# Patient Record
Sex: MALE | Race: WHITE | NOT HISPANIC OR LATINO | Employment: OTHER | URBAN - NONMETROPOLITAN AREA
[De-identification: names, ages, dates, MRNs, and addresses within clinical notes are randomized per-mention and may not be internally consistent; named-entity substitution may affect disease eponyms.]

---

## 2017-03-08 RX ORDER — LISINOPRIL 10 MG/1
10 TABLET ORAL DAILY
Qty: 90 TABLET | Refills: 3 | Status: SHIPPED | OUTPATIENT
Start: 2017-03-08 | End: 2017-03-31 | Stop reason: SDUPTHER

## 2017-03-31 ENCOUNTER — OFFICE VISIT (OUTPATIENT)
Dept: INTERNAL MEDICINE | Facility: CLINIC | Age: 67
End: 2017-03-31

## 2017-03-31 VITALS
WEIGHT: 208.25 LBS | OXYGEN SATURATION: 98 % | HEIGHT: 73 IN | HEART RATE: 89 BPM | SYSTOLIC BLOOD PRESSURE: 120 MMHG | BODY MASS INDEX: 27.6 KG/M2 | TEMPERATURE: 98 F | DIASTOLIC BLOOD PRESSURE: 60 MMHG

## 2017-03-31 DIAGNOSIS — R18.8 CIRRHOSIS OF LIVER WITH ASCITES, UNSPECIFIED HEPATIC CIRRHOSIS TYPE (HCC): ICD-10-CM

## 2017-03-31 DIAGNOSIS — I10 ESSENTIAL HYPERTENSION: Primary | ICD-10-CM

## 2017-03-31 DIAGNOSIS — K74.60 CIRRHOSIS OF LIVER WITH ASCITES, UNSPECIFIED HEPATIC CIRRHOSIS TYPE (HCC): ICD-10-CM

## 2017-03-31 DIAGNOSIS — E10.42 TYPE 1 DIABETES MELLITUS WITH DIABETIC POLYNEUROPATHY (HCC): ICD-10-CM

## 2017-03-31 PROCEDURE — 99214 OFFICE O/P EST MOD 30 MIN: CPT | Performed by: INTERNAL MEDICINE

## 2017-03-31 RX ORDER — LISINOPRIL 10 MG/1
10 TABLET ORAL DAILY
Qty: 90 TABLET | Refills: 3 | Status: SHIPPED | OUTPATIENT
Start: 2017-03-31 | End: 2017-09-25 | Stop reason: SDUPTHER

## 2017-03-31 NOTE — PROGRESS NOTES
Subjective  Garrett Richard is a 66 y.o. male    HPI coming in for follow-up poorly controlled diabetes has not been taking Bydureon. Continues to use tobacco severe peripheral neuropathy denies chest pain or shortness of breath history of steatohepatitis with cirrhosis.    The following portions of the patient's history were reviewed and updated as appropriate: allergies, current medications, past family history, past medical history, past social history, past surgical history, and problem list.     Review of Systems   Constitutional: Negative.  Negative for activity change, appetite change, fatigue and fever.   HENT: Negative.  Negative for congestion, ear discharge, ear pain and trouble swallowing.    Eyes: Negative.  Negative for photophobia and visual disturbance.   Respiratory: Negative.  Negative for cough and shortness of breath.    Cardiovascular: Negative.  Negative for chest pain and palpitations.   Gastrointestinal: Negative.  Negative for abdominal distention, abdominal pain, constipation, diarrhea, nausea and vomiting.   Endocrine: Negative.    Genitourinary: Negative.  Negative for dysuria, hematuria and urgency.   Musculoskeletal: Positive for arthralgias. Negative for back pain, joint swelling and myalgias.   Skin: Negative.  Negative for color change and rash.   Allergic/Immunologic: Negative.    Neurological: Positive for dizziness and numbness. Negative for weakness, light-headedness and headaches.   Hematological: Negative.  Negative for adenopathy. Does not bruise/bleed easily.   Psychiatric/Behavioral: Negative.  Negative for agitation, confusion and dysphoric mood. The patient is not nervous/anxious.        Vitals:    03/31/17 1425   BP: 120/60   Pulse: 89   Temp: 98 °F (36.7 °C)   SpO2: 98%       Objective  Physical Exam   Constitutional: He is oriented to person, place, and time. He appears well-developed and well-nourished.   HENT:   Head: Normocephalic and atraumatic.   Eyes: EOM are  normal. Pupils are equal, round, and reactive to light.   Neck: Normal range of motion. Neck supple. No thyromegaly present.   Cardiovascular: Normal rate, regular rhythm and normal heart sounds.    Pulmonary/Chest: Effort normal and breath sounds normal. No respiratory distress.   Abdominal: Soft. Bowel sounds are normal. There is no tenderness.   Musculoskeletal: Normal range of motion. He exhibits edema.   Neurological: He is alert and oriented to person, place, and time.   Skin: Skin is warm and dry.   Psychiatric: He has a normal mood and affect. His behavior is normal. Judgment and thought content normal.   Nursing note and vitals reviewed.      Diagnoses and all orders for this visit:    Essential hypertension stable with current meds and low-salt diet    Type 1 diabetes mellitus with diabetic polyneuropathy patient currently on Lantus I have added on Humalog insulin 6-10 units with meals 3 times a day follow Accu-Chek readings at home    Cirrhosis of liver with ascites, unspecified hepatic cirrhosis type stable continue with salt restriction recent CMP okay no history of alcohol use    Other orders  -     lisinopril (PRINIVIL,ZESTRIL) 10 MG tablet; Take 1 tablet by mouth Daily.

## 2017-05-31 ENCOUNTER — TELEPHONE (OUTPATIENT)
Dept: INTERNAL MEDICINE | Facility: CLINIC | Age: 67
End: 2017-05-31

## 2017-06-19 ENCOUNTER — OFFICE VISIT (OUTPATIENT)
Dept: INTERNAL MEDICINE | Facility: CLINIC | Age: 67
End: 2017-06-19

## 2017-06-19 VITALS
HEART RATE: 85 BPM | SYSTOLIC BLOOD PRESSURE: 120 MMHG | HEIGHT: 73 IN | OXYGEN SATURATION: 96 % | TEMPERATURE: 98.6 F | DIASTOLIC BLOOD PRESSURE: 65 MMHG | BODY MASS INDEX: 26.94 KG/M2 | WEIGHT: 203.25 LBS

## 2017-06-19 DIAGNOSIS — E10.42 TYPE 1 DIABETES MELLITUS WITH DIABETIC POLYNEUROPATHY (HCC): ICD-10-CM

## 2017-06-19 DIAGNOSIS — I10 ESSENTIAL HYPERTENSION: ICD-10-CM

## 2017-06-19 DIAGNOSIS — R18.8 CIRRHOSIS OF LIVER WITH ASCITES, UNSPECIFIED HEPATIC CIRRHOSIS TYPE (HCC): Primary | ICD-10-CM

## 2017-06-19 DIAGNOSIS — K74.60 CIRRHOSIS OF LIVER WITH ASCITES, UNSPECIFIED HEPATIC CIRRHOSIS TYPE (HCC): Primary | ICD-10-CM

## 2017-06-19 PROCEDURE — 99214 OFFICE O/P EST MOD 30 MIN: CPT | Performed by: INTERNAL MEDICINE

## 2017-06-19 NOTE — PROGRESS NOTES
Subjective  Garrett Richard is a 66 y.o. male    HPI coming in for follow-up history of type I diabetes on insulin basal along with rapid acting insulin with meals with reasonable good blood sugar control denies hypoglycemic episodes history of cirrhosis without significant fluid overload. Has peripheral neuropathy with occasional lightheadedness dizziness thought to be secondary to orthostasis    The following portions of the patient's history were reviewed and updated as appropriate: allergies, current medications, past family history, past medical history, past social history, past surgical history, and problem list.     Review of Systems   Constitutional: Negative.  Negative for activity change, appetite change, fatigue and fever.   HENT: Negative.  Negative for congestion, ear discharge, ear pain and trouble swallowing.    Eyes: Negative.  Negative for photophobia and visual disturbance.   Respiratory: Negative.  Negative for cough and shortness of breath.    Cardiovascular: Negative.  Negative for chest pain and palpitations.   Gastrointestinal: Negative.  Negative for abdominal distention, abdominal pain, constipation, diarrhea, nausea and vomiting.   Endocrine: Negative.    Genitourinary: Negative.  Negative for dysuria, hematuria and urgency.   Musculoskeletal: Positive for arthralgias. Negative for back pain, joint swelling and myalgias.   Skin: Negative.  Negative for color change and rash.   Allergic/Immunologic: Negative.    Neurological: Positive for dizziness and numbness. Negative for weakness, light-headedness and headaches.   Hematological: Negative.  Negative for adenopathy. Does not bruise/bleed easily.   Psychiatric/Behavioral: Negative.  Negative for agitation, confusion and dysphoric mood. The patient is not nervous/anxious.        Vitals:    06/19/17 1455   BP: 120/65   Pulse: 85   Temp: 98.6 °F (37 °C)   SpO2: 96%       Objective  Physical Exam   Constitutional: He is oriented to person,  place, and time. He appears well-developed and well-nourished. No distress.   HENT:   Nose: Nose normal.   Mouth/Throat: Oropharynx is clear and moist.   Eyes: Conjunctivae and EOM are normal. No scleral icterus.   Neck: No tracheal deviation present. No thyromegaly present.   Cardiovascular: Normal rate and regular rhythm.  Exam reveals no friction rub.    No murmur heard.  Pulmonary/Chest: No respiratory distress. He has no wheezes. He has no rales.   Abdominal: Soft. He exhibits no distension and no mass. There is no tenderness. There is no guarding.   Musculoskeletal: Normal range of motion. He exhibits no deformity.   Lymphadenopathy:     He has no cervical adenopathy.   Neurological: He is alert and oriented to person, place, and time. He has normal reflexes. No cranial nerve deficit. Coordination normal.   Skin: Skin is warm and dry. No rash noted. No erythema.   vitilligo   Psychiatric: He has a normal mood and affect. His behavior is normal. Judgment and thought content normal.       Diagnoses and all orders for this visit:    Cirrhosis of liver with ascites, unspecified hepatic cirrhosis type continue with salt and fluid restriction follow liver ultrasound    Essential hypertension stable with current meds and low-salt diet    Type 1 diabetes mellitus with diabetic polyneuropathy continue with current insulin regimen follow A1c

## 2017-06-20 RX ORDER — IBUPROFEN 200 MG
1 TABLET ORAL 3 TIMES DAILY
Qty: 200 EACH | Refills: 3 | Status: SHIPPED | OUTPATIENT
Start: 2017-06-20 | End: 2019-04-25 | Stop reason: SDUPTHER

## 2017-07-11 ENCOUNTER — HOSPITAL ENCOUNTER (OUTPATIENT)
Dept: ULTRASOUND IMAGING | Facility: HOSPITAL | Age: 67
Discharge: HOME | End: 2017-07-11
Attending: INTERNAL MEDICINE | Admitting: INTERNAL MEDICINE

## 2017-07-11 DIAGNOSIS — E10.42 TYPE 1 DIABETES MELLITUS WITH DIABETIC POLYNEUROPATHY (HCC): ICD-10-CM

## 2017-07-11 DIAGNOSIS — R18.8 CIRRHOSIS OF LIVER WITH ASCITES, UNSPECIFIED HEPATIC CIRRHOSIS TYPE (HCC): ICD-10-CM

## 2017-07-11 DIAGNOSIS — K74.60 CIRRHOSIS OF LIVER WITH ASCITES, UNSPECIFIED HEPATIC CIRRHOSIS TYPE (HCC): ICD-10-CM

## 2017-07-11 PROCEDURE — 76705 ECHO EXAM OF ABDOMEN: CPT

## 2017-07-18 ENCOUNTER — TELEPHONE (OUTPATIENT)
Dept: INTERNAL MEDICINE | Facility: CLINIC | Age: 67
End: 2017-07-18

## 2017-07-18 NOTE — TELEPHONE ENCOUNTER
----- Message from Pierce Lubin MD sent at 7/12/2017 10:28 AM EDT -----  Please inform patient ultrasound are okay.

## 2017-07-27 ENCOUNTER — TELEPHONE (OUTPATIENT)
Dept: INTERNAL MEDICINE | Facility: CLINIC | Age: 67
End: 2017-07-27

## 2017-07-27 NOTE — TELEPHONE ENCOUNTER
PATIENT NEEDS REFILL, BUT HIS INSURANCE WILL NOT PAY FOR HIM TO GET THIS DONE UNTIL Saturday, AND HE'LL BE OUT. PLEASE RETURN CALL TO DISCUSS WHAT HE NEEDS TO DO.

## 2017-07-27 NOTE — TELEPHONE ENCOUNTER
I have placed some samples in the lab fridge for the patient, He has been informed to pick these up.

## 2017-09-25 ENCOUNTER — OFFICE VISIT (OUTPATIENT)
Dept: INTERNAL MEDICINE | Facility: CLINIC | Age: 67
End: 2017-09-25

## 2017-09-25 VITALS
BODY MASS INDEX: 26.11 KG/M2 | WEIGHT: 197 LBS | TEMPERATURE: 98.5 F | SYSTOLIC BLOOD PRESSURE: 122 MMHG | OXYGEN SATURATION: 95 % | HEIGHT: 73 IN | RESPIRATION RATE: 12 BRPM | DIASTOLIC BLOOD PRESSURE: 60 MMHG | HEART RATE: 82 BPM

## 2017-09-25 DIAGNOSIS — K74.60 CIRRHOSIS OF LIVER WITH ASCITES, UNSPECIFIED HEPATIC CIRRHOSIS TYPE (HCC): ICD-10-CM

## 2017-09-25 DIAGNOSIS — I10 ESSENTIAL HYPERTENSION: ICD-10-CM

## 2017-09-25 DIAGNOSIS — R18.8 CIRRHOSIS OF LIVER WITH ASCITES, UNSPECIFIED HEPATIC CIRRHOSIS TYPE (HCC): ICD-10-CM

## 2017-09-25 DIAGNOSIS — E10.42 TYPE 1 DIABETES MELLITUS WITH DIABETIC POLYNEUROPATHY (HCC): Primary | ICD-10-CM

## 2017-09-25 LAB
POC CREATININE URINE: 50
POC MICROALBUMIN URINE: 10

## 2017-09-25 PROCEDURE — 82044 UR ALBUMIN SEMIQUANTITATIVE: CPT | Performed by: INTERNAL MEDICINE

## 2017-09-25 PROCEDURE — 99214 OFFICE O/P EST MOD 30 MIN: CPT | Performed by: INTERNAL MEDICINE

## 2017-09-25 RX ORDER — GABAPENTIN 300 MG/1
300 CAPSULE ORAL 2 TIMES DAILY
Qty: 180 CAPSULE | Refills: 1 | Status: SHIPPED | OUTPATIENT
Start: 2017-09-25 | End: 2018-06-22

## 2017-09-25 RX ORDER — LISINOPRIL 10 MG/1
10 TABLET ORAL DAILY
Qty: 90 TABLET | Refills: 3 | Status: SHIPPED | OUTPATIENT
Start: 2017-09-25 | End: 2018-03-21 | Stop reason: SDUPTHER

## 2017-09-25 NOTE — PROGRESS NOTES
"Subjective   Garrett Richard is a 67 y.o. male.     History of Present Illness   Here today for 6 month follow up for his chronic insulin dependent DM, HTN, HLD, cirrhosis and peripheral neuropathy. He reports his blood sugar to be \"all over the place\". He reports checking it at home, but does not have a record with him. He reports a diet consisting mainly of beef sticks, protein, cottage cheese and fruit. He has had a 6 lb weight loss since his last visit. He has an inadequate exercise regimen due to \"problems with my balance\". Pt reports falling several times over the past year due to losing his balance. He states \"if I can't see my feet, I will lose my balance and fall\". No associated syncope or episodes. Denies hypoglycemic episodes.     The following portions of the patient's history were reviewed and updated as appropriate: allergies, current medications, past family history, past medical history, past social history, past surgical history and problem list.    Review of Systems   Constitutional: Negative.  Negative for activity change, appetite change, fatigue and fever.   HENT: Negative.  Negative for congestion, ear discharge, ear pain and trouble swallowing.    Eyes: Negative.  Negative for photophobia and visual disturbance.   Respiratory: Negative.  Negative for cough and shortness of breath.    Cardiovascular: Negative.  Negative for chest pain and palpitations.   Gastrointestinal: Negative.  Negative for abdominal distention, abdominal pain, constipation, diarrhea, nausea and vomiting.   Endocrine: Negative.    Genitourinary: Negative.  Negative for dysuria, hematuria and urgency.   Musculoskeletal: Positive for arthralgias. Negative for back pain, joint swelling and myalgias.   Skin: Negative.  Negative for color change and rash.   Allergic/Immunologic: Negative.    Neurological: Positive for dizziness and numbness (neuropathy bilateral feet). Negative for weakness, light-headedness and headaches. "   Hematological: Negative.  Negative for adenopathy. Does not bruise/bleed easily.   Psychiatric/Behavioral: Negative.  Negative for agitation, confusion and dysphoric mood. The patient is not nervous/anxious.        Objective   Physical Exam   Constitutional: He is oriented to person, place, and time. He appears well-developed and well-nourished. No distress.   HENT:   Nose: Nose normal.   Mouth/Throat: Oropharynx is clear and moist.   Eyes: Conjunctivae and EOM are normal. No scleral icterus.   Neck: No tracheal deviation present. No thyromegaly present.   Cardiovascular: Normal rate and regular rhythm.  Exam reveals no friction rub.    No murmur heard.  Pulmonary/Chest: No respiratory distress. He has no wheezes. He has no rales.   Abdominal: Soft. He exhibits no distension and no mass. There is no tenderness. There is no guarding.   Musculoskeletal: Normal range of motion. He exhibits no deformity.    Garrett had a diabetic foot exam performed today.    Vascular Status -  His exam exhibits right foot vasculature normal. His exam exhibits no right foot edema. His exam exhibits left foot vasculature normal. His exam exhibits no left foot edema.   Skin Integrity  -  His right foot skin is intact.     Garrett 's left foot skin is intact. .  Lymphadenopathy:     He has no cervical adenopathy.   Neurological: He is alert and oriented to person, place, and time. He has normal strength and normal reflexes. No cranial nerve deficit. Coordination normal.   Skin: Skin is warm and dry. No rash noted. No erythema.   vitilligo   Psychiatric: He has a normal mood and affect. His behavior is normal. Judgment and thought content normal.       Assessment/Plan   Garrett was seen today for hyperlipidemia, hypertension and diabetes.    Diagnoses and all orders for this visit:    Type 1 diabetes mellitus with diabetic polyneuropathy - Check A1C and urine for microablumin today. Continue with insulin. ADA diet. Adequate exercise,  moderate intensity most days of the week (goal of 150 minutes per week). Monitor and record glucose at home. Annual eye exam (scheduled for 11/6/17). Good diabetic foot care with annual foot exam. Continue with Neurontin for neuropathy. Fall precautions.    Cirrhosis of liver with ascites, unspecified hepatic cirrhosis type - stable. Check CBC, CMP today. Liver US 7/11/17 was unchanged from previous exam (9/21/16).    Essential hypertension - stable. Continue with antihypertensive medications. Low sodium diet. Increase physical activity as noted above.    Other orders  -     lisinopril (PRINIVIL,ZESTRIL) 10 MG tablet; Take 1 tablet by mouth Daily.  -     gabapentin (NEURONTIN) 300 MG capsule; Take 1 capsule by mouth 2 (Two) Times a Day.    This encounter was scribed by Greg Hilton, as dictated by my teaching physician, Dr. Lubin, in his presence.      1.  This note accurately reflects work and decisions made by me.

## 2017-09-26 LAB
ALBUMIN SERPL-MCNC: 3.6 G/DL (ref 3.5–5)
ALBUMIN/GLOB SERPL: 0.9 G/DL (ref 1–2)
ALP SERPL-CCNC: 114 U/L (ref 38–126)
ALT SERPL-CCNC: 50 U/L (ref 13–69)
AST SERPL-CCNC: 53 U/L (ref 15–46)
BILIRUB SERPL-MCNC: 1.1 MG/DL (ref 0.2–1.3)
BUN SERPL-MCNC: 18 MG/DL (ref 7–20)
BUN/CREAT SERPL: 20 (ref 6.3–21.9)
CALCIUM SERPL-MCNC: 9.7 MG/DL (ref 8.4–10.2)
CHLORIDE SERPL-SCNC: 103 MMOL/L (ref 98–107)
CO2 SERPL-SCNC: 23 MMOL/L (ref 26–30)
CREAT SERPL-MCNC: 0.9 MG/DL (ref 0.6–1.3)
ERYTHROCYTE [DISTWIDTH] IN BLOOD BY AUTOMATED COUNT: 15.9 % (ref 11.5–14.5)
GLOBULIN SER CALC-MCNC: 4 GM/DL
GLUCOSE SERPL-MCNC: 440 MG/DL (ref 74–98)
HBA1C MFR BLD: >14.6 %
HCT VFR BLD AUTO: 33.3 % (ref 42–52)
HGB BLD-MCNC: 10.2 G/DL (ref 14–18)
LDLC SERPL DIRECT ASSAY-MCNC: 103 MG/DL (ref 0–99)
MCH RBC QN AUTO: 25.4 PG (ref 27–31)
MCHC RBC AUTO-ENTMCNC: 30.6 G/DL (ref 30–37)
MCV RBC AUTO: 83 FL (ref 80–94)
PLATELET # BLD AUTO: 69 10*3/MM3 (ref 130–400)
POTASSIUM SERPL-SCNC: 4.8 MMOL/L (ref 3.5–5.1)
PROT SERPL-MCNC: 7.6 G/DL (ref 6.3–8.2)
RBC # BLD AUTO: 4.01 10*6/MM3 (ref 4.7–6.1)
SODIUM SERPL-SCNC: 137 MMOL/L (ref 137–145)
WBC # BLD AUTO: 4.59 10*3/MM3 (ref 4.8–10.8)

## 2017-11-22 ENCOUNTER — TELEPHONE (OUTPATIENT)
Dept: INTERNAL MEDICINE | Facility: CLINIC | Age: 67
End: 2017-11-22

## 2017-11-22 RX ORDER — BLOOD-GLUCOSE METER
1 EACH MISCELLANEOUS 4 TIMES DAILY
Qty: 1 KIT | Refills: 0 | Status: SHIPPED | OUTPATIENT
Start: 2017-11-22 | End: 2018-01-29

## 2017-11-22 NOTE — TELEPHONE ENCOUNTER
Patient stopped by the office and stated he needs a new glucometer and supplies. Please sent in a new order for him to [facility] in [place].

## 2017-11-30 ENCOUNTER — TELEPHONE (OUTPATIENT)
Dept: INTERNAL MEDICINE | Facility: CLINIC | Age: 67
End: 2017-11-30

## 2017-11-30 NOTE — TELEPHONE ENCOUNTER
RETURNED PATIENTS CALL FROM A VM HE LEFT. NO ANSWER. LEFT VM REQUESTING A CALL BACK WITH SOME INFO ON WHAT HE MAY NEED.

## 2017-12-11 RX ORDER — PRENATAL VIT 91/IRON/FOLIC/DHA 28-975-200
COMBINATION PACKAGE (EA) ORAL 2 TIMES DAILY
Qty: 24 G | Refills: 2 | Status: SHIPPED | OUTPATIENT
Start: 2017-12-11 | End: 2018-01-29

## 2017-12-11 NOTE — TELEPHONE ENCOUNTER
Patient called requesting a refill on his insulin pen needles. This has been sent to [facility]

## 2018-01-29 ENCOUNTER — OFFICE VISIT (OUTPATIENT)
Dept: INTERNAL MEDICINE | Facility: CLINIC | Age: 68
End: 2018-01-29

## 2018-01-29 ENCOUNTER — APPOINTMENT (OUTPATIENT)
Dept: GENERAL RADIOLOGY | Facility: HOSPITAL | Age: 68
End: 2018-01-29

## 2018-01-29 ENCOUNTER — HOSPITAL ENCOUNTER (EMERGENCY)
Facility: HOSPITAL | Age: 68
Discharge: HOME | End: 2018-01-29
Attending: EMERGENCY MEDICINE | Admitting: EMERGENCY MEDICINE

## 2018-01-29 VITALS
DIASTOLIC BLOOD PRESSURE: 70 MMHG | HEIGHT: 72 IN | RESPIRATION RATE: 18 BRPM | TEMPERATURE: 98.8 F | HEART RATE: 99 BPM | BODY MASS INDEX: 25.47 KG/M2 | WEIGHT: 188 LBS | OXYGEN SATURATION: 94 % | SYSTOLIC BLOOD PRESSURE: 133 MMHG

## 2018-01-29 VITALS
TEMPERATURE: 98.4 F | WEIGHT: 188 LBS | DIASTOLIC BLOOD PRESSURE: 60 MMHG | HEIGHT: 73 IN | SYSTOLIC BLOOD PRESSURE: 110 MMHG | OXYGEN SATURATION: 96 % | BODY MASS INDEX: 24.92 KG/M2 | HEART RATE: 95 BPM

## 2018-01-29 DIAGNOSIS — M62.81 GENERALIZED MUSCLE WEAKNESS: ICD-10-CM

## 2018-01-29 DIAGNOSIS — N30.00 ACUTE CYSTITIS WITHOUT HEMATURIA: ICD-10-CM

## 2018-01-29 DIAGNOSIS — L03.116 CELLULITIS OF LEFT ANKLE: Primary | ICD-10-CM

## 2018-01-29 DIAGNOSIS — M25.572 ACUTE LEFT ANKLE PAIN: Primary | ICD-10-CM

## 2018-01-29 LAB
ALBUMIN SERPL-MCNC: 3.2 G/DL (ref 3.5–5)
ALBUMIN/GLOB SERPL: 0.8 G/DL (ref 1–2)
ALP SERPL-CCNC: 101 U/L (ref 38–126)
ALT SERPL W P-5'-P-CCNC: 39 U/L (ref 13–69)
ANION GAP SERPL CALCULATED.3IONS-SCNC: 14.2 MMOL/L
AST SERPL-CCNC: 53 U/L (ref 15–46)
BACTERIA UR QL AUTO: ABNORMAL /HPF
BASOPHILS # BLD AUTO: 0.02 10*3/MM3 (ref 0–0.2)
BASOPHILS NFR BLD AUTO: 0.3 % (ref 0–2.5)
BILIRUB SERPL-MCNC: 1.3 MG/DL (ref 0.2–1.3)
BILIRUB UR QL STRIP: NEGATIVE
BUN BLD-MCNC: 19 MG/DL (ref 7–20)
BUN/CREAT SERPL: 21.1 (ref 6.3–21.9)
CALCIUM SPEC-SCNC: 9 MG/DL (ref 8.4–10.2)
CHLORIDE SERPL-SCNC: 101 MMOL/L (ref 98–107)
CLARITY UR: ABNORMAL
CO2 SERPL-SCNC: 23 MMOL/L (ref 26–30)
COLOR UR: YELLOW
CREAT BLD-MCNC: 0.9 MG/DL (ref 0.6–1.3)
CRP SERPL-MCNC: 7.1 MG/DL (ref 0–1)
D-LACTATE SERPL-SCNC: 1.2 MMOL/L (ref 0.5–2)
DEPRECATED RDW RBC AUTO: 46.6 FL (ref 37–54)
EOSINOPHIL # BLD AUTO: 0.06 10*3/MM3 (ref 0–0.7)
EOSINOPHIL NFR BLD AUTO: 1 % (ref 0–7)
ERYTHROCYTE [DISTWIDTH] IN BLOOD BY AUTOMATED COUNT: 15.3 % (ref 11.5–14.5)
FLUAV AG NPH QL: NEGATIVE
FLUBV AG NPH QL IA: NEGATIVE
GFR SERPL CREATININE-BSD FRML MDRD: 85 ML/MIN/1.73
GLOBULIN UR ELPH-MCNC: 3.9 GM/DL
GLUCOSE BLD-MCNC: 318 MG/DL (ref 74–98)
GLUCOSE UR STRIP-MCNC: ABNORMAL MG/DL
HCT VFR BLD AUTO: 29.7 % (ref 42–52)
HGB BLD-MCNC: 9.8 G/DL (ref 14–18)
HGB UR QL STRIP.AUTO: ABNORMAL
HOLD SPECIMEN: NORMAL
HOLD SPECIMEN: NORMAL
HYALINE CASTS UR QL AUTO: ABNORMAL /LPF
IMM GRANULOCYTES # BLD: 0.01 10*3/MM3 (ref 0–0.06)
IMM GRANULOCYTES NFR BLD: 0.2 % (ref 0–0.6)
KETONES UR QL STRIP: ABNORMAL
LEUKOCYTE ESTERASE UR QL STRIP.AUTO: ABNORMAL
LYMPHOCYTES # BLD AUTO: 0.66 10*3/MM3 (ref 0.6–3.4)
LYMPHOCYTES NFR BLD AUTO: 11.3 % (ref 10–50)
MCH RBC QN AUTO: 27.5 PG (ref 27–31)
MCHC RBC AUTO-ENTMCNC: 33 G/DL (ref 30–37)
MCV RBC AUTO: 83.4 FL (ref 80–94)
MONOCYTES # BLD AUTO: 0.6 10*3/MM3 (ref 0–0.9)
MONOCYTES NFR BLD AUTO: 10.3 % (ref 0–12)
NEUTROPHILS # BLD AUTO: 4.5 10*3/MM3 (ref 2–6.9)
NEUTROPHILS NFR BLD AUTO: 76.9 % (ref 37–80)
NITRITE UR QL STRIP: POSITIVE
NRBC BLD MANUAL-RTO: 0 /100 WBC (ref 0–0)
PH UR STRIP.AUTO: 6 [PH] (ref 5–8)
PLATELET # BLD AUTO: 59 10*3/MM3 (ref 130–400)
PMV BLD AUTO: 11.1 FL (ref 6–12)
POTASSIUM BLD-SCNC: 4.2 MMOL/L (ref 3.5–5.1)
PROT SERPL-MCNC: 7.1 G/DL (ref 6.3–8.2)
PROT UR QL STRIP: ABNORMAL
RBC # BLD AUTO: 3.56 10*6/MM3 (ref 4.7–6.1)
RBC # UR: ABNORMAL /HPF
REF LAB TEST METHOD: ABNORMAL
RENAL EPI CELLS #/AREA URNS HPF: ABNORMAL /HPF
SODIUM BLD-SCNC: 134 MMOL/L (ref 137–145)
SP GR UR STRIP: 1.02 (ref 1–1.03)
SQUAMOUS #/AREA URNS HPF: ABNORMAL /HPF
TROPONIN I SERPL-MCNC: <0.012 NG/ML (ref 0–0.03)
UROBILINOGEN UR QL STRIP: ABNORMAL
WBC NRBC COR # BLD: 5.85 10*3/MM3 (ref 4.8–10.8)
WBC UR QL AUTO: ABNORMAL /HPF
WHOLE BLOOD HOLD SPECIMEN: NORMAL
WHOLE BLOOD HOLD SPECIMEN: NORMAL

## 2018-01-29 PROCEDURE — 86140 C-REACTIVE PROTEIN: CPT | Performed by: EMERGENCY MEDICINE

## 2018-01-29 PROCEDURE — 87147 CULTURE TYPE IMMUNOLOGIC: CPT | Performed by: EMERGENCY MEDICINE

## 2018-01-29 PROCEDURE — 93005 ELECTROCARDIOGRAM TRACING: CPT | Performed by: EMERGENCY MEDICINE

## 2018-01-29 PROCEDURE — 87804 INFLUENZA ASSAY W/OPTIC: CPT | Performed by: EMERGENCY MEDICINE

## 2018-01-29 PROCEDURE — 87086 URINE CULTURE/COLONY COUNT: CPT | Performed by: EMERGENCY MEDICINE

## 2018-01-29 PROCEDURE — 96361 HYDRATE IV INFUSION ADD-ON: CPT

## 2018-01-29 PROCEDURE — 96365 THER/PROPH/DIAG IV INF INIT: CPT

## 2018-01-29 PROCEDURE — 87040 BLOOD CULTURE FOR BACTERIA: CPT | Performed by: EMERGENCY MEDICINE

## 2018-01-29 PROCEDURE — 96375 TX/PRO/DX INJ NEW DRUG ADDON: CPT

## 2018-01-29 PROCEDURE — 81001 URINALYSIS AUTO W/SCOPE: CPT | Performed by: EMERGENCY MEDICINE

## 2018-01-29 PROCEDURE — 99213 OFFICE O/P EST LOW 20 MIN: CPT | Performed by: INTERNAL MEDICINE

## 2018-01-29 PROCEDURE — 87186 SC STD MICRODIL/AGAR DIL: CPT | Performed by: EMERGENCY MEDICINE

## 2018-01-29 PROCEDURE — 84484 ASSAY OF TROPONIN QUANT: CPT | Performed by: EMERGENCY MEDICINE

## 2018-01-29 PROCEDURE — 83605 ASSAY OF LACTIC ACID: CPT | Performed by: EMERGENCY MEDICINE

## 2018-01-29 PROCEDURE — 85025 COMPLETE CBC W/AUTO DIFF WBC: CPT | Performed by: EMERGENCY MEDICINE

## 2018-01-29 PROCEDURE — 71045 X-RAY EXAM CHEST 1 VIEW: CPT

## 2018-01-29 PROCEDURE — 99284 EMERGENCY DEPT VISIT MOD MDM: CPT

## 2018-01-29 PROCEDURE — 87077 CULTURE AEROBIC IDENTIFY: CPT | Performed by: EMERGENCY MEDICINE

## 2018-01-29 PROCEDURE — 25010000002 KETOROLAC TROMETHAMINE PER 15 MG: Performed by: EMERGENCY MEDICINE

## 2018-01-29 PROCEDURE — 80053 COMPREHEN METABOLIC PANEL: CPT | Performed by: EMERGENCY MEDICINE

## 2018-01-29 RX ORDER — CLINDAMYCIN PHOSPHATE 600 MG/50ML
600 INJECTION, SOLUTION INTRAVENOUS ONCE
Status: COMPLETED | OUTPATIENT
Start: 2018-01-29 | End: 2018-01-29

## 2018-01-29 RX ORDER — KETOROLAC TROMETHAMINE 30 MG/ML
30 INJECTION, SOLUTION INTRAMUSCULAR; INTRAVENOUS ONCE
Status: COMPLETED | OUTPATIENT
Start: 2018-01-29 | End: 2018-01-29

## 2018-01-29 RX ORDER — HYDROCODONE BITARTRATE AND ACETAMINOPHEN 5; 325 MG/1; MG/1
1 TABLET ORAL EVERY 6 HOURS PRN
Qty: 12 TABLET | Refills: 0 | Status: SHIPPED | OUTPATIENT
Start: 2018-01-29 | End: 2018-03-21

## 2018-01-29 RX ORDER — SODIUM CHLORIDE 0.9 % (FLUSH) 0.9 %
10 SYRINGE (ML) INJECTION AS NEEDED
Status: DISCONTINUED | OUTPATIENT
Start: 2018-01-29 | End: 2018-01-30 | Stop reason: HOSPADM

## 2018-01-29 RX ORDER — CLINDAMYCIN HYDROCHLORIDE 300 MG/1
300 CAPSULE ORAL 4 TIMES DAILY
Qty: 40 CAPSULE | Refills: 0 | Status: SHIPPED | OUTPATIENT
Start: 2018-01-29 | End: 2018-03-21

## 2018-01-29 RX ORDER — ACETAMINOPHEN 325 MG/1
975 TABLET ORAL ONCE
Status: COMPLETED | OUTPATIENT
Start: 2018-01-29 | End: 2018-01-29

## 2018-01-29 RX ADMIN — SODIUM CHLORIDE 1000 ML: 9 INJECTION, SOLUTION INTRAVENOUS at 21:32

## 2018-01-29 RX ADMIN — ACETAMINOPHEN 975 MG: 325 TABLET, FILM COATED ORAL at 21:32

## 2018-01-29 RX ADMIN — KETOROLAC TROMETHAMINE 30 MG: 30 INJECTION, SOLUTION INTRAMUSCULAR at 22:57

## 2018-01-29 RX ADMIN — CLINDAMYCIN PHOSPHATE 600 MG: 600 INJECTION, SOLUTION INTRAVENOUS at 22:58

## 2018-01-29 NOTE — PROGRESS NOTES
"Subjective  Garrett Richard is a 67 y.o. male    HPI coming in accompanied by his wife patient had an accidental fall 3 days ago while stepping out of his car and developed some pain in his left ankle laterally. He did not seek any medical attention at that time since then his pain has increased there is now some associated swelling and redness in the ankle region he is not able to bear weight fully on his left foot. He is using NSAIDs on a when necessary basis only. History of diabetes and cirrhosis    The following portions of the patient's history were reviewed and updated as appropriate: allergies, current medications, past family history, past medical history, past social history, past surgical history, and problem list.     Review of Systems   Constitutional: Negative.  Negative for activity change, appetite change, fatigue and fever.   HENT: Negative for congestion, ear discharge, ear pain and trouble swallowing.    Eyes: Negative for photophobia and visual disturbance.   Respiratory: Negative for cough and shortness of breath.    Cardiovascular: Negative for chest pain and palpitations.   Gastrointestinal: Negative for abdominal distention, abdominal pain, constipation, diarrhea, nausea and vomiting.   Endocrine: Negative.    Genitourinary: Negative for dysuria, hematuria and urgency.   Musculoskeletal: Positive for joint swelling. Negative for arthralgias, back pain and myalgias.   Skin: Negative for color change and rash.   Allergic/Immunologic: Negative.    Neurological: Negative for dizziness, weakness, light-headedness and headaches.   Hematological: Negative for adenopathy. Does not bruise/bleed easily.   Psychiatric/Behavioral: Negative for agitation, confusion and dysphoric mood. The patient is not nervous/anxious.        Visit Vitals   • /60   • Pulse 95   • Temp 98.4 °F (36.9 °C)   • Ht 185.4 cm (73\")   • Wt 85.3 kg (188 lb)   • SpO2 96%   • BMI 24.8 kg/m2       Objective  Physical Exam "   Constitutional: He is oriented to person, place, and time. He appears well-developed and well-nourished. No distress.   HENT:   Nose: Nose normal.   Mouth/Throat: Oropharynx is clear and moist.   Eyes: Conjunctivae and EOM are normal. No scleral icterus.   Neck: No tracheal deviation present. No thyromegaly present.   Cardiovascular: Normal rate and regular rhythm.  Exam reveals no friction rub.    No murmur heard.  Pulmonary/Chest: No respiratory distress. He has no wheezes. He has no rales.   Abdominal: Soft. He exhibits no distension and no mass. There is no tenderness. There is no guarding.   Musculoskeletal: Normal range of motion. He exhibits tenderness and deformity.   Lymphadenopathy:     He has no cervical adenopathy.   Neurological: He is alert and oriented to person, place, and time. He has normal reflexes. No cranial nerve deficit. Coordination normal.   Skin: Skin is warm and dry. No rash noted. No erythema.   Psychiatric: He has a normal mood and affect. His behavior is normal. Judgment and thought content normal.       Diagnoses and all orders for this visit:    Acute left ankle pain. With significant tenderness noted laterally suspicious for fibular fracture. We will need to refer to  orthopedics. Wife prefers Dr. Gaston referral made patient to be seen today at 1:30. Will hold off on imaging studies here.

## 2018-02-01 ENCOUNTER — TELEPHONE (OUTPATIENT)
Dept: INTERNAL MEDICINE | Facility: CLINIC | Age: 68
End: 2018-02-01

## 2018-02-01 LAB
BACTERIA SPEC AEROBE CULT: ABNORMAL
BACTERIA SPEC AEROBE CULT: ABNORMAL

## 2018-02-02 ENCOUNTER — OFFICE VISIT (OUTPATIENT)
Dept: INTERNAL MEDICINE | Facility: CLINIC | Age: 68
End: 2018-02-02

## 2018-02-02 VITALS
HEIGHT: 72 IN | OXYGEN SATURATION: 96 % | HEART RATE: 89 BPM | TEMPERATURE: 100 F | DIASTOLIC BLOOD PRESSURE: 70 MMHG | SYSTOLIC BLOOD PRESSURE: 130 MMHG | BODY MASS INDEX: 25.47 KG/M2 | WEIGHT: 188 LBS

## 2018-02-02 DIAGNOSIS — R50.81 FEVER IN OTHER DISEASES: Primary | ICD-10-CM

## 2018-02-02 PROCEDURE — 99213 OFFICE O/P EST LOW 20 MIN: CPT | Performed by: INTERNAL MEDICINE

## 2018-02-02 RX ORDER — LEVOFLOXACIN 500 MG/1
500 TABLET, FILM COATED ORAL DAILY
Qty: 7 TABLET | Refills: 0 | Status: SHIPPED | OUTPATIENT
Start: 2018-02-02 | End: 2018-03-21

## 2018-02-02 NOTE — PROGRESS NOTES
"Subjective  Garrett Richard is a 67 y.o. male    HPI coming in for follow-up on his left ankle injury with pain and swelling. Was evaluated by orthopedic surgeon x-ray did not show evidence of fracture appears to have had a bad sprain. He is wearing a boot now has had some low-grade fever was evaluated in the emergency room where a chest x-ray was unremarkable CBC without white count elevation. Urine analysis with evidence of infection. Culture grew staph epidermidis sensitive to Augmentin he is currently on Augmentin but continues to have low-grade fever    The following portions of the patient's history were reviewed and updated as appropriate: allergies, current medications, past family history, past medical history, past social history, past surgical history, and problem list.     Review of Systems   Constitutional: Negative.  Negative for activity change, appetite change, fatigue and fever.   HENT: Negative for congestion, ear discharge, ear pain and trouble swallowing.    Eyes: Negative for photophobia and visual disturbance.   Respiratory: Negative for cough and shortness of breath.    Cardiovascular: Negative for chest pain and palpitations.   Gastrointestinal: Negative for abdominal distention, abdominal pain, constipation, diarrhea, nausea and vomiting.   Endocrine: Negative.    Genitourinary: Negative for dysuria, hematuria and urgency.   Musculoskeletal: Positive for arthralgias. Negative for back pain, joint swelling and myalgias.        Lt ankle pain   Skin: Negative for color change and rash.   Allergic/Immunologic: Negative.    Neurological: Negative for dizziness, weakness, light-headedness and headaches.   Hematological: Negative for adenopathy. Does not bruise/bleed easily.   Psychiatric/Behavioral: Negative for agitation, confusion and dysphoric mood. The patient is not nervous/anxious.        Visit Vitals   • /70   • Pulse 89   • Temp 100 °F (37.8 °C)   • Ht 182.9 cm (72\")   • Wt 85.3 " kg (188 lb)   • SpO2 96%   • BMI 25.5 kg/m2       Objective  Physical Exam   Constitutional: He is oriented to person, place, and time. He appears well-developed and well-nourished. No distress.   HENT:   Nose: Nose normal.   Mouth/Throat: Oropharynx is clear and moist.   Eyes: Conjunctivae and EOM are normal. No scleral icterus.   Neck: No tracheal deviation present. No thyromegaly present.   Cardiovascular: Normal rate and regular rhythm.  Exam reveals no friction rub.    No murmur heard.  Pulmonary/Chest: No respiratory distress. He has no wheezes. He has no rales.   Abdominal: Soft. He exhibits no distension and no mass. There is no tenderness. There is no guarding.   Musculoskeletal: Normal range of motion. He exhibits no deformity.   Lymphadenopathy:     He has no cervical adenopathy.   Neurological: He is alert and oriented to person, place, and time. He has normal reflexes. No cranial nerve deficit. Coordination normal.   Skin: Skin is warm and dry. Rash noted. There is erythema.   Psychiatric: He has a normal mood and affect. His behavior is normal. Judgment and thought content normal.       Diagnoses and all orders for this visit:    Fever in other diseases labs reviewed discussed with patient. Suspect symptoms are secondary to cellulitis in his left ankle. Switch antibiotics changed to levofloxacin. Encouraged to push fluids needs aggressive blood sugar control. Consider IV antibiotics if needed he is going to be following up with orthopedics next week.    Other orders  -     Cancel: CBC No Differential  -     Cancel: Comprehensive metabolic panel  -     Cancel: Sedimentation rate, automated  -     Cancel: C-reactive protein  -     levoFLOXacin (LEVAQUIN) 500 MG tablet; Take 1 tablet by mouth Daily.

## 2018-02-03 LAB
BACTERIA SPEC AEROBE CULT: NORMAL
BACTERIA SPEC AEROBE CULT: NORMAL

## 2018-02-09 ENCOUNTER — TELEPHONE (OUTPATIENT)
Dept: INTERNAL MEDICINE | Facility: CLINIC | Age: 68
End: 2018-02-09

## 2018-02-12 ENCOUNTER — TELEPHONE (OUTPATIENT)
Dept: INTERNAL MEDICINE | Facility: CLINIC | Age: 68
End: 2018-02-12

## 2018-02-12 NOTE — TELEPHONE ENCOUNTER
PATIENT LEFT A  THIS MORNING SAYING HE STILL HAS THE INFECTION IN HIS ANKLE AND WANTS TO KNOW IF HE CAN HAVE ANOTHER ROUND OF ANTIBIOTICS     PLEASE ADVISE

## 2018-02-12 NOTE — TELEPHONE ENCOUNTER
PATIENT INFORMED TO TAKE ANTIINFLAMMATORY MED - HE SAYS THE LEVAQUIN WORKS THE BEST. HIS FOOT WAS LOOKING A LOT BETTER UNTIL THE ANTIBIOTIC RAN OUT.

## 2018-02-13 NOTE — TELEPHONE ENCOUNTER
Discussed with patient. Foot is better. Continue conservative measures. He is an appointment with orthopedics on 2 days. Continue to hold off on antibiotic

## 2018-03-21 ENCOUNTER — OFFICE VISIT (OUTPATIENT)
Dept: INTERNAL MEDICINE | Facility: CLINIC | Age: 68
End: 2018-03-21

## 2018-03-21 VITALS
WEIGHT: 187 LBS | HEART RATE: 109 BPM | SYSTOLIC BLOOD PRESSURE: 138 MMHG | BODY MASS INDEX: 25.33 KG/M2 | HEIGHT: 72 IN | TEMPERATURE: 98.5 F | OXYGEN SATURATION: 97 % | DIASTOLIC BLOOD PRESSURE: 72 MMHG

## 2018-03-21 DIAGNOSIS — E78.2 MIXED HYPERLIPIDEMIA: ICD-10-CM

## 2018-03-21 DIAGNOSIS — I10 ESSENTIAL HYPERTENSION: ICD-10-CM

## 2018-03-21 DIAGNOSIS — K74.60 CIRRHOSIS OF LIVER WITH ASCITES, UNSPECIFIED HEPATIC CIRRHOSIS TYPE (HCC): Primary | ICD-10-CM

## 2018-03-21 DIAGNOSIS — R18.8 CIRRHOSIS OF LIVER WITH ASCITES, UNSPECIFIED HEPATIC CIRRHOSIS TYPE (HCC): Primary | ICD-10-CM

## 2018-03-21 DIAGNOSIS — E10.42 TYPE 1 DIABETES MELLITUS WITH DIABETIC POLYNEUROPATHY (HCC): ICD-10-CM

## 2018-03-21 LAB
ALBUMIN SERPL-MCNC: 3.9 G/DL (ref 3.5–5)
ALBUMIN/GLOB SERPL: 0.8 G/DL (ref 1–2)
ALP SERPL-CCNC: 104 U/L (ref 38–126)
ALT SERPL-CCNC: 48 U/L (ref 13–69)
AST SERPL-CCNC: 59 U/L (ref 15–46)
BILIRUB SERPL-MCNC: 1.2 MG/DL (ref 0.2–1.3)
BUN SERPL-MCNC: 9 MG/DL (ref 7–20)
BUN/CREAT SERPL: 11.3 (ref 6.3–21.9)
CALCIUM SERPL-MCNC: 9.9 MG/DL (ref 8.4–10.2)
CHLORIDE SERPL-SCNC: 103 MMOL/L (ref 98–107)
CHOLEST SERPL-MCNC: 214 MG/DL (ref 0–199)
CO2 SERPL-SCNC: 26 MMOL/L (ref 26–30)
CREAT SERPL-MCNC: 0.8 MG/DL (ref 0.6–1.3)
ERYTHROCYTE [DISTWIDTH] IN BLOOD BY AUTOMATED COUNT: 15.5 % (ref 11.5–14.5)
GFR SERPLBLD CREATININE-BSD FMLA CKD-EPI: 118 ML/MIN/1.73
GFR SERPLBLD CREATININE-BSD FMLA CKD-EPI: 97 ML/MIN/1.73
GLOBULIN SER CALC-MCNC: 4.7 GM/DL
GLUCOSE SERPL-MCNC: 271 MG/DL (ref 74–98)
HBA1C MFR BLD: 10.1 %
HCT VFR BLD AUTO: 35.9 % (ref 42–52)
HDLC SERPL-MCNC: 50 MG/DL (ref 40–60)
HGB BLD-MCNC: 11.7 G/DL (ref 14–18)
LDLC SERPL CALC-MCNC: 142 MG/DL (ref 0–99)
MCH RBC QN AUTO: 28.2 PG (ref 27–31)
MCHC RBC AUTO-ENTMCNC: 32.6 G/DL (ref 30–37)
MCV RBC AUTO: 86.5 FL (ref 80–94)
PLATELET # BLD AUTO: 92 10*3/MM3 (ref 130–400)
POC CREATININE URINE: 100
POC MICROALBUMIN URINE: 30
POTASSIUM SERPL-SCNC: 4.3 MMOL/L (ref 3.5–5.1)
PROT SERPL-MCNC: 8.6 G/DL (ref 6.3–8.2)
RBC # BLD AUTO: 4.15 10*6/MM3 (ref 4.7–6.1)
SODIUM SERPL-SCNC: 144 MMOL/L (ref 137–145)
TRIGL SERPL-MCNC: 109 MG/DL
VLDLC SERPL CALC-MCNC: 21.8 MG/DL
WBC # BLD AUTO: 5.6 10*3/MM3 (ref 4.8–10.8)

## 2018-03-21 PROCEDURE — 82044 UR ALBUMIN SEMIQUANTITATIVE: CPT | Performed by: PHYSICIAN ASSISTANT

## 2018-03-21 PROCEDURE — 99214 OFFICE O/P EST MOD 30 MIN: CPT | Performed by: PHYSICIAN ASSISTANT

## 2018-03-21 RX ORDER — LISINOPRIL 10 MG/1
10 TABLET ORAL DAILY
Qty: 90 TABLET | Refills: 3 | Status: CANCELLED | OUTPATIENT
Start: 2018-03-21

## 2018-03-21 RX ORDER — LISINOPRIL 10 MG/1
10 TABLET ORAL DAILY
Qty: 90 TABLET | Refills: 3 | Status: SHIPPED | OUTPATIENT
Start: 2018-03-21 | End: 2019-04-25 | Stop reason: SDUPTHER

## 2018-03-21 NOTE — PROGRESS NOTES
Subjective     Chief Complaint: 6 month f/u    History of Present Illness     Garrett Richard is a 67 y.o. male with history of insulin dependent DM, HTN, HLD, peripheral neuropathy, and cirrhosis here for 6 month follow up. States his sugars have been running high at home, typically low 300s. Drinking a lot of water. Diet consists of cottage cheese, fruits, protein, beef sticks- has not changed. He's tried increasing his humalog 10 units 3x daily to 20 units 3x daily. Also using toujeo 20 units at bedtime. Sugar is typically highest upon awakening. No symptomatic lows, lowest reading 96. Neuropathy stable. Not due for eye exam until November.    Still recovering from an ankle injury resulting in cellulitis. Overall doing much better but a mild limp. Has not been exercising much because of this.    Weight is stable in the 180s. No increase in abdominal swelling, denies abdominal pain.    The following portions of the patient's history were reviewed and updated as appropriate: current medications, allergies, PMH.    Review of Systems   Constitutional: Negative for appetite change, chills, fatigue, fever and unexpected weight change.   HENT: Negative for congestion, ear pain, hearing loss, nosebleeds, sinus pressure, sore throat, tinnitus and trouble swallowing.    Eyes: Negative for photophobia, discharge and visual disturbance.   Respiratory: Negative for cough, chest tightness, shortness of breath and wheezing.    Cardiovascular: Negative for chest pain, palpitations and leg swelling.   Gastrointestinal: Negative for abdominal distention, abdominal pain, blood in stool, constipation, diarrhea, nausea and vomiting.   Endocrine: Positive for polydipsia and polyuria. Negative for cold intolerance, heat intolerance and polyphagia.   Genitourinary: Negative for difficulty urinating, dysuria, flank pain, frequency, hematuria and urgency.   Musculoskeletal: Negative for arthralgias, back pain, joint swelling,  "myalgias, neck pain and neck stiffness.        Left ankle pain.   Skin: Negative for color change, pallor, rash and wound.   Allergic/Immunologic: Negative for environmental allergies, food allergies and immunocompromised state.   Neurological: Negative for dizziness, weakness, numbness and headaches.   Hematological: Negative for adenopathy. Does not bruise/bleed easily.   Psychiatric/Behavioral: Negative for dysphoric mood, hallucinations, sleep disturbance and suicidal ideas. The patient is not nervous/anxious.      Objective     Vitals:    03/21/18 1001   BP: 138/72   Pulse: 109   Temp: 98.5 °F (36.9 °C)   SpO2: 97%   Weight: 84.8 kg (187 lb)   Height: 182.9 cm (72.01\")       Physical Exam   Constitutional: Appears well-developed and well-nourished.   Mouth/Throat: Oropharynx is clear and moist.   Eyes: EOM are normal. Pupils are equal, round, and reactive to light.   Neck: Normal range of motion. Neck supple.   Cardiovascular: Normal rate, regular rhythm and normal heart sounds.    Pulmonary/Chest: Effort normal and breath sounds normal.   Abdominal: Soft. Bowel sounds are normal.   Musculoskeletal: Left ankle edema. Normal range of motion.   Lymphadenopathy: No cervical adenopathy noted.   Neurological: Alert and oriented to person, place, and time.   Skin: Skin is warm and dry.   Psychiatric: Exhibits a normal mood and affect.     Assessment/Plan     Diagnoses and all orders for this visit:    Cirrhosis of liver with ascites, unspecified hepatic cirrhosis type  -     Comprehensive Metabolic Panel  -     US Liver; Future  -     CBC No Differential    Last US 07/11/2017, no change from previous (9/21/16). Labs today.    Type 1 diabetes mellitus with diabetic polyneuropathy  -     Comprehensive Metabolic Panel  -     Hemoglobin A1c  -     POC Microalbumin  -     HUMALOG 100 UNIT/ML injection; Inject 20 Units under the skin 3 (Three) Times a Day Before Meals.  -     Insulin Glargine 300 UNIT/ML solution " pen-injector; Inject 30 Units under the skin every night at bedtime.    We will try increasing Toujeo at night. Continue to monitor BS at home, watch diet, exercise encouraged. Labs today. Up to date on eye exam (November 2017). Continue good diabetic foot care.    Essential hypertension  -     lisinopril (PRINIVIL,ZESTRIL) 10 MG tablet; Take 1 tablet by mouth Daily.    Continue low salt diet. Increase exercise as tolerated.    Mixed hyperlipidemia  -     Lipid Panel    6 month f/u with Dr. Lubin, sooner if blood sugars worsen.    Adelita Maier PA-C  03/21/2018         Please note that portions of this note were completed with a voice recognition program. Efforts were made to edit dictation, but occasionally words are mistranscribed.

## 2018-04-27 ENCOUNTER — TELEPHONE (OUTPATIENT)
Dept: INTERNAL MEDICINE | Facility: CLINIC | Age: 68
End: 2018-04-27

## 2018-04-27 DIAGNOSIS — E10.42 TYPE 1 DIABETES MELLITUS WITH DIABETIC POLYNEUROPATHY (HCC): ICD-10-CM

## 2018-05-01 ENCOUNTER — HOSPITAL ENCOUNTER (OUTPATIENT)
Dept: ULTRASOUND IMAGING | Facility: HOSPITAL | Age: 68
Discharge: HOME | End: 2018-05-01
Admitting: PHYSICIAN ASSISTANT

## 2018-05-01 DIAGNOSIS — E10.42 TYPE 1 DIABETES MELLITUS WITH DIABETIC POLYNEUROPATHY (HCC): ICD-10-CM

## 2018-05-01 DIAGNOSIS — R18.8 CIRRHOSIS OF LIVER WITH ASCITES, UNSPECIFIED HEPATIC CIRRHOSIS TYPE (HCC): ICD-10-CM

## 2018-05-01 DIAGNOSIS — K74.60 CIRRHOSIS OF LIVER WITH ASCITES, UNSPECIFIED HEPATIC CIRRHOSIS TYPE (HCC): ICD-10-CM

## 2018-05-01 PROCEDURE — 76705 ECHO EXAM OF ABDOMEN: CPT

## 2018-05-01 NOTE — TELEPHONE ENCOUNTER
MEDICATION SENT TO PHARMACY     SPOKE WITH PHARMACIST - THEY HAVE THE SCRIPT READY FOR .     PATIENT INFORMED

## 2018-05-02 DIAGNOSIS — R16.0 LIVER MASSES: Primary | ICD-10-CM

## 2018-05-08 ENCOUNTER — HOSPITAL ENCOUNTER (OUTPATIENT)
Dept: CT IMAGING | Facility: HOSPITAL | Age: 68
Discharge: HOME | End: 2018-05-08
Attending: INTERNAL MEDICINE | Admitting: INTERNAL MEDICINE

## 2018-05-08 DIAGNOSIS — R16.0 LIVER MASSES: ICD-10-CM

## 2018-05-08 LAB — CREAT BLDA-MCNC: 0.8 MG/DL (ref 0.6–1.3)

## 2018-05-08 PROCEDURE — 0 IOPAMIDOL PER 1 ML: Performed by: INTERNAL MEDICINE

## 2018-05-08 PROCEDURE — 74178 CT ABD&PLV WO CNTR FLWD CNTR: CPT

## 2018-05-08 PROCEDURE — 82565 ASSAY OF CREATININE: CPT

## 2018-05-08 RX ADMIN — IOPAMIDOL 95 ML: 755 INJECTION, SOLUTION INTRAVENOUS at 09:30

## 2018-05-11 ENCOUNTER — TELEPHONE (OUTPATIENT)
Dept: INTERNAL MEDICINE | Facility: CLINIC | Age: 68
End: 2018-05-11

## 2018-05-11 DIAGNOSIS — R16.0 LIVER MASS: Primary | ICD-10-CM

## 2018-05-25 DIAGNOSIS — R16.0 LIVER MASS: Primary | ICD-10-CM

## 2018-06-22 ENCOUNTER — OFFICE VISIT (OUTPATIENT)
Dept: INTERNAL MEDICINE | Facility: CLINIC | Age: 68
End: 2018-06-22

## 2018-06-22 VITALS
BODY MASS INDEX: 25.47 KG/M2 | TEMPERATURE: 100.8 F | DIASTOLIC BLOOD PRESSURE: 70 MMHG | SYSTOLIC BLOOD PRESSURE: 120 MMHG | HEART RATE: 105 BPM | OXYGEN SATURATION: 96 % | HEIGHT: 72 IN | WEIGHT: 188 LBS

## 2018-06-22 DIAGNOSIS — I10 ESSENTIAL HYPERTENSION: ICD-10-CM

## 2018-06-22 DIAGNOSIS — E10.65 TYPE 1 DIABETES MELLITUS WITH HYPERGLYCEMIA (HCC): ICD-10-CM

## 2018-06-22 DIAGNOSIS — K74.60 CIRRHOSIS OF LIVER WITH ASCITES, UNSPECIFIED HEPATIC CIRRHOSIS TYPE (HCC): ICD-10-CM

## 2018-06-22 DIAGNOSIS — E10.42 TYPE 1 DIABETES MELLITUS WITH DIABETIC POLYNEUROPATHY (HCC): Primary | ICD-10-CM

## 2018-06-22 DIAGNOSIS — R18.8 CIRRHOSIS OF LIVER WITH ASCITES, UNSPECIFIED HEPATIC CIRRHOSIS TYPE (HCC): ICD-10-CM

## 2018-06-22 PROCEDURE — G0009 ADMIN PNEUMOCOCCAL VACCINE: HCPCS | Performed by: INTERNAL MEDICINE

## 2018-06-22 PROCEDURE — 99214 OFFICE O/P EST MOD 30 MIN: CPT | Performed by: INTERNAL MEDICINE

## 2018-06-22 PROCEDURE — 90670 PCV13 VACCINE IM: CPT | Performed by: INTERNAL MEDICINE

## 2018-06-22 NOTE — PROGRESS NOTES
"Subjective  Garrett Richard is a 67 y.o. 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 Recent finding off a sub 2 cm mass in his liver on routine ultrasound. He is now scheduled for possible liver transplant through [facility]. Coming in for follow-up recent A1c should suboptimal control    The following portions of the patient's history were reviewed and updated as appropriate: allergies, current medications, past family history, past medical history, past social history, past surgical history, and problem list.     Review of Systems   Constitutional: Positive for fatigue. Negative for activity change, appetite change and fever.   HENT: Negative for congestion, ear discharge, ear pain and trouble swallowing.    Eyes: Negative for photophobia and visual disturbance.   Respiratory: Negative for cough and shortness of breath.    Cardiovascular: Negative for chest pain and palpitations.   Gastrointestinal: Negative for abdominal distention, abdominal pain, constipation, diarrhea, nausea and vomiting.   Endocrine: Negative.    Genitourinary: Negative for dysuria, hematuria and urgency.   Musculoskeletal: Positive for arthralgias and gait problem. Negative for back pain, joint swelling and myalgias.   Skin: Negative for color change and rash.   Allergic/Immunologic: Negative.    Neurological: Negative for dizziness, weakness, light-headedness and headaches.   Hematological: Negative for adenopathy. Does not bruise/bleed easily.   Psychiatric/Behavioral: Positive for decreased concentration and sleep disturbance. Negative for agitation, confusion and dysphoric mood. The patient is not nervous/anxious.        Visit Vitals  /70   Pulse 105   Temp (!) 100.8 °F (38.2 °C)   Ht 182.9 cm (72.01\")   Wt 85.3 kg (188 lb)   SpO2 96%   BMI 25.49 kg/m²       Objective  Physical Exam "   Constitutional: He is oriented to person, place, and time. He appears well-developed and well-nourished. No distress.   HENT:   Nose: Nose normal.   Mouth/Throat: Oropharynx is clear and moist.   Eyes: Conjunctivae and EOM are normal. No scleral icterus.   Neck: No tracheal deviation present. No thyromegaly present.   Cardiovascular: Normal rate and regular rhythm.  Exam reveals no friction rub.    No murmur heard.  Pulmonary/Chest: No respiratory distress. He has no wheezes. He has no rales.   Abdominal: Soft. He exhibits no distension and no mass. There is no tenderness. There is no guarding.   Musculoskeletal: Normal range of motion. He exhibits deformity.   Lymphadenopathy:     He has no cervical adenopathy.   Neurological: He is alert and oriented to person, place, and time. He has normal reflexes. No cranial nerve deficit. Coordination normal.   Skin: Skin is warm and dry. No rash noted. No erythema.   Psychiatric: He has a normal mood and affect. His behavior is normal. Judgment and thought content normal.       Diagnoses and all orders for this visit:    Type 1 diabetes mellitus with diabetic polyneuropathy discussed titration of basal insulin discussed dietary restrictions    Type 1 diabetes with hyperglycemia, with long-term current use of insulin    Cirrhosis of liver with ascites, unspecified hepatic cirrhosis type without significant abdominal distention no change in bowel habits no melena or hematochezia. Continue with current meds    Essential hypertension stable with current meds and low-salt diet

## 2018-08-03 ENCOUNTER — TELEPHONE (OUTPATIENT)
Dept: INTERNAL MEDICINE | Facility: CLINIC | Age: 68
End: 2018-08-03

## 2018-08-03 NOTE — TELEPHONE ENCOUNTER
Patient left a voice mail about some scheduling conflicts.   I called him back and left a detailed voice mail requesting a call back on Monday and I will try to get in touch with him as well.

## 2018-08-13 ENCOUNTER — OFFICE VISIT (OUTPATIENT)
Dept: INTERNAL MEDICINE | Facility: CLINIC | Age: 68
End: 2018-08-13

## 2018-08-13 VITALS
BODY MASS INDEX: 26.55 KG/M2 | DIASTOLIC BLOOD PRESSURE: 70 MMHG | WEIGHT: 196 LBS | HEIGHT: 72 IN | OXYGEN SATURATION: 95 % | TEMPERATURE: 98.1 F | SYSTOLIC BLOOD PRESSURE: 120 MMHG | HEART RATE: 80 BPM

## 2018-08-13 DIAGNOSIS — E10.42 TYPE 1 DIABETES MELLITUS WITH DIABETIC POLYNEUROPATHY (HCC): Primary | ICD-10-CM

## 2018-08-13 PROCEDURE — 99213 OFFICE O/P EST LOW 20 MIN: CPT | Performed by: INTERNAL MEDICINE

## 2018-08-13 NOTE — PROGRESS NOTES
"Subjective  Garrett Richard is a 68 y.o. male    HPI patient with cirrhosis and portal hypertension with a renal lesion scheduled for surgical removal has poorly controlled diabetes has brought in his blood sugar readings which show very poor control this is in spite of his increasing his Humalog insulin with meals up to 24 units 3 times a day. He is reasonably compliant with diet    The following portions of the patient's history were reviewed and updated as appropriate: allergies, current medications, past family history, past medical history, past social history, past surgical history, and problem list.     Review of Systems   Constitutional: Positive for fatigue. Negative for activity change, appetite change and fever.   HENT: Negative for congestion, ear discharge, ear pain and trouble swallowing.    Eyes: Negative for photophobia and visual disturbance.   Respiratory: Negative for cough and shortness of breath.    Cardiovascular: Negative for chest pain and palpitations.   Gastrointestinal: Negative for abdominal distention, abdominal pain, constipation, diarrhea, nausea and vomiting.   Endocrine: Negative.    Genitourinary: Negative for dysuria, hematuria and urgency.   Musculoskeletal: Positive for gait problem. Negative for arthralgias, back pain, joint swelling and myalgias.   Skin: Negative for color change and rash.   Allergic/Immunologic: Negative.    Neurological: Negative for dizziness, weakness, light-headedness and headaches.   Hematological: Negative for adenopathy. Does not bruise/bleed easily.   Psychiatric/Behavioral: Negative for agitation, confusion, decreased concentration, dysphoric mood and sleep disturbance. The patient is not nervous/anxious.        Visit Vitals  /70   Pulse 80   Temp 98.1 °F (36.7 °C)   Ht 182.9 cm (72.01\")   Wt 88.9 kg (196 lb)   SpO2 95%   BMI 26.57 kg/m²       Objective  Physical Exam   Constitutional: He is oriented to person, place, and time. He appears " well-developed and well-nourished. No distress.   HENT:   Nose: Nose normal.   Mouth/Throat: Oropharynx is clear and moist.   Eyes: Conjunctivae and EOM are normal. No scleral icterus.   Neck: No tracheal deviation present. No thyromegaly present.   Cardiovascular: Normal rate and regular rhythm.  Exam reveals no friction rub.    No murmur heard.  Pulmonary/Chest: No respiratory distress. He has no wheezes. He has no rales.   Abdominal: Soft. He exhibits no distension and no mass. There is no tenderness. There is no guarding.   Musculoskeletal: Normal range of motion. He exhibits deformity.   Lymphadenopathy:     He has no cervical adenopathy.   Neurological: He is alert and oriented to person, place, and time. He has normal reflexes. No cranial nerve deficit. Coordination normal.   Skin: Skin is warm and dry. No rash noted. No erythema.   Psychiatric: He has a normal mood and affect. His behavior is normal. Judgment and thought content normal.       Diagnoses and all orders for this visit:    Type 1 diabetes mellitus with diabetic polyneuropathy (CMS/[facility]) will increase basal insulin to 30 units  daily at bedtime. Discussed walking regimen discussed titration of basal insulin 5 following a.m. Accu-Cheks.

## 2018-10-25 ENCOUNTER — OFFICE VISIT (OUTPATIENT)
Dept: INTERNAL MEDICINE | Facility: CLINIC | Age: 68
End: 2018-10-25

## 2018-10-25 VITALS
HEART RATE: 85 BPM | OXYGEN SATURATION: 97 % | SYSTOLIC BLOOD PRESSURE: 126 MMHG | WEIGHT: 203.8 LBS | BODY MASS INDEX: 27.6 KG/M2 | HEIGHT: 72 IN | TEMPERATURE: 99.5 F | DIASTOLIC BLOOD PRESSURE: 64 MMHG

## 2018-10-25 DIAGNOSIS — R18.8 CIRRHOSIS OF LIVER WITH ASCITES, UNSPECIFIED HEPATIC CIRRHOSIS TYPE (HCC): ICD-10-CM

## 2018-10-25 DIAGNOSIS — C22.0 HEPATOCELLULAR CARCINOMA (HCC): ICD-10-CM

## 2018-10-25 DIAGNOSIS — N28.89 RIGHT RENAL MASS: Primary | ICD-10-CM

## 2018-10-25 DIAGNOSIS — K74.60 CIRRHOSIS OF LIVER WITH ASCITES, UNSPECIFIED HEPATIC CIRRHOSIS TYPE (HCC): ICD-10-CM

## 2018-10-25 DIAGNOSIS — E10.42 TYPE 1 DIABETES MELLITUS WITH DIABETIC POLYNEUROPATHY (HCC): ICD-10-CM

## 2018-10-25 PROCEDURE — 90471 IMMUNIZATION ADMIN: CPT | Performed by: INTERNAL MEDICINE

## 2018-10-25 PROCEDURE — 99214 OFFICE O/P EST MOD 30 MIN: CPT | Performed by: INTERNAL MEDICINE

## 2018-10-25 PROCEDURE — 90662 IIV NO PRSV INCREASED AG IM: CPT | Performed by: INTERNAL MEDICINE

## 2018-10-25 NOTE — PROGRESS NOTES
Subjective  Garrett Richard is a 68 y.o. male    HPI coming in for follow-up patient with nonalcoholic steatohepatitis resulting in cirrhosis of the liver for which she was being followed by gastroenterology in [place]. A recent ultrasound of the liver showed evidence of a hepatocellular mass he has undergone transarterial chemotherapy embolization for the liver mass with good results. Also noted to have an incidental finding of a 2 cm right kidney mass suspicious for renal cell carcinoma. Because he is a candidate for future transplant this was treated with cryotherapy with good results. He has poorly controlled diabetes with neuropathy currently undergoing treatment from endocrine with escalating doses of rapid insulin with meals and basal insulin at nighttime. Now with an A1c with much better control. He denies episode suggestive of hypoglycemia    The following portions of the patient's history were reviewed and updated as appropriate: allergies, current medications, past family history, past medical history, past social history, past surgical history, and problem list.     Review of Systems   Constitutional: Positive for fatigue. Negative for activity change, appetite change and fever.   HENT: Negative for congestion, ear discharge, ear pain and trouble swallowing.    Eyes: Negative for photophobia and visual disturbance.   Respiratory: Negative for cough and shortness of breath.    Cardiovascular: Negative for chest pain and palpitations.   Gastrointestinal: Negative for abdominal distention, abdominal pain, constipation, diarrhea, nausea and vomiting.   Endocrine: Negative.    Genitourinary: Negative for dysuria, hematuria and urgency.   Musculoskeletal: Positive for arthralgias and gait problem. Negative for back pain, joint swelling and myalgias.   Skin: Negative for color change and rash.   Allergic/Immunologic: Negative.    Neurological: Positive for numbness. Negative for dizziness, weakness,  "light-headedness and headaches.   Hematological: Negative for adenopathy. Does not bruise/bleed easily.   Psychiatric/Behavioral: Positive for sleep disturbance. Negative for agitation, confusion, decreased concentration and dysphoric mood. The patient is not nervous/anxious.        Visit Vitals  /64   Pulse 85   Temp 99.5 °F (37.5 °C)   Ht 182.9 cm (72.01\")   Wt 92.4 kg (203 lb 12.8 oz)   SpO2 97%   BMI 27.63 kg/m²       Objective  Physical Exam   Constitutional: He is oriented to person, place, and time. He appears well-developed and well-nourished. No distress.   HENT:   Nose: Nose normal.   Mouth/Throat: Oropharynx is clear and moist.   Eyes: Conjunctivae and EOM are normal. No scleral icterus.   Neck: No tracheal deviation present. No thyromegaly present.   Cardiovascular: Normal rate and regular rhythm.  Exam reveals no friction rub.    No murmur heard.  Pulmonary/Chest: No respiratory distress. He has no wheezes. He has no rales.   Abdominal: Soft. He exhibits no distension and no mass. There is no tenderness. There is no guarding.   Musculoskeletal: Normal range of motion. He exhibits deformity.   Lymphadenopathy:     He has no cervical adenopathy.   Neurological: He is alert and oriented to person, place, and time. He has normal reflexes. No cranial nerve deficit. Coordination normal.   Skin: Skin is warm and dry. No rash noted. No erythema.   vitilligo   Psychiatric: He has a normal mood and affect. His behavior is normal. Judgment and thought content normal.       Diagnoses and all orders for this visit:    Right renal mass status post cryotherapy records from [facility] reviewed    Type 1 diabetes mellitus with diabetic polyneuropathy (CMS/HCC) appears stable following up with endocrinology    Cirrhosis of liver with ascites, unspecified hepatic cirrhosis type (CMS/HCC) stable recent weight gain noted without evidence of fluid overload    Hepatocellular carcinoma (CMS/HCC) stable  Stopped " tobacco use about 4 months ago.

## 2019-04-24 RX ORDER — FLURBIPROFEN SODIUM 0.3 MG/ML
SOLUTION/ DROPS OPHTHALMIC
Qty: 100 EACH | Refills: 1 | Status: SHIPPED | OUTPATIENT
Start: 2019-04-24 | End: 2019-04-25 | Stop reason: SDUPTHER

## 2019-04-25 ENCOUNTER — OFFICE VISIT (OUTPATIENT)
Dept: INTERNAL MEDICINE | Facility: CLINIC | Age: 69
End: 2019-04-25

## 2019-04-25 VITALS
TEMPERATURE: 98.6 F | OXYGEN SATURATION: 97 % | HEIGHT: 72 IN | DIASTOLIC BLOOD PRESSURE: 72 MMHG | SYSTOLIC BLOOD PRESSURE: 133 MMHG | WEIGHT: 209 LBS | HEART RATE: 79 BPM | BODY MASS INDEX: 28.31 KG/M2

## 2019-04-25 DIAGNOSIS — I10 ESSENTIAL HYPERTENSION: ICD-10-CM

## 2019-04-25 DIAGNOSIS — E10.42 TYPE 1 DIABETES MELLITUS WITH DIABETIC POLYNEUROPATHY (HCC): ICD-10-CM

## 2019-04-25 DIAGNOSIS — K74.60 CIRRHOSIS OF LIVER WITH ASCITES, UNSPECIFIED HEPATIC CIRRHOSIS TYPE (HCC): Primary | ICD-10-CM

## 2019-04-25 DIAGNOSIS — R18.8 CIRRHOSIS OF LIVER WITH ASCITES, UNSPECIFIED HEPATIC CIRRHOSIS TYPE (HCC): Primary | ICD-10-CM

## 2019-04-25 PROCEDURE — 99214 OFFICE O/P EST MOD 30 MIN: CPT | Performed by: INTERNAL MEDICINE

## 2019-04-25 RX ORDER — IBUPROFEN 200 MG
1 TABLET ORAL 3 TIMES DAILY
Qty: 200 EACH | Refills: 3 | Status: SHIPPED | OUTPATIENT
Start: 2019-04-25 | End: 2021-01-01

## 2019-04-25 RX ORDER — LISINOPRIL 10 MG/1
10 TABLET ORAL DAILY
Qty: 90 TABLET | Refills: 3 | Status: SHIPPED | OUTPATIENT
Start: 2019-04-25 | End: 2020-01-31 | Stop reason: SDUPTHER

## 2019-04-25 NOTE — PROGRESS NOTES
Subjective  Garrett Richard is a 68 y.o. male    HPI coming in for follow-up patient with cirrhosis of the liver thought to be secondary to  SMITH in a patient with poorly controlled diabetes in the past.  On routine follow-up noted to have hepatocellular carcinoma which was treated with transarterial chemoembolization with resolution of his lesion.  Also had an incidental finding of renal cell carcinoma which was treated with cryotherapy with resolution.  He is following up with endocrinology for his diabetes control most recent A1c of less than 7 he is on high doses of insulin.  He has stopped using tobacco over the last 1 month now.  No new complaints    The following portions of the patient's history were reviewed and updated as appropriate: allergies, current medications, past family history, past medical history, past social history, past surgical history, and problem list.     Review of Systems   Constitutional: Positive for fatigue. Negative for activity change, appetite change and fever.   HENT: Negative for congestion, ear discharge, ear pain and trouble swallowing.    Eyes: Negative for photophobia and visual disturbance.   Respiratory: Negative for cough and shortness of breath.    Cardiovascular: Negative for chest pain and palpitations.   Gastrointestinal: Negative for abdominal distention, abdominal pain, constipation, diarrhea, nausea and vomiting.   Endocrine: Negative.    Genitourinary: Negative for dysuria, hematuria and urgency.   Musculoskeletal: Positive for arthralgias. Negative for back pain, joint swelling and myalgias.   Skin: Negative for color change and rash.   Allergic/Immunologic: Negative.    Neurological: Negative for dizziness, weakness, light-headedness and headaches.   Hematological: Negative for adenopathy. Does not bruise/bleed easily.   Psychiatric/Behavioral: Positive for sleep disturbance. Negative for agitation, confusion and dysphoric mood. The patient is not  "nervous/anxious.        Visit Vitals  /72 Comment: Automatic   Pulse 79   Temp 98.6 °F (37 °C) (Temporal)   Ht 182.9 cm (72.01\")   Wt 94.8 kg (209 lb)   SpO2 97%   BMI 28.34 kg/m²       Objective  Physical Exam   Constitutional: He is oriented to person, place, and time. He appears well-developed and well-nourished. No distress.   HENT:   Nose: Nose normal.   Mouth/Throat: Oropharynx is clear and moist.   Eyes: Conjunctivae and EOM are normal. No scleral icterus.   Neck: No tracheal deviation present. No thyromegaly present.   Cardiovascular: Normal rate and regular rhythm. Exam reveals no friction rub.   No murmur heard.  Pulmonary/Chest: No respiratory distress. He has no wheezes. He has no rales.   Abdominal: Soft. He exhibits no distension and no mass. There is no tenderness. There is no guarding.   Musculoskeletal: Normal range of motion. He exhibits deformity.    Garrett had a diabetic foot exam performed today.  Vascular Status -  His right foot exhibits normal foot vasculature  and no edema. His left foot exhibits normal foot vasculature  and no edema.  Skin Integrity  -  His right foot skin is intact.His left foot skin is intact..  Lymphadenopathy:     He has no cervical adenopathy.   Neurological: He is alert and oriented to person, place, and time. He has normal reflexes. No cranial nerve deficit. Coordination normal.   Skin: Skin is warm and dry. No rash noted. No erythema.   Psychiatric: He has a normal mood and affect. His behavior is normal. Judgment and thought content normal.       Diagnoses and all orders for this visit:    Cirrhosis of liver with ascites, unspecified hepatic cirrhosis type (CMS/HCC) stable without evidence of fluid overload    Essential hypertension stable continue with current meds low-salt diet  -     lisinopril (PRINIVIL,ZESTRIL) 10 MG tablet; Take 1 tablet by mouth Daily.    Type 1 diabetes mellitus with diabetic polyneuropathy (CMS/HCC) continue with the dietary " "restrictions advised him to bring copy of his lab work from [facility] with his next visit  -     HUMALOG 100 UNIT/ML injection; Inject 30 Units under the skin into the appropriate area as directed 3 (Three) Times a Day Before Meals.    Other orders  -     Insulin Pen Needle (B-D UF III MINI PEN NEEDLES) 31G X 5 MM misc; 4 TIMES DAILY  -     Insulin Glargine 300 UNIT/ML solution pen-injector; Inject 40 Units under the skin into the appropriate area as directed every night at bedtime.  -     Insulin Syringe (SAFETY INSULIN SYRINGES) 29G X 1/2\" 1 ML misc; 1 each 3 (Three) Times a Day.        "

## 2019-05-02 DIAGNOSIS — E10.42 TYPE 1 DIABETES MELLITUS WITH DIABETIC POLYNEUROPATHY (HCC): ICD-10-CM

## 2019-05-02 RX ORDER — INSULIN GLARGINE 100 [IU]/ML
40 INJECTION, SOLUTION SUBCUTANEOUS NIGHTLY
Qty: 15 PEN | Refills: 3 | Status: SHIPPED | OUTPATIENT
Start: 2019-05-02 | End: 2019-09-19 | Stop reason: SDUPTHER

## 2019-05-02 RX ORDER — LANCETS 30 GAUGE
EACH MISCELLANEOUS
Qty: 200 EACH | Refills: 11 | Status: SHIPPED | OUTPATIENT
Start: 2019-05-02 | End: 2021-01-01 | Stop reason: CLARIF

## 2019-05-02 RX ORDER — BLOOD-GLUCOSE METER
KIT MISCELLANEOUS
Qty: 1 EACH | Refills: 0 | Status: SHIPPED | OUTPATIENT
Start: 2019-05-02 | End: 2021-01-01 | Stop reason: CLARIF

## 2019-05-02 NOTE — TELEPHONE ENCOUNTER
Insurance is saying that this patient needs a new meter The [employer] DUID, then he needs the test scripts, and needles for this machine.  He also needs a refill on the humalog and tojeo.  His prescription insurance is requiring a PA for the test strips, needles, tojeo, and humalog.  His prescription card is scanned in now.  He only has one day left of the humalog.

## 2019-05-03 RX ORDER — BLOOD SUGAR DIAGNOSTIC
1 STRIP MISCELLANEOUS 3 TIMES DAILY
Qty: 100 EACH | Refills: 3 | Status: SHIPPED | OUTPATIENT
Start: 2019-05-03 | End: 2021-01-01

## 2019-05-20 ENCOUNTER — TELEPHONE (OUTPATIENT)
Dept: INTERNAL MEDICINE | Facility: CLINIC | Age: 69
End: 2019-05-20

## 2019-05-20 NOTE — TELEPHONE ENCOUNTER
Patient would like to inform you that    has changed his insulin. It looks as though Dr. Lubin is aware of this but he just wanted double check.     He also has an MRI done at  on 05/07/2019 and he would like to know if Dr. Lubin as reviewed and/or received a copy.

## 2019-05-21 NOTE — TELEPHONE ENCOUNTER
Patient informed via detailed voice mail that I was able to get a copy of the MRI report.     I thanked him for letting us know

## 2019-08-01 RX ORDER — INSULIN ASPART 100 [IU]/ML
INJECTION, SOLUTION INTRAVENOUS; SUBCUTANEOUS
Qty: 3 PEN | Refills: 3 | Status: SHIPPED | OUTPATIENT
Start: 2019-08-01 | End: 2019-09-22 | Stop reason: SDUPTHER

## 2019-09-13 ENCOUNTER — OFFICE VISIT (OUTPATIENT)
Dept: INTERNAL MEDICINE | Facility: CLINIC | Age: 69
End: 2019-09-13

## 2019-09-13 VITALS
TEMPERATURE: 100 F | DIASTOLIC BLOOD PRESSURE: 66 MMHG | WEIGHT: 218 LBS | BODY MASS INDEX: 29.53 KG/M2 | HEART RATE: 79 BPM | HEIGHT: 72 IN | OXYGEN SATURATION: 99 % | SYSTOLIC BLOOD PRESSURE: 138 MMHG

## 2019-09-13 DIAGNOSIS — R18.8 CIRRHOSIS OF LIVER WITH ASCITES, UNSPECIFIED HEPATIC CIRRHOSIS TYPE (HCC): ICD-10-CM

## 2019-09-13 DIAGNOSIS — I10 ESSENTIAL HYPERTENSION: ICD-10-CM

## 2019-09-13 DIAGNOSIS — E10.42 TYPE 1 DIABETES MELLITUS WITH DIABETIC POLYNEUROPATHY (HCC): ICD-10-CM

## 2019-09-13 DIAGNOSIS — C22.0 HEPATOCELLULAR CARCINOMA (HCC): Primary | ICD-10-CM

## 2019-09-13 DIAGNOSIS — K74.60 CIRRHOSIS OF LIVER WITH ASCITES, UNSPECIFIED HEPATIC CIRRHOSIS TYPE (HCC): ICD-10-CM

## 2019-09-13 PROCEDURE — 99397 PER PM REEVAL EST PAT 65+ YR: CPT | Performed by: INTERNAL MEDICINE

## 2019-09-13 NOTE — PROGRESS NOTES
Chief Complaint   Patient presents with   • Annual Exam       Garrett Richard is a 69 y.o. male and is here for a comprehensive physical exam. The patient reports problems - hepato cellular ca, s/p TACE, diabetes, cirrhosis.     History:  Erectile dysfunction  yes  Nocturia  yes      Do you take any herbs or supplements that were not prescribed by a doctor? no    Are you taking aspirin daily? no      Health Habits:  Dental Exam. up to date  Eye Exam. up to date  Exercise: 5 times/week.  Current exercise activities include: walking    Health Maintenance   Topic Date Due   • HEMOGLOBIN A1C  09/21/2018   • LIPID PANEL  03/21/2019   • URINE MICROALBUMIN  03/21/2019   • ZOSTER VACCINE (3 of 3) 09/13/2019 (Originally 11/11/2016)   • PNEUMOCOCCAL VACCINES (65+ LOW/MEDIUM RISK) (2 of 2 - PPSV23) 09/13/2029 (Originally 6/22/2019)   • DIABETIC EYE EXAM  04/09/2020   • DIABETIC FOOT EXAM  04/25/2020   • ANNUAL PHYSICAL  09/14/2020   • TDAP/TD VACCINES (3 - Td) 09/01/2025   • COLONOSCOPY  06/21/2028   • HEPATITIS C SCREENING  Completed   • INFLUENZA VACCINE  Addressed       PMH, PSH, SocHx, FamHx, Allergies, and Medications: Reviewed and updated in the Visit Navigator.     No Known Allergies  Past Medical History:   Diagnosis Date   • Diabetes mellitus (CMS/HCC)     type II per patient   • Hepatocellular carcinoma (CMS/HCC) 10/25/2018    s/p TACE procedure with no mass noted on fu imaging   • Hypertension    • Liver disease    • Renal cell cancer (CMS/HCC)     s/p cryosurgery   • Umbilical hernia    • Upper respiratory infection    • Urinary symptom or sign    • Urinary tract infection      Past Surgical History:   Procedure Laterality Date   • TONSILLECTOMY       Social History     Socioeconomic History   • Marital status:      Spouse name: Not on file   • Number of children: Not on file   • Years of education: Not on file   • Highest education level: Not on file   Tobacco Use   • Smoking status: Never Smoker   •  "Smokeless tobacco: Never Used   Substance and Sexual Activity   • Alcohol use: No   • Drug use: No   • Sexual activity: Defer     No family history on file.    Review of Systems  Review of Systems   Constitutional: Negative.  Negative for activity change, appetite change, fatigue and fever.   HENT: Negative for congestion, ear discharge, ear pain and trouble swallowing.    Eyes: Negative for photophobia and visual disturbance.   Respiratory: Negative for cough and shortness of breath.    Cardiovascular: Negative for chest pain and palpitations.   Gastrointestinal: Negative for abdominal distention, abdominal pain, constipation, diarrhea, nausea and vomiting.   Endocrine: Negative.    Genitourinary: Negative for dysuria, hematuria and urgency.   Musculoskeletal: Positive for arthralgias. Negative for back pain, joint swelling and myalgias.   Skin: Negative for color change and rash.   Allergic/Immunologic: Negative.    Neurological: Negative for dizziness, weakness, light-headedness and headaches.   Hematological: Negative for adenopathy. Does not bruise/bleed easily.   Psychiatric/Behavioral: Positive for sleep disturbance. Negative for agitation, confusion and dysphoric mood. The patient is not nervous/anxious.        Vitals:    09/13/19 1534   BP: 138/66   Pulse: 79   Temp: 100 °F (37.8 °C)   SpO2: 99%       Objective   /66 Comment: Automatic  Pulse 79   Temp 100 °F (37.8 °C) (Temporal)   Ht 182.9 cm (72.01\")   Wt 98.9 kg (218 lb)   SpO2 99%   BMI 29.56 kg/m²     Physical Exam   Constitutional: He is oriented to person, place, and time. He appears well-developed and well-nourished. No distress.   HENT:   Nose: Nose normal.   Mouth/Throat: Oropharynx is clear and moist.   Eyes: Conjunctivae and EOM are normal. No scleral icterus.   Neck: No tracheal deviation present. No thyromegaly present.   Cardiovascular: Normal rate and regular rhythm. Exam reveals no friction rub.   No murmur " heard.  Pulmonary/Chest: No respiratory distress. He has no wheezes. He has no rales.   Abdominal: Soft. He exhibits no distension and no mass. There is no tenderness. There is no guarding.   Musculoskeletal: Normal range of motion. He exhibits deformity.   Lymphadenopathy:     He has no cervical adenopathy.   Neurological: He is alert and oriented to person, place, and time. He has normal reflexes. No cranial nerve deficit. Coordination normal.   Skin: Skin is warm and dry. No rash noted. No erythema.   Psychiatric: He has a normal mood and affect. His behavior is normal. Judgment and thought content normal.       The 10-year CVD risk score (D'Agostino, et al., 2008) is: 47.2%    Values used to calculate the score:      Age: 69 years      Sex: Male      Diabetic: Yes      Tobacco smoker: No      Systolic Blood Pressure: 138 mmHg      Is BP treated: Yes      HDL Cholesterol: 50 mg/dL      Total Cholesterol: 214 mg/dL    Lab Results   Component Value Date    CHLPL 214 (H) 03/21/2018    TRIG 109 03/21/2018    HDL 50 03/21/2018     (H) 03/21/2018     Glucose   Date Value Ref Range Status   01/29/2018 318 (H) 74 - 98 mg/dL Final     Comment:     Glucose >180, Hemoglobin A1C recommended.     BUN   Date Value Ref Range Status   03/21/2018 9 7 - 20 mg/dL Final   01/29/2018 19 7 - 20 mg/dL Final     Creatinine   Date Value Ref Range Status   05/08/2018 0.80 0.60 - 1.30 mg/dL Final     Comment:     Serial Number: [id number]:  [id number]     Sodium   Date Value Ref Range Status   03/21/2018 144 137 - 145 mmol/L Final   01/29/2018 134 (L) 137 - 145 mmol/L Final     Potassium   Date Value Ref Range Status   03/21/2018 4.3 3.5 - 5.1 mmol/L Final   01/29/2018 4.2 3.5 - 5.1 mmol/L Final     Chloride   Date Value Ref Range Status   03/21/2018 103 98 - 107 mmol/L Final   01/29/2018 101 98 - 107 mmol/L Final     CO2   Date Value Ref Range Status   01/29/2018 23.0 (L) 26.0 - 30.0 mmol/L Final     Total CO2   Date Value Ref  Range Status   03/21/2018 26.0 26.0 - 30.0 mmol/L Final     Calcium   Date Value Ref Range Status   03/21/2018 9.9 8.4 - 10.2 mg/dL Final   01/29/2018 9.0 8.4 - 10.2 mg/dL Final     Total Protein   Date Value Ref Range Status   01/29/2018 7.1 6.3 - 8.2 g/dL Final     Albumin   Date Value Ref Range Status   03/21/2018 3.90 3.50 - 5.00 g/dL Final   01/29/2018 3.20 (L) 3.50 - 5.00 g/dL Final     ALT (SGPT)   Date Value Ref Range Status   03/21/2018 48 13 - 69 U/L Final   01/29/2018 39 13 - 69 U/L Final     AST (SGOT)   Date Value Ref Range Status   03/21/2018 59 (H) 15 - 46 U/L Final   01/29/2018 53 (H) 15 - 46 U/L Final     Alkaline Phosphatase   Date Value Ref Range Status   03/21/2018 104 38 - 126 U/L Final   01/29/2018 101 38 - 126 U/L Final     Total Bilirubin   Date Value Ref Range Status   03/21/2018 1.2 0.2 - 1.3 mg/dL Final   01/29/2018 1.3 0.2 - 1.3 mg/dL Final     eGFR Non  Am   Date Value Ref Range Status   03/21/2018 97 >60 mL/min/1.73 Final     Comment:     Abnormal estimated GFR should be followed by more specific  studies to confirm end stage chronic renal disease. The  equation used for calculation may not be accurate for  patients less than 19 years old, greater than 70 years old,  patients at extremes of weight, malnutrition, or with acute  renal dysfunction.       eGFR Non  Amer   Date Value Ref Range Status   01/29/2018 85 >60 mL/min/1.73 Final     A/G Ratio   Date Value Ref Range Status   03/21/2018 0.8 (L) 1.0 - 2.0 g/dL Final     BUN/Creatinine Ratio   Date Value Ref Range Status   03/21/2018 11.3 6.3 - 21.9 Final   01/29/2018 21.1 6.3 - 21.9 Final     Anion Gap   Date Value Ref Range Status   01/29/2018 14.2 mmol/L Final     Lab Results   Component Value Date    WBC 5.60 03/21/2018    HGB 11.7 (L) 03/21/2018    HCT 35.9 (L) 03/21/2018    MCV 86.5 03/21/2018    PLT 92 (L) 03/21/2018       Assessment/Plan   1. Healthy male exam.    2. Patient Counseling: Including but not Limited to  the following, when appropriate:  --Nutrition: Stressed importance of moderation in sodium/caffeine intake, saturated fat and cholesterol, caloric balance, sufficient intake of fresh fruits, vegetables, fiber  .--Discussed the issue of daily use of baby aspirin.  --Exercise: Stressed the importance of regular exercise.   --Substance Abuse: Discussed cessation/primary prevention of tobacco, alcohol, or other drug use; driving or other dangerous activities under the influence; availability of treatment for abuse, as indicated based on social history.    --Sexuality: Discussed sexually transmitted diseases, partner selection, use of condoms and contraceptive alternatives.   --Injury prevention: Discussed safety belts, safety helmets, smoke detector, smoking near bedding or upholstery.   --Dental health: Discussed importance of regular tooth brushing, flossing, and dental visits.  --Immunizations reviewed.  --Discussed benefits of colon cancer screening.      3. Discussed the patient's BMI with him.  The BMI is above average; BMI management plan is completed  4. No Follow-up on file.  5. Age-appropriate Screening Scheduled  6. There are no Patient Instructions on file for this visit.    Assessment/Plan     Garrett was seen today for annual exam.    Diagnoses and all orders for this visit:    Hepatocellular carcinoma (CMS/HCC) stable awaiting transplant is on the list at [facility]    Cirrhosis of liver with ascites, unspecified hepatic cirrhosis type (CMS/HCC) without evidence of ascites continue with current meds he is following up with the  transplant center    Type 1 diabetes mellitus with diabetic polyneuropathy (CMS/HCC) following up with  endocrinology continue with insulin recent A1c shows good control    Essential hypertension stable with current meds and low-salt diet

## 2019-09-19 RX ORDER — INSULIN GLARGINE 100 [IU]/ML
40 INJECTION, SOLUTION SUBCUTANEOUS NIGHTLY
Qty: 15 PEN | Refills: 6 | Status: SHIPPED | OUTPATIENT
Start: 2019-09-19 | End: 2020-06-24

## 2019-09-23 RX ORDER — INSULIN ASPART 100 [IU]/ML
INJECTION, SOLUTION INTRAVENOUS; SUBCUTANEOUS
Qty: 15 PEN | Refills: 3 | Status: SHIPPED | OUTPATIENT
Start: 2019-09-23 | End: 2019-11-19 | Stop reason: SDUPTHER

## 2019-11-19 RX ORDER — INSULIN ASPART 100 [IU]/ML
INJECTION, SOLUTION INTRAVENOUS; SUBCUTANEOUS
Qty: 45 PEN | Refills: 3 | Status: SHIPPED | OUTPATIENT
Start: 2019-11-19 | End: 2020-05-04 | Stop reason: SDUPTHER

## 2020-01-01 ENCOUNTER — TELEPHONE (OUTPATIENT)
Dept: INTERNAL MEDICINE | Facility: CLINIC | Age: 70
End: 2020-01-01

## 2020-01-01 ENCOUNTER — HOSPITAL ENCOUNTER (OUTPATIENT)
Dept: NEUROLOGY | Facility: HOSPITAL | Age: 70
Discharge: HOME | End: 2020-10-20
Admitting: NURSE PRACTITIONER

## 2020-01-01 PROCEDURE — 95886 MUSC TEST DONE W/N TEST COMP: CPT

## 2020-01-01 PROCEDURE — 95911 NRV CNDJ TEST 9-10 STUDIES: CPT

## 2020-01-01 RX ORDER — FLASH GLUCOSE SCANNING READER
1 EACH MISCELLANEOUS
Qty: 2 DEVICE | Refills: 3 | Status: SHIPPED | OUTPATIENT
Start: 2020-01-01

## 2020-01-01 RX ORDER — SILDENAFIL CITRATE 20 MG/1
60 TABLET ORAL DAILY PRN
Qty: 30 TABLET | Refills: 2 | Status: SHIPPED | OUTPATIENT
Start: 2020-01-01 | End: 2021-01-01

## 2020-01-31 ENCOUNTER — OFFICE VISIT (OUTPATIENT)
Dept: INTERNAL MEDICINE | Facility: CLINIC | Age: 70
End: 2020-01-31

## 2020-01-31 VITALS
TEMPERATURE: 98.2 F | WEIGHT: 217.8 LBS | SYSTOLIC BLOOD PRESSURE: 135 MMHG | HEIGHT: 72 IN | HEART RATE: 82 BPM | BODY MASS INDEX: 29.5 KG/M2 | OXYGEN SATURATION: 98 % | DIASTOLIC BLOOD PRESSURE: 57 MMHG

## 2020-01-31 DIAGNOSIS — K74.60 CIRRHOSIS OF LIVER WITH ASCITES, UNSPECIFIED HEPATIC CIRRHOSIS TYPE (HCC): ICD-10-CM

## 2020-01-31 DIAGNOSIS — C22.0 HEPATOCELLULAR CARCINOMA (HCC): Primary | ICD-10-CM

## 2020-01-31 DIAGNOSIS — I10 ESSENTIAL HYPERTENSION: ICD-10-CM

## 2020-01-31 DIAGNOSIS — R18.8 CIRRHOSIS OF LIVER WITH ASCITES, UNSPECIFIED HEPATIC CIRRHOSIS TYPE (HCC): ICD-10-CM

## 2020-01-31 DIAGNOSIS — I95.1 ORTHOSTATIC HYPOTENSION: ICD-10-CM

## 2020-01-31 PROCEDURE — 99214 OFFICE O/P EST MOD 30 MIN: CPT | Performed by: INTERNAL MEDICINE

## 2020-01-31 RX ORDER — LISINOPRIL 10 MG/1
5 TABLET ORAL DAILY
Qty: 90 TABLET | Refills: 3 | Status: SHIPPED | OUTPATIENT
Start: 2020-01-31 | End: 2020-05-04 | Stop reason: SDUPTHER

## 2020-01-31 RX ORDER — FUROSEMIDE 20 MG/1
20 TABLET ORAL DAILY
COMMUNITY
Start: 2019-11-12 | End: 2020-05-04 | Stop reason: SDUPTHER

## 2020-01-31 NOTE — PROGRESS NOTES
"Subjective  Garrett Richard is a 69 y.o. male    HPI patient with a history of cirrhosis with hepatocellular carcinoma status post TACE procedure and cryoablation of his renal cell carcinoma.  Recent MRI shows no new lesions.  Has had some increase in abdominal girth with suspected ascites.  He was recently placed on low-dose diuretic.  Has had episodes of lightheadedness dizziness lately.  No syncope.  He denies polyuria or recent diarrhea.    The following portions of the patient's history were reviewed and updated as appropriate: allergies, current medications, past family history, past medical history, past social history, past surgical history, and problem list.     Review of Systems   Constitutional: Positive for fatigue. Negative for activity change, appetite change and fever.   HENT: Negative for congestion, ear discharge, ear pain and trouble swallowing.    Eyes: Negative for photophobia and visual disturbance.   Respiratory: Negative for cough and shortness of breath.    Cardiovascular: Negative for chest pain and palpitations.   Gastrointestinal: Positive for abdominal distention. Negative for abdominal pain, constipation, diarrhea, nausea and vomiting.   Endocrine: Negative.    Genitourinary: Negative for dysuria, hematuria and urgency.   Musculoskeletal: Negative for arthralgias, back pain, joint swelling and myalgias.   Skin: Negative for color change and rash.   Allergic/Immunologic: Negative.    Neurological: Positive for light-headedness. Negative for dizziness, weakness and headaches.   Hematological: Negative for adenopathy. Does not bruise/bleed easily.   Psychiatric/Behavioral: Negative for agitation, confusion and dysphoric mood. The patient is not nervous/anxious.        Visit Vitals  /57   Pulse 82   Temp 98.2 °F (36.8 °C) (Temporal)   Ht 182.9 cm (72.01\")   Wt 98.8 kg (217 lb 12.8 oz)   SpO2 98%   BMI 29.53 kg/m²       Objective  Physical Exam   Constitutional: He is oriented to " person, place, and time. He appears well-developed and well-nourished. No distress.   HENT:   Nose: Nose normal.   Mouth/Throat: Oropharynx is clear and moist.   Eyes: Conjunctivae and EOM are normal. No scleral icterus.   Neck: No tracheal deviation present. No thyromegaly present.   Cardiovascular: Normal rate and regular rhythm. Exam reveals no friction rub.   No murmur heard.  Pulmonary/Chest: No respiratory distress. He has no wheezes. He has no rales.   Abdominal: Soft. He exhibits distension. He exhibits no mass. There is no tenderness. There is no guarding.   Musculoskeletal: Normal range of motion. He exhibits no deformity.   Lymphadenopathy:     He has no cervical adenopathy.   Neurological: He is alert and oriented to person, place, and time. He has normal reflexes. No cranial nerve deficit. Coordination normal.   Skin: Skin is warm and dry. No rash noted. No erythema.   Psychiatric: He has a normal mood and affect. His behavior is normal. Judgment and thought content normal.       Diagnoses and all orders for this visit:    Hepatocellular carcinoma (CMS/HCC) stable recent MRI okay.  Following up with gastroenterology    Cirrhosis of liver with ascites, unspecified hepatic cirrhosis type (CMS/HCC) without significant fluid overload overload continue with salt restriction    Orthostatic hypotension with suspected mild dehydration.  Will reduce lisinopril dosing to 5 mg daily.  Encourage slight increase in fluid intake    Other orders  -     furosemide (LASIX) 20 MG tablet; Take 20 mg by mouth Daily.

## 2020-03-19 ENCOUNTER — OFFICE VISIT (OUTPATIENT)
Dept: INTERNAL MEDICINE | Facility: CLINIC | Age: 70
End: 2020-03-19

## 2020-03-19 VITALS
HEIGHT: 72 IN | SYSTOLIC BLOOD PRESSURE: 138 MMHG | BODY MASS INDEX: 28.82 KG/M2 | DIASTOLIC BLOOD PRESSURE: 68 MMHG | HEART RATE: 87 BPM | OXYGEN SATURATION: 96 % | TEMPERATURE: 98.8 F | WEIGHT: 212.8 LBS

## 2020-03-19 DIAGNOSIS — I10 ESSENTIAL HYPERTENSION: ICD-10-CM

## 2020-03-19 DIAGNOSIS — R18.8 CIRRHOSIS OF LIVER WITH ASCITES, UNSPECIFIED HEPATIC CIRRHOSIS TYPE (HCC): Primary | ICD-10-CM

## 2020-03-19 DIAGNOSIS — K74.60 CIRRHOSIS OF LIVER WITH ASCITES, UNSPECIFIED HEPATIC CIRRHOSIS TYPE (HCC): Primary | ICD-10-CM

## 2020-03-19 DIAGNOSIS — E10.42 TYPE 1 DIABETES MELLITUS WITH DIABETIC POLYNEUROPATHY (HCC): ICD-10-CM

## 2020-03-19 PROCEDURE — 99214 OFFICE O/P EST MOD 30 MIN: CPT | Performed by: INTERNAL MEDICINE

## 2020-03-19 NOTE — PROGRESS NOTES
"Subjective  Garrett Richard is a 69 y.o. male    HPI coming in for follow-up patient with type 1 diabetes, cirrhosis with hepatocellular carcinoma currently resected and now undergoing follow-up every 6 months.  He missed his imaging study in February.  He denies abdominal pain no nausea vomiting intermittently has had lightheadedness especially with suddenly standing up    The following portions of the patient's history were reviewed and updated as appropriate: allergies, current medications, past family history, past medical history, past social history, past surgical history, and problem list.     Review of Systems   Constitutional: Negative.  Negative for activity change, appetite change, fatigue and fever.   HENT: Negative for congestion, ear discharge, ear pain and trouble swallowing.    Eyes: Negative for photophobia and visual disturbance.   Respiratory: Negative for cough and shortness of breath.    Cardiovascular: Negative for chest pain and palpitations.   Gastrointestinal: Negative for abdominal distention, abdominal pain, constipation, diarrhea, nausea and vomiting.   Endocrine: Negative.    Genitourinary: Negative for dysuria, hematuria and urgency.   Musculoskeletal: Positive for arthralgias. Negative for back pain, joint swelling and myalgias.   Skin: Negative for color change and rash.   Allergic/Immunologic: Negative.    Neurological: Positive for light-headedness. Negative for dizziness, weakness and headaches.   Hematological: Negative for adenopathy. Does not bruise/bleed easily.   Psychiatric/Behavioral: Negative for agitation, confusion and dysphoric mood. The patient is not nervous/anxious.        Visit Vitals  /68   Pulse 87   Temp 98.8 °F (37.1 °C) (Temporal)   Ht 182.9 cm (72.01\")   Wt 96.5 kg (212 lb 12.8 oz)   SpO2 96%   BMI 28.85 kg/m²       Objective  Physical Exam   Constitutional: He is oriented to person, place, and time. He appears well-developed and well-nourished. No " distress.   HENT:   Nose: Nose normal.   Mouth/Throat: Oropharynx is clear and moist.   Eyes: Conjunctivae and EOM are normal. No scleral icterus.   Neck: No tracheal deviation present. No thyromegaly present.   Cardiovascular: Normal rate and regular rhythm. Exam reveals no friction rub.   No murmur heard.  Pulmonary/Chest: No respiratory distress. He has no wheezes. He has no rales.   Abdominal: Soft. He exhibits distension. He exhibits no mass. There is no tenderness. There is no guarding.   Musculoskeletal: Normal range of motion. He exhibits deformity.   Lymphadenopathy:     He has no cervical adenopathy.   Neurological: He is alert and oriented to person, place, and time. He has normal reflexes. No cranial nerve deficit. Coordination normal.   Skin: Skin is warm and dry. No rash noted. No erythema.   Psychiatric: He has a normal mood and affect. His behavior is normal. Judgment and thought content normal.       Diagnoses and all orders for this visit:    Cirrhosis of liver with ascites, unspecified hepatic cirrhosis type (CMS/HCC) some amount of ascites noted continue with Lasix and salt restriction is scheduled for endoscopic evaluation in a few months    Type 1 diabetes mellitus with diabetic polyneuropathy (CMS/HCC) continue with the dietary restrictions is following up with endocrinologist at [facility]    Essential hypertension stable with current meds    Suspect he has orthostatic hypotension.  Advised appropriate ways of changing posture.  He has not sustained any falls

## 2020-05-04 ENCOUNTER — TELEPHONE (OUTPATIENT)
Dept: INTERNAL MEDICINE | Facility: CLINIC | Age: 70
End: 2020-05-04

## 2020-05-04 DIAGNOSIS — I10 ESSENTIAL HYPERTENSION: ICD-10-CM

## 2020-05-04 RX ORDER — LISINOPRIL 10 MG/1
5 TABLET ORAL DAILY
Qty: 90 TABLET | Refills: 3 | Status: SHIPPED | OUTPATIENT
Start: 2020-05-04 | End: 2020-09-03 | Stop reason: SDUPTHER

## 2020-05-04 RX ORDER — FUROSEMIDE 20 MG/1
20 TABLET ORAL DAILY
Qty: 90 TABLET | Refills: 3 | Status: SHIPPED | OUTPATIENT
Start: 2020-05-04 | End: 2021-01-01

## 2020-05-04 NOTE — TELEPHONE ENCOUNTER
Pt stopped by and asked for refills of his medications and to update his insurance information. He states his new insurance will not cover Baslagar but he thinks it will cover levemir.

## 2020-07-27 ENCOUNTER — OFFICE VISIT (OUTPATIENT)
Dept: NEUROLOGY | Facility: CLINIC | Age: 70
End: 2020-07-27

## 2020-07-27 ENCOUNTER — LAB REQUISITION (OUTPATIENT)
Dept: LAB | Facility: HOSPITAL | Age: 70
End: 2020-07-27

## 2020-07-27 VITALS
HEART RATE: 80 BPM | WEIGHT: 214 LBS | DIASTOLIC BLOOD PRESSURE: 70 MMHG | HEIGHT: 72 IN | BODY MASS INDEX: 28.99 KG/M2 | OXYGEN SATURATION: 95 % | SYSTOLIC BLOOD PRESSURE: 120 MMHG | TEMPERATURE: 97.3 F

## 2020-07-27 DIAGNOSIS — R26.89 BALANCE DISORDER: Primary | ICD-10-CM

## 2020-07-27 DIAGNOSIS — Z00.00 ROUTINE GENERAL MEDICAL EXAMINATION AT A HEALTH CARE FACILITY: ICD-10-CM

## 2020-07-27 DIAGNOSIS — I10 ESSENTIAL HYPERTENSION: ICD-10-CM

## 2020-07-27 PROCEDURE — 99203 OFFICE O/P NEW LOW 30 MIN: CPT | Performed by: NURSE PRACTITIONER

## 2020-07-27 PROCEDURE — 36415 COLL VENOUS BLD VENIPUNCTURE: CPT | Performed by: NURSE PRACTITIONER

## 2020-07-27 NOTE — PROGRESS NOTES
Subjective:     Patient ID: Garrett Richard is a 69 y.o. male.    CC:   Chief Complaint   Patient presents with   • Balance Issues       HPI:   History of Present Illness\    Mr. Richard is a very pleasant 69-year-old new patient here today for initial neurological evaluation for balance issues.  He tells me that for about 3 years he has been having problems with imbalance, he states when he stands if he leans forward at all he feels that his entire body continues to move forward.  When he is walking if he is not able to look at his feet and he will fall to the side or forward.  Although he states he has not actually fallen he feels as if he will fall.  This is been an ongoing issue for quite some time.  He adamantly denies any dizziness or vertigo at all.  He does have a history of renal cell carcinoma and liver cancer.  Neither required chemotherapy and recall in early stages.  He is followed by .  He tells me that he has been told in the past he has mild neuropathy, he is insulin-dependent diabetic.  He however denies any numbness or tingling in his feet.  He does feel weak in his legs, not so much in his arms.  He has never had any issues tripping over his feet, he denies shuffled gait.  He is very active, he fishes regularly, he at times notices problems with his balance when he is sitting in his boat and he rises to a standing position he feels wobbly.  He denies any vision changes, he has mild cataracts.  He states he has not had a headache in 50 years.  He denies any confusional episodes, slurred speech, stroke symptoms, dizziness, syncope, tremor or falls.  He denies problems with loss of smell or taste, he denies bowel or bladder incontinence    The following portions of the patient's history were reviewed and updated as appropriate: allergies, current medications, past family history, past medical history, past social history, past surgical history and problem list.    Past Medical History:  "  Diagnosis Date   • Diabetes mellitus (CMS/HCC)     type II per patient   • Hepatocellular carcinoma (CMS/HCC) 10/25/2018    s/p TACE procedure with no mass noted on fu imaging   • Hypertension    • Liver disease    • Renal cell cancer (CMS/HCC)     s/p cryosurgery   • Umbilical hernia    • Upper respiratory infection    • Urinary symptom or sign    • Urinary tract infection        Past Surgical History:   Procedure Laterality Date   • TONSILLECTOMY         Social History     Socioeconomic History   • Marital status:      Spouse name: Not on file   • Number of children: Not on file   • Years of education: Not on file   • Highest education level: Not on file   Tobacco Use   • Smoking status: Never Smoker   • Smokeless tobacco: Never Used   Substance and Sexual Activity   • Alcohol use: No   • Drug use: No   • Sexual activity: Defer       Family History   Problem Relation Age of Onset   • Colon cancer Mother    • Diabetes Maternal Grandmother    • COPD Father    • No Known Problems Maternal Grandfather    • No Known Problems Paternal Grandmother    • No Known Problems Paternal Grandfather         Review of Systems   HENT: Negative.    Eyes: Negative.    Respiratory: Negative.    Cardiovascular: Negative.    Gastrointestinal: Negative.    Endocrine: Negative.    Genitourinary: Negative.    Musculoskeletal: Positive for gait problem. Negative for arthralgias, back pain, joint swelling, myalgias, neck pain and neck stiffness.   Skin: Negative.    Allergic/Immunologic: Negative.    Neurological: Positive for weakness. Negative for dizziness, tremors, seizures, syncope, facial asymmetry, speech difficulty, light-headedness, numbness and headaches.   Hematological: Negative.    Psychiatric/Behavioral: Negative.         Objective:  /70   Pulse 80   Temp 97.3 °F (36.3 °C)   Ht 182.9 cm (72\")   Wt 97.1 kg (214 lb)   SpO2 95%   BMI 29.02 kg/m²     Neurologic Exam     Mental Status   Oriented to person, " place, and time.   Attention: normal. Concentration: normal.   Speech: speech is normal   Level of consciousness: alert  Knowledge: consistent with education.   Able to read. Able to write. Normal comprehension.     Cranial Nerves   Cranial nerves II through XII intact.     CN II   Visual fields full to confrontation.   Right visual field deficit: none  Left visual field deficit: none     CN III, IV, VI   Pupils are equal, round, and reactive to light.  Extraocular motions are normal.   Right pupil: Size: 3 mm. Shape: regular. Reactivity: brisk. Consensual response: intact. Accommodation: intact.   Left pupil: Size: 3 mm. Shape: regular. Reactivity: brisk. Consensual response: intact. Accommodation: intact.   CN III: no CN III palsy  CN VI: no CN VI palsy  Nystagmus: none   Upgaze: normal  Downgaze: normal    CN V   Facial sensation intact.     CN VII   Facial expression full, symmetric.     CN VIII   CN VIII normal.     CN IX, X   CN IX normal.   CN X normal.     CN XI   CN XI normal.     CN XII   CN XII normal.     Motor Exam   Muscle bulk: normal  Overall muscle tone: normal  Right arm tone: normal  Left arm tone: normal  Right arm pronator drift: absent  Left arm pronator drift: absent  Right leg tone: normal  Left leg tone: normal    Strength   Strength 5/5 throughout.     Sensory Exam   Light touch normal.   Right arm vibration: normal  Left arm vibration: normal  Right leg vibration: decreased from ankle  Left leg vibration: normal  Proprioception normal.   Right arm pinprick: normal  Left arm pinprick: normal  Right leg pinprick: decreased from toes  Left leg pinprick: normal    Gait, Coordination, and Reflexes     Gait  Gait: normal    Coordination   Romberg: negative  Finger to nose coordination: normal  Heel to shin coordination: normal  Tandem walking coordination: normal    Tremor   Resting tremor: absent  Intention tremor: absent  Action tremor: absent    Reflexes   Reflexes 2+ except as noted.   Right  plantar: normal  Left plantar: normal  Right Arreola: absent  Left Arreola: absent      Physical Exam   Constitutional: He is oriented to person, place, and time. He appears well-developed and well-nourished. No distress.   HENT:   Head: Normocephalic and atraumatic.   Eyes: Pupils are equal, round, and reactive to light. Conjunctivae and EOM are normal. No scleral icterus.   Neck: Normal range of motion. Neck supple.   Cardiovascular: Normal rate.   No carotid bruits noted   Pulmonary/Chest: Effort normal. No respiratory distress.   Neurological: He is alert and oriented to person, place, and time. He has normal strength. He has a normal Finger-Nose-Finger Test, a normal Heel to Shin Test, a normal Romberg Test and a normal Tandem Gait Test. Gait normal.   Skin: Skin is warm. Capillary refill takes less than 2 seconds.   Psychiatric: He has a normal mood and affect. His speech is normal and behavior is normal. Judgment and thought content normal.   Vitals reviewed.      Assessment/Plan:       Garrett was seen today for balance issues.    Diagnoses and all orders for this visit:    Balance disorder  -     MRI Brain With & Without Contrast; Future  -     MRI Cervical Spine Without Contrast; Future    Essential hypertension  -     Basic Metabolic Panel; Future    Mr. Richard is here today complaining of long-term balance issues.  He adamantly denies any dizziness or vertigo.  Orthostatic blood pressure checks and heart rate show no evidence of orthostasis today  I would like to get an MRI of the brain with and without contrast due to his history of kidney and liver cancer, rule out tumor or stroke  MRI of the C-spine rule out cord compression or myelopathy  If the above testing shows no identified source of his balance issues recommend nerve conduction study as he does have evidence of some mild neuropathy more so on the right foot, this can however cause issues with balance.  He has a complete labs through [facility] who he is  followed very closely by due to his history of kidney cancer and liver mass.  CMP pending prior to MRI  And would like to follow-up with him in about 4 to 6 weeks or sooner if needed, if he has any questions concerns or problems he is encouraged to notify my office ASAP         Reviewed medications, potential side effects and signs and symptoms to report. Discussed risk versus benefits of treatment plan with patient and/or family-including medications, labs and radiology that may be ordered. Addressed questions and concerns during visit. Patient and/or family verbalized understanding and agree with plan.    During this visit the following were done:  Labs Reviewed [x]  records reviewed  Labs Ordered []    Radiology Reports Reviewed []    Radiology Ordered []    PCP Records Reviewed [x]    Referring Provider Records Reviewed []    ER Records Reviewed []    Hospital Records Reviewed []    History Obtained From Family []    Radiology Images Reviewed []    Other Reviewed []    Records Requested []      SAURABH Martinez  7/27/2020        Answers for HPI/ROS submitted by the patient on 7/20/2020   What is the primary reason for your visit?: Other  Please describe your symptoms.: Severe imbalance issues.  Have you had these symptoms before?: Yes  How long have you been having these symptoms?: Greater than 2 weeks  Please list any medications you are currently taking for this condition.: None  Please describe any probable cause for these symptoms. : Liver disorder,  Awaiting transplant acceptance.

## 2020-07-28 ENCOUNTER — TELEPHONE (OUTPATIENT)
Dept: NEUROLOGY | Facility: CLINIC | Age: 70
End: 2020-07-28

## 2020-07-28 LAB
BUN SERPL-MCNC: 11 MG/DL (ref 8–27)
BUN/CREAT SERPL: 9 (ref 10–24)
CALCIUM SERPL-MCNC: 9.3 MG/DL (ref 8.6–10.2)
CHLORIDE SERPL-SCNC: 101 MMOL/L (ref 96–106)
CO2 SERPL-SCNC: 22 MMOL/L (ref 20–29)
CREAT SERPL-MCNC: 1.26 MG/DL (ref 0.76–1.27)
GLUCOSE SERPL-MCNC: 409 MG/DL (ref 65–99)
POTASSIUM SERPL-SCNC: 4.4 MMOL/L (ref 3.5–5.2)
SODIUM SERPL-SCNC: 135 MMOL/L (ref 134–144)

## 2020-07-28 NOTE — TELEPHONE ENCOUNTER
Garrett notified of his glucose level. He said last week it was running 160's. He has taken his insulin prior to coming to the appt. He has a zoom call with his Diabetes doctor in the morning. He sees [facility].

## 2020-07-28 NOTE — TELEPHONE ENCOUNTER
----- Message from SAURABH Martinez sent at 7/28/2020  1:11 PM EDT -----  Please let pt know his blood sugar was 409 on labs yesterday, he needs to keep close eye on glucose readings and follow with PCP or endocrinologist is readings are consistently elevated

## 2020-07-28 NOTE — PROGRESS NOTES
Please let pt know his blood sugar was 409 on labs yesterday, he needs to keep close eye on glucose readings and follow with PCP or endocrinologist is readings are consistently elevated

## 2020-08-01 ENCOUNTER — RESULTS ENCOUNTER (OUTPATIENT)
Dept: NEUROLOGY | Facility: CLINIC | Age: 70
End: 2020-08-01

## 2020-08-01 DIAGNOSIS — I10 ESSENTIAL HYPERTENSION: ICD-10-CM

## 2020-08-21 ENCOUNTER — HOSPITAL ENCOUNTER (OUTPATIENT)
Dept: MRI IMAGING | Facility: HOSPITAL | Age: 70
Discharge: HOME | End: 2020-08-21

## 2020-08-21 ENCOUNTER — HOSPITAL ENCOUNTER (OUTPATIENT)
Dept: MRI IMAGING | Facility: HOSPITAL | Age: 70
Discharge: HOME | End: 2020-08-21
Admitting: NURSE PRACTITIONER

## 2020-08-21 DIAGNOSIS — R26.89 BALANCE DISORDER: ICD-10-CM

## 2020-08-21 PROCEDURE — 72141 MRI NECK SPINE W/O DYE: CPT

## 2020-08-21 PROCEDURE — 0 GADOBENATE DIMEGLUMINE 529 MG/ML SOLUTION: Performed by: NURSE PRACTITIONER

## 2020-08-21 PROCEDURE — A9577 INJ MULTIHANCE: HCPCS | Performed by: NURSE PRACTITIONER

## 2020-08-21 PROCEDURE — 70553 MRI BRAIN STEM W/O & W/DYE: CPT

## 2020-08-21 RX ADMIN — GADOBENATE DIMEGLUMINE 20 ML: 529 INJECTION, SOLUTION INTRAVENOUS at 09:45

## 2020-08-24 ENCOUNTER — OFFICE VISIT (OUTPATIENT)
Dept: NEUROLOGY | Facility: CLINIC | Age: 70
End: 2020-08-24

## 2020-08-24 VITALS
SYSTOLIC BLOOD PRESSURE: 130 MMHG | OXYGEN SATURATION: 96 % | HEIGHT: 72 IN | WEIGHT: 214 LBS | DIASTOLIC BLOOD PRESSURE: 60 MMHG | HEART RATE: 87 BPM | TEMPERATURE: 97.3 F | BODY MASS INDEX: 28.99 KG/M2

## 2020-08-24 DIAGNOSIS — M48.02 CERVICAL STENOSIS OF SPINE: ICD-10-CM

## 2020-08-24 DIAGNOSIS — R26.89 BALANCE DISORDER: Primary | ICD-10-CM

## 2020-08-24 DIAGNOSIS — R20.0 NUMBNESS IN FEET: ICD-10-CM

## 2020-08-24 PROCEDURE — 99213 OFFICE O/P EST LOW 20 MIN: CPT | Performed by: NURSE PRACTITIONER

## 2020-08-24 NOTE — PROGRESS NOTES
Subjective:     Patient ID: Garrett Richard is a 70 y.o. male.    CC:   Chief Complaint   Patient presents with   • Balance Issues       HPI:   History of Present Illness     Mr. Richard is here today for follow-up.  I first saw him about a month ago for evaluation for balance issues.  He told me that for about 3 years he has been having problems with imbalance, he stated when he stands up or leans forward at all he feels that his entire body continues to move forward.  When he is walking if he is not able to look at his feet and he will fall to the side or forward.  He said he had not actually fallen but he feels as if he will fall.  This is been an ongoing issue for quite some time.  He adamantly denied any dizziness or vertigo at all.  He does have a history of renal cell carcinoma and liver cancer.  Neither required chemotherapy and were caught in early stages.  He is followed by .  He told me that he has been told in the past he has mild neuropathy, he is insulin-dependent diabetic.  He however denied any numbness or tingling in his feet.  He does feel weak in his legs, not so much in his arms.  He has never had any issues tripping over his feet, he denied shuffled gait.  He is very active, he fishes regularly, he at times notices problems with his balance when he is sitting in his boat and he rises to a standing position he feels wobbly.  He denied any vision changes, he has mild cataracts.  He said he has not had a headache in 50 years.  He denied any confusional episodes, slurred speech, stroke symptoms, dizziness, syncope, tremor or falls.  He denied problems with loss of smell or taste, he denies bowel or bladder incontinence    When he was seen last I did order MRI of the brain with and without contrast and MRI of the C-spine.  MRI of the brain has been performed, radiology read as no acute findings with no evidence of mass or lesion, only mild to moderate chronic small vessel changes.  Images  reviewed by myself,  Dr. Evans did also review and agree with radiology read.  MRI of the C-spine however did show areas of cord compression, Dr. Evans has reviewed and suggested he see neurosurgery    He is here today for follow-up, he has no new complaints  The following portions of the patient's history were reviewed and updated as appropriate: allergies, current medications, past family history, past medical history, past social history, past surgical history and problem list.    Past Medical History:   Diagnosis Date   • Diabetes mellitus (CMS/HCC)     type II per patient   • Hepatocellular carcinoma (CMS/HCC) 10/25/2018    s/p TACE procedure with no mass noted on fu imaging   • Hypertension    • Liver disease    • Renal cell cancer (CMS/HCC)     s/p cryosurgery   • Umbilical hernia    • Upper respiratory infection    • Urinary symptom or sign    • Urinary tract infection        Past Surgical History:   Procedure Laterality Date   • TONSILLECTOMY         Social History     Socioeconomic History   • Marital status:      Spouse name: Not on file   • Number of children: Not on file   • Years of education: Not on file   • Highest education level: Not on file   Tobacco Use   • Smoking status: Never Smoker   • Smokeless tobacco: Never Used   Substance and Sexual Activity   • Alcohol use: No   • Drug use: No   • Sexual activity: Defer       Family History   Problem Relation Age of Onset   • Colon cancer Mother    • Diabetes Maternal Grandmother    • COPD Father    • No Known Problems Maternal Grandfather    • No Known Problems Paternal Grandmother    • No Known Problems Paternal Grandfather         Review of Systems   Constitutional: Negative.    HENT: Negative.    Eyes: Negative.    Respiratory: Negative.    Cardiovascular: Negative.    Gastrointestinal: Negative.    Endocrine: Negative.    Genitourinary: Negative.    Musculoskeletal: Positive for gait problem (balance problem).   Skin: Negative.   "  Allergic/Immunologic: Negative.    Neurological: Negative for dizziness, tremors, seizures, syncope, facial asymmetry, speech difficulty, weakness, light-headedness, numbness and headaches.   Hematological: Negative.    Psychiatric/Behavioral: Negative.         Objective:  /60   Pulse 87   Temp 97.3 °F (36.3 °C)   Ht 182.9 cm (72\")   Wt 97.1 kg (214 lb)   SpO2 96%   BMI 29.02 kg/m²     Neurologic Exam     Mental Status   Oriented to person, place, and time.   Attention: normal. Concentration: normal.   Speech: speech is normal   Level of consciousness: alert  Knowledge: consistent with education.   Able to read. Able to write. Normal comprehension.     Cranial Nerves   Cranial nerves II through XII intact.     CN II   Visual fields full to confrontation.   Right visual field deficit: none  Left visual field deficit: none     CN III, IV, VI   Pupils are equal, round, and reactive to light.  Extraocular motions are normal.   Right pupil: Size: 3 mm. Shape: regular. Reactivity: brisk. Consensual response: intact. Accommodation: intact.   Left pupil: Size: 3 mm. Shape: regular. Reactivity: brisk. Consensual response: intact. Accommodation: intact.   CN III: no CN III palsy  CN VI: no CN VI palsy  Nystagmus: none   Upgaze: normal  Downgaze: normal    CN V   Facial sensation intact.     CN VII   Facial expression full, symmetric.     CN VIII   CN VIII normal.     CN IX, X   CN IX normal.   CN X normal.     CN XI   CN XI normal.     CN XII   CN XII normal.     Motor Exam   Muscle bulk: normal  Overall muscle tone: normal  Right arm tone: normal  Left arm tone: normal  Right arm pronator drift: absent  Left arm pronator drift: absent  Right leg tone: normal  Left leg tone: normal    Strength   Strength 5/5 throughout.     Sensory Exam   Light touch normal.   Mild decreased pinprick sensation plantar aspect of the feet right greater than left     Gait, Coordination, and Reflexes     Gait  Gait: " normal    Coordination   Romberg: negative  Finger to nose coordination: normal  Heel to shin coordination: normal  Tandem walking coordination: normal    Tremor   Resting tremor: absent  Intention tremor: absent  Action tremor: absent    Reflexes   Reflexes 2+ except as noted.       Physical Exam   Constitutional: He is oriented to person, place, and time. He appears well-developed and well-nourished. No distress.   HENT:   Head: Normocephalic and atraumatic.   Eyes: Pupils are equal, round, and reactive to light. Conjunctivae and EOM are normal. No scleral icterus.   Neck: Normal range of motion. Neck supple.   Pulmonary/Chest: Effort normal. No respiratory distress.   Neurological: He is alert and oriented to person, place, and time. He has normal strength. He has a normal Finger-Nose-Finger Test, a normal Heel to Shin Test, a normal Romberg Test and a normal Tandem Gait Test. Gait normal.   Skin: Skin is warm.   Psychiatric: He has a normal mood and affect. His speech is normal and behavior is normal. Judgment and thought content normal.   Vitals reviewed.      Assessment/Plan:       Garrett was seen today for balance issues.    Diagnoses and all orders for this visit:    Balance disorder  -     EMG & Nerve Conduction Test  -     Ambulatory Referral to Neurosurgery    Numbness in feet  -     EMG & Nerve Conduction Test  -     Methylmalonic Acid, Serum; Future  -     Vitamin B12; Future  -     Folate; Future  -     VANGIE + PE; Future    Cervical stenosis of spine  -     Ambulatory Referral to Neurosurgery    He does have some mild decreased pinprick sensation in his feet, he is a long-term insulin-dependent diabetic.  Dr. Evans has reviewed MRI of the brain and C-spine, he feels that his balance issues are related to cervical cord compression and recommends neurosurgery consult.  Labs also pending as noted above.  I am going to go ahead and get him on the schedule for an EMG in the event neurosurgery would like  this completed as well.         Reviewed medications, potential side effects and signs and symptoms to report. Discussed risk versus benefits of treatment plan with patient and/or family-including medications, labs and radiology that may be ordered. Addressed questions and concerns during visit. Patient and/or family verbalized understanding and agree with plan.         Stefanie Purdy, APRN  8/25/2020

## 2020-08-29 ENCOUNTER — RESULTS ENCOUNTER (OUTPATIENT)
Dept: NEUROLOGY | Facility: CLINIC | Age: 70
End: 2020-08-29

## 2020-08-29 DIAGNOSIS — R20.0 NUMBNESS IN FEET: ICD-10-CM

## 2020-09-03 DIAGNOSIS — I10 ESSENTIAL HYPERTENSION: ICD-10-CM

## 2020-09-03 RX ORDER — LISINOPRIL 10 MG/1
5 TABLET ORAL DAILY
Qty: 90 TABLET | Refills: 3 | Status: SHIPPED | OUTPATIENT
Start: 2020-09-03 | End: 2021-01-01 | Stop reason: ALTCHOICE

## 2020-09-28 ENCOUNTER — OFFICE VISIT (OUTPATIENT)
Dept: INTERNAL MEDICINE | Facility: CLINIC | Age: 70
End: 2020-09-28

## 2020-09-28 VITALS
BODY MASS INDEX: 28.91 KG/M2 | DIASTOLIC BLOOD PRESSURE: 70 MMHG | HEART RATE: 93 BPM | OXYGEN SATURATION: 96 % | SYSTOLIC BLOOD PRESSURE: 130 MMHG | TEMPERATURE: 98.9 F | HEIGHT: 72 IN | WEIGHT: 213.4 LBS

## 2020-09-28 DIAGNOSIS — E78.2 MIXED HYPERLIPIDEMIA: ICD-10-CM

## 2020-09-28 DIAGNOSIS — E29.1 HYPOGONADISM IN MALE: ICD-10-CM

## 2020-09-28 DIAGNOSIS — N28.89 RIGHT RENAL MASS: ICD-10-CM

## 2020-09-28 DIAGNOSIS — K74.60 CIRRHOSIS OF LIVER WITH ASCITES, UNSPECIFIED HEPATIC CIRRHOSIS TYPE (HCC): ICD-10-CM

## 2020-09-28 DIAGNOSIS — C22.0 HEPATOCELLULAR CARCINOMA (HCC): ICD-10-CM

## 2020-09-28 DIAGNOSIS — E10.42 TYPE 1 DIABETES MELLITUS WITH DIABETIC POLYNEUROPATHY (HCC): Primary | ICD-10-CM

## 2020-09-28 DIAGNOSIS — R18.8 CIRRHOSIS OF LIVER WITH ASCITES, UNSPECIFIED HEPATIC CIRRHOSIS TYPE (HCC): ICD-10-CM

## 2020-09-28 PROCEDURE — 99397 PER PM REEVAL EST PAT 65+ YR: CPT | Performed by: INTERNAL MEDICINE

## 2020-09-28 PROCEDURE — G0439 PPPS, SUBSEQ VISIT: HCPCS | Performed by: INTERNAL MEDICINE

## 2020-09-28 PROCEDURE — 96160 PT-FOCUSED HLTH RISK ASSMT: CPT | Performed by: INTERNAL MEDICINE

## 2020-09-28 NOTE — PROGRESS NOTES
The ABCs of the Annual Wellness Visit  Subsequent Medicare Wellness Visit    Chief Complaint   Patient presents with   • Medicare Wellness-subsequent       Subjective   History of Present Illness:  Garrett Richard is a 70 y.o. male who presents for a Subsequent Medicare Wellness Visit.    HEALTH RISK ASSESSMENT    Recent Hospitalizations:  No hospitalization(s) within the last year.    Current Medical Providers:  Patient Care Team:  Pierce Lubin MD as PCP - General    Smoking Status:  Social History     Tobacco Use   Smoking Status Never Smoker   Smokeless Tobacco Never Used       Alcohol Consumption:  Social History     Substance and Sexual Activity   Alcohol Use No       Depression Screen:   PHQ-2/PHQ-9 Depression Screening 9/28/2020   Little interest or pleasure in doing things 0   Feeling down, depressed, or hopeless 0   Total Score 0       Fall Risk Screen:  STEADI Fall Risk Assessment was completed, and patient is at LOW risk for falls.Assessment completed on:9/28/2020    Health Habits and Functional and Cognitive Screening:  Functional & Cognitive Status 9/28/2020   Do you have difficulty preparing food and eating? No   Do you have difficulty bathing yourself, getting dressed or grooming yourself? No   Do you have difficulty using the toilet? No   Do you have difficulty moving around from place to place? No   Do you have trouble with steps or getting out of a bed or a chair? No   Current Diet Well Balanced Diet   Dental Exam Up to date   Eye Exam Up to date   Exercise (times per week) 0 times per week   Current Exercise Activities Include No Regular Exercise   Do you need help using the phone?  No   Are you deaf or do you have serious difficulty hearing?  No   Do you need help with transportation? No   Do you need help shopping? No   Do you need help preparing meals?  No   Do you need help with housework?  No   Do you need help with laundry? No   Do you need help taking your medications? No   Do you  need help managing money? No   Do you ever drive or ride in a car without wearing a seat belt? No   Have you felt unusual stress, anger or loneliness in the last month? No   Who do you live with? Spouse   If you need help, do you have trouble finding someone available to you? No   Have you been bothered in the last four weeks by sexual problems? No   Do you have difficulty concentrating, remembering or making decisions? No         Does the patient have evidence of cognitive impairment? No    Asprin use counseling:Contraindicated from taking ASA    Age-appropriate Screening Schedule:  Refer to the list below for future screening recommendations based on patient's age, sex and/or medical conditions. Orders for these recommended tests are listed in the plan section. The patient has been provided with a written plan.    Health Maintenance   Topic Date Due   • HEMOGLOBIN A1C  09/21/2018   • LIPID PANEL  03/21/2019   • URINE MICROALBUMIN  03/21/2019   • DIABETIC FOOT EXAM  04/25/2020   • ZOSTER VACCINE (3 of 3) 01/31/2029 (Originally 11/11/2016)   • DIABETIC EYE EXAM  01/21/2021   • TDAP/TD VACCINES (3 - Td) 09/01/2025   • COLONOSCOPY  06/23/2030   • INFLUENZA VACCINE  Completed          The following portions of the patient's history were reviewed and updated as appropriate: allergies, current medications, past family history, past medical history, past social history, past surgical history and problem list.    Outpatient Medications Prior to Visit   Medication Sig Dispense Refill   • furosemide (LASIX) 20 MG tablet Take 1 tablet by mouth Daily. 90 tablet 3   • glucose blood test strip TEST FOUR TIMES DAILY FOR DM. E11.9 ACCUCHECK DUID 200 each 11   • glucose monitor monitoring kit ACCUCHECK DUID. USE TO TEST DIABETES. E11.9 1 each 0   • insulin aspart (NovoLOG FlexPen) 100 UNIT/ML solution pen-injector sc pen Inject 30 Units under the skin into the appropriate area as directed 3 (Three) Times a Day With Meals. 45 pen 3  "  • insulin detemir (LEVEMIR) 100 UNIT/ML injection Inject 35 Units under the skin into the appropriate area as directed Every Night.     • Insulin Pen Needle (B-D UF III MINI PEN NEEDLES) 31G X 5 MM misc 4 TIMES DAILY 100 each 5   • Insulin Syringe (SAFETY INSULIN SYRINGES) 29G X 1/2\" 1 ML misc 1 each 3 (Three) Times a Day. 200 each 3   • Insulin Syringe-Needle U-100 (B-D INS SYR ULTRAFINE .3CC/31G) 31G X 5/16\" 0.3 ML misc 1 Units 3 (Three) Times a Day. 100 each 3   • Lancets misc ACCUCHECK DUID LANCETS. TEST FOUR TIMES A DAY FOR DIABETES. E11.9 200 each 11   • lisinopril (PRINIVIL,ZESTRIL) 10 MG tablet Take 0.5 tablets by mouth Daily. (Patient taking differently: Take 10 mg by mouth Daily.) 90 tablet 3     No facility-administered medications prior to visit.        Patient Active Problem List   Diagnosis   • Hepatic cirrhosis (CMS/HCC)   • Reduced libido   • Type 1 diabetes mellitus with diabetic polyneuropathy (CMS/HCC)   • Hyperlipidemia   • Essential hypertension   • Hypogonadism in male   • Peripheral neuropathy   • Right renal mass   • Hepatocellular carcinoma (CMS/HCC)       Advanced Care Planning:  ACP discussion was held with the patient during this visit. Patient does not have an advance directive, information provided.    Review of Systems   Constitutional: Positive for fatigue. Negative for activity change, appetite change and fever.   HENT: Negative for congestion, ear discharge, ear pain and trouble swallowing.    Eyes: Negative for photophobia and visual disturbance.   Respiratory: Negative for cough and shortness of breath.    Cardiovascular: Negative for chest pain and palpitations.   Gastrointestinal: Negative for abdominal distention, abdominal pain, constipation, diarrhea, nausea and vomiting.   Endocrine: Negative.    Genitourinary: Negative for dysuria, hematuria and urgency.   Musculoskeletal: Positive for arthralgias. Negative for back pain, joint swelling and myalgias.   Skin: Negative for " "color change and rash.   Allergic/Immunologic: Negative.    Neurological: Positive for dizziness, tremors and weakness. Negative for light-headedness and headaches.   Hematological: Negative for adenopathy. Does not bruise/bleed easily.   Psychiatric/Behavioral: Positive for sleep disturbance. Negative for agitation, confusion and dysphoric mood. The patient is not nervous/anxious.        Compared to one year ago, the patient feels his physical health is worse.  Compared to one year ago, the patient feels his mental health is worse.    Reviewed chart for potential of high risk medication in the elderly: yes  Reviewed chart for potential of harmful drug interactions in the elderly:yes    Objective         Vitals:    09/28/20 1420   BP: 130/70   Pulse: 93   Temp: 98.9 °F (37.2 °C)   TempSrc: Temporal   SpO2: 96%   Weight: 96.8 kg (213 lb 6.4 oz)   Height: 182.9 cm (72\")   PainSc: 0-No pain       Body mass index is 28.94 kg/m².  Discussed the patient's BMI with him. The BMI is above average; BMI management plan is completed.    Physical Exam  Constitutional:       General: He is not in acute distress.     Appearance: He is well-developed.   HENT:      Nose: Nose normal.   Eyes:      General: No scleral icterus.     Conjunctiva/sclera: Conjunctivae normal.   Neck:      Thyroid: No thyromegaly.      Trachea: No tracheal deviation.   Cardiovascular:      Rate and Rhythm: Normal rate and regular rhythm.      Pulses:           Dorsalis pedis pulses are 1+ on the right side and 1+ on the left side.      Heart sounds: No murmur. No friction rub.   Pulmonary:      Effort: No respiratory distress.      Breath sounds: No wheezing or rales.   Abdominal:      General: There is no distension.      Palpations: Abdomen is soft. There is no mass.      Tenderness: There is no abdominal tenderness. There is no guarding.   Musculoskeletal: Normal range of motion.         General: No deformity.   Feet:      Right foot:      Protective " Sensation: 1 site tested. 1 site sensed.     Skin integrity: No skin breakdown or callus.      Left foot:      Protective Sensation: 1 site tested. 1 site sensed.     Skin integrity: No skin breakdown or callus.   Lymphadenopathy:      Cervical: No cervical adenopathy.   Skin:     General: Skin is warm and dry.      Findings: No erythema or rash.   Neurological:      Mental Status: He is alert and oriented to person, place, and time.      Cranial Nerves: No cranial nerve deficit.      Coordination: Coordination normal.      Deep Tendon Reflexes: Reflexes are normal and symmetric.   Psychiatric:         Behavior: Behavior normal.         Thought Content: Thought content normal.         Judgment: Judgment normal.         Lab Results   Component Value Date     (H) 07/27/2020        Assessment/Plan   Medicare Risks and Personalized Health Plan  CMS Preventative Services Quick Reference  Advance Directive Discussion  Tobacco Use/Dependance (use dotphrase .tobaccocessation for documentation)    The above risks/problems have been discussed with the patient.  Pertinent information has been shared with the patient in the After Visit Summary.  Follow up plans and orders are seen below in the Assessment/Plan Section.    Diagnoses and all orders for this visit:    1. Type 1 diabetes mellitus with diabetic polyneuropathy (CMS/HCC) (Primary) recent A1c shows good control continue with the current insulin regimen.  He is following up with endocrinology    2. Hepatocellular carcinoma (CMS/HCC) stable status post status post transarterial chemoembolization in July 2018 awaiting liver transplant    3. Cirrhosis of liver with ascites, unspecified hepatic cirrhosis type (CMS/HCC) stable following up with transplant clinic currently stable without evidence of fluid overload    4. Mixed hyperlipidemia dietary restrictions follow lipid profile    5. Hypogonadism in male    6. Right renal mass status post cryoablation with good  results      Follow Up:  No follow-ups on file.     An After Visit Summary and PPPS were given to the patient.

## 2020-09-30 LAB
ALBUMIN SERPL-MCNC: 3.6 G/DL (ref 3.5–5.2)
ALBUMIN/CREAT UR: 19 MG/G CREAT (ref 0–29)
ALBUMIN/GLOB SERPL: 1.1 G/DL
ALP SERPL-CCNC: 87 U/L (ref 39–117)
ALT SERPL-CCNC: 26 U/L (ref 1–41)
AST SERPL-CCNC: 52 U/L (ref 1–40)
BILIRUB SERPL-MCNC: 1.4 MG/DL (ref 0–1.2)
BUN SERPL-MCNC: 14 MG/DL (ref 8–23)
BUN/CREAT SERPL: 11.3 (ref 7–25)
CALCIUM SERPL-MCNC: 9.5 MG/DL (ref 8.6–10.5)
CHLORIDE SERPL-SCNC: 104 MMOL/L (ref 98–107)
CO2 SERPL-SCNC: 24.2 MMOL/L (ref 22–29)
CREAT SERPL-MCNC: 1.24 MG/DL (ref 0.76–1.27)
CREAT UR-MCNC: 125.5 MG/DL
ERYTHROCYTE [DISTWIDTH] IN BLOOD BY AUTOMATED COUNT: 15.9 % (ref 12.3–15.4)
GLOBULIN SER CALC-MCNC: 3.2 GM/DL
GLUCOSE SERPL-MCNC: 230 MG/DL (ref 65–99)
HBA1C MFR BLD: 7.5 % (ref 4.8–5.6)
HCT VFR BLD AUTO: 40.8 % (ref 37.5–51)
HGB BLD-MCNC: 14.6 G/DL (ref 13–17.7)
LDLC SERPL DIRECT ASSAY-MCNC: 127 MG/DL (ref 0–99)
MCH RBC QN AUTO: 33.6 PG (ref 26.6–33)
MCHC RBC AUTO-ENTMCNC: 35.8 G/DL (ref 31.5–35.7)
MCV RBC AUTO: 94 FL (ref 79–97)
MICROALBUMIN UR-MCNC: 24.4 UG/ML
PLATELET # BLD AUTO: 67 10*3/MM3 (ref 140–450)
POTASSIUM SERPL-SCNC: 4.8 MMOL/L (ref 3.5–5.2)
PROT SERPL-MCNC: 6.8 G/DL (ref 6–8.5)
RBC # BLD AUTO: 4.34 10*6/MM3 (ref 4.14–5.8)
SODIUM SERPL-SCNC: 137 MMOL/L (ref 136–145)
WBC # BLD AUTO: 4.89 10*3/MM3 (ref 3.4–10.8)

## 2020-11-13 NOTE — TELEPHONE ENCOUNTER
Garrett is requesting a 14 day diabetic patch system (Freestyle Sohail)     He says it is covered under the new insurance package     I informed him that a lot of insurance companies have been denying them but I will check with Dr. Lubin.     Please advise

## 2020-11-20 NOTE — TELEPHONE ENCOUNTER
Caller: Garrett Richard    Relationship: Self    Best call back number: [phone]    What medication are you requesting: VIAGRA    What are your current symptoms:ERECTILE DYSFUNCTION    How long have you been experiencing symptoms: 10 YEARS    Have you had these symptoms before:    [] Yes  [x] No    Have you been treated for these symptoms before:   [] Yes  [x] No    If a prescription is needed, what is your preferred pharmacy and phone number: [facility] #[id number] - [place] - [address] [phone] [facility] [phone] [facility]     Additional notes:

## 2021-01-01 ENCOUNTER — TELEPHONE (OUTPATIENT)
Dept: INTERNAL MEDICINE | Facility: CLINIC | Age: 71
End: 2021-01-01

## 2021-01-01 ENCOUNTER — READMISSION MANAGEMENT (OUTPATIENT)
Dept: CALL CENTER | Facility: HOSPITAL | Age: 71
End: 2021-01-01

## 2021-01-01 ENCOUNTER — APPOINTMENT (OUTPATIENT)
Dept: CT IMAGING | Facility: HOSPITAL | Age: 71
End: 2021-01-01

## 2021-01-01 ENCOUNTER — NURSING HOME (OUTPATIENT)
Dept: INTERNAL MEDICINE | Facility: CLINIC | Age: 71
End: 2021-01-01

## 2021-01-01 ENCOUNTER — LAB (OUTPATIENT)
Dept: LAB | Facility: HOSPITAL | Age: 71
End: 2021-01-01

## 2021-01-01 ENCOUNTER — PATIENT OUTREACH (OUTPATIENT)
Dept: CASE MANAGEMENT | Facility: OTHER | Age: 71
End: 2021-01-01

## 2021-01-01 ENCOUNTER — HOSPITAL ENCOUNTER (EMERGENCY)
Facility: HOSPITAL | Age: 71
Discharge: HOME | End: 2021-03-23
Attending: STUDENT IN AN ORGANIZED HEALTH CARE EDUCATION/TRAINING PROGRAM | Admitting: STUDENT IN AN ORGANIZED HEALTH CARE EDUCATION/TRAINING PROGRAM

## 2021-01-01 ENCOUNTER — HOSPITAL ENCOUNTER (EMERGENCY)
Facility: HOSPITAL | Age: 71
Discharge: HOME | End: 2021-03-25
Attending: EMERGENCY MEDICINE | Admitting: EMERGENCY MEDICINE

## 2021-01-01 ENCOUNTER — TRANSITIONAL CARE MANAGEMENT TELEPHONE ENCOUNTER (OUTPATIENT)
Dept: CALL CENTER | Facility: HOSPITAL | Age: 71
End: 2021-01-01

## 2021-01-01 ENCOUNTER — OFFICE VISIT (OUTPATIENT)
Dept: INTERNAL MEDICINE | Facility: CLINIC | Age: 71
End: 2021-01-01

## 2021-01-01 ENCOUNTER — HOSPITAL ENCOUNTER (EMERGENCY)
Facility: HOSPITAL | Age: 71
Discharge: ANOTHER HOSPITAL | End: 2021-01-09
Attending: EMERGENCY MEDICINE | Admitting: EMERGENCY MEDICINE

## 2021-01-01 ENCOUNTER — APPOINTMENT (OUTPATIENT)
Dept: GENERAL RADIOLOGY | Facility: HOSPITAL | Age: 71
End: 2021-01-01

## 2021-01-01 ENCOUNTER — HOSPITAL ENCOUNTER (OUTPATIENT)
Facility: HOSPITAL | Age: 71
Setting detail: HOSPITAL OUTPATIENT SURGERY
End: 2021-01-01
Attending: INTERNAL MEDICINE | Admitting: INTERNAL MEDICINE

## 2021-01-01 ENCOUNTER — HOSPITAL ENCOUNTER (INPATIENT)
Facility: HOSPITAL | Age: 71
LOS: 4 days | Discharge: NURSING, REHAB OR LONG-TERM CARE | End: 2021-08-21
Attending: EMERGENCY MEDICINE | Admitting: INTERNAL MEDICINE

## 2021-01-01 ENCOUNTER — HOSPITAL ENCOUNTER (INPATIENT)
Facility: HOSPITAL | Age: 71
LOS: 4 days | Discharge: HOME HEALTH | End: 2021-06-16
Attending: EMERGENCY MEDICINE | Admitting: INTERNAL MEDICINE

## 2021-01-01 ENCOUNTER — APPOINTMENT (OUTPATIENT)
Dept: ULTRASOUND IMAGING | Facility: HOSPITAL | Age: 71
End: 2021-01-01

## 2021-01-01 ENCOUNTER — HOSPITAL ENCOUNTER (OUTPATIENT)
Dept: ULTRASOUND IMAGING | Facility: HOSPITAL | Age: 71
Discharge: HOME | End: 2021-04-09

## 2021-01-01 ENCOUNTER — HOSPITAL ENCOUNTER (OUTPATIENT)
Facility: HOSPITAL | Age: 71
Setting detail: OBSERVATION
Discharge: HOME HEALTH | End: 2021-05-29
Attending: EMERGENCY MEDICINE | Admitting: INTERNAL MEDICINE

## 2021-01-01 ENCOUNTER — OUTSIDE FACILITY SERVICE (OUTPATIENT)
Dept: INTERNAL MEDICINE | Facility: CLINIC | Age: 71
End: 2021-01-01

## 2021-01-01 ENCOUNTER — HOSPITAL ENCOUNTER (INPATIENT)
Facility: HOSPITAL | Age: 71
LOS: 3 days | Discharge: NURSING, REHAB OR LONG-TERM CARE | End: 2021-08-11
Attending: EMERGENCY MEDICINE | Admitting: INTERNAL MEDICINE

## 2021-01-01 ENCOUNTER — PATIENT MESSAGE (OUTPATIENT)
Dept: INTERNAL MEDICINE | Facility: CLINIC | Age: 71
End: 2021-01-01

## 2021-01-01 ENCOUNTER — TRANSCRIBE ORDERS (OUTPATIENT)
Dept: LAB | Facility: HOSPITAL | Age: 71
End: 2021-01-01

## 2021-01-01 ENCOUNTER — ANESTHESIA EVENT (OUTPATIENT)
Dept: GASTROENTEROLOGY | Facility: HOSPITAL | Age: 71
End: 2021-01-01

## 2021-01-01 ENCOUNTER — HOSPITAL ENCOUNTER (INPATIENT)
Facility: HOSPITAL | Age: 71
LOS: 5 days | Discharge: HOME | End: 2021-05-14
Attending: EMERGENCY MEDICINE | Admitting: INTERNAL MEDICINE

## 2021-01-01 ENCOUNTER — HOSPITAL ENCOUNTER (INPATIENT)
Facility: HOSPITAL | Age: 71
LOS: 3 days | Discharge: HOSPICE | End: 2021-10-02
Attending: EMERGENCY MEDICINE | Admitting: FAMILY MEDICINE

## 2021-01-01 ENCOUNTER — ANESTHESIA (OUTPATIENT)
Dept: GASTROENTEROLOGY | Facility: HOSPITAL | Age: 71
End: 2021-01-01

## 2021-01-01 ENCOUNTER — TELEPHONE (OUTPATIENT)
Dept: SOCIAL WORK | Facility: HOSPITAL | Age: 71
End: 2021-01-01

## 2021-01-01 VITALS
HEIGHT: 72 IN | TEMPERATURE: 97.5 F | SYSTOLIC BLOOD PRESSURE: 130 MMHG | WEIGHT: 149.12 LBS | DIASTOLIC BLOOD PRESSURE: 60 MMHG | OXYGEN SATURATION: 97 % | HEART RATE: 66 BPM | BODY MASS INDEX: 20.2 KG/M2

## 2021-01-01 VITALS
RESPIRATION RATE: 18 BRPM | HEART RATE: 75 BPM | BODY MASS INDEX: 18.07 KG/M2 | HEIGHT: 72 IN | DIASTOLIC BLOOD PRESSURE: 60 MMHG | SYSTOLIC BLOOD PRESSURE: 117 MMHG | TEMPERATURE: 97.9 F | OXYGEN SATURATION: 98 % | WEIGHT: 133.38 LBS

## 2021-01-01 VITALS
OXYGEN SATURATION: 97 % | TEMPERATURE: 98.7 F | DIASTOLIC BLOOD PRESSURE: 60 MMHG | HEART RATE: 88 BPM | SYSTOLIC BLOOD PRESSURE: 120 MMHG | WEIGHT: 217.12 LBS | BODY MASS INDEX: 29.41 KG/M2 | HEIGHT: 72 IN

## 2021-01-01 VITALS
DIASTOLIC BLOOD PRESSURE: 63 MMHG | TEMPERATURE: 98.6 F | HEART RATE: 85 BPM | OXYGEN SATURATION: 92 % | WEIGHT: 216 LBS | SYSTOLIC BLOOD PRESSURE: 122 MMHG | HEIGHT: 72 IN | BODY MASS INDEX: 29.26 KG/M2 | RESPIRATION RATE: 18 BRPM

## 2021-01-01 VITALS
TEMPERATURE: 97.5 F | DIASTOLIC BLOOD PRESSURE: 60 MMHG | SYSTOLIC BLOOD PRESSURE: 101 MMHG | HEART RATE: 94 BPM | RESPIRATION RATE: 18 BRPM | OXYGEN SATURATION: 96 %

## 2021-01-01 VITALS
OXYGEN SATURATION: 98 % | DIASTOLIC BLOOD PRESSURE: 60 MMHG | TEMPERATURE: 98.2 F | HEART RATE: 96 BPM | HEIGHT: 73 IN | BODY MASS INDEX: 25.18 KG/M2 | WEIGHT: 190 LBS | SYSTOLIC BLOOD PRESSURE: 110 MMHG

## 2021-01-01 VITALS
TEMPERATURE: 97.9 F | BODY MASS INDEX: 20.53 KG/M2 | SYSTOLIC BLOOD PRESSURE: 121 MMHG | WEIGHT: 151.38 LBS | HEART RATE: 67 BPM | DIASTOLIC BLOOD PRESSURE: 67 MMHG | OXYGEN SATURATION: 96 % | RESPIRATION RATE: 18 BRPM

## 2021-01-01 VITALS
DIASTOLIC BLOOD PRESSURE: 59 MMHG | TEMPERATURE: 98.7 F | HEART RATE: 94 BPM | BODY MASS INDEX: 28.63 KG/M2 | WEIGHT: 216 LBS | HEIGHT: 73 IN | SYSTOLIC BLOOD PRESSURE: 110 MMHG | OXYGEN SATURATION: 97 %

## 2021-01-01 VITALS
HEIGHT: 72 IN | BODY MASS INDEX: 23.86 KG/M2 | OXYGEN SATURATION: 98 % | DIASTOLIC BLOOD PRESSURE: 51 MMHG | WEIGHT: 176.12 LBS | SYSTOLIC BLOOD PRESSURE: 110 MMHG | TEMPERATURE: 98.2 F | HEART RATE: 66 BPM

## 2021-01-01 VITALS
TEMPERATURE: 97.8 F | BODY MASS INDEX: 21.41 KG/M2 | DIASTOLIC BLOOD PRESSURE: 67 MMHG | WEIGHT: 158.07 LBS | SYSTOLIC BLOOD PRESSURE: 129 MMHG | RESPIRATION RATE: 19 BRPM | OXYGEN SATURATION: 96 % | HEIGHT: 72 IN | HEART RATE: 71 BPM

## 2021-01-01 VITALS
OXYGEN SATURATION: 96 % | RESPIRATION RATE: 18 BRPM | HEART RATE: 80 BPM | TEMPERATURE: 97.3 F | WEIGHT: 158.07 LBS | BODY MASS INDEX: 21.41 KG/M2 | HEIGHT: 72 IN | SYSTOLIC BLOOD PRESSURE: 114 MMHG | DIASTOLIC BLOOD PRESSURE: 73 MMHG

## 2021-01-01 VITALS
BODY MASS INDEX: 20.59 KG/M2 | HEIGHT: 72 IN | SYSTOLIC BLOOD PRESSURE: 110 MMHG | WEIGHT: 152 LBS | OXYGEN SATURATION: 98 % | TEMPERATURE: 97.2 F | HEART RATE: 62 BPM | DIASTOLIC BLOOD PRESSURE: 54 MMHG

## 2021-01-01 VITALS
RESPIRATION RATE: 18 BRPM | TEMPERATURE: 98.1 F | BODY MASS INDEX: 29.26 KG/M2 | DIASTOLIC BLOOD PRESSURE: 62 MMHG | OXYGEN SATURATION: 95 % | HEART RATE: 90 BPM | SYSTOLIC BLOOD PRESSURE: 125 MMHG | HEIGHT: 72 IN | WEIGHT: 216 LBS

## 2021-01-01 VITALS
WEIGHT: 223 LBS | HEIGHT: 72 IN | HEART RATE: 87 BPM | OXYGEN SATURATION: 95 % | RESPIRATION RATE: 16 BRPM | DIASTOLIC BLOOD PRESSURE: 52 MMHG | TEMPERATURE: 98.2 F | SYSTOLIC BLOOD PRESSURE: 112 MMHG | BODY MASS INDEX: 30.2 KG/M2

## 2021-01-01 VITALS
OXYGEN SATURATION: 94 % | TEMPERATURE: 97.6 F | RESPIRATION RATE: 18 BRPM | DIASTOLIC BLOOD PRESSURE: 65 MMHG | WEIGHT: 155.31 LBS | SYSTOLIC BLOOD PRESSURE: 120 MMHG | BODY MASS INDEX: 21.06 KG/M2 | HEART RATE: 84 BPM

## 2021-01-01 VITALS
RESPIRATION RATE: 18 BRPM | BODY MASS INDEX: 20.19 KG/M2 | DIASTOLIC BLOOD PRESSURE: 62 MMHG | TEMPERATURE: 98.1 F | HEIGHT: 72 IN | WEIGHT: 149.03 LBS | OXYGEN SATURATION: 98 % | HEART RATE: 77 BPM | SYSTOLIC BLOOD PRESSURE: 116 MMHG

## 2021-01-01 VITALS
DIASTOLIC BLOOD PRESSURE: 52 MMHG | RESPIRATION RATE: 19 BRPM | OXYGEN SATURATION: 95 % | HEIGHT: 72 IN | SYSTOLIC BLOOD PRESSURE: 109 MMHG | TEMPERATURE: 98.1 F | BODY MASS INDEX: 20.07 KG/M2 | WEIGHT: 148.15 LBS | HEART RATE: 60 BPM

## 2021-01-01 VITALS
BODY MASS INDEX: 27.43 KG/M2 | HEART RATE: 107 BPM | DIASTOLIC BLOOD PRESSURE: 67 MMHG | WEIGHT: 207 LBS | RESPIRATION RATE: 18 BRPM | TEMPERATURE: 99.2 F | OXYGEN SATURATION: 95 % | HEIGHT: 73 IN | SYSTOLIC BLOOD PRESSURE: 136 MMHG

## 2021-01-01 VITALS
DIASTOLIC BLOOD PRESSURE: 55 MMHG | TEMPERATURE: 98.2 F | RESPIRATION RATE: 18 BRPM | BODY MASS INDEX: 29.71 KG/M2 | OXYGEN SATURATION: 96 % | SYSTOLIC BLOOD PRESSURE: 125 MMHG | HEART RATE: 60 BPM | WEIGHT: 219.36 LBS | HEIGHT: 72 IN

## 2021-01-01 VITALS
WEIGHT: 201.12 LBS | SYSTOLIC BLOOD PRESSURE: 126 MMHG | HEART RATE: 84 BPM | HEIGHT: 72 IN | OXYGEN SATURATION: 96 % | BODY MASS INDEX: 27.24 KG/M2 | DIASTOLIC BLOOD PRESSURE: 62 MMHG | TEMPERATURE: 98 F

## 2021-01-01 DIAGNOSIS — R79.89 INCREASED AMMONIA LEVEL: ICD-10-CM

## 2021-01-01 DIAGNOSIS — K72.10 END STAGE LIVER DISEASE (HCC): ICD-10-CM

## 2021-01-01 DIAGNOSIS — C22.0 HEPATOCELLULAR CARCINOMA (HCC): ICD-10-CM

## 2021-01-01 DIAGNOSIS — K76.82 HEPATIC ENCEPHALOPATHY (HCC): Primary | ICD-10-CM

## 2021-01-01 DIAGNOSIS — K74.60 CIRRHOSIS OF LIVER WITH ASCITES, UNSPECIFIED HEPATIC CIRRHOSIS TYPE (HCC): ICD-10-CM

## 2021-01-01 DIAGNOSIS — S32.009A CLOSED FRACTURE OF TRANSVERSE PROCESS OF LUMBAR VERTEBRA, INITIAL ENCOUNTER (HCC): ICD-10-CM

## 2021-01-01 DIAGNOSIS — D63.8 ANEMIA OF CHRONIC DISEASE: ICD-10-CM

## 2021-01-01 DIAGNOSIS — E43 SEVERE MALNUTRITION (HCC): ICD-10-CM

## 2021-01-01 DIAGNOSIS — D69.6 THROMBOCYTOPENIA (HCC): ICD-10-CM

## 2021-01-01 DIAGNOSIS — E86.0 DEHYDRATION: ICD-10-CM

## 2021-01-01 DIAGNOSIS — R18.8 CIRRHOSIS OF LIVER WITH ASCITES, UNSPECIFIED HEPATIC CIRRHOSIS TYPE (HCC): Primary | ICD-10-CM

## 2021-01-01 DIAGNOSIS — K74.60 CIRRHOSIS OF LIVER WITH ASCITES, UNSPECIFIED HEPATIC CIRRHOSIS TYPE (HCC): Primary | ICD-10-CM

## 2021-01-01 DIAGNOSIS — Z01.818 PRE-OPERATIVE CLEARANCE: ICD-10-CM

## 2021-01-01 DIAGNOSIS — Z85.05 HISTORY OF HEPATOCELLULAR CARCINOMA: ICD-10-CM

## 2021-01-01 DIAGNOSIS — I10 ESSENTIAL HYPERTENSION: ICD-10-CM

## 2021-01-01 DIAGNOSIS — R18.8 CIRRHOSIS OF LIVER WITH ASCITES, UNSPECIFIED HEPATIC CIRRHOSIS TYPE (HCC): ICD-10-CM

## 2021-01-01 DIAGNOSIS — J12.82 PNEUMONIA DUE TO COVID-19 VIRUS: Primary | ICD-10-CM

## 2021-01-01 DIAGNOSIS — R41.82 ALTERED MENTAL STATUS, UNSPECIFIED ALTERED MENTAL STATUS TYPE: ICD-10-CM

## 2021-01-01 DIAGNOSIS — K92.1 MELENA: ICD-10-CM

## 2021-01-01 DIAGNOSIS — R55 SYNCOPE, NEAR: ICD-10-CM

## 2021-01-01 DIAGNOSIS — K76.82 ENCEPHALOPATHY, HEPATIC (HCC): Primary | ICD-10-CM

## 2021-01-01 DIAGNOSIS — N18.30 STAGE 3 CHRONIC KIDNEY DISEASE, UNSPECIFIED WHETHER STAGE 3A OR 3B CKD (HCC): ICD-10-CM

## 2021-01-01 DIAGNOSIS — N50.89 SCROTAL EDEMA: ICD-10-CM

## 2021-01-01 DIAGNOSIS — G92.8 ENCEPHALOPATHY DUE TO AMMONIA: ICD-10-CM

## 2021-01-01 DIAGNOSIS — R60.0 EDEMA OF BOTH LOWER EXTREMITIES: Primary | ICD-10-CM

## 2021-01-01 DIAGNOSIS — E10.42 TYPE 1 DIABETES MELLITUS WITH DIABETIC POLYNEUROPATHY (HCC): ICD-10-CM

## 2021-01-01 DIAGNOSIS — R53.83 FATIGUE, UNSPECIFIED TYPE: ICD-10-CM

## 2021-01-01 DIAGNOSIS — E87.20 LACTIC ACIDOSIS: ICD-10-CM

## 2021-01-01 DIAGNOSIS — E10.42 TYPE 1 DIABETES MELLITUS WITH DIABETIC POLYNEUROPATHY (HCC): Primary | ICD-10-CM

## 2021-01-01 DIAGNOSIS — Z01.818 PRE-OP EXAMINATION: ICD-10-CM

## 2021-01-01 DIAGNOSIS — U07.1 COVID-19: Primary | ICD-10-CM

## 2021-01-01 DIAGNOSIS — M79.89 LEG SWELLING: ICD-10-CM

## 2021-01-01 DIAGNOSIS — C22.0 HEPATOCELLULAR CARCINOMA (HCC): Primary | ICD-10-CM

## 2021-01-01 DIAGNOSIS — R74.01 TRANSAMINITIS: ICD-10-CM

## 2021-01-01 DIAGNOSIS — S22.009A CLOSED FRACTURE OF THORACIC VERTEBRA, UNSPECIFIED FRACTURE MORPHOLOGY, UNSPECIFIED THORACIC VERTEBRAL LEVEL, INITIAL ENCOUNTER (HCC): ICD-10-CM

## 2021-01-01 DIAGNOSIS — E43 UNSPECIFIED SEVERE PROTEIN-CALORIE MALNUTRITION (HCC): ICD-10-CM

## 2021-01-01 DIAGNOSIS — D64.9 ANEMIA, UNSPECIFIED TYPE: Primary | ICD-10-CM

## 2021-01-01 DIAGNOSIS — Z01.818 PRE-OP EXAMINATION: Primary | ICD-10-CM

## 2021-01-01 DIAGNOSIS — Z01.818 PRE-OPERATIVE CLEARANCE: Primary | ICD-10-CM

## 2021-01-01 DIAGNOSIS — K76.82 ENCEPHALOPATHY, HEPATIC (HCC): ICD-10-CM

## 2021-01-01 DIAGNOSIS — R41.0 CONFUSION: ICD-10-CM

## 2021-01-01 DIAGNOSIS — U07.1 PNEUMONIA DUE TO COVID-19 VIRUS: Primary | ICD-10-CM

## 2021-01-01 DIAGNOSIS — R50.9 FEVER, UNSPECIFIED FEVER CAUSE: Primary | ICD-10-CM

## 2021-01-01 DIAGNOSIS — E86.0 MILD DEHYDRATION: ICD-10-CM

## 2021-01-01 DIAGNOSIS — R53.1 WEAKNESS: ICD-10-CM

## 2021-01-01 DIAGNOSIS — D50.0 IRON DEFICIENCY ANEMIA DUE TO CHRONIC BLOOD LOSS: Primary | ICD-10-CM

## 2021-01-01 DIAGNOSIS — R77.8 ELEVATED TROPONIN: ICD-10-CM

## 2021-01-01 DIAGNOSIS — D64.9 ANEMIA, UNSPECIFIED TYPE: ICD-10-CM

## 2021-01-01 DIAGNOSIS — K75.81 NASH (NONALCOHOLIC STEATOHEPATITIS): ICD-10-CM

## 2021-01-01 DIAGNOSIS — D64.9 SYMPTOMATIC ANEMIA: Primary | ICD-10-CM

## 2021-01-01 DIAGNOSIS — T59.891A ENCEPHALOPATHY DUE TO AMMONIA: ICD-10-CM

## 2021-01-01 DIAGNOSIS — J96.01 ACUTE RESPIRATORY FAILURE WITH HYPOXIA (HCC): ICD-10-CM

## 2021-01-01 LAB
A-A DO2: 37.1 MMHG
ABO GROUP BLD: NORMAL
ACINETOBACTER SCREEN CX: NORMAL
ALBUMIN FLD-MCNC: 0.4 G/DL
ALBUMIN SERPL-MCNC: 2 G/DL (ref 3.5–5.2)
ALBUMIN SERPL-MCNC: 2 G/DL (ref 3.5–5.2)
ALBUMIN SERPL-MCNC: 2.1 G/DL (ref 3.5–5.2)
ALBUMIN SERPL-MCNC: 2.2 G/DL (ref 3.5–5.2)
ALBUMIN SERPL-MCNC: 2.2 G/DL (ref 3.5–5.2)
ALBUMIN SERPL-MCNC: 2.3 G/DL (ref 3.5–5.2)
ALBUMIN SERPL-MCNC: 2.3 G/DL (ref 3.5–5.2)
ALBUMIN SERPL-MCNC: 2.4 G/DL (ref 3.5–5.2)
ALBUMIN SERPL-MCNC: 2.5 G/DL (ref 3.5–5.2)
ALBUMIN SERPL-MCNC: 2.6 G/DL (ref 3.5–5.2)
ALBUMIN SERPL-MCNC: 2.7 G/DL (ref 3.5–5.2)
ALBUMIN SERPL-MCNC: 2.9 G/DL (ref 3.5–5.2)
ALBUMIN SERPL-MCNC: 2.9 G/DL (ref 3.5–5.2)
ALBUMIN SERPL-MCNC: 3.1 G/DL (ref 3.5–5.2)
ALBUMIN SERPL-MCNC: 3.1 G/DL (ref 3.5–5.2)
ALBUMIN SERPL-MCNC: 3.2 G/DL (ref 3.5–5.2)
ALBUMIN SERPL-MCNC: 3.4 G/DL (ref 3.5–5.2)
ALBUMIN/GLOB SERPL: 0.5 G/DL
ALBUMIN/GLOB SERPL: 0.6 G/DL
ALBUMIN/GLOB SERPL: 0.7 G/DL
ALBUMIN/GLOB SERPL: 0.8 G/DL
ALBUMIN/GLOB SERPL: 0.9 G/DL
ALBUMIN/GLOB SERPL: 0.9 G/DL
ALBUMIN/GLOB SERPL: 1 G/DL
ALBUMIN/GLOB SERPL: 1.2 G/DL
ALBUMIN/GLOB SERPL: 1.2 G/DL
ALP SERPL-CCNC: 128 U/L (ref 39–117)
ALP SERPL-CCNC: 139 U/L (ref 39–117)
ALP SERPL-CCNC: 35 U/L (ref 39–117)
ALP SERPL-CCNC: 35 U/L (ref 39–117)
ALP SERPL-CCNC: 36 U/L (ref 39–117)
ALP SERPL-CCNC: 37 U/L (ref 39–117)
ALP SERPL-CCNC: 57 U/L (ref 39–117)
ALP SERPL-CCNC: 60 U/L (ref 39–117)
ALP SERPL-CCNC: 65 U/L (ref 39–117)
ALP SERPL-CCNC: 66 U/L (ref 39–117)
ALP SERPL-CCNC: 68 U/L (ref 39–117)
ALP SERPL-CCNC: 68 U/L (ref 39–117)
ALP SERPL-CCNC: 69 U/L (ref 39–117)
ALP SERPL-CCNC: 70 U/L (ref 39–117)
ALP SERPL-CCNC: 72 U/L (ref 39–117)
ALP SERPL-CCNC: 72 U/L (ref 39–117)
ALP SERPL-CCNC: 76 U/L (ref 39–117)
ALP SERPL-CCNC: 79 U/L (ref 39–117)
ALP SERPL-CCNC: 79 U/L (ref 39–117)
ALP SERPL-CCNC: 84 U/L (ref 39–117)
ALP SERPL-CCNC: 85 U/L (ref 39–117)
ALP SERPL-CCNC: 87 U/L (ref 39–117)
ALP SERPL-CCNC: 88 U/L (ref 39–117)
ALP SERPL-CCNC: 89 U/L (ref 39–117)
ALT SERPL W P-5'-P-CCNC: 10 U/L (ref 1–41)
ALT SERPL W P-5'-P-CCNC: 11 U/L (ref 1–41)
ALT SERPL W P-5'-P-CCNC: 12 U/L (ref 1–41)
ALT SERPL W P-5'-P-CCNC: 13 U/L (ref 1–41)
ALT SERPL W P-5'-P-CCNC: 16 U/L (ref 1–41)
ALT SERPL W P-5'-P-CCNC: 18 U/L (ref 1–41)
ALT SERPL W P-5'-P-CCNC: 20 U/L (ref 1–41)
ALT SERPL W P-5'-P-CCNC: 20 U/L (ref 1–41)
ALT SERPL W P-5'-P-CCNC: 21 U/L (ref 1–41)
ALT SERPL W P-5'-P-CCNC: 23 U/L (ref 1–41)
ALT SERPL W P-5'-P-CCNC: 24 U/L (ref 1–41)
ALT SERPL W P-5'-P-CCNC: 26 U/L (ref 1–41)
ALT SERPL W P-5'-P-CCNC: 28 U/L (ref 1–41)
ALT SERPL W P-5'-P-CCNC: 32 U/L (ref 1–41)
ALT SERPL W P-5'-P-CCNC: 42 U/L (ref 1–41)
ALT SERPL W P-5'-P-CCNC: 498 U/L (ref 1–41)
ALT SERPL-CCNC: 15 U/L (ref 1–41)
ALT SERPL-CCNC: 17 U/L (ref 1–41)
ALT SERPL-CCNC: 17 U/L (ref 1–41)
ALT SERPL-CCNC: 21 U/L (ref 1–41)
AMMONIA BLD-SCNC: 107 UMOL/L (ref 16–60)
AMMONIA BLD-SCNC: 130 UMOL/L (ref 16–60)
AMMONIA BLD-SCNC: 133 UMOL/L (ref 16–60)
AMMONIA BLD-SCNC: 141 UMOL/L (ref 16–60)
AMMONIA BLD-SCNC: 166 UMOL/L (ref 16–60)
AMMONIA BLD-SCNC: 62 UMOL/L (ref 16–60)
AMMONIA BLD-SCNC: 87 UMOL/L (ref 16–60)
AMMONIA BLD-SCNC: 90 UMOL/L (ref 16–60)
ANION GAP SERPL CALCULATED.3IONS-SCNC: 10.4 MMOL/L (ref 5–15)
ANION GAP SERPL CALCULATED.3IONS-SCNC: 11 MMOL/L (ref 5–15)
ANION GAP SERPL CALCULATED.3IONS-SCNC: 11.2 MMOL/L (ref 5–15)
ANION GAP SERPL CALCULATED.3IONS-SCNC: 11.4 MMOL/L (ref 5–15)
ANION GAP SERPL CALCULATED.3IONS-SCNC: 14 MMOL/L (ref 5–15)
ANION GAP SERPL CALCULATED.3IONS-SCNC: 4.6 MMOL/L (ref 5–15)
ANION GAP SERPL CALCULATED.3IONS-SCNC: 5 MMOL/L (ref 5–15)
ANION GAP SERPL CALCULATED.3IONS-SCNC: 5.3 MMOL/L (ref 5–15)
ANION GAP SERPL CALCULATED.3IONS-SCNC: 5.9 MMOL/L (ref 5–15)
ANION GAP SERPL CALCULATED.3IONS-SCNC: 5.9 MMOL/L (ref 5–15)
ANION GAP SERPL CALCULATED.3IONS-SCNC: 6.7 MMOL/L (ref 5–15)
ANION GAP SERPL CALCULATED.3IONS-SCNC: 6.7 MMOL/L (ref 5–15)
ANION GAP SERPL CALCULATED.3IONS-SCNC: 7.7 MMOL/L (ref 5–15)
ANION GAP SERPL CALCULATED.3IONS-SCNC: 8 MMOL/L (ref 5–15)
ANION GAP SERPL CALCULATED.3IONS-SCNC: 8 MMOL/L (ref 5–15)
ANION GAP SERPL CALCULATED.3IONS-SCNC: 8.1 MMOL/L (ref 5–15)
ANION GAP SERPL CALCULATED.3IONS-SCNC: 8.1 MMOL/L (ref 5–15)
ANION GAP SERPL CALCULATED.3IONS-SCNC: 8.2 MMOL/L (ref 5–15)
ANION GAP SERPL CALCULATED.3IONS-SCNC: 8.7 MMOL/L (ref 5–15)
ANION GAP SERPL CALCULATED.3IONS-SCNC: 8.8 MMOL/L (ref 5–15)
ANION GAP SERPL CALCULATED.3IONS-SCNC: 8.9 MMOL/L (ref 5–15)
ANION GAP SERPL CALCULATED.3IONS-SCNC: 8.9 MMOL/L (ref 5–15)
ANION GAP SERPL CALCULATED.3IONS-SCNC: 9.2 MMOL/L (ref 5–15)
ANION GAP SERPL CALCULATED.3IONS-SCNC: 9.2 MMOL/L (ref 5–15)
ANION GAP SERPL CALCULATED.3IONS-SCNC: 9.3 MMOL/L (ref 5–15)
ANION GAP SERPL CALCULATED.3IONS-SCNC: 9.4 MMOL/L (ref 5–15)
ANION GAP SERPL CALCULATED.3IONS-SCNC: 9.6 MMOL/L (ref 5–15)
ANION GAP SERPL CALCULATED.3IONS-SCNC: 9.7 MMOL/L (ref 5–15)
ANISOCYTOSIS BLD QL: NORMAL
APPEARANCE FLD: ABNORMAL
APPEARANCE FLD: CLEAR
APPEARANCE FLD: CLEAR
APTT PPP: 34.1 SECONDS (ref 24.5–37.2)
APTT PPP: 36.4 SECONDS (ref 24.5–37.2)
APTT PPP: 36.8 SECONDS (ref 24.5–37.2)
ARTERIAL PATENCY WRIST A: POSITIVE
AST SERPL-CCNC: 22 U/L (ref 1–40)
AST SERPL-CCNC: 22 U/L (ref 1–40)
AST SERPL-CCNC: 24 U/L (ref 1–40)
AST SERPL-CCNC: 24 U/L (ref 1–40)
AST SERPL-CCNC: 29 U/L (ref 1–40)
AST SERPL-CCNC: 29 U/L (ref 1–40)
AST SERPL-CCNC: 30 U/L (ref 1–40)
AST SERPL-CCNC: 32 U/L (ref 1–40)
AST SERPL-CCNC: 34 U/L (ref 1–40)
AST SERPL-CCNC: 35 U/L (ref 1–40)
AST SERPL-CCNC: 36 U/L (ref 1–40)
AST SERPL-CCNC: 36 U/L (ref 1–40)
AST SERPL-CCNC: 37 U/L (ref 1–40)
AST SERPL-CCNC: 38 U/L (ref 1–40)
AST SERPL-CCNC: 40 U/L (ref 1–40)
AST SERPL-CCNC: 42 U/L (ref 1–40)
AST SERPL-CCNC: 43 U/L (ref 1–40)
AST SERPL-CCNC: 46 U/L (ref 1–40)
AST SERPL-CCNC: 491 U/L (ref 1–40)
ATMOSPHERIC PRESS: 731 MMHG
B PARAPERT DNA SPEC QL NAA+PROBE: NOT DETECTED
B PERT DNA SPEC QL NAA+PROBE: NOT DETECTED
BACTERIA BLD CULT: ABNORMAL
BACTERIA FLD CULT: NORMAL
BACTERIA FLD CULT: NORMAL
BACTERIA SPEC AEROBE CULT: ABNORMAL
BACTERIA SPEC AEROBE CULT: NORMAL
BACTERIA UR QL AUTO: ABNORMAL /HPF
BASE EXCESS BLDA CALC-SCNC: -0.8 MMOL/L (ref 0–2)
BASOPHILS # BLD AUTO: 0 10*3/MM3 (ref 0–0.2)
BASOPHILS # BLD AUTO: 0.01 10*3/MM3 (ref 0–0.2)
BASOPHILS # BLD AUTO: 0.02 10*3/MM3 (ref 0–0.2)
BASOPHILS # BLD AUTO: 0.03 10*3/MM3 (ref 0–0.2)
BASOPHILS # BLD AUTO: 0.04 10*3/MM3 (ref 0–0.2)
BASOPHILS # BLD AUTO: 0.05 10*3/MM3 (ref 0–0.2)
BASOPHILS NFR BLD AUTO: 0 % (ref 0–1.5)
BASOPHILS NFR BLD AUTO: 0.2 % (ref 0–1.5)
BASOPHILS NFR BLD AUTO: 0.3 % (ref 0–1.5)
BASOPHILS NFR BLD AUTO: 0.6 % (ref 0–1.5)
BASOPHILS NFR BLD AUTO: 0.8 % (ref 0–1.5)
BASOPHILS NFR BLD AUTO: 0.9 % (ref 0–1.5)
BASOPHILS NFR BLD AUTO: 1 % (ref 0–1.5)
BASOPHILS NFR BLD AUTO: 1.2 % (ref 0–1.5)
BDY SITE: ABNORMAL
BH BB BLOOD EXPIRATION DATE: NORMAL
BH BB BLOOD TYPE BARCODE: 6200
BH BB DISPENSE STATUS: NORMAL
BH BB PRODUCT CODE: NORMAL
BH BB UNIT NUMBER: NORMAL
BILIRUB SERPL-MCNC: 1 MG/DL (ref 0–1.2)
BILIRUB SERPL-MCNC: 1 MG/DL (ref 0–1.2)
BILIRUB SERPL-MCNC: 1.2 MG/DL (ref 0–1.2)
BILIRUB SERPL-MCNC: 1.4 MG/DL (ref 0–1.2)
BILIRUB SERPL-MCNC: 1.5 MG/DL (ref 0–1.2)
BILIRUB SERPL-MCNC: 1.6 MG/DL (ref 0–1.2)
BILIRUB SERPL-MCNC: 1.7 MG/DL (ref 0–1.2)
BILIRUB SERPL-MCNC: 1.7 MG/DL (ref 0–1.2)
BILIRUB SERPL-MCNC: 1.8 MG/DL (ref 0–1.2)
BILIRUB SERPL-MCNC: 1.9 MG/DL (ref 0–1.2)
BILIRUB SERPL-MCNC: 2.1 MG/DL (ref 0–1.2)
BILIRUB SERPL-MCNC: 2.1 MG/DL (ref 0–1.2)
BILIRUB SERPL-MCNC: 2.3 MG/DL (ref 0–1.2)
BILIRUB SERPL-MCNC: 3.2 MG/DL (ref 0–1.2)
BILIRUB SERPL-MCNC: 3.2 MG/DL (ref 0–1.2)
BILIRUB UR QL STRIP: NEGATIVE
BLD GP AB SCN SERPL QL: NEGATIVE
BLD GP AB SCN SERPL QL: NEGATIVE
BUN SERPL-MCNC: 10 MG/DL (ref 8–23)
BUN SERPL-MCNC: 15 MG/DL (ref 8–23)
BUN SERPL-MCNC: 17 MG/DL (ref 8–23)
BUN SERPL-MCNC: 18 MG/DL (ref 8–23)
BUN SERPL-MCNC: 19 MG/DL (ref 8–23)
BUN SERPL-MCNC: 20 MG/DL (ref 8–23)
BUN SERPL-MCNC: 21 MG/DL (ref 8–23)
BUN SERPL-MCNC: 22 MG/DL (ref 8–23)
BUN SERPL-MCNC: 23 MG/DL (ref 8–23)
BUN SERPL-MCNC: 24 MG/DL (ref 8–23)
BUN SERPL-MCNC: 24 MG/DL (ref 8–23)
BUN SERPL-MCNC: 25 MG/DL (ref 8–23)
BUN SERPL-MCNC: 25 MG/DL (ref 8–23)
BUN SERPL-MCNC: 26 MG/DL (ref 8–23)
BUN SERPL-MCNC: 26 MG/DL (ref 8–23)
BUN SERPL-MCNC: 27 MG/DL (ref 8–23)
BUN SERPL-MCNC: 29 MG/DL (ref 8–23)
BUN SERPL-MCNC: 32 MG/DL (ref 8–23)
BUN SERPL-MCNC: 32 MG/DL (ref 8–23)
BUN SERPL-MCNC: 33 MG/DL (ref 8–23)
BUN SERPL-MCNC: 41 MG/DL (ref 8–23)
BUN SERPL-MCNC: 45 MG/DL (ref 8–23)
BUN/CREAT SERPL: 10.6 (ref 7–25)
BUN/CREAT SERPL: 11.5 (ref 7–25)
BUN/CREAT SERPL: 12.4 (ref 7–25)
BUN/CREAT SERPL: 12.6 (ref 7–25)
BUN/CREAT SERPL: 13 (ref 7–25)
BUN/CREAT SERPL: 14.7 (ref 7–25)
BUN/CREAT SERPL: 15.2 (ref 7–25)
BUN/CREAT SERPL: 15.3 (ref 7–25)
BUN/CREAT SERPL: 15.5 (ref 7–25)
BUN/CREAT SERPL: 15.6 (ref 7–25)
BUN/CREAT SERPL: 15.9 (ref 7–25)
BUN/CREAT SERPL: 16.2 (ref 7–25)
BUN/CREAT SERPL: 16.4 (ref 7–25)
BUN/CREAT SERPL: 16.7 (ref 7–25)
BUN/CREAT SERPL: 16.8 (ref 7–25)
BUN/CREAT SERPL: 17.4 (ref 7–25)
BUN/CREAT SERPL: 18.1 (ref 7–25)
BUN/CREAT SERPL: 18.5 (ref 7–25)
BUN/CREAT SERPL: 18.6 (ref 7–25)
BUN/CREAT SERPL: 19.1 (ref 7–25)
BUN/CREAT SERPL: 20.4 (ref 7–25)
BUN/CREAT SERPL: 21.5 (ref 7–25)
BUN/CREAT SERPL: 21.8 (ref 7–25)
BUN/CREAT SERPL: 22.4 (ref 7–25)
BUN/CREAT SERPL: 22.8 (ref 7–25)
BUN/CREAT SERPL: 22.8 (ref 7–25)
BUN/CREAT SERPL: 22.9 (ref 7–25)
BUN/CREAT SERPL: 23.9 (ref 7–25)
BUN/CREAT SERPL: 28.9 (ref 7–25)
BUN/CREAT SERPL: 31.7 (ref 7–25)
BUN/CREAT SERPL: 32.8 (ref 7–25)
BUN/CREAT SERPL: 34.5 (ref 7–25)
C PNEUM DNA NPH QL NAA+NON-PROBE: NOT DETECTED
CALCIUM SERPL-MCNC: 8.3 MG/DL (ref 8.6–10.5)
CALCIUM SERPL-MCNC: 8.3 MG/DL (ref 8.6–10.5)
CALCIUM SERPL-MCNC: 8.4 MG/DL (ref 8.6–10.5)
CALCIUM SERPL-MCNC: 9.2 MG/DL (ref 8.6–10.5)
CALCIUM SPEC-SCNC: 7.7 MG/DL (ref 8.6–10.5)
CALCIUM SPEC-SCNC: 7.8 MG/DL (ref 8.6–10.5)
CALCIUM SPEC-SCNC: 7.9 MG/DL (ref 8.6–10.5)
CALCIUM SPEC-SCNC: 7.9 MG/DL (ref 8.6–10.5)
CALCIUM SPEC-SCNC: 8 MG/DL (ref 8.6–10.5)
CALCIUM SPEC-SCNC: 8.1 MG/DL (ref 8.6–10.5)
CALCIUM SPEC-SCNC: 8.1 MG/DL (ref 8.6–10.5)
CALCIUM SPEC-SCNC: 8.2 MG/DL (ref 8.6–10.5)
CALCIUM SPEC-SCNC: 8.3 MG/DL (ref 8.6–10.5)
CALCIUM SPEC-SCNC: 8.4 MG/DL (ref 8.6–10.5)
CALCIUM SPEC-SCNC: 8.5 MG/DL (ref 8.6–10.5)
CALCIUM SPEC-SCNC: 8.8 MG/DL (ref 8.6–10.5)
CALCIUM SPEC-SCNC: 8.9 MG/DL (ref 8.6–10.5)
CALCIUM SPEC-SCNC: 9 MG/DL (ref 8.6–10.5)
CALCIUM SPEC-SCNC: 9.1 MG/DL (ref 8.6–10.5)
CALCIUM SPEC-SCNC: 9.2 MG/DL (ref 8.6–10.5)
CALCIUM SPEC-SCNC: 9.2 MG/DL (ref 8.6–10.5)
CHLORIDE SERPL-SCNC: 100 MMOL/L (ref 98–107)
CHLORIDE SERPL-SCNC: 101 MMOL/L (ref 98–107)
CHLORIDE SERPL-SCNC: 102 MMOL/L (ref 98–107)
CHLORIDE SERPL-SCNC: 102 MMOL/L (ref 98–107)
CHLORIDE SERPL-SCNC: 104 MMOL/L (ref 98–107)
CHLORIDE SERPL-SCNC: 105 MMOL/L (ref 98–107)
CHLORIDE SERPL-SCNC: 106 MMOL/L (ref 98–107)
CHLORIDE SERPL-SCNC: 107 MMOL/L (ref 98–107)
CHLORIDE SERPL-SCNC: 108 MMOL/L (ref 98–107)
CHLORIDE SERPL-SCNC: 109 MMOL/L (ref 98–107)
CHLORIDE SERPL-SCNC: 110 MMOL/L (ref 98–107)
CHLORIDE SERPL-SCNC: 110 MMOL/L (ref 98–107)
CHLORIDE SERPL-SCNC: 111 MMOL/L (ref 98–107)
CHLORIDE SERPL-SCNC: 112 MMOL/L (ref 98–107)
CHLORIDE SERPL-SCNC: 114 MMOL/L (ref 98–107)
CHLORIDE SERPL-SCNC: 97 MMOL/L (ref 98–107)
CHOLEST SERPL-MCNC: 121 MG/DL (ref 0–200)
CK SERPL-CCNC: 90 U/L (ref 20–200)
CLARITY UR: CLEAR
CO2 SERPL-SCNC: 12.8 MMOL/L (ref 22–29)
CO2 SERPL-SCNC: 14.6 MMOL/L (ref 22–29)
CO2 SERPL-SCNC: 17 MMOL/L (ref 22–29)
CO2 SERPL-SCNC: 18 MMOL/L (ref 22–29)
CO2 SERPL-SCNC: 18.3 MMOL/L (ref 22–29)
CO2 SERPL-SCNC: 19.3 MMOL/L (ref 22–29)
CO2 SERPL-SCNC: 19.7 MMOL/L (ref 22–29)
CO2 SERPL-SCNC: 19.8 MMOL/L (ref 22–29)
CO2 SERPL-SCNC: 19.9 MMOL/L (ref 22–29)
CO2 SERPL-SCNC: 20.1 MMOL/L (ref 22–29)
CO2 SERPL-SCNC: 20.1 MMOL/L (ref 22–29)
CO2 SERPL-SCNC: 20.2 MMOL/L (ref 22–29)
CO2 SERPL-SCNC: 20.3 MMOL/L (ref 22–29)
CO2 SERPL-SCNC: 20.4 MMOL/L (ref 22–29)
CO2 SERPL-SCNC: 20.6 MMOL/L (ref 22–29)
CO2 SERPL-SCNC: 20.8 MMOL/L (ref 22–29)
CO2 SERPL-SCNC: 20.9 MMOL/L (ref 22–29)
CO2 SERPL-SCNC: 21 MMOL/L (ref 22–29)
CO2 SERPL-SCNC: 21.1 MMOL/L (ref 22–29)
CO2 SERPL-SCNC: 21.4 MMOL/L (ref 22–29)
CO2 SERPL-SCNC: 21.7 MMOL/L (ref 22–29)
CO2 SERPL-SCNC: 21.8 MMOL/L (ref 22–29)
CO2 SERPL-SCNC: 22 MMOL/L (ref 22–29)
CO2 SERPL-SCNC: 22 MMOL/L (ref 22–29)
CO2 SERPL-SCNC: 22.5 MMOL/L (ref 22–29)
CO2 SERPL-SCNC: 23.1 MMOL/L (ref 22–29)
CO2 SERPL-SCNC: 24.1 MMOL/L (ref 22–29)
CO2 SERPL-SCNC: 24.3 MMOL/L (ref 22–29)
CO2 SERPL-SCNC: 24.6 MMOL/L (ref 22–29)
CO2 SERPL-SCNC: 25.4 MMOL/L (ref 22–29)
COHGB MFR BLD: 1.1 % (ref 0–2)
COLOR FLD: YELLOW
COLOR UR: ABNORMAL
COLOR UR: YELLOW
CREAT SERPL-MCNC: 0.9 MG/DL (ref 0.76–1.27)
CREAT SERPL-MCNC: 0.93 MG/DL (ref 0.76–1.27)
CREAT SERPL-MCNC: 0.94 MG/DL (ref 0.76–1.27)
CREAT SERPL-MCNC: 0.94 MG/DL (ref 0.76–1.27)
CREAT SERPL-MCNC: 0.99 MG/DL (ref 0.76–1.27)
CREAT SERPL-MCNC: 1.02 MG/DL (ref 0.76–1.27)
CREAT SERPL-MCNC: 1.03 MG/DL (ref 0.76–1.27)
CREAT SERPL-MCNC: 1.04 MG/DL (ref 0.76–1.27)
CREAT SERPL-MCNC: 1.05 MG/DL (ref 0.76–1.27)
CREAT SERPL-MCNC: 1.09 MG/DL (ref 0.76–1.27)
CREAT SERPL-MCNC: 1.14 MG/DL (ref 0.76–1.27)
CREAT SERPL-MCNC: 1.15 MG/DL (ref 0.76–1.27)
CREAT SERPL-MCNC: 1.19 MG/DL (ref 0.76–1.27)
CREAT SERPL-MCNC: 1.19 MG/DL (ref 0.76–1.27)
CREAT SERPL-MCNC: 1.21 MG/DL (ref 0.76–1.27)
CREAT SERPL-MCNC: 1.22 MG/DL (ref 0.76–1.27)
CREAT SERPL-MCNC: 1.22 MG/DL (ref 0.76–1.27)
CREAT SERPL-MCNC: 1.24 MG/DL (ref 0.76–1.27)
CREAT SERPL-MCNC: 1.24 MG/DL (ref 0.76–1.27)
CREAT SERPL-MCNC: 1.26 MG/DL (ref 0.76–1.27)
CREAT SERPL-MCNC: 1.27 MG/DL (ref 0.76–1.27)
CREAT SERPL-MCNC: 1.27 MG/DL (ref 0.76–1.27)
CREAT SERPL-MCNC: 1.31 MG/DL (ref 0.76–1.27)
CREAT SERPL-MCNC: 1.34 MG/DL (ref 0.76–1.27)
CREAT SERPL-MCNC: 1.36 MG/DL (ref 0.76–1.27)
CREAT SERPL-MCNC: 1.37 MG/DL (ref 0.76–1.27)
CREAT SERPL-MCNC: 1.37 MG/DL (ref 0.76–1.27)
CREAT SERPL-MCNC: 1.38 MG/DL (ref 0.76–1.27)
CREAT SERPL-MCNC: 1.43 MG/DL (ref 0.76–1.27)
CREAT SERPL-MCNC: 1.55 MG/DL (ref 0.76–1.27)
CREAT SERPL-MCNC: 1.9 MG/DL (ref 0.76–1.27)
CREAT SERPL-MCNC: 1.92 MG/DL (ref 0.76–1.27)
CROSSMATCH INTERPRETATION: NORMAL
CRP SERPL-MCNC: 0.96 MG/DL (ref 0–0.5)
D-LACTATE SERPL-SCNC: 1.1 MMOL/L (ref 0.5–2)
D-LACTATE SERPL-SCNC: 1.6 MMOL/L (ref 0.5–2)
D-LACTATE SERPL-SCNC: 1.8 MMOL/L (ref 0.5–2)
D-LACTATE SERPL-SCNC: 2 MMOL/L (ref 0.5–2)
D-LACTATE SERPL-SCNC: 2.9 MMOL/L (ref 0.5–2)
D-LACTATE SERPL-SCNC: 3 MMOL/L (ref 0.5–2)
DACRYOCYTES BLD QL SMEAR: NORMAL
DEPRECATED RDW RBC AUTO: 50.4 FL (ref 37–54)
DEPRECATED RDW RBC AUTO: 51.7 FL (ref 37–54)
DEPRECATED RDW RBC AUTO: 54.4 FL (ref 37–54)
DEPRECATED RDW RBC AUTO: 54.5 FL (ref 37–54)
DEPRECATED RDW RBC AUTO: 54.6 FL (ref 37–54)
DEPRECATED RDW RBC AUTO: 55.1 FL (ref 37–54)
DEPRECATED RDW RBC AUTO: 55.4 FL (ref 37–54)
DEPRECATED RDW RBC AUTO: 56.8 FL (ref 37–54)
DEPRECATED RDW RBC AUTO: 56.8 FL (ref 37–54)
DEPRECATED RDW RBC AUTO: 57.9 FL (ref 37–54)
DEPRECATED RDW RBC AUTO: 60.3 FL (ref 37–54)
DEPRECATED RDW RBC AUTO: 61.9 FL (ref 37–54)
DEPRECATED RDW RBC AUTO: 62.9 FL (ref 37–54)
DEPRECATED RDW RBC AUTO: 63 FL (ref 37–54)
DEPRECATED RDW RBC AUTO: 69.4 FL (ref 37–54)
DEPRECATED RDW RBC AUTO: 69.7 FL (ref 37–54)
DEPRECATED RDW RBC AUTO: 70 FL (ref 37–54)
DEPRECATED RDW RBC AUTO: 70.8 FL (ref 37–54)
DEPRECATED RDW RBC AUTO: 73.6 FL (ref 37–54)
DEPRECATED RDW RBC AUTO: 75.7 FL (ref 37–54)
DEPRECATED RDW RBC AUTO: 77.2 FL (ref 37–54)
DEPRECATED RDW RBC AUTO: 78.6 FL (ref 37–54)
DEPRECATED RDW RBC AUTO: 82.2 FL (ref 37–54)
DEPRECATED RDW RBC AUTO: 83.8 FL (ref 37–54)
DEPRECATED RDW RBC AUTO: 85.5 FL (ref 37–54)
DEPRECATED RDW RBC AUTO: 86 FL (ref 37–54)
DEPRECATED RDW RBC AUTO: 86.9 FL (ref 37–54)
ELLIPTOCYTES BLD QL SMEAR: NORMAL
EOSINOPHIL # BLD AUTO: 0 10*3/MM3 (ref 0–0.4)
EOSINOPHIL # BLD AUTO: 0.08 10*3/MM3 (ref 0–0.4)
EOSINOPHIL # BLD AUTO: 0.09 10*3/MM3 (ref 0–0.4)
EOSINOPHIL # BLD AUTO: 0.09 10*3/MM3 (ref 0–0.4)
EOSINOPHIL # BLD AUTO: 0.11 10*3/MM3 (ref 0–0.4)
EOSINOPHIL # BLD AUTO: 0.11 10*3/MM3 (ref 0–0.4)
EOSINOPHIL # BLD AUTO: 0.13 10*3/MM3 (ref 0–0.4)
EOSINOPHIL # BLD AUTO: 0.19 10*3/MM3 (ref 0–0.4)
EOSINOPHIL # BLD AUTO: 0.2 10*3/MM3 (ref 0–0.4)
EOSINOPHIL # BLD AUTO: 0.2 10*3/MM3 (ref 0–0.4)
EOSINOPHIL # BLD AUTO: 0.21 10*3/MM3 (ref 0–0.4)
EOSINOPHIL # BLD AUTO: 0.22 10*3/MM3 (ref 0–0.4)
EOSINOPHIL # BLD AUTO: 0.22 10*3/MM3 (ref 0–0.4)
EOSINOPHIL # BLD AUTO: 0.23 10*3/MM3 (ref 0–0.4)
EOSINOPHIL # BLD AUTO: 0.24 10*3/MM3 (ref 0–0.4)
EOSINOPHIL # BLD AUTO: 0.25 10*3/MM3 (ref 0–0.4)
EOSINOPHIL # BLD AUTO: 0.29 10*3/MM3 (ref 0–0.4)
EOSINOPHIL # BLD AUTO: 0.3 10*3/MM3 (ref 0–0.4)
EOSINOPHIL # BLD AUTO: 0.31 10*3/MM3 (ref 0–0.4)
EOSINOPHIL # BLD AUTO: 0.38 10*3/MM3 (ref 0–0.4)
EOSINOPHIL NFR BLD AUTO: 0 % (ref 0.3–6.2)
EOSINOPHIL NFR BLD AUTO: 0.9 % (ref 0.3–6.2)
EOSINOPHIL NFR BLD AUTO: 1.1 % (ref 0.3–6.2)
EOSINOPHIL NFR BLD AUTO: 1.3 % (ref 0.3–6.2)
EOSINOPHIL NFR BLD AUTO: 1.3 % (ref 0.3–6.2)
EOSINOPHIL NFR BLD AUTO: 1.8 % (ref 0.3–6.2)
EOSINOPHIL NFR BLD AUTO: 2.3 % (ref 0.3–6.2)
EOSINOPHIL NFR BLD AUTO: 3.5 % (ref 0.3–6.2)
EOSINOPHIL NFR BLD AUTO: 3.7 % (ref 0.3–6.2)
EOSINOPHIL NFR BLD AUTO: 4.1 % (ref 0.3–6.2)
EOSINOPHIL NFR BLD AUTO: 4.2 % (ref 0.3–6.2)
EOSINOPHIL NFR BLD AUTO: 4.3 % (ref 0.3–6.2)
EOSINOPHIL NFR BLD AUTO: 4.8 % (ref 0.3–6.2)
EOSINOPHIL NFR BLD AUTO: 4.9 % (ref 0.3–6.2)
EOSINOPHIL NFR BLD AUTO: 5 % (ref 0.3–6.2)
EOSINOPHIL NFR BLD AUTO: 5.5 % (ref 0.3–6.2)
EOSINOPHIL NFR BLD AUTO: 5.7 % (ref 0.3–6.2)
EOSINOPHIL NFR BLD AUTO: 6.4 % (ref 0.3–6.2)
EOSINOPHIL NFR BLD AUTO: 6.6 % (ref 0.3–6.2)
EOSINOPHIL NFR BLD AUTO: 7.4 % (ref 0.3–6.2)
ERYTHROCYTE [DISTWIDTH] IN BLOOD BY AUTOMATED COUNT: 13.2 % (ref 12.3–15.4)
ERYTHROCYTE [DISTWIDTH] IN BLOOD BY AUTOMATED COUNT: 14 % (ref 12.3–15.4)
ERYTHROCYTE [DISTWIDTH] IN BLOOD BY AUTOMATED COUNT: 14.1 % (ref 12.3–15.4)
ERYTHROCYTE [DISTWIDTH] IN BLOOD BY AUTOMATED COUNT: 14.3 % (ref 12.3–15.4)
ERYTHROCYTE [DISTWIDTH] IN BLOOD BY AUTOMATED COUNT: 14.3 % (ref 12.3–15.4)
ERYTHROCYTE [DISTWIDTH] IN BLOOD BY AUTOMATED COUNT: 14.5 % (ref 12.3–15.4)
ERYTHROCYTE [DISTWIDTH] IN BLOOD BY AUTOMATED COUNT: 14.5 % (ref 12.3–15.4)
ERYTHROCYTE [DISTWIDTH] IN BLOOD BY AUTOMATED COUNT: 14.6 % (ref 12.3–15.4)
ERYTHROCYTE [DISTWIDTH] IN BLOOD BY AUTOMATED COUNT: 14.9 % (ref 12.3–15.4)
ERYTHROCYTE [DISTWIDTH] IN BLOOD BY AUTOMATED COUNT: 15.1 % (ref 12.3–15.4)
ERYTHROCYTE [DISTWIDTH] IN BLOOD BY AUTOMATED COUNT: 15.1 % (ref 12.3–15.4)
ERYTHROCYTE [DISTWIDTH] IN BLOOD BY AUTOMATED COUNT: 15.4 % (ref 12.3–15.4)
ERYTHROCYTE [DISTWIDTH] IN BLOOD BY AUTOMATED COUNT: 15.7 % (ref 12.3–15.4)
ERYTHROCYTE [DISTWIDTH] IN BLOOD BY AUTOMATED COUNT: 15.8 % (ref 12.3–15.4)
ERYTHROCYTE [DISTWIDTH] IN BLOOD BY AUTOMATED COUNT: 16.6 % (ref 12.3–15.4)
ERYTHROCYTE [DISTWIDTH] IN BLOOD BY AUTOMATED COUNT: 17.9 % (ref 12.3–15.4)
ERYTHROCYTE [DISTWIDTH] IN BLOOD BY AUTOMATED COUNT: 18.3 % (ref 12.3–15.4)
ERYTHROCYTE [DISTWIDTH] IN BLOOD BY AUTOMATED COUNT: 18.4 % (ref 12.3–15.4)
ERYTHROCYTE [DISTWIDTH] IN BLOOD BY AUTOMATED COUNT: 19.5 % (ref 12.3–15.4)
ERYTHROCYTE [DISTWIDTH] IN BLOOD BY AUTOMATED COUNT: 19.9 % (ref 12.3–15.4)
ERYTHROCYTE [DISTWIDTH] IN BLOOD BY AUTOMATED COUNT: 20 % (ref 12.3–15.4)
ERYTHROCYTE [DISTWIDTH] IN BLOOD BY AUTOMATED COUNT: 20.2 % (ref 12.3–15.4)
ERYTHROCYTE [DISTWIDTH] IN BLOOD BY AUTOMATED COUNT: 20.9 % (ref 12.3–15.4)
ERYTHROCYTE [DISTWIDTH] IN BLOOD BY AUTOMATED COUNT: 21.6 % (ref 12.3–15.4)
ERYTHROCYTE [DISTWIDTH] IN BLOOD BY AUTOMATED COUNT: 21.8 % (ref 12.3–15.4)
ERYTHROCYTE [DISTWIDTH] IN BLOOD BY AUTOMATED COUNT: 23.5 % (ref 12.3–15.4)
ERYTHROCYTE [DISTWIDTH] IN BLOOD BY AUTOMATED COUNT: 23.5 % (ref 12.3–15.4)
ERYTHROCYTE [DISTWIDTH] IN BLOOD BY AUTOMATED COUNT: 23.9 % (ref 12.3–15.4)
ERYTHROCYTE [DISTWIDTH] IN BLOOD BY AUTOMATED COUNT: 24.4 % (ref 12.3–15.4)
ERYTHROCYTE [DISTWIDTH] IN BLOOD BY AUTOMATED COUNT: 24.6 % (ref 12.3–15.4)
ERYTHROCYTE [DISTWIDTH] IN BLOOD BY AUTOMATED COUNT: 24.8 % (ref 12.3–15.4)
ERYTHROCYTE [DISTWIDTH] IN BLOOD BY AUTOMATED COUNT: 24.9 % (ref 12.3–15.4)
ERYTHROCYTE [SEDIMENTATION RATE] IN BLOOD: 22 MM/HR (ref 0–15)
FERRITIN SERPL-MCNC: 110.1 NG/ML (ref 30–400)
FLUAV SUBTYP SPEC NAA+PROBE: NOT DETECTED
FLUBV RNA ISLT QL NAA+PROBE: NOT DETECTED
FOLATE SERPL-MCNC: 6.77 NG/ML (ref 4.78–24.2)
GFR SERPL CREATININE-BSD FRML MDRD: 35 ML/MIN/1.73
GFR SERPL CREATININE-BSD FRML MDRD: 35 ML/MIN/1.73
GFR SERPL CREATININE-BSD FRML MDRD: 45 ML/MIN/1.73
GFR SERPL CREATININE-BSD FRML MDRD: 49 ML/MIN/1.73
GFR SERPL CREATININE-BSD FRML MDRD: 51 ML/MIN/1.73
GFR SERPL CREATININE-BSD FRML MDRD: 52 ML/MIN/1.73
GFR SERPL CREATININE-BSD FRML MDRD: 53 ML/MIN/1.73
GFR SERPL CREATININE-BSD FRML MDRD: 56 ML/MIN/1.73
GFR SERPL CREATININE-BSD FRML MDRD: 56 ML/MIN/1.73
GFR SERPL CREATININE-BSD FRML MDRD: 57 ML/MIN/1.73
GFR SERPL CREATININE-BSD FRML MDRD: 58 ML/MIN/1.73
GFR SERPL CREATININE-BSD FRML MDRD: 58 ML/MIN/1.73
GFR SERPL CREATININE-BSD FRML MDRD: 59 ML/MIN/1.73
GFR SERPL CREATININE-BSD FRML MDRD: 59 ML/MIN/1.73
GFR SERPL CREATININE-BSD FRML MDRD: 61 ML/MIN/1.73
GFR SERPL CREATININE-BSD FRML MDRD: 61 ML/MIN/1.73
GFR SERPL CREATININE-BSD FRML MDRD: 63 ML/MIN/1.73
GFR SERPL CREATININE-BSD FRML MDRD: 64 ML/MIN/1.73
GFR SERPL CREATININE-BSD FRML MDRD: 67 ML/MIN/1.73
GFR SERPL CREATININE-BSD FRML MDRD: 70 ML/MIN/1.73
GFR SERPL CREATININE-BSD FRML MDRD: 71 ML/MIN/1.73
GFR SERPL CREATININE-BSD FRML MDRD: 72 ML/MIN/1.73
GFR SERPL CREATININE-BSD FRML MDRD: 73 ML/MIN/1.73
GFR SERPL CREATININE-BSD FRML MDRD: 80 ML/MIN/1.73
GFR SERPL CREATININE-BSD FRML MDRD: 81 ML/MIN/1.73
GFR SERPL CREATININE-BSD FRML MDRD: 84 ML/MIN/1.73
GLOBULIN SER CALC-MCNC: 2.8 GM/DL
GLOBULIN SER CALC-MCNC: 3.4 GM/DL
GLOBULIN SER CALC-MCNC: 3.8 GM/DL
GLOBULIN SER CALC-MCNC: 4.1 GM/DL
GLOBULIN UR ELPH-MCNC: 2.5 GM/DL
GLOBULIN UR ELPH-MCNC: 2.7 GM/DL
GLOBULIN UR ELPH-MCNC: 3.4 GM/DL
GLOBULIN UR ELPH-MCNC: 3.5 GM/DL
GLOBULIN UR ELPH-MCNC: 3.7 GM/DL
GLOBULIN UR ELPH-MCNC: 3.8 GM/DL
GLOBULIN UR ELPH-MCNC: 3.9 GM/DL
GLOBULIN UR ELPH-MCNC: 4 GM/DL
GLOBULIN UR ELPH-MCNC: 4 GM/DL
GLOBULIN UR ELPH-MCNC: 4.1 GM/DL
GLOBULIN UR ELPH-MCNC: 4.1 GM/DL
GLOBULIN UR ELPH-MCNC: 4.3 GM/DL
GLUCOSE BLDC GLUCOMTR-MCNC: 102 MG/DL (ref 70–130)
GLUCOSE BLDC GLUCOMTR-MCNC: 103 MG/DL (ref 70–130)
GLUCOSE BLDC GLUCOMTR-MCNC: 106 MG/DL (ref 70–130)
GLUCOSE BLDC GLUCOMTR-MCNC: 111 MG/DL (ref 70–130)
GLUCOSE BLDC GLUCOMTR-MCNC: 112 MG/DL (ref 70–130)
GLUCOSE BLDC GLUCOMTR-MCNC: 115 MG/DL (ref 70–130)
GLUCOSE BLDC GLUCOMTR-MCNC: 116 MG/DL (ref 70–130)
GLUCOSE BLDC GLUCOMTR-MCNC: 128 MG/DL (ref 70–130)
GLUCOSE BLDC GLUCOMTR-MCNC: 128 MG/DL (ref 70–130)
GLUCOSE BLDC GLUCOMTR-MCNC: 130 MG/DL (ref 70–130)
GLUCOSE BLDC GLUCOMTR-MCNC: 133 MG/DL (ref 70–130)
GLUCOSE BLDC GLUCOMTR-MCNC: 147 MG/DL (ref 70–130)
GLUCOSE BLDC GLUCOMTR-MCNC: 149 MG/DL (ref 70–130)
GLUCOSE BLDC GLUCOMTR-MCNC: 151 MG/DL (ref 70–130)
GLUCOSE BLDC GLUCOMTR-MCNC: 154 MG/DL (ref 70–130)
GLUCOSE BLDC GLUCOMTR-MCNC: 158 MG/DL (ref 70–130)
GLUCOSE BLDC GLUCOMTR-MCNC: 160 MG/DL (ref 70–130)
GLUCOSE BLDC GLUCOMTR-MCNC: 160 MG/DL (ref 70–130)
GLUCOSE BLDC GLUCOMTR-MCNC: 161 MG/DL (ref 70–130)
GLUCOSE BLDC GLUCOMTR-MCNC: 163 MG/DL (ref 70–130)
GLUCOSE BLDC GLUCOMTR-MCNC: 167 MG/DL (ref 70–130)
GLUCOSE BLDC GLUCOMTR-MCNC: 170 MG/DL (ref 70–130)
GLUCOSE BLDC GLUCOMTR-MCNC: 171 MG/DL (ref 70–130)
GLUCOSE BLDC GLUCOMTR-MCNC: 172 MG/DL (ref 70–130)
GLUCOSE BLDC GLUCOMTR-MCNC: 173 MG/DL (ref 70–130)
GLUCOSE BLDC GLUCOMTR-MCNC: 174 MG/DL (ref 70–130)
GLUCOSE BLDC GLUCOMTR-MCNC: 175 MG/DL (ref 70–130)
GLUCOSE BLDC GLUCOMTR-MCNC: 177 MG/DL (ref 70–130)
GLUCOSE BLDC GLUCOMTR-MCNC: 184 MG/DL (ref 70–130)
GLUCOSE BLDC GLUCOMTR-MCNC: 189 MG/DL (ref 70–130)
GLUCOSE BLDC GLUCOMTR-MCNC: 203 MG/DL (ref 70–130)
GLUCOSE BLDC GLUCOMTR-MCNC: 204 MG/DL (ref 70–130)
GLUCOSE BLDC GLUCOMTR-MCNC: 206 MG/DL (ref 70–130)
GLUCOSE BLDC GLUCOMTR-MCNC: 217 MG/DL (ref 70–130)
GLUCOSE BLDC GLUCOMTR-MCNC: 219 MG/DL (ref 70–130)
GLUCOSE BLDC GLUCOMTR-MCNC: 219 MG/DL (ref 70–130)
GLUCOSE BLDC GLUCOMTR-MCNC: 222 MG/DL (ref 70–130)
GLUCOSE BLDC GLUCOMTR-MCNC: 246 MG/DL (ref 70–130)
GLUCOSE BLDC GLUCOMTR-MCNC: 248 MG/DL (ref 70–130)
GLUCOSE BLDC GLUCOMTR-MCNC: 250 MG/DL (ref 70–130)
GLUCOSE BLDC GLUCOMTR-MCNC: 251 MG/DL (ref 70–130)
GLUCOSE BLDC GLUCOMTR-MCNC: 259 MG/DL (ref 70–130)
GLUCOSE BLDC GLUCOMTR-MCNC: 267 MG/DL (ref 70–130)
GLUCOSE BLDC GLUCOMTR-MCNC: 271 MG/DL (ref 70–130)
GLUCOSE BLDC GLUCOMTR-MCNC: 275 MG/DL (ref 70–130)
GLUCOSE BLDC GLUCOMTR-MCNC: 285 MG/DL (ref 70–130)
GLUCOSE BLDC GLUCOMTR-MCNC: 295 MG/DL (ref 70–130)
GLUCOSE BLDC GLUCOMTR-MCNC: 299 MG/DL (ref 70–130)
GLUCOSE BLDC GLUCOMTR-MCNC: 307 MG/DL (ref 70–130)
GLUCOSE BLDC GLUCOMTR-MCNC: 318 MG/DL (ref 70–130)
GLUCOSE BLDC GLUCOMTR-MCNC: 334 MG/DL (ref 70–130)
GLUCOSE BLDC GLUCOMTR-MCNC: 352 MG/DL (ref 70–130)
GLUCOSE BLDC GLUCOMTR-MCNC: 380 MG/DL (ref 70–130)
GLUCOSE BLDC GLUCOMTR-MCNC: 52 MG/DL (ref 70–130)
GLUCOSE BLDC GLUCOMTR-MCNC: 56 MG/DL (ref 70–130)
GLUCOSE BLDC GLUCOMTR-MCNC: 72 MG/DL (ref 70–130)
GLUCOSE BLDC GLUCOMTR-MCNC: 77 MG/DL (ref 70–130)
GLUCOSE BLDC GLUCOMTR-MCNC: 93 MG/DL (ref 70–130)
GLUCOSE BLDC GLUCOMTR-MCNC: 93 MG/DL (ref 70–130)
GLUCOSE BLDC GLUCOMTR-MCNC: 94 MG/DL (ref 70–130)
GLUCOSE BLDC GLUCOMTR-MCNC: 95 MG/DL (ref 70–130)
GLUCOSE BLDC GLUCOMTR-MCNC: 98 MG/DL (ref 70–130)
GLUCOSE FLD-MCNC: 180 MG/DL
GLUCOSE SERPL-MCNC: 110 MG/DL (ref 65–99)
GLUCOSE SERPL-MCNC: 115 MG/DL (ref 65–99)
GLUCOSE SERPL-MCNC: 123 MG/DL (ref 65–99)
GLUCOSE SERPL-MCNC: 125 MG/DL (ref 65–99)
GLUCOSE SERPL-MCNC: 125 MG/DL (ref 65–99)
GLUCOSE SERPL-MCNC: 129 MG/DL (ref 65–99)
GLUCOSE SERPL-MCNC: 143 MG/DL (ref 65–99)
GLUCOSE SERPL-MCNC: 145 MG/DL (ref 65–99)
GLUCOSE SERPL-MCNC: 152 MG/DL (ref 65–99)
GLUCOSE SERPL-MCNC: 159 MG/DL (ref 65–99)
GLUCOSE SERPL-MCNC: 160 MG/DL (ref 65–99)
GLUCOSE SERPL-MCNC: 163 MG/DL (ref 65–99)
GLUCOSE SERPL-MCNC: 166 MG/DL (ref 65–99)
GLUCOSE SERPL-MCNC: 169 MG/DL (ref 65–99)
GLUCOSE SERPL-MCNC: 184 MG/DL (ref 65–99)
GLUCOSE SERPL-MCNC: 195 MG/DL (ref 65–99)
GLUCOSE SERPL-MCNC: 200 MG/DL (ref 65–99)
GLUCOSE SERPL-MCNC: 202 MG/DL (ref 65–99)
GLUCOSE SERPL-MCNC: 203 MG/DL (ref 65–99)
GLUCOSE SERPL-MCNC: 219 MG/DL (ref 65–99)
GLUCOSE SERPL-MCNC: 219 MG/DL (ref 65–99)
GLUCOSE SERPL-MCNC: 220 MG/DL (ref 65–99)
GLUCOSE SERPL-MCNC: 221 MG/DL (ref 65–99)
GLUCOSE SERPL-MCNC: 240 MG/DL (ref 65–99)
GLUCOSE SERPL-MCNC: 258 MG/DL (ref 65–99)
GLUCOSE SERPL-MCNC: 267 MG/DL (ref 65–99)
GLUCOSE SERPL-MCNC: 76 MG/DL (ref 65–99)
GLUCOSE SERPL-MCNC: 78 MG/DL (ref 65–99)
GLUCOSE SERPL-MCNC: 79 MG/DL (ref 65–99)
GLUCOSE SERPL-MCNC: 85 MG/DL (ref 65–99)
GLUCOSE SERPL-MCNC: 89 MG/DL (ref 65–99)
GLUCOSE SERPL-MCNC: 95 MG/DL (ref 65–99)
GLUCOSE UR STRIP-MCNC: NEGATIVE MG/DL
GRAM STN SPEC: ABNORMAL
GRAM STN SPEC: NORMAL
HADV DNA SPEC NAA+PROBE: NOT DETECTED
HBA1C MFR BLD: 5.2 % (ref 4.8–5.6)
HCO3 BLDA-SCNC: 21.8 MMOL/L (ref 22–28)
HCOV 229E RNA SPEC QL NAA+PROBE: NOT DETECTED
HCOV HKU1 RNA SPEC QL NAA+PROBE: NOT DETECTED
HCOV NL63 RNA SPEC QL NAA+PROBE: NOT DETECTED
HCOV OC43 RNA SPEC QL NAA+PROBE: NOT DETECTED
HCT VFR BLD AUTO: 18.8 % (ref 37.5–51)
HCT VFR BLD AUTO: 20.6 % (ref 37.5–51)
HCT VFR BLD AUTO: 20.9 % (ref 37.5–51)
HCT VFR BLD AUTO: 24 % (ref 37.5–51)
HCT VFR BLD AUTO: 24.1 % (ref 37.5–51)
HCT VFR BLD AUTO: 24.2 % (ref 37.5–51)
HCT VFR BLD AUTO: 25 % (ref 37.5–51)
HCT VFR BLD AUTO: 25.4 % (ref 37.5–51)
HCT VFR BLD AUTO: 25.7 % (ref 37.5–51)
HCT VFR BLD AUTO: 25.8 % (ref 37.5–51)
HCT VFR BLD AUTO: 26 % (ref 37.5–51)
HCT VFR BLD AUTO: 26.2 % (ref 37.5–51)
HCT VFR BLD AUTO: 26.4 % (ref 37.5–51)
HCT VFR BLD AUTO: 27.2 % (ref 37.5–51)
HCT VFR BLD AUTO: 27.3 % (ref 37.5–51)
HCT VFR BLD AUTO: 27.5 % (ref 37.5–51)
HCT VFR BLD AUTO: 27.6 % (ref 37.5–51)
HCT VFR BLD AUTO: 27.6 % (ref 37.5–51)
HCT VFR BLD AUTO: 27.8 % (ref 37.5–51)
HCT VFR BLD AUTO: 27.9 % (ref 37.5–51)
HCT VFR BLD AUTO: 28 % (ref 37.5–51)
HCT VFR BLD AUTO: 28.1 % (ref 37.5–51)
HCT VFR BLD AUTO: 28.1 % (ref 37.5–51)
HCT VFR BLD AUTO: 28.3 % (ref 37.5–51)
HCT VFR BLD AUTO: 28.3 % (ref 37.5–51)
HCT VFR BLD AUTO: 28.5 % (ref 37.5–51)
HCT VFR BLD AUTO: 29.1 % (ref 37.5–51)
HCT VFR BLD AUTO: 29.2 % (ref 37.5–51)
HCT VFR BLD AUTO: 29.3 % (ref 37.5–51)
HCT VFR BLD AUTO: 29.7 % (ref 37.5–51)
HCT VFR BLD AUTO: 29.7 % (ref 37.5–51)
HCT VFR BLD AUTO: 30.4 % (ref 37.5–51)
HCT VFR BLD AUTO: 30.5 % (ref 37.5–51)
HCT VFR BLD AUTO: 31.8 % (ref 37.5–51)
HCT VFR BLD AUTO: 31.8 % (ref 37.5–51)
HCT VFR BLD AUTO: 32.4 % (ref 37.5–51)
HCT VFR BLD AUTO: 32.7 % (ref 37.5–51)
HCT VFR BLD AUTO: 34.4 % (ref 37.5–51)
HCT VFR BLD AUTO: 36.6 % (ref 37.5–51)
HCT VFR BLD AUTO: 42.2 % (ref 37.5–51)
HCT VFR BLD CALC: 27 %
HDLC SERPL-MCNC: 39 MG/DL (ref 40–60)
HGB BLD-MCNC: 10 G/DL (ref 13–17.7)
HGB BLD-MCNC: 10 G/DL (ref 13–17.7)
HGB BLD-MCNC: 10.3 G/DL (ref 13–17.7)
HGB BLD-MCNC: 10.4 G/DL (ref 13–17.7)
HGB BLD-MCNC: 10.7 G/DL (ref 13–17.7)
HGB BLD-MCNC: 11.2 G/DL (ref 13–17.7)
HGB BLD-MCNC: 12.5 G/DL (ref 13–17.7)
HGB BLD-MCNC: 14.9 G/DL (ref 13–17.7)
HGB BLD-MCNC: 5.7 G/DL (ref 13–17.7)
HGB BLD-MCNC: 6 G/DL (ref 13–17.7)
HGB BLD-MCNC: 6.2 G/DL (ref 13–17.7)
HGB BLD-MCNC: 7.2 G/DL (ref 13–17.7)
HGB BLD-MCNC: 7.2 G/DL (ref 13–17.7)
HGB BLD-MCNC: 7.4 G/DL (ref 13–17.7)
HGB BLD-MCNC: 7.7 G/DL (ref 13–17.7)
HGB BLD-MCNC: 8 G/DL (ref 13–17.7)
HGB BLD-MCNC: 8.1 G/DL (ref 13–17.7)
HGB BLD-MCNC: 8.3 G/DL (ref 13–17.7)
HGB BLD-MCNC: 8.4 G/DL (ref 13–17.7)
HGB BLD-MCNC: 8.4 G/DL (ref 13–17.7)
HGB BLD-MCNC: 8.5 G/DL (ref 13–17.7)
HGB BLD-MCNC: 8.6 G/DL (ref 13–17.7)
HGB BLD-MCNC: 8.7 G/DL (ref 13–17.7)
HGB BLD-MCNC: 8.8 G/DL (ref 13–17.7)
HGB BLD-MCNC: 8.9 G/DL (ref 13–17.7)
HGB BLD-MCNC: 9 G/DL (ref 13–17.7)
HGB BLD-MCNC: 9 G/DL (ref 13–17.7)
HGB BLD-MCNC: 9.1 G/DL (ref 13–17.7)
HGB BLD-MCNC: 9.1 G/DL (ref 13–17.7)
HGB BLD-MCNC: 9.2 G/DL (ref 13–17.7)
HGB BLD-MCNC: 9.5 G/DL (ref 13–17.7)
HGB BLD-MCNC: 9.8 G/DL (ref 13–17.7)
HGB UR QL STRIP.AUTO: NEGATIVE
HMPV RNA NPH QL NAA+NON-PROBE: NOT DETECTED
HOLD SPECIMEN: NORMAL
HPIV1 RNA SPEC QL NAA+PROBE: NOT DETECTED
HPIV2 RNA SPEC QL NAA+PROBE: NOT DETECTED
HPIV3 RNA NPH QL NAA+PROBE: NOT DETECTED
HPIV4 P GENE NPH QL NAA+PROBE: NOT DETECTED
HYALINE CASTS UR QL AUTO: ABNORMAL /LPF
HYPOCHROMIA BLD QL: NORMAL
IMM GRANULOCYTES # BLD AUTO: 0.01 10*3/MM3 (ref 0–0.05)
IMM GRANULOCYTES # BLD AUTO: 0.02 10*3/MM3 (ref 0–0.05)
IMM GRANULOCYTES # BLD AUTO: 0.03 10*3/MM3 (ref 0–0.05)
IMM GRANULOCYTES # BLD AUTO: 0.04 10*3/MM3 (ref 0–0.05)
IMM GRANULOCYTES # BLD AUTO: 0.05 10*3/MM3 (ref 0–0.05)
IMM GRANULOCYTES # BLD AUTO: 0.05 10*3/MM3 (ref 0–0.05)
IMM GRANULOCYTES # BLD AUTO: 0.07 10*3/MM3 (ref 0–0.05)
IMM GRANULOCYTES NFR BLD AUTO: 0.2 % (ref 0–0.5)
IMM GRANULOCYTES NFR BLD AUTO: 0.3 % (ref 0–0.5)
IMM GRANULOCYTES NFR BLD AUTO: 0.4 % (ref 0–0.5)
IMM GRANULOCYTES NFR BLD AUTO: 0.5 % (ref 0–0.5)
IMM GRANULOCYTES NFR BLD AUTO: 0.6 % (ref 0–0.5)
IMM GRANULOCYTES NFR BLD AUTO: 0.7 % (ref 0–0.5)
IMM GRANULOCYTES NFR BLD AUTO: 1 % (ref 0–0.5)
INHALED O2 CONCENTRATION: 21 %
INR PPP: 1.27 (ref 0.9–1.1)
INR PPP: 1.3 (ref 0.9–1.1)
INR PPP: 1.36 (ref 0.9–1.1)
INR PPP: 1.37 (ref 0.9–1.1)
INR PPP: 1.38 (ref 0.9–1.1)
INR PPP: 1.4 (ref 0.9–1.1)
INR PPP: 1.46 (ref 0.9–1.1)
IRON 24H UR-MRATE: 85 MCG/DL (ref 59–158)
IRON SATN MFR SERPL: 37 % (ref 20–50)
IRON SERPL-MCNC: 28 MCG/DL (ref 59–158)
ISOLATED FROM: ABNORMAL
KETONES UR QL STRIP: NEGATIVE
L PNEUMO1 AG UR QL IA: NEGATIVE
LDLC SERPL CALC-MCNC: 69 MG/DL (ref 0–100)
LDLC/HDLC SERPL: 1.81 {RATIO}
LEUKOCYTE ESTERASE UR QL STRIP.AUTO: NEGATIVE
LYMPHOCYTES # BLD AUTO: 0.2 10*3/MM3 (ref 0.7–3.1)
LYMPHOCYTES # BLD AUTO: 0.28 10*3/MM3 (ref 0.7–3.1)
LYMPHOCYTES # BLD AUTO: 0.35 10*3/MM3 (ref 0.7–3.1)
LYMPHOCYTES # BLD AUTO: 0.38 10*3/MM3 (ref 0.7–3.1)
LYMPHOCYTES # BLD AUTO: 0.45 10*3/MM3 (ref 0.7–3.1)
LYMPHOCYTES # BLD AUTO: 0.45 10*3/MM3 (ref 0.7–3.1)
LYMPHOCYTES # BLD AUTO: 0.49 10*3/MM3 (ref 0.7–3.1)
LYMPHOCYTES # BLD AUTO: 0.5 10*3/MM3 (ref 0.7–3.1)
LYMPHOCYTES # BLD AUTO: 0.51 10*3/MM3 (ref 0.7–3.1)
LYMPHOCYTES # BLD AUTO: 0.52 10*3/MM3 (ref 0.7–3.1)
LYMPHOCYTES # BLD AUTO: 0.53 10*3/MM3 (ref 0.7–3.1)
LYMPHOCYTES # BLD AUTO: 0.57 10*3/MM3 (ref 0.7–3.1)
LYMPHOCYTES # BLD AUTO: 0.62 10*3/MM3 (ref 0.7–3.1)
LYMPHOCYTES # BLD AUTO: 0.63 10*3/MM3 (ref 0.7–3.1)
LYMPHOCYTES # BLD AUTO: 0.67 10*3/MM3 (ref 0.7–3.1)
LYMPHOCYTES # BLD AUTO: 0.73 10*3/MM3 (ref 0.7–3.1)
LYMPHOCYTES # BLD AUTO: 0.74 10*3/MM3 (ref 0.7–3.1)
LYMPHOCYTES # BLD AUTO: 0.81 10*3/MM3 (ref 0.7–3.1)
LYMPHOCYTES # BLD AUTO: 0.85 10*3/MM3 (ref 0.7–3.1)
LYMPHOCYTES # BLD AUTO: 0.88 10*3/MM3 (ref 0.7–3.1)
LYMPHOCYTES # BLD AUTO: 0.91 10*3/MM3 (ref 0.7–3.1)
LYMPHOCYTES # BLD AUTO: 1 10*3/MM3 (ref 0.7–3.1)
LYMPHOCYTES # BLD AUTO: 1.08 10*3/MM3 (ref 0.7–3.1)
LYMPHOCYTES NFR BLD AUTO: 10.6 % (ref 19.6–45.3)
LYMPHOCYTES NFR BLD AUTO: 10.8 % (ref 19.6–45.3)
LYMPHOCYTES NFR BLD AUTO: 11.9 % (ref 19.6–45.3)
LYMPHOCYTES NFR BLD AUTO: 12.2 % (ref 19.6–45.3)
LYMPHOCYTES NFR BLD AUTO: 12.7 % (ref 19.6–45.3)
LYMPHOCYTES NFR BLD AUTO: 13.2 % (ref 19.6–45.3)
LYMPHOCYTES NFR BLD AUTO: 13.6 % (ref 19.6–45.3)
LYMPHOCYTES NFR BLD AUTO: 15.1 % (ref 19.6–45.3)
LYMPHOCYTES NFR BLD AUTO: 16.9 % (ref 19.6–45.3)
LYMPHOCYTES NFR BLD AUTO: 18.2 % (ref 19.6–45.3)
LYMPHOCYTES NFR BLD AUTO: 19.8 % (ref 19.6–45.3)
LYMPHOCYTES NFR BLD AUTO: 22 % (ref 19.6–45.3)
LYMPHOCYTES NFR BLD AUTO: 22.9 % (ref 19.6–45.3)
LYMPHOCYTES NFR BLD AUTO: 3.1 % (ref 19.6–45.3)
LYMPHOCYTES NFR BLD AUTO: 4 % (ref 19.6–45.3)
LYMPHOCYTES NFR BLD AUTO: 5.6 % (ref 19.6–45.3)
LYMPHOCYTES NFR BLD AUTO: 6.5 % (ref 19.6–45.3)
LYMPHOCYTES NFR BLD AUTO: 6.8 % (ref 19.6–45.3)
LYMPHOCYTES NFR BLD AUTO: 8.3 % (ref 19.6–45.3)
LYMPHOCYTES NFR BLD AUTO: 8.5 % (ref 19.6–45.3)
LYMPHOCYTES NFR BLD AUTO: 9 % (ref 19.6–45.3)
LYMPHOCYTES NFR BLD AUTO: 9 % (ref 19.6–45.3)
LYMPHOCYTES NFR BLD AUTO: 9.8 % (ref 19.6–45.3)
LYMPHOCYTES NFR FLD MANUAL: 27 %
Lab: ABNORMAL
M PNEUMO IGG SER IA-ACNC: NOT DETECTED
MACROCYTES BLD QL SMEAR: NORMAL
MACROPHAGE FLUID: 0 %
MAGNESIUM SERPL-MCNC: 1.6 MG/DL (ref 1.6–2.4)
MAGNESIUM SERPL-MCNC: 1.6 MG/DL (ref 1.6–2.4)
MAGNESIUM SERPL-MCNC: 1.7 MG/DL (ref 1.6–2.4)
MAGNESIUM SERPL-MCNC: 1.8 MG/DL (ref 1.6–2.4)
MAGNESIUM SERPL-MCNC: 1.9 MG/DL (ref 1.6–2.4)
MAGNESIUM SERPL-MCNC: 2 MG/DL (ref 1.6–2.4)
MCH RBC QN AUTO: 26.9 PG (ref 26.6–33)
MCH RBC QN AUTO: 27.8 PG (ref 26.6–33)
MCH RBC QN AUTO: 27.8 PG (ref 26.6–33)
MCH RBC QN AUTO: 27.9 PG (ref 26.6–33)
MCH RBC QN AUTO: 28 PG (ref 26.6–33)
MCH RBC QN AUTO: 28 PG (ref 26.6–33)
MCH RBC QN AUTO: 28.1 PG (ref 26.6–33)
MCH RBC QN AUTO: 28.3 PG (ref 26.6–33)
MCH RBC QN AUTO: 28.4 PG (ref 26.6–33)
MCH RBC QN AUTO: 28.5 PG (ref 26.6–33)
MCH RBC QN AUTO: 28.7 PG (ref 26.6–33)
MCH RBC QN AUTO: 28.8 PG (ref 26.6–33)
MCH RBC QN AUTO: 28.8 PG (ref 26.6–33)
MCH RBC QN AUTO: 29.3 PG (ref 26.6–33)
MCH RBC QN AUTO: 29.4 PG (ref 26.6–33)
MCH RBC QN AUTO: 31.2 PG (ref 26.6–33)
MCH RBC QN AUTO: 31.2 PG (ref 26.6–33)
MCH RBC QN AUTO: 31.3 PG (ref 26.6–33)
MCH RBC QN AUTO: 31.7 PG (ref 26.6–33)
MCH RBC QN AUTO: 31.8 PG (ref 26.6–33)
MCH RBC QN AUTO: 33.3 PG (ref 26.6–33)
MCH RBC QN AUTO: 33.7 PG (ref 26.6–33)
MCH RBC QN AUTO: 34.2 PG (ref 26.6–33)
MCH RBC QN AUTO: 35.9 PG (ref 26.6–33)
MCH RBC QN AUTO: 36 PG (ref 26.6–33)
MCH RBC QN AUTO: 36.2 PG (ref 26.6–33)
MCH RBC QN AUTO: 36.2 PG (ref 26.6–33)
MCH RBC QN AUTO: 36.3 PG (ref 26.6–33)
MCH RBC QN AUTO: 36.3 PG (ref 26.6–33)
MCH RBC QN AUTO: 36.4 PG (ref 26.6–33)
MCH RBC QN AUTO: 36.7 PG (ref 26.6–33)
MCH RBC QN AUTO: 37.4 PG (ref 26.6–33)
MCHC RBC AUTO-ENTMCNC: 28.9 G/DL (ref 31.5–35.7)
MCHC RBC AUTO-ENTMCNC: 29.6 G/DL (ref 31.5–35.7)
MCHC RBC AUTO-ENTMCNC: 29.7 G/DL (ref 31.5–35.7)
MCHC RBC AUTO-ENTMCNC: 29.7 G/DL (ref 31.5–35.7)
MCHC RBC AUTO-ENTMCNC: 29.8 G/DL (ref 31.5–35.7)
MCHC RBC AUTO-ENTMCNC: 29.9 G/DL (ref 31.5–35.7)
MCHC RBC AUTO-ENTMCNC: 30.3 G/DL (ref 31.5–35.7)
MCHC RBC AUTO-ENTMCNC: 30.3 G/DL (ref 31.5–35.7)
MCHC RBC AUTO-ENTMCNC: 30.5 G/DL (ref 31.5–35.7)
MCHC RBC AUTO-ENTMCNC: 30.7 G/DL (ref 31.5–35.7)
MCHC RBC AUTO-ENTMCNC: 30.8 G/DL (ref 31.5–35.7)
MCHC RBC AUTO-ENTMCNC: 31.8 G/DL (ref 31.5–35.7)
MCHC RBC AUTO-ENTMCNC: 32 G/DL (ref 31.5–35.7)
MCHC RBC AUTO-ENTMCNC: 32.1 G/DL (ref 31.5–35.7)
MCHC RBC AUTO-ENTMCNC: 32.4 G/DL (ref 31.5–35.7)
MCHC RBC AUTO-ENTMCNC: 32.5 G/DL (ref 31.5–35.7)
MCHC RBC AUTO-ENTMCNC: 32.6 G/DL (ref 31.5–35.7)
MCHC RBC AUTO-ENTMCNC: 33.1 G/DL (ref 31.5–35.7)
MCHC RBC AUTO-ENTMCNC: 33.5 G/DL (ref 31.5–35.7)
MCHC RBC AUTO-ENTMCNC: 33.6 G/DL (ref 31.5–35.7)
MCHC RBC AUTO-ENTMCNC: 33.7 G/DL (ref 31.5–35.7)
MCHC RBC AUTO-ENTMCNC: 33.7 G/DL (ref 31.5–35.7)
MCHC RBC AUTO-ENTMCNC: 33.8 G/DL (ref 31.5–35.7)
MCHC RBC AUTO-ENTMCNC: 33.9 G/DL (ref 31.5–35.7)
MCHC RBC AUTO-ENTMCNC: 34.1 G/DL (ref 31.5–35.7)
MCHC RBC AUTO-ENTMCNC: 34.1 G/DL (ref 31.5–35.7)
MCHC RBC AUTO-ENTMCNC: 34.2 G/DL (ref 31.5–35.7)
MCHC RBC AUTO-ENTMCNC: 34.2 G/DL (ref 31.5–35.7)
MCHC RBC AUTO-ENTMCNC: 35.1 G/DL (ref 31.5–35.7)
MCHC RBC AUTO-ENTMCNC: 35.3 G/DL (ref 31.5–35.7)
MCV RBC AUTO: 101.7 FL (ref 79–97)
MCV RBC AUTO: 102.4 FL (ref 79–97)
MCV RBC AUTO: 103.6 FL (ref 79–97)
MCV RBC AUTO: 106.1 FL (ref 79–97)
MCV RBC AUTO: 106.2 FL (ref 79–97)
MCV RBC AUTO: 106.5 FL (ref 79–97)
MCV RBC AUTO: 106.5 FL (ref 79–97)
MCV RBC AUTO: 106.7 FL (ref 79–97)
MCV RBC AUTO: 107.4 FL (ref 79–97)
MCV RBC AUTO: 107.5 FL (ref 79–97)
MCV RBC AUTO: 107.8 FL (ref 79–97)
MCV RBC AUTO: 86.6 FL (ref 79–97)
MCV RBC AUTO: 87.3 FL (ref 79–97)
MCV RBC AUTO: 89.6 FL (ref 79–97)
MCV RBC AUTO: 90 FL (ref 79–97)
MCV RBC AUTO: 90.9 FL (ref 79–97)
MCV RBC AUTO: 91.3 FL (ref 79–97)
MCV RBC AUTO: 91.3 FL (ref 79–97)
MCV RBC AUTO: 93.8 FL (ref 79–97)
MCV RBC AUTO: 93.8 FL (ref 79–97)
MCV RBC AUTO: 94.8 FL (ref 79–97)
MCV RBC AUTO: 94.9 FL (ref 79–97)
MCV RBC AUTO: 94.9 FL (ref 79–97)
MCV RBC AUTO: 95.1 FL (ref 79–97)
MCV RBC AUTO: 95.4 FL (ref 79–97)
MCV RBC AUTO: 95.8 FL (ref 79–97)
MCV RBC AUTO: 95.9 FL (ref 79–97)
MCV RBC AUTO: 96.2 FL (ref 79–97)
MCV RBC AUTO: 96.8 FL (ref 79–97)
MCV RBC AUTO: 96.9 FL (ref 79–97)
MCV RBC AUTO: 97.5 FL (ref 79–97)
MCV RBC AUTO: 97.6 FL (ref 79–97)
METHGB BLD QL: 0.9 % (ref 0–1.5)
MICROCYTES BLD QL: NORMAL
MODALITY: ABNORMAL
MONOCYTES # BLD AUTO: 0.16 10*3/MM3 (ref 0.1–0.9)
MONOCYTES # BLD AUTO: 0.2 10*3/MM3 (ref 0.1–0.9)
MONOCYTES # BLD AUTO: 0.23 10*3/MM3 (ref 0.1–0.9)
MONOCYTES # BLD AUTO: 0.32 10*3/MM3 (ref 0.1–0.9)
MONOCYTES # BLD AUTO: 0.32 10*3/MM3 (ref 0.1–0.9)
MONOCYTES # BLD AUTO: 0.37 10*3/MM3 (ref 0.1–0.9)
MONOCYTES # BLD AUTO: 0.39 10*3/MM3 (ref 0.1–0.9)
MONOCYTES # BLD AUTO: 0.4 10*3/MM3 (ref 0.1–0.9)
MONOCYTES # BLD AUTO: 0.42 10*3/MM3 (ref 0.1–0.9)
MONOCYTES # BLD AUTO: 0.45 10*3/MM3 (ref 0.1–0.9)
MONOCYTES # BLD AUTO: 0.45 10*3/MM3 (ref 0.1–0.9)
MONOCYTES # BLD AUTO: 0.47 10*3/MM3 (ref 0.1–0.9)
MONOCYTES # BLD AUTO: 0.49 10*3/MM3 (ref 0.1–0.9)
MONOCYTES # BLD AUTO: 0.5 10*3/MM3 (ref 0.1–0.9)
MONOCYTES # BLD AUTO: 0.51 10*3/MM3 (ref 0.1–0.9)
MONOCYTES # BLD AUTO: 0.51 10*3/MM3 (ref 0.1–0.9)
MONOCYTES # BLD AUTO: 0.52 10*3/MM3 (ref 0.1–0.9)
MONOCYTES # BLD AUTO: 0.52 10*3/MM3 (ref 0.1–0.9)
MONOCYTES # BLD AUTO: 0.57 10*3/MM3 (ref 0.1–0.9)
MONOCYTES # BLD AUTO: 0.61 10*3/MM3 (ref 0.1–0.9)
MONOCYTES # BLD AUTO: 1.43 10*3/MM3 (ref 0.1–0.9)
MONOCYTES NFR BLD AUTO: 10.1 % (ref 5–12)
MONOCYTES NFR BLD AUTO: 10.1 % (ref 5–12)
MONOCYTES NFR BLD AUTO: 10.2 % (ref 5–12)
MONOCYTES NFR BLD AUTO: 10.4 % (ref 5–12)
MONOCYTES NFR BLD AUTO: 10.6 % (ref 5–12)
MONOCYTES NFR BLD AUTO: 10.9 % (ref 5–12)
MONOCYTES NFR BLD AUTO: 11.6 % (ref 5–12)
MONOCYTES NFR BLD AUTO: 12.1 % (ref 5–12)
MONOCYTES NFR BLD AUTO: 3.9 % (ref 5–12)
MONOCYTES NFR BLD AUTO: 4 % (ref 5–12)
MONOCYTES NFR BLD AUTO: 4.2 % (ref 5–12)
MONOCYTES NFR BLD AUTO: 4.6 % (ref 5–12)
MONOCYTES NFR BLD AUTO: 5.4 % (ref 5–12)
MONOCYTES NFR BLD AUTO: 5.6 % (ref 5–12)
MONOCYTES NFR BLD AUTO: 7.2 % (ref 5–12)
MONOCYTES NFR BLD AUTO: 7.3 % (ref 5–12)
MONOCYTES NFR BLD AUTO: 8.3 % (ref 5–12)
MONOCYTES NFR BLD AUTO: 8.6 % (ref 5–12)
MONOCYTES NFR BLD AUTO: 8.9 % (ref 5–12)
MONOCYTES NFR BLD AUTO: 9.1 % (ref 5–12)
MONOCYTES NFR BLD AUTO: 9.5 % (ref 5–12)
MONOCYTES NFR BLD AUTO: 9.6 % (ref 5–12)
MONOCYTES NFR BLD AUTO: 9.8 % (ref 5–12)
MONOS+MACROS NFR FLD: 67 %
MONOS+MACROS NFR FLD: 69 %
MRSA DNA SPEC QL NAA+PROBE: NORMAL
NEUTROPHILS NFR BLD AUTO: 13.13 10*3/MM3 (ref 1.7–7)
NEUTROPHILS NFR BLD AUTO: 2.26 10*3/MM3 (ref 1.7–7)
NEUTROPHILS NFR BLD AUTO: 2.37 10*3/MM3 (ref 1.7–7)
NEUTROPHILS NFR BLD AUTO: 2.68 10*3/MM3 (ref 1.7–7)
NEUTROPHILS NFR BLD AUTO: 2.92 10*3/MM3 (ref 1.7–7)
NEUTROPHILS NFR BLD AUTO: 3.06 10*3/MM3 (ref 1.7–7)
NEUTROPHILS NFR BLD AUTO: 3.19 10*3/MM3 (ref 1.7–7)
NEUTROPHILS NFR BLD AUTO: 3.23 10*3/MM3 (ref 1.7–7)
NEUTROPHILS NFR BLD AUTO: 3.3 10*3/MM3 (ref 1.7–7)
NEUTROPHILS NFR BLD AUTO: 3.38 10*3/MM3 (ref 1.7–7)
NEUTROPHILS NFR BLD AUTO: 3.59 10*3/MM3 (ref 1.7–7)
NEUTROPHILS NFR BLD AUTO: 3.7 10*3/MM3 (ref 1.7–7)
NEUTROPHILS NFR BLD AUTO: 3.83 10*3/MM3 (ref 1.7–7)
NEUTROPHILS NFR BLD AUTO: 3.83 10*3/MM3 (ref 1.7–7)
NEUTROPHILS NFR BLD AUTO: 3.88 10*3/MM3 (ref 1.7–7)
NEUTROPHILS NFR BLD AUTO: 4.46 10*3/MM3 (ref 1.7–7)
NEUTROPHILS NFR BLD AUTO: 4.57 10*3/MM3 (ref 1.7–7)
NEUTROPHILS NFR BLD AUTO: 4.64 10*3/MM3 (ref 1.7–7)
NEUTROPHILS NFR BLD AUTO: 5.15 10*3/MM3 (ref 1.7–7)
NEUTROPHILS NFR BLD AUTO: 5.27 10*3/MM3 (ref 1.7–7)
NEUTROPHILS NFR BLD AUTO: 5.45 10*3/MM3 (ref 1.7–7)
NEUTROPHILS NFR BLD AUTO: 5.85 10*3/MM3 (ref 1.7–7)
NEUTROPHILS NFR BLD AUTO: 62.1 % (ref 42.7–76)
NEUTROPHILS NFR BLD AUTO: 63.9 % (ref 42.7–76)
NEUTROPHILS NFR BLD AUTO: 64.1 % (ref 42.7–76)
NEUTROPHILS NFR BLD AUTO: 65.4 % (ref 42.7–76)
NEUTROPHILS NFR BLD AUTO: 68.2 % (ref 42.7–76)
NEUTROPHILS NFR BLD AUTO: 68.3 % (ref 42.7–76)
NEUTROPHILS NFR BLD AUTO: 68.4 % (ref 42.7–76)
NEUTROPHILS NFR BLD AUTO: 69 % (ref 42.7–76)
NEUTROPHILS NFR BLD AUTO: 71 % (ref 42.7–76)
NEUTROPHILS NFR BLD AUTO: 72.8 % (ref 42.7–76)
NEUTROPHILS NFR BLD AUTO: 73.3 % (ref 42.7–76)
NEUTROPHILS NFR BLD AUTO: 73.3 % (ref 42.7–76)
NEUTROPHILS NFR BLD AUTO: 75.4 % (ref 42.7–76)
NEUTROPHILS NFR BLD AUTO: 76.9 % (ref 42.7–76)
NEUTROPHILS NFR BLD AUTO: 77.4 % (ref 42.7–76)
NEUTROPHILS NFR BLD AUTO: 79.6 % (ref 42.7–76)
NEUTROPHILS NFR BLD AUTO: 79.8 % (ref 42.7–76)
NEUTROPHILS NFR BLD AUTO: 83.3 % (ref 42.7–76)
NEUTROPHILS NFR BLD AUTO: 87.1 % (ref 42.7–76)
NEUTROPHILS NFR BLD AUTO: 87.6 % (ref 42.7–76)
NEUTROPHILS NFR BLD AUTO: 89.2 % (ref 42.7–76)
NEUTROPHILS NFR BLD AUTO: 9.41 10*3/MM3 (ref 1.7–7)
NEUTROPHILS NFR BLD AUTO: 90.6 % (ref 42.7–76)
NEUTROPHILS NFR BLD AUTO: 91.2 % (ref 42.7–76)
NEUTROPHILS NFR FLD MANUAL: 31 %
NEUTROPHILS NFR FLD MANUAL: 33 %
NEUTROPHILS NFR FLD MANUAL: 73 %
NITRITE UR QL STRIP: NEGATIVE
NOTE: ABNORMAL
NOTIFIED BY: ABNORMAL
NRBC BLD AUTO-RTO: 0 /100 WBC (ref 0–0.2)
NT-PROBNP SERPL-MCNC: 115.2 PG/ML (ref 0–900)
NT-PROBNP SERPL-MCNC: 223.8 PG/ML (ref 0–900)
OVALOCYTES BLD QL SMEAR: NORMAL
OVALOCYTES BLD QL SMEAR: NORMAL
OXYHGB MFR BLDV: 94.1 % (ref 94–99)
PCO2 BLDA: 28.1 MM HG (ref 35–45)
PCO2 TEMP ADJ BLD: ABNORMAL MM[HG]
PH BLDA: 7.5 PH UNITS (ref 7.35–7.45)
PH UR STRIP.AUTO: 6 [PH] (ref 5–8)
PH UR STRIP.AUTO: 6.5 [PH] (ref 5–8)
PH UR STRIP.AUTO: 7 [PH] (ref 5–8)
PH UR STRIP.AUTO: <=5 [PH] (ref 5–8)
PH, TEMP CORRECTED: ABNORMAL
PLATELET # BLD AUTO: 111 10*3/MM3 (ref 140–450)
PLATELET # BLD AUTO: 29 10*3/MM3 (ref 140–450)
PLATELET # BLD AUTO: 32 10*3/MM3 (ref 140–450)
PLATELET # BLD AUTO: 37 10*3/MM3 (ref 140–450)
PLATELET # BLD AUTO: 40 10*3/MM3 (ref 140–450)
PLATELET # BLD AUTO: 44 10*3/MM3 (ref 140–450)
PLATELET # BLD AUTO: 47 10*3/MM3 (ref 140–450)
PLATELET # BLD AUTO: 48 10*3/MM3 (ref 140–450)
PLATELET # BLD AUTO: 49 10*3/MM3 (ref 140–450)
PLATELET # BLD AUTO: 51 10*3/MM3 (ref 140–450)
PLATELET # BLD AUTO: 52 10*3/MM3 (ref 140–450)
PLATELET # BLD AUTO: 52 10*3/MM3 (ref 140–450)
PLATELET # BLD AUTO: 57 10*3/MM3 (ref 140–450)
PLATELET # BLD AUTO: 58 10*3/MM3 (ref 140–450)
PLATELET # BLD AUTO: 59 10*3/MM3 (ref 140–450)
PLATELET # BLD AUTO: 60 10*3/MM3 (ref 140–450)
PLATELET # BLD AUTO: 60 10*3/MM3 (ref 140–450)
PLATELET # BLD AUTO: 61 10*3/MM3 (ref 140–450)
PLATELET # BLD AUTO: 61 10*3/MM3 (ref 140–450)
PLATELET # BLD AUTO: 62 10*3/MM3 (ref 140–450)
PLATELET # BLD AUTO: 64 10*3/MM3 (ref 140–450)
PLATELET # BLD AUTO: 66 10*3/MM3 (ref 140–450)
PLATELET # BLD AUTO: 77 10*3/MM3 (ref 140–450)
PLATELET # BLD AUTO: 78 10*3/MM3 (ref 140–450)
PLATELET # BLD AUTO: 79 10*3/MM3 (ref 140–450)
PLATELET # BLD AUTO: 84 10*3/MM3 (ref 140–450)
PLATELET # BLD AUTO: 87 10*3/MM3 (ref 140–450)
PLATELET # BLD AUTO: 95 10*3/MM3 (ref 140–450)
PLATELET # BLD AUTO: 95 10*3/MM3 (ref 140–450)
PMV BLD AUTO: 10 FL (ref 6–12)
PMV BLD AUTO: 10 FL (ref 6–12)
PMV BLD AUTO: 10.2 FL (ref 6–12)
PMV BLD AUTO: 10.4 FL (ref 6–12)
PMV BLD AUTO: 10.5 FL (ref 6–12)
PMV BLD AUTO: 10.7 FL (ref 6–12)
PMV BLD AUTO: 8.7 FL (ref 6–12)
PMV BLD AUTO: 8.8 FL (ref 6–12)
PMV BLD AUTO: 8.9 FL (ref 6–12)
PMV BLD AUTO: 9.1 FL (ref 6–12)
PMV BLD AUTO: 9.2 FL (ref 6–12)
PMV BLD AUTO: 9.3 FL (ref 6–12)
PMV BLD AUTO: 9.3 FL (ref 6–12)
PMV BLD AUTO: 9.4 FL (ref 6–12)
PMV BLD AUTO: 9.4 FL (ref 6–12)
PMV BLD AUTO: 9.5 FL (ref 6–12)
PMV BLD AUTO: 9.7 FL (ref 6–12)
PMV BLD AUTO: 9.7 FL (ref 6–12)
PMV BLD AUTO: 9.8 FL (ref 6–12)
PMV BLD AUTO: 9.8 FL (ref 6–12)
PMV BLD AUTO: 9.9 FL (ref 6–12)
PO2 BLDA: 74.8 MM HG (ref 75–100)
PO2 TEMP ADJ BLD: ABNORMAL MM[HG]
POIKILOCYTOSIS BLD QL SMEAR: NORMAL
POIKILOCYTOSIS BLD QL SMEAR: NORMAL
POTASSIUM SERPL-SCNC: 2.9 MMOL/L (ref 3.5–5.2)
POTASSIUM SERPL-SCNC: 3.2 MMOL/L (ref 3.5–5.2)
POTASSIUM SERPL-SCNC: 3.2 MMOL/L (ref 3.5–5.2)
POTASSIUM SERPL-SCNC: 3.6 MMOL/L (ref 3.5–5.2)
POTASSIUM SERPL-SCNC: 3.6 MMOL/L (ref 3.5–5.2)
POTASSIUM SERPL-SCNC: 3.8 MMOL/L (ref 3.5–5.2)
POTASSIUM SERPL-SCNC: 3.9 MMOL/L (ref 3.5–5.2)
POTASSIUM SERPL-SCNC: 3.9 MMOL/L (ref 3.5–5.2)
POTASSIUM SERPL-SCNC: 4 MMOL/L (ref 3.5–5.2)
POTASSIUM SERPL-SCNC: 4.1 MMOL/L (ref 3.5–5.2)
POTASSIUM SERPL-SCNC: 4.2 MMOL/L (ref 3.5–5.2)
POTASSIUM SERPL-SCNC: 4.3 MMOL/L (ref 3.5–5.2)
POTASSIUM SERPL-SCNC: 4.4 MMOL/L (ref 3.5–5.2)
POTASSIUM SERPL-SCNC: 4.4 MMOL/L (ref 3.5–5.2)
POTASSIUM SERPL-SCNC: 4.6 MMOL/L (ref 3.5–5.2)
POTASSIUM SERPL-SCNC: 4.7 MMOL/L (ref 3.5–5.2)
POTASSIUM SERPL-SCNC: 4.7 MMOL/L (ref 3.5–5.2)
POTASSIUM SERPL-SCNC: 5 MMOL/L (ref 3.5–5.2)
POTASSIUM SERPL-SCNC: 5.5 MMOL/L (ref 3.5–5.2)
PROCALCITONIN SERPL-MCNC: 0.17 NG/ML (ref 0–0.25)
PROCALCITONIN SERPL-MCNC: 0.21 NG/ML (ref 0–0.25)
PROCALCITONIN SERPL-MCNC: 0.21 NG/ML (ref 0–0.25)
PROCALCITONIN SERPL-MCNC: 0.22 NG/ML (ref 0–0.25)
PROT FLD-MCNC: <1 G/DL
PROT SERPL-MCNC: 5 G/DL (ref 6–8.5)
PROT SERPL-MCNC: 5.3 G/DL (ref 6–8.5)
PROT SERPL-MCNC: 5.4 G/DL (ref 6–8.5)
PROT SERPL-MCNC: 5.5 G/DL (ref 6–8.5)
PROT SERPL-MCNC: 5.6 G/DL (ref 6–8.5)
PROT SERPL-MCNC: 5.7 G/DL (ref 6–8.5)
PROT SERPL-MCNC: 5.9 G/DL (ref 6–8.5)
PROT SERPL-MCNC: 6 G/DL (ref 6–8.5)
PROT SERPL-MCNC: 6.1 G/DL (ref 6–8.5)
PROT SERPL-MCNC: 6.1 G/DL (ref 6–8.5)
PROT SERPL-MCNC: 6.3 G/DL (ref 6–8.5)
PROT SERPL-MCNC: 6.3 G/DL (ref 6–8.5)
PROT SERPL-MCNC: 6.4 G/DL (ref 6–8.5)
PROT SERPL-MCNC: 6.5 G/DL (ref 6–8.5)
PROT SERPL-MCNC: 6.6 G/DL (ref 6–8.5)
PROT SERPL-MCNC: 6.7 G/DL (ref 6–8.5)
PROT SERPL-MCNC: 7.1 G/DL (ref 6–8.5)
PROT SERPL-MCNC: 7.4 G/DL (ref 6–8.5)
PROT UR QL STRIP: NEGATIVE
PROTHROMBIN TIME: 16.5 SECONDS (ref 12–15.1)
PROTHROMBIN TIME: 16.8 SECONDS (ref 12–15.1)
PROTHROMBIN TIME: 17.3 SECONDS (ref 12–15.1)
PROTHROMBIN TIME: 17.5 SECONDS (ref 12–15.1)
PROTHROMBIN TIME: 17.5 SECONDS (ref 12–15.1)
PROTHROMBIN TIME: 17.7 SECONDS (ref 12–15.1)
PROTHROMBIN TIME: 18.3 SECONDS (ref 12–15.1)
RBC # BLD AUTO: 1.98 10*6/MM3 (ref 4.14–5.8)
RBC # BLD AUTO: 2.19 10*6/MM3 (ref 4.14–5.8)
RBC # BLD AUTO: 2.46 10*6/MM3 (ref 4.14–5.8)
RBC # BLD AUTO: 2.48 10*6/MM3 (ref 4.14–5.8)
RBC # BLD AUTO: 2.53 10*6/MM3 (ref 4.14–5.8)
RBC # BLD AUTO: 2.58 10*6/MM3 (ref 4.14–5.8)
RBC # BLD AUTO: 2.64 10*6/MM3 (ref 4.14–5.8)
RBC # BLD AUTO: 2.67 10*6/MM3 (ref 4.14–5.8)
RBC # BLD AUTO: 2.7 10*6/MM3 (ref 4.14–5.8)
RBC # BLD AUTO: 2.71 10*6/MM3 (ref 4.14–5.8)
RBC # BLD AUTO: 2.76 10*6/MM3 (ref 4.14–5.8)
RBC # BLD AUTO: 2.83 10*6/MM3 (ref 4.14–5.8)
RBC # BLD AUTO: 2.85 10*6/MM3 (ref 4.14–5.8)
RBC # BLD AUTO: 2.86 10*6/MM3 (ref 4.14–5.8)
RBC # BLD AUTO: 2.86 10*6/MM3 (ref 4.14–5.8)
RBC # BLD AUTO: 2.88 10*6/MM3 (ref 4.14–5.8)
RBC # BLD AUTO: 2.91 10*6/MM3 (ref 4.14–5.8)
RBC # BLD AUTO: 2.92 10*6/MM3 (ref 4.14–5.8)
RBC # BLD AUTO: 2.95 10*6/MM3 (ref 4.14–5.8)
RBC # BLD AUTO: 2.96 10*6/MM3 (ref 4.14–5.8)
RBC # BLD AUTO: 2.98 10*6/MM3 (ref 4.14–5.8)
RBC # BLD AUTO: 3.04 10*6/MM3 (ref 4.14–5.8)
RBC # BLD AUTO: 3.1 10*6/MM3 (ref 4.14–5.8)
RBC # BLD AUTO: 3.15 10*6/MM3 (ref 4.14–5.8)
RBC # BLD AUTO: 3.2 10*6/MM3 (ref 4.14–5.8)
RBC # BLD AUTO: 3.24 10*6/MM3 (ref 4.14–5.8)
RBC # BLD AUTO: 3.32 10*6/MM3 (ref 4.14–5.8)
RBC # BLD AUTO: 3.55 10*6/MM3 (ref 4.14–5.8)
RBC # BLD AUTO: 3.65 10*6/MM3 (ref 4.14–5.8)
RBC # BLD AUTO: 3.67 10*6/MM3 (ref 4.14–5.8)
RBC # BLD AUTO: 3.75 10*6/MM3 (ref 4.14–5.8)
RBC # BLD AUTO: 4.12 10*6/MM3 (ref 4.14–5.8)
RBC # FLD AUTO: 1000 /MM3 (ref 0–200000)
RBC # UR: ABNORMAL /HPF
RBC MORPH BLD: NORMAL
REF LAB TEST METHOD: ABNORMAL
RH BLD: POSITIVE
RHINOVIRUS RNA SPEC NAA+PROBE: NOT DETECTED
RSV RNA NPH QL NAA+NON-PROBE: NOT DETECTED
S PNEUM AG SPEC QL LA: NEGATIVE
SAO2 % BLDCOA: 96 % (ref 94–100)
SARS-COV-2 RNA NOSE QL NAA+PROBE: NOT DETECTED
SARS-COV-2 RNA NOSE QL NAA+PROBE: NOT DETECTED
SARS-COV-2 RNA NPH QL NAA+NON-PROBE: DETECTED
SARS-COV-2 RNA PNL SPEC NAA+PROBE: NOT DETECTED
SMALL PLATELETS BLD QL SMEAR: ADEQUATE
SMALL PLATELETS BLD QL SMEAR: NORMAL
SODIUM SERPL-SCNC: 129 MMOL/L (ref 136–145)
SODIUM SERPL-SCNC: 129 MMOL/L (ref 136–145)
SODIUM SERPL-SCNC: 130 MMOL/L (ref 136–145)
SODIUM SERPL-SCNC: 130 MMOL/L (ref 136–145)
SODIUM SERPL-SCNC: 132 MMOL/L (ref 136–145)
SODIUM SERPL-SCNC: 133 MMOL/L (ref 136–145)
SODIUM SERPL-SCNC: 133 MMOL/L (ref 136–145)
SODIUM SERPL-SCNC: 134 MMOL/L (ref 136–145)
SODIUM SERPL-SCNC: 135 MMOL/L (ref 136–145)
SODIUM SERPL-SCNC: 136 MMOL/L (ref 136–145)
SODIUM SERPL-SCNC: 137 MMOL/L (ref 136–145)
SODIUM SERPL-SCNC: 138 MMOL/L (ref 136–145)
SODIUM SERPL-SCNC: 139 MMOL/L (ref 136–145)
SODIUM SERPL-SCNC: 140 MMOL/L (ref 136–145)
SODIUM SERPL-SCNC: 141 MMOL/L (ref 136–145)
SP GR UR STRIP: 1.01 (ref 1–1.03)
SQUAMOUS #/AREA URNS HPF: ABNORMAL /HPF
T&S EXPIRATION DATE: NORMAL
T&S EXPIRATION DATE: NORMAL
TIBC SERPL-MCNC: 231 MCG/DL (ref 298–536)
TRANSFERRIN SERPL-MCNC: 155 MG/DL (ref 200–360)
TRIGL SERPL-MCNC: 57 MG/DL (ref 0–150)
TROPONIN T SERPL-MCNC: 0.02 NG/ML (ref 0–0.03)
TROPONIN T SERPL-MCNC: 0.02 NG/ML (ref 0–0.03)
TROPONIN T SERPL-MCNC: 0.03 NG/ML (ref 0–0.03)
TROPONIN T SERPL-MCNC: 0.04 NG/ML (ref 0–0.03)
TROPONIN T SERPL-MCNC: 0.06 NG/ML (ref 0–0.03)
TROPONIN T SERPL-MCNC: 0.06 NG/ML (ref 0–0.03)
UNIT  ABO: NORMAL
UNIT  RH: NORMAL
UROBILINOGEN UR QL STRIP: ABNORMAL
UROBILINOGEN UR QL STRIP: NORMAL
VENTILATOR MODE: ABNORMAL
VIT B12 BLD-MCNC: 870 PG/ML (ref 211–946)
VLDLC SERPL-MCNC: 13 MG/DL (ref 5–40)
VRE SPEC QL CULT: NORMAL
WBC # BLD AUTO: 11.84 10*3/MM3 (ref 3.4–10.8)
WBC # BLD AUTO: 14.5 10*3/MM3 (ref 3.4–10.8)
WBC # BLD AUTO: 3.45 10*3/MM3 (ref 3.4–10.8)
WBC # BLD AUTO: 3.71 10*3/MM3 (ref 3.4–10.8)
WBC # BLD AUTO: 3.91 10*3/MM3 (ref 3.4–10.8)
WBC # BLD AUTO: 4.11 10*3/MM3 (ref 3.4–10.8)
WBC # BLD AUTO: 4.12 10*3/MM3 (ref 3.4–10.8)
WBC # BLD AUTO: 4.34 10*3/MM3 (ref 3.4–10.8)
WBC # BLD AUTO: 4.34 10*3/MM3 (ref 3.4–10.8)
WBC # BLD AUTO: 4.61 10*3/MM3 (ref 3.4–10.8)
WBC # BLD AUTO: 4.68 10*3/MM3 (ref 3.4–10.8)
WBC # BLD AUTO: 4.71 10*3/MM3 (ref 3.4–10.8)
WBC # BLD AUTO: 4.84 10*3/MM3 (ref 3.4–10.8)
WBC # BLD AUTO: 4.88 10*3/MM3 (ref 3.4–10.8)
WBC # BLD AUTO: 4.89 10*3/MM3 (ref 3.4–10.8)
WBC # BLD AUTO: 4.92 10*3/MM3 (ref 3.4–10.8)
WBC # BLD AUTO: 4.92 10*3/MM3 (ref 3.4–10.8)
WBC # BLD AUTO: 4.95 10*3/MM3 (ref 3.4–10.8)
WBC # BLD AUTO: 4.96 10*3/MM3 (ref 3.4–10.8)
WBC # BLD AUTO: 5 10*3/MM3 (ref 3.4–10.8)
WBC # BLD AUTO: 5.01 10*3/MM3 (ref 3.4–10.8)
WBC # BLD AUTO: 5.01 10*3/MM3 (ref 3.4–10.8)
WBC # BLD AUTO: 5.04 10*3/MM3 (ref 3.4–10.8)
WBC # BLD AUTO: 5.22 10*3/MM3 (ref 3.4–10.8)
WBC # BLD AUTO: 5.26 10*3/MM3 (ref 3.4–10.8)
WBC # BLD AUTO: 5.37 10*3/MM3 (ref 3.4–10.8)
WBC # BLD AUTO: 5.61 10*3/MM3 (ref 3.4–10.8)
WBC # BLD AUTO: 5.88 10*3/MM3 (ref 3.4–10.8)
WBC # BLD AUTO: 6.15 10*3/MM3 (ref 3.4–10.8)
WBC # BLD AUTO: 6.83 10*3/MM3 (ref 3.4–10.8)
WBC # BLD AUTO: 6.86 10*3/MM3 (ref 3.4–10.8)
WBC # BLD AUTO: 7.02 10*3/MM3 (ref 3.4–10.8)
WBC # FLD AUTO: 192 /MM3 (ref 0–1000)
WBC # FLD AUTO: 292 /MM3 (ref 0–1000)
WBC # FLD AUTO: 855 /MM3 (ref 0–1000)
WBC MORPH BLD: NORMAL
WBC UR QL AUTO: ABNORMAL /HPF
WHOLE BLOOD HOLD SPECIMEN: NORMAL

## 2021-01-01 PROCEDURE — 99498 ADVNCD CARE PLAN ADDL 30 MIN: CPT | Performed by: INTERNAL MEDICINE

## 2021-01-01 PROCEDURE — 86900 BLOOD TYPING SEROLOGIC ABO: CPT

## 2021-01-01 PROCEDURE — 97165 OT EVAL LOW COMPLEX 30 MIN: CPT

## 2021-01-01 PROCEDURE — 99285 EMERGENCY DEPT VISIT HI MDM: CPT

## 2021-01-01 PROCEDURE — 80053 COMPREHEN METABOLIC PANEL: CPT | Performed by: INTERNAL MEDICINE

## 2021-01-01 PROCEDURE — 99497 ADVNCD CARE PLAN 30 MIN: CPT | Performed by: INTERNAL MEDICINE

## 2021-01-01 PROCEDURE — 99238 HOSP IP/OBS DSCHRG MGMT 30/<: CPT | Performed by: INTERNAL MEDICINE

## 2021-01-01 PROCEDURE — 87186 SC STD MICRODIL/AGAR DIL: CPT | Performed by: EMERGENCY MEDICINE

## 2021-01-01 PROCEDURE — 87040 BLOOD CULTURE FOR BACTERIA: CPT | Performed by: EMERGENCY MEDICINE

## 2021-01-01 PROCEDURE — 63710000001 INSULIN DETEMIR PER 5 UNITS: Performed by: INTERNAL MEDICINE

## 2021-01-01 PROCEDURE — 87635 SARS-COV-2 COVID-19 AMP PRB: CPT | Performed by: EMERGENCY MEDICINE

## 2021-01-01 PROCEDURE — 76942 ECHO GUIDE FOR BIOPSY: CPT

## 2021-01-01 PROCEDURE — 82746 ASSAY OF FOLIC ACID SERUM: CPT | Performed by: INTERNAL MEDICINE

## 2021-01-01 PROCEDURE — 80053 COMPREHEN METABOLIC PANEL: CPT | Performed by: EMERGENCY MEDICINE

## 2021-01-01 PROCEDURE — 82140 ASSAY OF AMMONIA: CPT | Performed by: EMERGENCY MEDICINE

## 2021-01-01 PROCEDURE — 85014 HEMATOCRIT: CPT | Performed by: STUDENT IN AN ORGANIZED HEALTH CARE EDUCATION/TRAINING PROGRAM

## 2021-01-01 PROCEDURE — 82550 ASSAY OF CK (CPK): CPT | Performed by: PHYSICIAN ASSISTANT

## 2021-01-01 PROCEDURE — 25010000002 FUROSEMIDE PER 20 MG: Performed by: INTERNAL MEDICINE

## 2021-01-01 PROCEDURE — 85025 COMPLETE CBC W/AUTO DIFF WBC: CPT | Performed by: INTERNAL MEDICINE

## 2021-01-01 PROCEDURE — 36600 WITHDRAWAL OF ARTERIAL BLOOD: CPT

## 2021-01-01 PROCEDURE — 85025 COMPLETE CBC W/AUTO DIFF WBC: CPT | Performed by: PHYSICIAN ASSISTANT

## 2021-01-01 PROCEDURE — 36430 TRANSFUSION BLD/BLD COMPNT: CPT

## 2021-01-01 PROCEDURE — 81003 URINALYSIS AUTO W/O SCOPE: CPT | Performed by: EMERGENCY MEDICINE

## 2021-01-01 PROCEDURE — 81003 URINALYSIS AUTO W/O SCOPE: CPT | Performed by: PHYSICIAN ASSISTANT

## 2021-01-01 PROCEDURE — 85007 BL SMEAR W/DIFF WBC COUNT: CPT | Performed by: INTERNAL MEDICINE

## 2021-01-01 PROCEDURE — 89051 BODY FLUID CELL COUNT: CPT | Performed by: INTERNAL MEDICINE

## 2021-01-01 PROCEDURE — 85730 THROMBOPLASTIN TIME PARTIAL: CPT | Performed by: PHYSICIAN ASSISTANT

## 2021-01-01 PROCEDURE — 99232 SBSQ HOSP IP/OBS MODERATE 35: CPT | Performed by: INTERNAL MEDICINE

## 2021-01-01 PROCEDURE — 80048 BASIC METABOLIC PNL TOTAL CA: CPT | Performed by: FAMILY MEDICINE

## 2021-01-01 PROCEDURE — 99213 OFFICE O/P EST LOW 20 MIN: CPT | Performed by: INTERNAL MEDICINE

## 2021-01-01 PROCEDURE — 97162 PT EVAL MOD COMPLEX 30 MIN: CPT

## 2021-01-01 PROCEDURE — 93005 ELECTROCARDIOGRAM TRACING: CPT | Performed by: EMERGENCY MEDICINE

## 2021-01-01 PROCEDURE — 63710000001 INSULIN REGULAR HUMAN PER 5 UNITS: Performed by: INTERNAL MEDICINE

## 2021-01-01 PROCEDURE — 25010000002 SODIUM CHLORIDE 0.9 % WITH KCL 20 MEQ 20-0.9 MEQ/L-% SOLUTION: Performed by: FAMILY MEDICINE

## 2021-01-01 PROCEDURE — 25010000002 FUROSEMIDE PER 20 MG: Performed by: PHYSICIAN ASSISTANT

## 2021-01-01 PROCEDURE — 63710000001 DEXAMETHASONE PER 0.25 MG: Performed by: INTERNAL MEDICINE

## 2021-01-01 PROCEDURE — 74177 CT ABD & PELVIS W/CONTRAST: CPT

## 2021-01-01 PROCEDURE — 80053 COMPREHEN METABOLIC PANEL: CPT | Performed by: PHYSICIAN ASSISTANT

## 2021-01-01 PROCEDURE — 87015 SPECIMEN INFECT AGNT CONCNTJ: CPT | Performed by: INTERNAL MEDICINE

## 2021-01-01 PROCEDURE — 25010000002 MORPHINE SULFATE (PF) 2 MG/ML SOLUTION: Performed by: STUDENT IN AN ORGANIZED HEALTH CARE EDUCATION/TRAINING PROGRAM

## 2021-01-01 PROCEDURE — 82962 GLUCOSE BLOOD TEST: CPT

## 2021-01-01 PROCEDURE — 93005 ELECTROCARDIOGRAM TRACING: CPT

## 2021-01-01 PROCEDURE — P9047 ALBUMIN (HUMAN), 25%, 50ML: HCPCS | Performed by: PHYSICIAN ASSISTANT

## 2021-01-01 PROCEDURE — 25010000002 OCTREOTIDE PER 25 MCG: Performed by: EMERGENCY MEDICINE

## 2021-01-01 PROCEDURE — U0004 COV-19 TEST NON-CDC HGH THRU: HCPCS

## 2021-01-01 PROCEDURE — 99220 PR INITIAL OBSERVATION CARE/DAY 70 MINUTES: CPT | Performed by: INTERNAL MEDICINE

## 2021-01-01 PROCEDURE — 82945 GLUCOSE OTHER FLUID: CPT | Performed by: INTERNAL MEDICINE

## 2021-01-01 PROCEDURE — 99239 HOSP IP/OBS DSCHRG MGMT >30: CPT | Performed by: INTERNAL MEDICINE

## 2021-01-01 PROCEDURE — 82728 ASSAY OF FERRITIN: CPT | Performed by: INTERNAL MEDICINE

## 2021-01-01 PROCEDURE — 83735 ASSAY OF MAGNESIUM: CPT | Performed by: EMERGENCY MEDICINE

## 2021-01-01 PROCEDURE — 85018 HEMOGLOBIN: CPT | Performed by: INTERNAL MEDICINE

## 2021-01-01 PROCEDURE — 63710000001 INSULIN ASPART PER 5 UNITS: Performed by: FAMILY MEDICINE

## 2021-01-01 PROCEDURE — 71045 X-RAY EXAM CHEST 1 VIEW: CPT

## 2021-01-01 PROCEDURE — 25010000002 PROPOFOL 10 MG/ML EMULSION: Performed by: NURSE ANESTHETIST, CERTIFIED REGISTERED

## 2021-01-01 PROCEDURE — 84484 ASSAY OF TROPONIN QUANT: CPT | Performed by: PHYSICIAN ASSISTANT

## 2021-01-01 PROCEDURE — 97166 OT EVAL MOD COMPLEX 45 MIN: CPT

## 2021-01-01 PROCEDURE — 80048 BASIC METABOLIC PNL TOTAL CA: CPT | Performed by: INTERNAL MEDICINE

## 2021-01-01 PROCEDURE — 63710000001 INSULIN ASPART PER 5 UNITS: Performed by: INTERNAL MEDICINE

## 2021-01-01 PROCEDURE — 85007 BL SMEAR W/DIFF WBC COUNT: CPT | Performed by: PHYSICIAN ASSISTANT

## 2021-01-01 PROCEDURE — 85014 HEMATOCRIT: CPT | Performed by: INTERNAL MEDICINE

## 2021-01-01 PROCEDURE — 82140 ASSAY OF AMMONIA: CPT | Performed by: PHYSICIAN ASSISTANT

## 2021-01-01 PROCEDURE — 99232 SBSQ HOSP IP/OBS MODERATE 35: CPT | Performed by: NURSE PRACTITIONER

## 2021-01-01 PROCEDURE — 99214 OFFICE O/P EST MOD 30 MIN: CPT | Performed by: INTERNAL MEDICINE

## 2021-01-01 PROCEDURE — P9016 RBC LEUKOCYTES REDUCED: HCPCS

## 2021-01-01 PROCEDURE — 83605 ASSAY OF LACTIC ACID: CPT | Performed by: PHYSICIAN ASSISTANT

## 2021-01-01 PROCEDURE — 87635 SARS-COV-2 COVID-19 AMP PRB: CPT | Performed by: INTERNAL MEDICINE

## 2021-01-01 PROCEDURE — 85027 COMPLETE CBC AUTOMATED: CPT | Performed by: NURSE PRACTITIONER

## 2021-01-01 PROCEDURE — 25010000002 METOCLOPRAMIDE PER 10 MG: Performed by: NURSE ANESTHETIST, CERTIFIED REGISTERED

## 2021-01-01 PROCEDURE — 85610 PROTHROMBIN TIME: CPT | Performed by: EMERGENCY MEDICINE

## 2021-01-01 PROCEDURE — 97535 SELF CARE MNGMENT TRAINING: CPT

## 2021-01-01 PROCEDURE — 86920 COMPATIBILITY TEST SPIN: CPT

## 2021-01-01 PROCEDURE — 86140 C-REACTIVE PROTEIN: CPT | Performed by: PHYSICIAN ASSISTANT

## 2021-01-01 PROCEDURE — 97116 GAIT TRAINING THERAPY: CPT

## 2021-01-01 PROCEDURE — 81001 URINALYSIS AUTO W/SCOPE: CPT | Performed by: EMERGENCY MEDICINE

## 2021-01-01 PROCEDURE — 99284 EMERGENCY DEPT VISIT MOD MDM: CPT

## 2021-01-01 PROCEDURE — 25010000002 ALBUMIN HUMAN 25% PER 50 ML: Performed by: INTERNAL MEDICINE

## 2021-01-01 PROCEDURE — 25010000002 CEFTRIAXONE SODIUM-DEXTROSE 2-2.22 GM-%(50ML) RECONSTITUTED SOLUTION: Performed by: INTERNAL MEDICINE

## 2021-01-01 PROCEDURE — G0378 HOSPITAL OBSERVATION PER HR: HCPCS

## 2021-01-01 PROCEDURE — 85025 COMPLETE CBC W/AUTO DIFF WBC: CPT | Performed by: EMERGENCY MEDICINE

## 2021-01-01 PROCEDURE — 85610 PROTHROMBIN TIME: CPT | Performed by: INTERNAL MEDICINE

## 2021-01-01 PROCEDURE — 97161 PT EVAL LOW COMPLEX 20 MIN: CPT

## 2021-01-01 PROCEDURE — 84484 ASSAY OF TROPONIN QUANT: CPT | Performed by: EMERGENCY MEDICINE

## 2021-01-01 PROCEDURE — 25010000002 ALBUMIN HUMAN 25% PER 50 ML: Performed by: PHYSICIAN ASSISTANT

## 2021-01-01 PROCEDURE — 99233 SBSQ HOSP IP/OBS HIGH 50: CPT | Performed by: INTERNAL MEDICINE

## 2021-01-01 PROCEDURE — 99308 SBSQ NF CARE LOW MDM 20: CPT | Performed by: PHYSICIAN ASSISTANT

## 2021-01-01 PROCEDURE — 85610 PROTHROMBIN TIME: CPT | Performed by: PHYSICIAN ASSISTANT

## 2021-01-01 PROCEDURE — 85007 BL SMEAR W/DIFF WBC COUNT: CPT | Performed by: EMERGENCY MEDICINE

## 2021-01-01 PROCEDURE — 25010000002 IOPAMIDOL 61 % SOLUTION: Performed by: EMERGENCY MEDICINE

## 2021-01-01 PROCEDURE — 85025 COMPLETE CBC W/AUTO DIFF WBC: CPT

## 2021-01-01 PROCEDURE — 80053 COMPREHEN METABOLIC PANEL: CPT

## 2021-01-01 PROCEDURE — P9047 ALBUMIN (HUMAN), 25%, 50ML: HCPCS | Performed by: INTERNAL MEDICINE

## 2021-01-01 PROCEDURE — 84145 PROCALCITONIN (PCT): CPT | Performed by: INTERNAL MEDICINE

## 2021-01-01 PROCEDURE — 25010000002 ONDANSETRON PER 1 MG: Performed by: INTERNAL MEDICINE

## 2021-01-01 PROCEDURE — 83036 HEMOGLOBIN GLYCOSYLATED A1C: CPT | Performed by: EMERGENCY MEDICINE

## 2021-01-01 PROCEDURE — 70450 CT HEAD/BRAIN W/O DYE: CPT

## 2021-01-01 PROCEDURE — 80048 BASIC METABOLIC PNL TOTAL CA: CPT | Performed by: NURSE PRACTITIONER

## 2021-01-01 PROCEDURE — 84145 PROCALCITONIN (PCT): CPT | Performed by: EMERGENCY MEDICINE

## 2021-01-01 PROCEDURE — 25010000003 MEPERIDINE PER 100 MG: Performed by: NURSE ANESTHETIST, CERTIFIED REGISTERED

## 2021-01-01 PROCEDURE — 82805 BLOOD GASES W/O2 SATURATION: CPT

## 2021-01-01 PROCEDURE — 83735 ASSAY OF MAGNESIUM: CPT | Performed by: INTERNAL MEDICINE

## 2021-01-01 PROCEDURE — 87899 AGENT NOS ASSAY W/OPTIC: CPT | Performed by: INTERNAL MEDICINE

## 2021-01-01 PROCEDURE — 63710000001 INSULIN DETEMIR PER 5 UNITS: Performed by: FAMILY MEDICINE

## 2021-01-01 PROCEDURE — 86850 RBC ANTIBODY SCREEN: CPT | Performed by: STUDENT IN AN ORGANIZED HEALTH CARE EDUCATION/TRAINING PROGRAM

## 2021-01-01 PROCEDURE — 36415 COLL VENOUS BLD VENIPUNCTURE: CPT | Performed by: INTERNAL MEDICINE

## 2021-01-01 PROCEDURE — 87150 DNA/RNA AMPLIFIED PROBE: CPT | Performed by: EMERGENCY MEDICINE

## 2021-01-01 PROCEDURE — 63710000001 DIPHENHYDRAMINE PER 50 MG: Performed by: INTERNAL MEDICINE

## 2021-01-01 PROCEDURE — 0202U NFCT DS 22 TRGT SARS-COV-2: CPT | Performed by: EMERGENCY MEDICINE

## 2021-01-01 PROCEDURE — 87070 CULTURE OTHR SPECIMN AEROBIC: CPT | Performed by: INTERNAL MEDICINE

## 2021-01-01 PROCEDURE — 73080 X-RAY EXAM OF ELBOW: CPT

## 2021-01-01 PROCEDURE — 74176 CT ABD & PELVIS W/O CONTRAST: CPT

## 2021-01-01 PROCEDURE — 87081 CULTURE SCREEN ONLY: CPT | Performed by: FAMILY MEDICINE

## 2021-01-01 PROCEDURE — 87040 BLOOD CULTURE FOR BACTERIA: CPT | Performed by: INTERNAL MEDICINE

## 2021-01-01 PROCEDURE — 25010000002 OCTREOTIDE PER 25 MCG: Performed by: INTERNAL MEDICINE

## 2021-01-01 PROCEDURE — 85018 HEMOGLOBIN: CPT | Performed by: STUDENT IN AN ORGANIZED HEALTH CARE EDUCATION/TRAINING PROGRAM

## 2021-01-01 PROCEDURE — 84484 ASSAY OF TROPONIN QUANT: CPT

## 2021-01-01 PROCEDURE — 83605 ASSAY OF LACTIC ACID: CPT | Performed by: EMERGENCY MEDICINE

## 2021-01-01 PROCEDURE — 96375 TX/PRO/DX INJ NEW DRUG ADDON: CPT

## 2021-01-01 PROCEDURE — 25010000002 MIDAZOLAM PER 1MG: Performed by: NURSE ANESTHETIST, CERTIFIED REGISTERED

## 2021-01-01 PROCEDURE — 87205 SMEAR GRAM STAIN: CPT | Performed by: INTERNAL MEDICINE

## 2021-01-01 PROCEDURE — 86901 BLOOD TYPING SEROLOGIC RH(D): CPT | Performed by: EMERGENCY MEDICINE

## 2021-01-01 PROCEDURE — 94799 UNLISTED PULMONARY SVC/PX: CPT

## 2021-01-01 PROCEDURE — 99495 TRANSJ CARE MGMT MOD F2F 14D: CPT | Performed by: INTERNAL MEDICINE

## 2021-01-01 PROCEDURE — 63710000001 INSULIN DETEMIR PER 5 UNITS: Performed by: EMERGENCY MEDICINE

## 2021-01-01 PROCEDURE — 1111F DSCHRG MED/CURRENT MED MERGE: CPT | Performed by: INTERNAL MEDICINE

## 2021-01-01 PROCEDURE — 99222 1ST HOSP IP/OBS MODERATE 55: CPT | Performed by: INTERNAL MEDICINE

## 2021-01-01 PROCEDURE — 83880 ASSAY OF NATRIURETIC PEPTIDE: CPT | Performed by: PHYSICIAN ASSISTANT

## 2021-01-01 PROCEDURE — 97110 THERAPEUTIC EXERCISES: CPT

## 2021-01-01 PROCEDURE — 87040 BLOOD CULTURE FOR BACTERIA: CPT | Performed by: PHYSICIAN ASSISTANT

## 2021-01-01 PROCEDURE — 99283 EMERGENCY DEPT VISIT LOW MDM: CPT

## 2021-01-01 PROCEDURE — 85651 RBC SED RATE NONAUTOMATED: CPT | Performed by: PHYSICIAN ASSISTANT

## 2021-01-01 PROCEDURE — 96366 THER/PROPH/DIAG IV INF ADDON: CPT

## 2021-01-01 PROCEDURE — 63710000001 INSULIN ASPART PER 5 UNITS: Performed by: EMERGENCY MEDICINE

## 2021-01-01 PROCEDURE — 25010000002 DEXAMETHASONE SODIUM PHOSPHATE 10 MG/ML SOLUTION: Performed by: EMERGENCY MEDICINE

## 2021-01-01 PROCEDURE — 25010000002 AZITHROMYCIN 500 MG/250 ML: Performed by: EMERGENCY MEDICINE

## 2021-01-01 PROCEDURE — 84157 ASSAY OF PROTEIN OTHER: CPT | Performed by: INTERNAL MEDICINE

## 2021-01-01 PROCEDURE — 86900 BLOOD TYPING SEROLOGIC ABO: CPT | Performed by: STUDENT IN AN ORGANIZED HEALTH CARE EDUCATION/TRAINING PROGRAM

## 2021-01-01 PROCEDURE — 87205 SMEAR GRAM STAIN: CPT | Performed by: EMERGENCY MEDICINE

## 2021-01-01 PROCEDURE — 43255 EGD CONTROL BLEEDING ANY: CPT | Performed by: INTERNAL MEDICINE

## 2021-01-01 PROCEDURE — 86901 BLOOD TYPING SEROLOGIC RH(D): CPT | Performed by: STUDENT IN AN ORGANIZED HEALTH CARE EDUCATION/TRAINING PROGRAM

## 2021-01-01 PROCEDURE — 80048 BASIC METABOLIC PNL TOTAL CA: CPT | Performed by: EMERGENCY MEDICINE

## 2021-01-01 PROCEDURE — 99217 PR OBSERVATION CARE DISCHARGE MANAGEMENT: CPT | Performed by: INTERNAL MEDICINE

## 2021-01-01 PROCEDURE — 99305 1ST NF CARE MODERATE MDM 35: CPT | Performed by: PHYSICIAN ASSISTANT

## 2021-01-01 PROCEDURE — P9047 ALBUMIN (HUMAN), 25%, 50ML: HCPCS | Performed by: EMERGENCY MEDICINE

## 2021-01-01 PROCEDURE — 93005 ELECTROCARDIOGRAM TRACING: CPT | Performed by: PHYSICIAN ASSISTANT

## 2021-01-01 PROCEDURE — 25010000002 CEFTRIAXONE SODIUM-DEXTROSE 1-3.74 GM-%(50ML) RECONSTITUTED SOLUTION: Performed by: EMERGENCY MEDICINE

## 2021-01-01 PROCEDURE — 25010000002 AZITHROMYCIN 500 MG/250 ML: Performed by: INTERNAL MEDICINE

## 2021-01-01 PROCEDURE — 97530 THERAPEUTIC ACTIVITIES: CPT

## 2021-01-01 PROCEDURE — 87070 CULTURE OTHR SPECIMN AEROBIC: CPT | Performed by: EMERGENCY MEDICINE

## 2021-01-01 PROCEDURE — 83735 ASSAY OF MAGNESIUM: CPT

## 2021-01-01 PROCEDURE — 85027 COMPLETE CBC AUTOMATED: CPT

## 2021-01-01 PROCEDURE — 80061 LIPID PANEL: CPT | Performed by: INTERNAL MEDICINE

## 2021-01-01 PROCEDURE — 83735 ASSAY OF MAGNESIUM: CPT | Performed by: FAMILY MEDICINE

## 2021-01-01 PROCEDURE — 25010000002 ALBUMIN HUMAN 25% PER 50 ML: Performed by: EMERGENCY MEDICINE

## 2021-01-01 PROCEDURE — C9803 HOPD COVID-19 SPEC COLLECT: HCPCS

## 2021-01-01 PROCEDURE — 99223 1ST HOSP IP/OBS HIGH 75: CPT | Performed by: EMERGENCY MEDICINE

## 2021-01-01 PROCEDURE — 85730 THROMBOPLASTIN TIME PARTIAL: CPT | Performed by: INTERNAL MEDICINE

## 2021-01-01 PROCEDURE — 84466 ASSAY OF TRANSFERRIN: CPT | Performed by: INTERNAL MEDICINE

## 2021-01-01 PROCEDURE — 83540 ASSAY OF IRON: CPT | Performed by: INTERNAL MEDICINE

## 2021-01-01 PROCEDURE — OUTSIDEPOS PR OUTSIDE POS PLACEHOLDER: Performed by: INTERNAL MEDICINE

## 2021-01-01 PROCEDURE — 99223 1ST HOSP IP/OBS HIGH 75: CPT | Performed by: FAMILY MEDICINE

## 2021-01-01 PROCEDURE — 99231 SBSQ HOSP IP/OBS SF/LOW 25: CPT | Performed by: INTERNAL MEDICINE

## 2021-01-01 PROCEDURE — 86901 BLOOD TYPING SEROLOGIC RH(D): CPT

## 2021-01-01 PROCEDURE — 76705 ECHO EXAM OF ABDOMEN: CPT

## 2021-01-01 PROCEDURE — 86850 RBC ANTIBODY SCREEN: CPT | Performed by: EMERGENCY MEDICINE

## 2021-01-01 PROCEDURE — 82140 ASSAY OF AMMONIA: CPT | Performed by: INTERNAL MEDICINE

## 2021-01-01 PROCEDURE — 0W9G3ZZ DRAINAGE OF PERITONEAL CAVITY, PERCUTANEOUS APPROACH: ICD-10-PCS | Performed by: RADIOLOGY

## 2021-01-01 PROCEDURE — 99223 1ST HOSP IP/OBS HIGH 75: CPT | Performed by: INTERNAL MEDICINE

## 2021-01-01 PROCEDURE — 82042 OTHER SOURCE ALBUMIN QUAN EA: CPT | Performed by: INTERNAL MEDICINE

## 2021-01-01 PROCEDURE — 96365 THER/PROPH/DIAG IV INF INIT: CPT

## 2021-01-01 PROCEDURE — 82375 ASSAY CARBOXYHB QUANT: CPT

## 2021-01-01 PROCEDURE — 85027 COMPLETE CBC AUTOMATED: CPT | Performed by: INTERNAL MEDICINE

## 2021-01-01 PROCEDURE — 82607 VITAMIN B-12: CPT | Performed by: INTERNAL MEDICINE

## 2021-01-01 PROCEDURE — 0W3P8ZZ CONTROL BLEEDING IN GASTROINTESTINAL TRACT, VIA NATURAL OR ARTIFICIAL OPENING ENDOSCOPIC: ICD-10-PCS | Performed by: INTERNAL MEDICINE

## 2021-01-01 PROCEDURE — 83050 HGB METHEMOGLOBIN QUAN: CPT

## 2021-01-01 PROCEDURE — 99309 SBSQ NF CARE MODERATE MDM 30: CPT | Performed by: PHYSICIAN ASSISTANT

## 2021-01-01 PROCEDURE — 83880 ASSAY OF NATRIURETIC PEPTIDE: CPT | Performed by: EMERGENCY MEDICINE

## 2021-01-01 PROCEDURE — 87077 CULTURE AEROBIC IDENTIFY: CPT | Performed by: EMERGENCY MEDICINE

## 2021-01-01 PROCEDURE — G0180 MD CERTIFICATION HHA PATIENT: HCPCS | Performed by: INTERNAL MEDICINE

## 2021-01-01 PROCEDURE — 71260 CT THORAX DX C+: CPT

## 2021-01-01 PROCEDURE — 87641 MR-STAPH DNA AMP PROBE: CPT | Performed by: FAMILY MEDICINE

## 2021-01-01 PROCEDURE — 89051 BODY FLUID CELL COUNT: CPT | Performed by: EMERGENCY MEDICINE

## 2021-01-01 PROCEDURE — 25010000002 ONDANSETRON PER 1 MG: Performed by: NURSE ANESTHETIST, CERTIFIED REGISTERED

## 2021-01-01 PROCEDURE — 86900 BLOOD TYPING SEROLOGIC ABO: CPT | Performed by: EMERGENCY MEDICINE

## 2021-01-01 PROCEDURE — 25010000002 DEXAMETHASONE PER 1 MG: Performed by: INTERNAL MEDICINE

## 2021-01-01 RX ORDER — SODIUM CHLORIDE 0.9 % (FLUSH) 0.9 %
10 SYRINGE (ML) INJECTION AS NEEDED
Status: DISCONTINUED | OUTPATIENT
Start: 2021-01-01 | End: 2021-01-01 | Stop reason: HOSPADM

## 2021-01-01 RX ORDER — MIDAZOLAM HYDROCHLORIDE 2 MG/2ML
INJECTION, SOLUTION INTRAMUSCULAR; INTRAVENOUS AS NEEDED
Status: DISCONTINUED | OUTPATIENT
Start: 2021-01-01 | End: 2021-01-01 | Stop reason: SURG

## 2021-01-01 RX ORDER — LACTULOSE 10 G/15ML
20 SOLUTION ORAL 3 TIMES DAILY
COMMUNITY
End: 2021-01-01 | Stop reason: HOSPADM

## 2021-01-01 RX ORDER — CHOLECALCIFEROL (VITAMIN D3) 125 MCG
5 CAPSULE ORAL NIGHTLY PRN
Status: DISCONTINUED | OUTPATIENT
Start: 2021-01-01 | End: 2021-01-01 | Stop reason: HOSPADM

## 2021-01-01 RX ORDER — DEXTROSE MONOHYDRATE 25 G/50ML
25 INJECTION, SOLUTION INTRAVENOUS
Status: DISCONTINUED | OUTPATIENT
Start: 2021-01-01 | End: 2021-01-01 | Stop reason: HOSPADM

## 2021-01-01 RX ORDER — ASCORBIC ACID 500 MG
500 TABLET ORAL DAILY
Status: DISCONTINUED | OUTPATIENT
Start: 2021-01-01 | End: 2021-01-01 | Stop reason: HOSPADM

## 2021-01-01 RX ORDER — DEXAMETHASONE SODIUM PHOSPHATE 4 MG/ML
6 INJECTION, SOLUTION INTRA-ARTICULAR; INTRALESIONAL; INTRAMUSCULAR; INTRAVENOUS; SOFT TISSUE DAILY
Status: DISCONTINUED | OUTPATIENT
Start: 2021-01-01 | End: 2021-01-01 | Stop reason: HOSPADM

## 2021-01-01 RX ORDER — SPIRONOLACTONE 25 MG/1
50 TABLET ORAL 2 TIMES DAILY
Status: DISCONTINUED | OUTPATIENT
Start: 2021-01-01 | End: 2021-01-01 | Stop reason: HOSPADM

## 2021-01-01 RX ORDER — AMOXICILLIN 250 MG
1 CAPSULE ORAL 2 TIMES DAILY
Status: DISCONTINUED | OUTPATIENT
Start: 2021-01-01 | End: 2021-01-01 | Stop reason: HOSPADM

## 2021-01-01 RX ORDER — FUROSEMIDE 20 MG/1
20 TABLET ORAL 2 TIMES DAILY
Qty: 6 TABLET | Refills: 0 | Status: SHIPPED | OUTPATIENT
Start: 2021-01-01 | End: 2021-01-01

## 2021-01-01 RX ORDER — FERROUS SULFATE 325(65) MG
325 TABLET ORAL
COMMUNITY

## 2021-01-01 RX ORDER — POTASSIUM CHLORIDE 1500 MG/1
20 TABLET, FILM COATED, EXTENDED RELEASE ORAL ONCE AS NEEDED
Qty: 30 TABLET | Refills: 0 | Status: SHIPPED | OUTPATIENT
Start: 2021-01-01 | End: 2021-01-01

## 2021-01-01 RX ORDER — LACTULOSE 10 G/15ML
30 SOLUTION ORAL 3 TIMES DAILY
Status: DISCONTINUED | OUTPATIENT
Start: 2021-01-01 | End: 2021-01-01

## 2021-01-01 RX ORDER — GLUCOSAM/CHON-MSM1/C/MANG/BOSW 500-416.6
TABLET ORAL
Qty: 100 EACH | Refills: 3 | Status: SHIPPED | OUTPATIENT
Start: 2021-01-01

## 2021-01-01 RX ORDER — ALBUMIN (HUMAN) 12.5 G/50ML
50 SOLUTION INTRAVENOUS ONCE
Status: CANCELLED | OUTPATIENT
Start: 2021-01-01 | End: 2021-01-01

## 2021-01-01 RX ORDER — ACETAMINOPHEN 325 MG/1
650 TABLET ORAL EVERY 4 HOURS PRN
Status: DISCONTINUED | OUTPATIENT
Start: 2021-01-01 | End: 2021-01-01 | Stop reason: HOSPADM

## 2021-01-01 RX ORDER — NICOTINE POLACRILEX 4 MG
1 LOZENGE BUCCAL
Status: DISCONTINUED | OUTPATIENT
Start: 2021-01-01 | End: 2021-01-01 | Stop reason: HOSPADM

## 2021-01-01 RX ORDER — SPIRONOLACTONE 25 MG/1
100 TABLET ORAL DAILY
Status: DISCONTINUED | OUTPATIENT
Start: 2021-01-01 | End: 2021-01-01 | Stop reason: HOSPADM

## 2021-01-01 RX ORDER — SODIUM CHLORIDE 0.9 % (FLUSH) 0.9 %
10 SYRINGE (ML) INJECTION EVERY 12 HOURS SCHEDULED
Status: DISCONTINUED | OUTPATIENT
Start: 2021-01-01 | End: 2021-01-01 | Stop reason: HOSPADM

## 2021-01-01 RX ORDER — AMINO ACIDS/PROTEIN HYDROLYS 15G-100/30
30 LIQUID (ML) ORAL 2 TIMES DAILY
Status: DISCONTINUED | OUTPATIENT
Start: 2021-01-01 | End: 2021-01-01 | Stop reason: HOSPADM

## 2021-01-01 RX ORDER — FUROSEMIDE 20 MG/1
20 TABLET ORAL DAILY
Status: DISCONTINUED | OUTPATIENT
Start: 2021-01-01 | End: 2021-01-01 | Stop reason: HOSPADM

## 2021-01-01 RX ORDER — SUCRALFATE 1 G/1
1 TABLET ORAL 4 TIMES DAILY
Qty: 120 TABLET | Refills: 0 | Status: ON HOLD | OUTPATIENT
Start: 2021-01-01 | End: 2021-01-01

## 2021-01-01 RX ORDER — ACETAMINOPHEN 650 MG/1
650 SUPPOSITORY RECTAL EVERY 4 HOURS PRN
Status: DISCONTINUED | OUTPATIENT
Start: 2021-01-01 | End: 2021-01-01 | Stop reason: HOSPADM

## 2021-01-01 RX ORDER — SODIUM CHLORIDE AND POTASSIUM CHLORIDE 150; 900 MG/100ML; MG/100ML
75 INJECTION, SOLUTION INTRAVENOUS CONTINUOUS
Status: DISCONTINUED | OUTPATIENT
Start: 2021-01-01 | End: 2021-01-01 | Stop reason: HOSPADM

## 2021-01-01 RX ORDER — PROPOFOL 10 MG/ML
VIAL (ML) INTRAVENOUS AS NEEDED
Status: DISCONTINUED | OUTPATIENT
Start: 2021-01-01 | End: 2021-01-01 | Stop reason: SURG

## 2021-01-01 RX ORDER — DIPHENHYDRAMINE HYDROCHLORIDE 50 MG/ML
25 INJECTION INTRAMUSCULAR; INTRAVENOUS ONCE
Status: COMPLETED | OUTPATIENT
Start: 2021-01-01 | End: 2021-01-01

## 2021-01-01 RX ORDER — INSULIN GLARGINE 100 [IU]/ML
10 INJECTION, SOLUTION SUBCUTANEOUS NIGHTLY
COMMUNITY
Start: 2021-01-01 | End: 2021-01-01 | Stop reason: HOSPADM

## 2021-01-01 RX ORDER — LACTULOSE 10 G/15ML
30 SOLUTION ORAL 3 TIMES DAILY
Status: DISCONTINUED | OUTPATIENT
Start: 2021-01-01 | End: 2021-01-01 | Stop reason: HOSPADM

## 2021-01-01 RX ORDER — METOCLOPRAMIDE HYDROCHLORIDE 5 MG/ML
INJECTION INTRAMUSCULAR; INTRAVENOUS AS NEEDED
Status: DISCONTINUED | OUTPATIENT
Start: 2021-01-01 | End: 2021-01-01 | Stop reason: SURG

## 2021-01-01 RX ORDER — LACTULOSE 10 G/15ML
30 SOLUTION ORAL DAILY
Status: DISCONTINUED | OUTPATIENT
Start: 2021-01-01 | End: 2021-01-01

## 2021-01-01 RX ORDER — HEPARIN SODIUM 5000 [USP'U]/ML
5000 INJECTION, SOLUTION INTRAVENOUS; SUBCUTANEOUS EVERY 8 HOURS SCHEDULED
Status: DISCONTINUED | OUTPATIENT
Start: 2021-01-01 | End: 2021-01-01 | Stop reason: ALTCHOICE

## 2021-01-01 RX ORDER — SUCRALFATE 1 G/1
1 TABLET ORAL 4 TIMES DAILY
Status: DISCONTINUED | OUTPATIENT
Start: 2021-01-01 | End: 2021-01-01 | Stop reason: HOSPADM

## 2021-01-01 RX ORDER — BENZONATATE 100 MG/1
100 CAPSULE ORAL 3 TIMES DAILY PRN
Status: DISCONTINUED | OUTPATIENT
Start: 2021-01-01 | End: 2021-01-01 | Stop reason: HOSPADM

## 2021-01-01 RX ORDER — FUROSEMIDE 40 MG/1
40 TABLET ORAL DAILY
Status: DISCONTINUED | OUTPATIENT
Start: 2021-01-01 | End: 2021-01-01 | Stop reason: HOSPADM

## 2021-01-01 RX ORDER — POTASSIUM CHLORIDE 750 MG/1
40 CAPSULE, EXTENDED RELEASE ORAL ONCE
Status: COMPLETED | OUTPATIENT
Start: 2021-01-01 | End: 2021-01-01

## 2021-01-01 RX ORDER — OXYCODONE HYDROCHLORIDE 5 MG/1
5 TABLET ORAL EVERY 4 HOURS PRN
Status: DISCONTINUED | OUTPATIENT
Start: 2021-01-01 | End: 2021-01-01 | Stop reason: HOSPADM

## 2021-01-01 RX ORDER — PANTOPRAZOLE SODIUM 40 MG/10ML
40 INJECTION, POWDER, LYOPHILIZED, FOR SOLUTION INTRAVENOUS EVERY 12 HOURS SCHEDULED
Status: DISPENSED | OUTPATIENT
Start: 2021-01-01 | End: 2021-01-01

## 2021-01-01 RX ORDER — INSULIN ASPART 100 [IU]/ML
7 INJECTION, SOLUTION INTRAVENOUS; SUBCUTANEOUS
Start: 2021-01-01

## 2021-01-01 RX ORDER — LACTULOSE 10 G/15ML
30 SOLUTION ORAL 4 TIMES DAILY
Status: DISCONTINUED | OUTPATIENT
Start: 2021-01-01 | End: 2021-01-01 | Stop reason: HOSPADM

## 2021-01-01 RX ORDER — DEXAMETHASONE SODIUM PHOSPHATE 10 MG/ML
6 INJECTION, SOLUTION INTRAMUSCULAR; INTRAVENOUS ONCE
Status: COMPLETED | OUTPATIENT
Start: 2021-01-01 | End: 2021-01-01

## 2021-01-01 RX ORDER — SULFAMETHOXAZOLE AND TRIMETHOPRIM 800; 160 MG/1; MG/1
1 TABLET ORAL DAILY
Qty: 30 TABLET | Refills: 0 | Status: SHIPPED | OUTPATIENT
Start: 2021-01-01 | End: 2021-01-01 | Stop reason: HOSPADM

## 2021-01-01 RX ORDER — SPIRONOLACTONE 100 MG/1
100 TABLET, FILM COATED ORAL DAILY
Qty: 90 TABLET | Refills: 0 | Status: ON HOLD | OUTPATIENT
Start: 2021-01-01 | End: 2021-01-01 | Stop reason: SDUPTHER

## 2021-01-01 RX ORDER — ONDANSETRON 2 MG/ML
4 INJECTION INTRAMUSCULAR; INTRAVENOUS EVERY 6 HOURS PRN
Status: DISCONTINUED | OUTPATIENT
Start: 2021-01-01 | End: 2021-01-01 | Stop reason: HOSPADM

## 2021-01-01 RX ORDER — LACTULOSE 10 G/15ML
20 SOLUTION ORAL DAILY
Status: DISCONTINUED | OUTPATIENT
Start: 2021-01-01 | End: 2021-01-01 | Stop reason: ALTCHOICE

## 2021-01-01 RX ORDER — FUROSEMIDE 40 MG/1
20 TABLET ORAL DAILY
Start: 2021-01-01 | End: 2021-11-09

## 2021-01-01 RX ORDER — ONDANSETRON 2 MG/ML
4 INJECTION INTRAMUSCULAR; INTRAVENOUS ONCE
Status: DISCONTINUED | OUTPATIENT
Start: 2021-01-01 | End: 2021-01-01 | Stop reason: HOSPADM

## 2021-01-01 RX ORDER — FERROUS SULFATE TAB EC 324 MG (65 MG FE EQUIVALENT) 324 (65 FE) MG
324 TABLET DELAYED RESPONSE ORAL
Status: DISCONTINUED | OUTPATIENT
Start: 2021-01-01 | End: 2021-01-01 | Stop reason: HOSPADM

## 2021-01-01 RX ORDER — ACETAMINOPHEN 160 MG/5ML
650 SOLUTION ORAL EVERY 4 HOURS PRN
Status: DISCONTINUED | OUTPATIENT
Start: 2021-01-01 | End: 2021-01-01 | Stop reason: HOSPADM

## 2021-01-01 RX ORDER — SPIRONOLACTONE 50 MG/1
TABLET, FILM COATED ORAL 2 TIMES DAILY
COMMUNITY
Start: 2021-01-01 | End: 2021-01-01 | Stop reason: HOSPADM

## 2021-01-01 RX ORDER — KETAMINE HCL IN NACL, ISO-OSM 100MG/10ML
SYRINGE (ML) INJECTION AS NEEDED
Status: DISCONTINUED | OUTPATIENT
Start: 2021-01-01 | End: 2021-01-01 | Stop reason: SURG

## 2021-01-01 RX ORDER — ALBUMIN (HUMAN) 12.5 G/50ML
25 SOLUTION INTRAVENOUS ONCE
Status: COMPLETED | OUTPATIENT
Start: 2021-01-01 | End: 2021-01-01

## 2021-01-01 RX ORDER — SPIRONOLACTONE 50 MG/1
50 TABLET, FILM COATED ORAL 2 TIMES DAILY
Qty: 90 TABLET | Refills: 0
Start: 2021-01-01 | End: 2021-11-09

## 2021-01-01 RX ORDER — ALBUMIN (HUMAN) 12.5 G/50ML
25 SOLUTION INTRAVENOUS 3 TIMES DAILY
Status: DISPENSED | OUTPATIENT
Start: 2021-01-01 | End: 2021-01-01

## 2021-01-01 RX ORDER — SUCRALFATE 1 G/1
1 TABLET ORAL 4 TIMES DAILY
Qty: 120 TABLET | Refills: 0 | Status: SHIPPED | OUTPATIENT
Start: 2021-01-01 | End: 2021-01-01

## 2021-01-01 RX ORDER — PANTOPRAZOLE SODIUM 40 MG/1
40 TABLET, DELAYED RELEASE ORAL DAILY
Status: DISCONTINUED | OUTPATIENT
Start: 2021-01-01 | End: 2021-01-01 | Stop reason: HOSPADM

## 2021-01-01 RX ORDER — RIFAMPIN 300 MG/1
300 CAPSULE ORAL 2 TIMES DAILY
Status: ON HOLD | COMMUNITY
Start: 2021-01-01 | End: 2021-01-01

## 2021-01-01 RX ORDER — LACTULOSE 10 G/15ML
20 SOLUTION ORAL 4 TIMES DAILY
Status: DISCONTINUED | OUTPATIENT
Start: 2021-01-01 | End: 2021-01-01 | Stop reason: HOSPADM

## 2021-01-01 RX ORDER — IPRATROPIUM BROMIDE AND ALBUTEROL SULFATE 2.5; .5 MG/3ML; MG/3ML
3 SOLUTION RESPIRATORY (INHALATION) EVERY 4 HOURS PRN
Status: DISCONTINUED | OUTPATIENT
Start: 2021-01-01 | End: 2021-01-01 | Stop reason: HOSPADM

## 2021-01-01 RX ORDER — LACTULOSE 10 G/15ML
15 SOLUTION ORAL 2 TIMES DAILY
Qty: 4054.2 ML | Refills: 0 | Status: SHIPPED | OUTPATIENT
Start: 2021-01-01 | End: 2021-01-01

## 2021-01-01 RX ORDER — ONDANSETRON 2 MG/ML
INJECTION INTRAMUSCULAR; INTRAVENOUS AS NEEDED
Status: DISCONTINUED | OUTPATIENT
Start: 2021-01-01 | End: 2021-01-01 | Stop reason: SURG

## 2021-01-01 RX ORDER — ALBUMIN (HUMAN) 12.5 G/50ML
50 SOLUTION INTRAVENOUS ONCE
Status: DISCONTINUED | OUTPATIENT
Start: 2021-01-01 | End: 2021-01-01 | Stop reason: SDUPTHER

## 2021-01-01 RX ORDER — BENZONATATE 100 MG/1
100 CAPSULE ORAL 3 TIMES DAILY PRN
Status: ON HOLD
Start: 2021-01-01 | End: 2021-01-01

## 2021-01-01 RX ORDER — LACTULOSE 10 G/15ML
20 SOLUTION ORAL ONCE
Status: COMPLETED | OUTPATIENT
Start: 2021-01-01 | End: 2021-01-01

## 2021-01-01 RX ORDER — FUROSEMIDE 40 MG/1
40 TABLET ORAL DAILY PRN
Qty: 30 TABLET | Refills: 0 | Status: SHIPPED | OUTPATIENT
Start: 2021-01-01 | End: 2021-01-01

## 2021-01-01 RX ORDER — LACTULOSE 10 G/15ML
20 SOLUTION ORAL 4 TIMES DAILY
Qty: 1892 ML | Refills: 0 | Status: SHIPPED | OUTPATIENT
Start: 2021-01-01

## 2021-01-01 RX ORDER — FUROSEMIDE 10 MG/ML
20 INJECTION INTRAMUSCULAR; INTRAVENOUS ONCE
Status: COMPLETED | OUTPATIENT
Start: 2021-01-01 | End: 2021-01-01

## 2021-01-01 RX ORDER — ALBUMIN (HUMAN) 12.5 G/50ML
25 SOLUTION INTRAVENOUS
Status: COMPLETED | OUTPATIENT
Start: 2021-01-01 | End: 2021-01-01

## 2021-01-01 RX ORDER — FUROSEMIDE 40 MG/1
TABLET ORAL
Qty: 90 TABLET | Refills: 3 | Status: SHIPPED | OUTPATIENT
Start: 2021-01-01 | End: 2021-01-01 | Stop reason: HOSPADM

## 2021-01-01 RX ORDER — AMOXICILLIN 250 MG
1 CAPSULE ORAL
COMMUNITY
End: 2021-01-01

## 2021-01-01 RX ORDER — CEFTRIAXONE 2 G/50ML
2 INJECTION, SOLUTION INTRAVENOUS
Status: COMPLETED | OUTPATIENT
Start: 2021-01-01 | End: 2021-01-01

## 2021-01-01 RX ORDER — FUROSEMIDE 40 MG/1
TABLET ORAL
COMMUNITY
Start: 2021-01-01 | End: 2021-01-01

## 2021-01-01 RX ORDER — LORAZEPAM 2 MG/ML
1 INJECTION INTRAMUSCULAR EVERY 4 HOURS PRN
Status: DISCONTINUED | OUTPATIENT
Start: 2021-01-01 | End: 2021-01-01 | Stop reason: HOSPADM

## 2021-01-01 RX ORDER — FUROSEMIDE 40 MG/1
40 TABLET ORAL 2 TIMES DAILY
Status: ON HOLD | COMMUNITY
End: 2021-01-01 | Stop reason: SDUPTHER

## 2021-01-01 RX ORDER — ISONIAZID 300 MG/1
TABLET ORAL
COMMUNITY
Start: 2021-01-01 | End: 2021-01-01

## 2021-01-01 RX ORDER — CEFTRIAXONE 2 G/50ML
2 INJECTION, SOLUTION INTRAVENOUS EVERY 24 HOURS
Status: DISCONTINUED | OUTPATIENT
Start: 2021-01-01 | End: 2021-01-01

## 2021-01-01 RX ORDER — AMOXICILLIN 250 MG
1 CAPSULE ORAL NIGHTLY
Status: DISCONTINUED | OUTPATIENT
Start: 2021-01-01 | End: 2021-01-01 | Stop reason: HOSPADM

## 2021-01-01 RX ORDER — ACETAMINOPHEN 500 MG
1000 TABLET ORAL ONCE
Status: COMPLETED | OUTPATIENT
Start: 2021-01-01 | End: 2021-01-01

## 2021-01-01 RX ORDER — FUROSEMIDE 20 MG/1
TABLET ORAL
Qty: 90 TABLET | Refills: 3 | Status: SHIPPED | OUTPATIENT
Start: 2021-01-01 | End: 2021-01-01 | Stop reason: DRUGHIGH

## 2021-01-01 RX ORDER — ACETAMINOPHEN 325 MG/1
650 TABLET ORAL EVERY 6 HOURS PRN
Status: DISCONTINUED | OUTPATIENT
Start: 2021-01-01 | End: 2021-01-01 | Stop reason: HOSPADM

## 2021-01-01 RX ORDER — ONDANSETRON 4 MG/1
4 TABLET, FILM COATED ORAL EVERY 6 HOURS PRN
Status: DISCONTINUED | OUTPATIENT
Start: 2021-01-01 | End: 2021-01-01 | Stop reason: HOSPADM

## 2021-01-01 RX ORDER — SODIUM CHLORIDE 9 MG/ML
70 INJECTION, SOLUTION INTRAVENOUS CONTINUOUS PRN
Status: DISCONTINUED | OUTPATIENT
Start: 2021-01-01 | End: 2021-01-01 | Stop reason: HOSPADM

## 2021-01-01 RX ORDER — ALBUMIN (HUMAN) 12.5 G/50ML
75 SOLUTION INTRAVENOUS ONCE
Status: COMPLETED | OUTPATIENT
Start: 2021-01-01 | End: 2021-01-01

## 2021-01-01 RX ORDER — LABETALOL HYDROCHLORIDE 5 MG/ML
10 INJECTION, SOLUTION INTRAVENOUS EVERY 6 HOURS PRN
Status: DISCONTINUED | OUTPATIENT
Start: 2021-01-01 | End: 2021-01-01 | Stop reason: HOSPADM

## 2021-01-01 RX ORDER — PANTOPRAZOLE SODIUM 40 MG/1
40 TABLET, DELAYED RELEASE ORAL DAILY
Qty: 90 TABLET | Refills: 3 | Status: SHIPPED | OUTPATIENT
Start: 2021-01-01

## 2021-01-01 RX ORDER — MIDODRINE HYDROCHLORIDE 10 MG/1
5 TABLET ORAL
Qty: 45 TABLET | Refills: 0 | Status: SHIPPED | OUTPATIENT
Start: 2021-01-01 | End: 2021-01-01 | Stop reason: HOSPADM

## 2021-01-01 RX ORDER — LACTULOSE 10 G/15ML
20 SOLUTION ORAL 3 TIMES DAILY
Status: DISCONTINUED | OUTPATIENT
Start: 2021-01-01 | End: 2021-01-01

## 2021-01-01 RX ORDER — NADOLOL 40 MG/1
20 TABLET ORAL DAILY
Status: DISCONTINUED | OUTPATIENT
Start: 2021-01-01 | End: 2021-01-01 | Stop reason: HOSPADM

## 2021-01-01 RX ORDER — SODIUM CHLORIDE 0.9 % (FLUSH) 0.9 %
3-10 SYRINGE (ML) INJECTION AS NEEDED
Status: DISCONTINUED | OUTPATIENT
Start: 2021-01-01 | End: 2021-01-01 | Stop reason: HOSPADM

## 2021-01-01 RX ORDER — GLUCOSAM/CHON-MSM1/C/MANG/BOSW 500-416.6
TABLET ORAL
COMMUNITY
Start: 2021-01-01 | End: 2021-01-01 | Stop reason: SDUPTHER

## 2021-01-01 RX ORDER — FUROSEMIDE 40 MG/1
40 TABLET ORAL DAILY
Qty: 90 TABLET | Refills: 0 | Status: ON HOLD | OUTPATIENT
Start: 2021-01-01 | End: 2021-01-01 | Stop reason: SDUPTHER

## 2021-01-01 RX ORDER — SODIUM CHLORIDE 9 MG/ML
INJECTION, SOLUTION INTRAVENOUS CONTINUOUS PRN
Status: DISCONTINUED | OUTPATIENT
Start: 2021-01-01 | End: 2021-01-01 | Stop reason: SURG

## 2021-01-01 RX ORDER — DIPHENHYDRAMINE HCL 25 MG
25 CAPSULE ORAL ONCE
Status: COMPLETED | OUTPATIENT
Start: 2021-01-01 | End: 2021-01-01

## 2021-01-01 RX ORDER — CALCIUM CITRATE/VITAMIN D3 200MG-6.25
TABLET ORAL
COMMUNITY
Start: 2021-01-01 | End: 2021-01-01 | Stop reason: SDUPTHER

## 2021-01-01 RX ORDER — MORPHINE SULFATE 2 MG/ML
2 INJECTION, SOLUTION INTRAMUSCULAR; INTRAVENOUS ONCE AS NEEDED
Status: COMPLETED | OUTPATIENT
Start: 2021-01-01 | End: 2021-01-01

## 2021-01-01 RX ORDER — MIDODRINE HYDROCHLORIDE 5 MG/1
5 TABLET ORAL
Status: DISCONTINUED | OUTPATIENT
Start: 2021-01-01 | End: 2021-01-01 | Stop reason: HOSPADM

## 2021-01-01 RX ORDER — CALCIUM CITRATE/VITAMIN D3 200MG-6.25
TABLET ORAL
Qty: 100 EACH | Refills: 3 | Status: SHIPPED | OUTPATIENT
Start: 2021-01-01

## 2021-01-01 RX ORDER — SODIUM CHLORIDE 0.9 % (FLUSH) 0.9 %
3 SYRINGE (ML) INJECTION EVERY 12 HOURS SCHEDULED
Status: DISCONTINUED | OUTPATIENT
Start: 2021-01-01 | End: 2021-01-01 | Stop reason: HOSPADM

## 2021-01-01 RX ORDER — TRAMADOL HYDROCHLORIDE 50 MG/1
50 TABLET ORAL EVERY 6 HOURS PRN
Status: DISCONTINUED | OUTPATIENT
Start: 2021-01-01 | End: 2021-01-01 | Stop reason: HOSPADM

## 2021-01-01 RX ORDER — MEPERIDINE HYDROCHLORIDE 50 MG/ML
INJECTION INTRAMUSCULAR; INTRAVENOUS; SUBCUTANEOUS AS NEEDED
Status: DISCONTINUED | OUTPATIENT
Start: 2021-01-01 | End: 2021-01-01 | Stop reason: SURG

## 2021-01-01 RX ORDER — ALBUMIN (HUMAN) 12.5 G/50ML
25 SOLUTION INTRAVENOUS
Status: DISPENSED | OUTPATIENT
Start: 2021-01-01 | End: 2021-01-01

## 2021-01-01 RX ORDER — CEFTRIAXONE 1 G/50ML
1 INJECTION, SOLUTION INTRAVENOUS ONCE
Status: COMPLETED | OUTPATIENT
Start: 2021-01-01 | End: 2021-01-01

## 2021-01-01 RX ORDER — SUCRALFATE 1 G/1
1 TABLET ORAL 4 TIMES DAILY
Qty: 120 TABLET | Refills: 5 | Status: SHIPPED | OUTPATIENT
Start: 2021-01-01

## 2021-01-01 RX ORDER — PANTOPRAZOLE SODIUM 40 MG/1
40 TABLET, DELAYED RELEASE ORAL DAILY
COMMUNITY
End: 2021-01-01 | Stop reason: SDUPTHER

## 2021-01-01 RX ORDER — PANTOPRAZOLE SODIUM 40 MG/1
40 TABLET, DELAYED RELEASE ORAL DAILY
Qty: 90 TABLET | Refills: 0 | Status: SHIPPED | OUTPATIENT
Start: 2021-01-01 | End: 2021-01-01

## 2021-01-01 RX ORDER — FUROSEMIDE 10 MG/ML
40 INJECTION INTRAMUSCULAR; INTRAVENOUS ONCE
Status: COMPLETED | OUTPATIENT
Start: 2021-01-01 | End: 2021-01-01

## 2021-01-01 RX ORDER — LANOLIN ALCOHOL/MO/W.PET/CERES
50 CREAM (GRAM) TOPICAL DAILY
COMMUNITY
End: 2021-01-01

## 2021-01-01 RX ORDER — FLUTICASONE PROPIONATE 50 MCG
2 SPRAY, SUSPENSION (ML) NASAL DAILY
COMMUNITY
Start: 2021-01-01 | End: 2021-01-01

## 2021-01-01 RX ORDER — NADOLOL 20 MG/1
20 TABLET ORAL DAILY
Status: ON HOLD | COMMUNITY
End: 2021-01-01 | Stop reason: ALTCHOICE

## 2021-01-01 RX ADMIN — RIFAXIMIN 550 MG: 550 TABLET ORAL at 08:47

## 2021-01-01 RX ADMIN — LACTULOSE 30 G: 20 SOLUTION ORAL at 18:32

## 2021-01-01 RX ADMIN — HUMAN INSULIN 3 UNITS: 100 INJECTION, SOLUTION SUBCUTANEOUS at 23:57

## 2021-01-01 RX ADMIN — SODIUM CHLORIDE, PRESERVATIVE FREE 10 ML: 5 INJECTION INTRAVENOUS at 20:39

## 2021-01-01 RX ADMIN — ALBUMIN HUMAN 25 G: 0.25 SOLUTION INTRAVENOUS at 08:59

## 2021-01-01 RX ADMIN — CEFTRIAXONE 2 G: 2 INJECTION, SOLUTION INTRAVENOUS at 13:12

## 2021-01-01 RX ADMIN — MIDODRINE HYDROCHLORIDE 5 MG: 5 TABLET ORAL at 11:39

## 2021-01-01 RX ADMIN — FUROSEMIDE 20 MG: 20 TABLET ORAL at 08:48

## 2021-01-01 RX ADMIN — SPIRONOLACTONE 100 MG: 25 TABLET, FILM COATED ORAL at 11:30

## 2021-01-01 RX ADMIN — LACTULOSE 20 G: 20 SOLUTION ORAL at 09:19

## 2021-01-01 RX ADMIN — LACTULOSE 30 G: 20 SOLUTION ORAL at 21:07

## 2021-01-01 RX ADMIN — DEXAMETHASONE SODIUM PHOSPHATE 4 MG: 4 INJECTION, SOLUTION INTRA-ARTICULAR; INTRALESIONAL; INTRAMUSCULAR; INTRAVENOUS; SOFT TISSUE at 08:46

## 2021-01-01 RX ADMIN — PROPOFOL 10 MG: 10 INJECTION, EMULSION INTRAVENOUS at 07:58

## 2021-01-01 RX ADMIN — SODIUM CHLORIDE, PRESERVATIVE FREE 10 ML: 5 INJECTION INTRAVENOUS at 21:45

## 2021-01-01 RX ADMIN — Medication 30 ML: at 21:28

## 2021-01-01 RX ADMIN — LACTULOSE 20 G: 20 SOLUTION ORAL at 08:54

## 2021-01-01 RX ADMIN — DOCUSATE SODIUM 50MG AND SENNOSIDES 8.6MG 1 TABLET: 8.6; 5 TABLET, FILM COATED ORAL at 21:32

## 2021-01-01 RX ADMIN — SODIUM CHLORIDE, PRESERVATIVE FREE 3 ML: 5 INJECTION INTRAVENOUS at 08:48

## 2021-01-01 RX ADMIN — INSULIN ASPART 5 UNITS: 100 INJECTION, SOLUTION INTRAVENOUS; SUBCUTANEOUS at 11:30

## 2021-01-01 RX ADMIN — SODIUM CHLORIDE AND POTASSIUM CHLORIDE 75 ML/HR: 9; 1.49 INJECTION, SOLUTION INTRAVENOUS at 21:07

## 2021-01-01 RX ADMIN — SUCRALFATE 1 G: 1 TABLET ORAL at 20:50

## 2021-01-01 RX ADMIN — SODIUM CHLORIDE, PRESERVATIVE FREE 10 ML: 5 INJECTION INTRAVENOUS at 20:34

## 2021-01-01 RX ADMIN — SODIUM CHLORIDE, PRESERVATIVE FREE 10 ML: 5 INJECTION INTRAVENOUS at 08:19

## 2021-01-01 RX ADMIN — INSULIN ASPART 5 UNITS: 100 INJECTION, SOLUTION INTRAVENOUS; SUBCUTANEOUS at 17:17

## 2021-01-01 RX ADMIN — FUROSEMIDE 40 MG: 40 TABLET ORAL at 08:48

## 2021-01-01 RX ADMIN — OCTREOTIDE ACETATE 25 MCG/HR: 100 INJECTION, SOLUTION INTRAVENOUS; SUBCUTANEOUS at 04:38

## 2021-01-01 RX ADMIN — SUCRALFATE 1 G: 1 TABLET ORAL at 09:13

## 2021-01-01 RX ADMIN — LACTULOSE 20 G: 20 SOLUTION ORAL at 18:20

## 2021-01-01 RX ADMIN — SUCRALFATE 1 G: 1 TABLET ORAL at 18:12

## 2021-01-01 RX ADMIN — PANTOPRAZOLE SODIUM 40 MG: 40 TABLET, DELAYED RELEASE ORAL at 06:31

## 2021-01-01 RX ADMIN — HUMAN INSULIN 3 UNITS: 100 INJECTION, SOLUTION SUBCUTANEOUS at 06:31

## 2021-01-01 RX ADMIN — LACTULOSE 20 G: 20 SOLUTION ORAL at 17:43

## 2021-01-01 RX ADMIN — ALBUMIN HUMAN 25 G: 0.25 SOLUTION INTRAVENOUS at 20:40

## 2021-01-01 RX ADMIN — DOCUSATE SODIUM 50MG AND SENNOSIDES 8.6MG 1 TABLET: 8.6; 5 TABLET, FILM COATED ORAL at 20:50

## 2021-01-01 RX ADMIN — SUCRALFATE 1 G: 1 TABLET ORAL at 09:20

## 2021-01-01 RX ADMIN — SODIUM CHLORIDE 1000 ML: 9 INJECTION, SOLUTION INTRAVENOUS at 14:20

## 2021-01-01 RX ADMIN — Medication 30 ML: at 20:06

## 2021-01-01 RX ADMIN — DEXAMETHASONE SODIUM PHOSPHATE 6 MG: 10 INJECTION, SOLUTION INTRAMUSCULAR; INTRAVENOUS at 17:22

## 2021-01-01 RX ADMIN — LACTULOSE 20 G: 20 SOLUTION ORAL at 11:10

## 2021-01-01 RX ADMIN — HUMAN INSULIN 6 UNITS: 100 INJECTION, SOLUTION SUBCUTANEOUS at 12:39

## 2021-01-01 RX ADMIN — SUCRALFATE 1 G: 1 TABLET ORAL at 08:21

## 2021-01-01 RX ADMIN — FERROUS SULFATE TAB EC 324 MG (65 MG FE EQUIVALENT) 324 MG: 324 (65 FE) TABLET DELAYED RESPONSE at 08:48

## 2021-01-01 RX ADMIN — PROPOFOL 20 MG: 10 INJECTION, EMULSION INTRAVENOUS at 07:57

## 2021-01-01 RX ADMIN — SODIUM CHLORIDE, PRESERVATIVE FREE 10 ML: 5 INJECTION INTRAVENOUS at 05:20

## 2021-01-01 RX ADMIN — Medication 30 ML: at 08:45

## 2021-01-01 RX ADMIN — FUROSEMIDE 20 MG: 20 TABLET ORAL at 08:30

## 2021-01-01 RX ADMIN — SPIRONOLACTONE 50 MG: 25 TABLET, FILM COATED ORAL at 09:13

## 2021-01-01 RX ADMIN — FUROSEMIDE 40 MG: 40 TABLET ORAL at 09:29

## 2021-01-01 RX ADMIN — Medication 25 MG: at 07:56

## 2021-01-01 RX ADMIN — PANTOPRAZOLE SODIUM 40 MG: 40 TABLET, DELAYED RELEASE ORAL at 08:48

## 2021-01-01 RX ADMIN — RIFAXIMIN 550 MG: 550 TABLET ORAL at 11:30

## 2021-01-01 RX ADMIN — PANTOPRAZOLE SODIUM 40 MG: 40 TABLET, DELAYED RELEASE ORAL at 09:08

## 2021-01-01 RX ADMIN — DOCUSATE SODIUM 50MG AND SENNOSIDES 8.6MG 1 TABLET: 8.6; 5 TABLET, FILM COATED ORAL at 21:09

## 2021-01-01 RX ADMIN — SODIUM CHLORIDE, PRESERVATIVE FREE 10 ML: 5 INJECTION INTRAVENOUS at 21:20

## 2021-01-01 RX ADMIN — SPIRONOLACTONE 100 MG: 25 TABLET, FILM COATED ORAL at 09:19

## 2021-01-01 RX ADMIN — DEXAMETHASONE 6 MG: 2 TABLET ORAL at 08:55

## 2021-01-01 RX ADMIN — PANTOPRAZOLE SODIUM 40 MG: 40 TABLET, DELAYED RELEASE ORAL at 06:46

## 2021-01-01 RX ADMIN — SPIRONOLACTONE 50 MG: 25 TABLET, FILM COATED ORAL at 20:50

## 2021-01-01 RX ADMIN — MIDODRINE HYDROCHLORIDE 5 MG: 5 TABLET ORAL at 06:35

## 2021-01-01 RX ADMIN — INSULIN ASPART 4 UNITS: 100 INJECTION, SOLUTION INTRAVENOUS; SUBCUTANEOUS at 17:30

## 2021-01-01 RX ADMIN — AZITHROMYCIN 500 MG: 500 INJECTION, POWDER, LYOPHILIZED, FOR SOLUTION INTRAVENOUS at 14:38

## 2021-01-01 RX ADMIN — LACTULOSE 20 G: 20 SOLUTION ORAL at 12:19

## 2021-01-01 RX ADMIN — RIFAXIMIN 550 MG: 550 TABLET ORAL at 20:50

## 2021-01-01 RX ADMIN — AZITHROMYCIN 500 MG: 500 INJECTION, POWDER, LYOPHILIZED, FOR SOLUTION INTRAVENOUS at 16:27

## 2021-01-01 RX ADMIN — RIFAXIMIN 550 MG: 550 TABLET ORAL at 20:10

## 2021-01-01 RX ADMIN — ALBUMIN HUMAN 75 G: 0.25 SOLUTION INTRAVENOUS at 02:09

## 2021-01-01 RX ADMIN — OXYCODONE HYDROCHLORIDE AND ACETAMINOPHEN 500 MG: 500 TABLET ORAL at 08:30

## 2021-01-01 RX ADMIN — INSULIN DETEMIR 10 UNITS: 100 INJECTION, SOLUTION SUBCUTANEOUS at 09:33

## 2021-01-01 RX ADMIN — OXYCODONE HYDROCHLORIDE AND ACETAMINOPHEN 500 MG: 500 TABLET ORAL at 08:47

## 2021-01-01 RX ADMIN — DEXAMETHASONE 6 MG: 2 TABLET ORAL at 08:30

## 2021-01-01 RX ADMIN — HUMAN INSULIN 4 UNITS: 100 INJECTION, SOLUTION SUBCUTANEOUS at 06:44

## 2021-01-01 RX ADMIN — HUMAN INSULIN 2 UNITS: 100 INJECTION, SOLUTION SUBCUTANEOUS at 19:13

## 2021-01-01 RX ADMIN — INSULIN ASPART 3 UNITS: 100 INJECTION, SOLUTION INTRAVENOUS; SUBCUTANEOUS at 17:19

## 2021-01-01 RX ADMIN — SODIUM CHLORIDE, PRESERVATIVE FREE 10 ML: 5 INJECTION INTRAVENOUS at 08:22

## 2021-01-01 RX ADMIN — INSULIN ASPART 3 UNITS: 100 INJECTION, SOLUTION INTRAVENOUS; SUBCUTANEOUS at 18:50

## 2021-01-01 RX ADMIN — SODIUM CHLORIDE, PRESERVATIVE FREE 3 ML: 5 INJECTION INTRAVENOUS at 14:08

## 2021-01-01 RX ADMIN — ALBUMIN HUMAN 25 G: 0.25 SOLUTION INTRAVENOUS at 00:10

## 2021-01-01 RX ADMIN — LACTULOSE 20 G: 20 SOLUTION ORAL at 08:48

## 2021-01-01 RX ADMIN — INSULIN ASPART 6 UNITS: 100 INJECTION, SOLUTION INTRAVENOUS; SUBCUTANEOUS at 11:38

## 2021-01-01 RX ADMIN — CEFTRIAXONE 2 G: 2 INJECTION, SOLUTION INTRAVENOUS at 09:39

## 2021-01-01 RX ADMIN — RIFAXIMIN 550 MG: 550 TABLET ORAL at 20:38

## 2021-01-01 RX ADMIN — MIDODRINE HYDROCHLORIDE 5 MG: 5 TABLET ORAL at 17:17

## 2021-01-01 RX ADMIN — DEXTROSE MONOHYDRATE 25 G: 500 INJECTION PARENTERAL at 06:35

## 2021-01-01 RX ADMIN — SUCRALFATE 1 G: 1 TABLET ORAL at 11:38

## 2021-01-01 RX ADMIN — PANTOPRAZOLE SODIUM 40 MG: 40 TABLET, DELAYED RELEASE ORAL at 08:49

## 2021-01-01 RX ADMIN — RIFAXIMIN 550 MG: 550 TABLET ORAL at 20:59

## 2021-01-01 RX ADMIN — LACTULOSE 20 G: 20 SOLUTION ORAL at 18:12

## 2021-01-01 RX ADMIN — SPIRONOLACTONE 50 MG: 25 TABLET, FILM COATED ORAL at 21:32

## 2021-01-01 RX ADMIN — LACTULOSE 30 G: 20 SOLUTION ORAL at 08:21

## 2021-01-01 RX ADMIN — DIPHENHYDRAMINE HYDROCHLORIDE 25 MG: 25 CAPSULE ORAL at 15:09

## 2021-01-01 RX ADMIN — HUMAN INSULIN 5 UNITS: 100 INJECTION, SOLUTION SUBCUTANEOUS at 18:34

## 2021-01-01 RX ADMIN — FUROSEMIDE 20 MG: 20 TABLET ORAL at 09:13

## 2021-01-01 RX ADMIN — SODIUM CHLORIDE, PRESERVATIVE FREE 10 ML: 5 INJECTION INTRAVENOUS at 21:09

## 2021-01-01 RX ADMIN — SUCRALFATE 1 G: 1 TABLET ORAL at 21:09

## 2021-01-01 RX ADMIN — SODIUM CHLORIDE, PRESERVATIVE FREE 10 ML: 5 INJECTION INTRAVENOUS at 09:11

## 2021-01-01 RX ADMIN — INSULIN ASPART 8 UNITS: 100 INJECTION, SOLUTION INTRAVENOUS; SUBCUTANEOUS at 11:49

## 2021-01-01 RX ADMIN — DOCUSATE SODIUM 50MG AND SENNOSIDES 8.6MG 1 TABLET: 8.6; 5 TABLET, FILM COATED ORAL at 08:38

## 2021-01-01 RX ADMIN — LACTULOSE 20 G: 20 SOLUTION ORAL at 12:39

## 2021-01-01 RX ADMIN — SODIUM CHLORIDE, PRESERVATIVE FREE 10 ML: 5 INJECTION INTRAVENOUS at 08:31

## 2021-01-01 RX ADMIN — SODIUM CHLORIDE, PRESERVATIVE FREE 10 ML: 5 INJECTION INTRAVENOUS at 08:55

## 2021-01-01 RX ADMIN — ALBUMIN HUMAN 25 G: 0.25 SOLUTION INTRAVENOUS at 21:55

## 2021-01-01 RX ADMIN — CEFTRIAXONE 2 G: 2 INJECTION, SOLUTION INTRAVENOUS at 10:15

## 2021-01-01 RX ADMIN — POTASSIUM CHLORIDE 40 MEQ: 10 CAPSULE, COATED, EXTENDED RELEASE ORAL at 21:07

## 2021-01-01 RX ADMIN — RIFAXIMIN 550 MG: 550 TABLET ORAL at 21:07

## 2021-01-01 RX ADMIN — FUROSEMIDE 40 MG: 40 TABLET ORAL at 11:30

## 2021-01-01 RX ADMIN — FUROSEMIDE 20 MG: 20 TABLET ORAL at 08:55

## 2021-01-01 RX ADMIN — LACTULOSE 20 G: 20 SOLUTION ORAL at 21:22

## 2021-01-01 RX ADMIN — LACTULOSE 20 G: 20 SOLUTION ORAL at 09:13

## 2021-01-01 RX ADMIN — MIDAZOLAM HYDROCHLORIDE 2 MG: 1 INJECTION, SOLUTION INTRAMUSCULAR; INTRAVENOUS at 07:55

## 2021-01-01 RX ADMIN — LACTULOSE 20 G: 20 SOLUTION ORAL at 11:47

## 2021-01-01 RX ADMIN — SODIUM CHLORIDE, PRESERVATIVE FREE 10 ML: 5 INJECTION INTRAVENOUS at 09:47

## 2021-01-01 RX ADMIN — SUCRALFATE 1 G: 1 TABLET ORAL at 09:08

## 2021-01-01 RX ADMIN — RIFAXIMIN 550 MG: 550 TABLET ORAL at 09:20

## 2021-01-01 RX ADMIN — ONDANSETRON 4 MG: 2 INJECTION INTRAMUSCULAR; INTRAVENOUS at 07:59

## 2021-01-01 RX ADMIN — SODIUM CHLORIDE, PRESERVATIVE FREE 3 ML: 5 INJECTION INTRAVENOUS at 20:38

## 2021-01-01 RX ADMIN — LACTULOSE 20 G: 20 SOLUTION ORAL at 08:49

## 2021-01-01 RX ADMIN — SODIUM CHLORIDE, PRESERVATIVE FREE 10 ML: 5 INJECTION INTRAVENOUS at 10:00

## 2021-01-01 RX ADMIN — SODIUM CHLORIDE: 9 INJECTION, SOLUTION INTRAVENOUS at 07:37

## 2021-01-01 RX ADMIN — INSULIN ASPART 8 UNITS: 100 INJECTION, SOLUTION INTRAVENOUS; SUBCUTANEOUS at 16:59

## 2021-01-01 RX ADMIN — MIDODRINE HYDROCHLORIDE 5 MG: 5 TABLET ORAL at 13:12

## 2021-01-01 RX ADMIN — SODIUM CHLORIDE, PRESERVATIVE FREE 10 ML: 5 INJECTION INTRAVENOUS at 21:28

## 2021-01-01 RX ADMIN — SUCRALFATE 1 G: 1 TABLET ORAL at 17:00

## 2021-01-01 RX ADMIN — MIDODRINE HYDROCHLORIDE 5 MG: 5 TABLET ORAL at 17:21

## 2021-01-01 RX ADMIN — SODIUM CHLORIDE 1000 ML: 9 INJECTION, SOLUTION INTRAVENOUS at 20:26

## 2021-01-01 RX ADMIN — FUROSEMIDE 40 MG: 10 INJECTION, SOLUTION INTRAMUSCULAR; INTRAVENOUS at 14:22

## 2021-01-01 RX ADMIN — INSULIN ASPART 5 UNITS: 100 INJECTION, SOLUTION INTRAVENOUS; SUBCUTANEOUS at 12:06

## 2021-01-01 RX ADMIN — SODIUM CHLORIDE, PRESERVATIVE FREE 10 ML: 5 INJECTION INTRAVENOUS at 11:31

## 2021-01-01 RX ADMIN — NADOLOL 20 MG: 40 TABLET ORAL at 08:31

## 2021-01-01 RX ADMIN — ALBUMIN HUMAN 25 G: 0.25 SOLUTION INTRAVENOUS at 15:35

## 2021-01-01 RX ADMIN — LACTULOSE 20 G: 20 SOLUTION ORAL at 23:57

## 2021-01-01 RX ADMIN — RIFAXIMIN 550 MG: 550 TABLET ORAL at 21:09

## 2021-01-01 RX ADMIN — SODIUM CHLORIDE, PRESERVATIVE FREE 10 ML: 5 INJECTION INTRAVENOUS at 09:00

## 2021-01-01 RX ADMIN — INSULIN DETEMIR 10 UNITS: 100 INJECTION, SOLUTION SUBCUTANEOUS at 21:07

## 2021-01-01 RX ADMIN — SODIUM CHLORIDE, PRESERVATIVE FREE 10 ML: 5 INJECTION INTRAVENOUS at 20:50

## 2021-01-01 RX ADMIN — ONDANSETRON 4 MG: 2 INJECTION INTRAMUSCULAR; INTRAVENOUS at 09:02

## 2021-01-01 RX ADMIN — LACTULOSE 30 G: 20 SOLUTION ORAL at 09:08

## 2021-01-01 RX ADMIN — LACTULOSE 30 G: 20 SOLUTION ORAL at 09:20

## 2021-01-01 RX ADMIN — SODIUM CHLORIDE, PRESERVATIVE FREE 10 ML: 5 INJECTION INTRAVENOUS at 21:07

## 2021-01-01 RX ADMIN — MORPHINE SULFATE 2 MG: 2 INJECTION, SOLUTION INTRAMUSCULAR; INTRAVENOUS at 07:36

## 2021-01-01 RX ADMIN — SPIRONOLACTONE 50 MG: 25 TABLET, FILM COATED ORAL at 20:38

## 2021-01-01 RX ADMIN — CEFTRIAXONE 2 G: 2 INJECTION, SOLUTION INTRAVENOUS at 08:45

## 2021-01-01 RX ADMIN — RIFAXIMIN 550 MG: 550 TABLET ORAL at 10:32

## 2021-01-01 RX ADMIN — SUCRALFATE 1 G: 1 TABLET ORAL at 21:36

## 2021-01-01 RX ADMIN — INSULIN DETEMIR 15 UNITS: 100 INJECTION, SOLUTION SUBCUTANEOUS at 23:07

## 2021-01-01 RX ADMIN — Medication 30 ML: at 10:27

## 2021-01-01 RX ADMIN — SPIRONOLACTONE 100 MG: 25 TABLET, FILM COATED ORAL at 08:48

## 2021-01-01 RX ADMIN — RIFAXIMIN 550 MG: 550 TABLET ORAL at 08:31

## 2021-01-01 RX ADMIN — PANTOPRAZOLE SODIUM 40 MG: 40 TABLET, DELAYED RELEASE ORAL at 06:36

## 2021-01-01 RX ADMIN — OXYCODONE HYDROCHLORIDE AND ACETAMINOPHEN 500 MG: 500 TABLET ORAL at 08:38

## 2021-01-01 RX ADMIN — LACTULOSE 30 G: 20 SOLUTION ORAL at 17:58

## 2021-01-01 RX ADMIN — PANTOPRAZOLE SODIUM 40 MG: 40 TABLET, DELAYED RELEASE ORAL at 06:40

## 2021-01-01 RX ADMIN — LACTULOSE 30 G: 20 SOLUTION ORAL at 21:51

## 2021-01-01 RX ADMIN — INSULIN DETEMIR 15 UNITS: 100 INJECTION, SOLUTION SUBCUTANEOUS at 02:10

## 2021-01-01 RX ADMIN — ONDANSETRON 4 MG: 2 INJECTION INTRAMUSCULAR; INTRAVENOUS at 12:31

## 2021-01-01 RX ADMIN — Medication 30 ML: at 09:08

## 2021-01-01 RX ADMIN — ALBUMIN HUMAN 25 G: 0.25 SOLUTION INTRAVENOUS at 17:20

## 2021-01-01 RX ADMIN — SODIUM CHLORIDE, PRESERVATIVE FREE 10 ML: 5 INJECTION INTRAVENOUS at 23:58

## 2021-01-01 RX ADMIN — SODIUM CHLORIDE, PRESERVATIVE FREE 10 ML: 5 INJECTION INTRAVENOUS at 02:08

## 2021-01-01 RX ADMIN — HUMAN INSULIN 4 UNITS: 100 INJECTION, SOLUTION SUBCUTANEOUS at 12:58

## 2021-01-01 RX ADMIN — DOCUSATE SODIUM 50MG AND SENNOSIDES 8.6MG 1 TABLET: 8.6; 5 TABLET, FILM COATED ORAL at 08:47

## 2021-01-01 RX ADMIN — INSULIN DETEMIR 15 UNITS: 100 INJECTION, SOLUTION SUBCUTANEOUS at 08:48

## 2021-01-01 RX ADMIN — RIFAXIMIN 550 MG: 550 TABLET ORAL at 09:13

## 2021-01-01 RX ADMIN — DEXAMETHASONE 6 MG: 2 TABLET ORAL at 09:13

## 2021-01-01 RX ADMIN — LACTULOSE 20 G: 20 SOLUTION ORAL at 12:28

## 2021-01-01 RX ADMIN — OXYCODONE HYDROCHLORIDE AND ACETAMINOPHEN 500 MG: 500 TABLET ORAL at 09:13

## 2021-01-01 RX ADMIN — INSULIN ASPART 5 UNITS: 100 INJECTION, SOLUTION INTRAVENOUS; SUBCUTANEOUS at 08:58

## 2021-01-01 RX ADMIN — SODIUM CHLORIDE, PRESERVATIVE FREE 10 ML: 5 INJECTION INTRAVENOUS at 22:31

## 2021-01-01 RX ADMIN — IOPAMIDOL 100 ML: 612 INJECTION, SOLUTION INTRAVENOUS at 11:49

## 2021-01-01 RX ADMIN — PANTOPRAZOLE SODIUM 40 MG: 40 TABLET, DELAYED RELEASE ORAL at 08:21

## 2021-01-01 RX ADMIN — LACTULOSE 20 G: 20 SOLUTION ORAL at 08:30

## 2021-01-01 RX ADMIN — FERROUS SULFATE TAB EC 324 MG (65 MG FE EQUIVALENT) 324 MG: 324 (65 FE) TABLET DELAYED RESPONSE at 08:31

## 2021-01-01 RX ADMIN — SPIRONOLACTONE 50 MG: 25 TABLET, FILM COATED ORAL at 20:59

## 2021-01-01 RX ADMIN — SUCRALFATE 1 G: 1 TABLET ORAL at 12:19

## 2021-01-01 RX ADMIN — SPIRONOLACTONE 50 MG: 25 TABLET, FILM COATED ORAL at 08:55

## 2021-01-01 RX ADMIN — SODIUM CHLORIDE, PRESERVATIVE FREE 10 ML: 5 INJECTION INTRAVENOUS at 08:45

## 2021-01-01 RX ADMIN — OXYCODONE HYDROCHLORIDE AND ACETAMINOPHEN 500 MG: 500 TABLET ORAL at 08:55

## 2021-01-01 RX ADMIN — Medication 30 ML: at 21:52

## 2021-01-01 RX ADMIN — INSULIN ASPART 5 UNITS: 100 INJECTION, SOLUTION INTRAVENOUS; SUBCUTANEOUS at 18:20

## 2021-01-01 RX ADMIN — SODIUM CHLORIDE, PRESERVATIVE FREE 3 ML: 5 INJECTION INTRAVENOUS at 08:38

## 2021-01-01 RX ADMIN — OCTREOTIDE ACETATE 50 MCG/HR: 100 INJECTION, SOLUTION INTRAVENOUS; SUBCUTANEOUS at 06:45

## 2021-01-01 RX ADMIN — PANTOPRAZOLE SODIUM 40 MG: 40 TABLET, DELAYED RELEASE ORAL at 09:20

## 2021-01-01 RX ADMIN — LACTULOSE 20 G: 20 SOLUTION ORAL at 11:30

## 2021-01-01 RX ADMIN — SPIRONOLACTONE 50 MG: 25 TABLET, FILM COATED ORAL at 23:57

## 2021-01-01 RX ADMIN — SODIUM CHLORIDE 70 ML/HR: 9 INJECTION, SOLUTION INTRAVENOUS at 07:20

## 2021-01-01 RX ADMIN — LACTULOSE 20 G: 20 SOLUTION ORAL at 09:14

## 2021-01-01 RX ADMIN — FERROUS SULFATE TAB EC 324 MG (65 MG FE EQUIVALENT) 324 MG: 324 (65 FE) TABLET DELAYED RESPONSE at 14:06

## 2021-01-01 RX ADMIN — LACTULOSE 20 G: 20 SOLUTION ORAL at 11:00

## 2021-01-01 RX ADMIN — SUCRALFATE 1 G: 1 TABLET ORAL at 08:31

## 2021-01-01 RX ADMIN — LACTULOSE 20 G: 20 SOLUTION ORAL at 08:31

## 2021-01-01 RX ADMIN — SUCRALFATE 1 G: 1 TABLET ORAL at 11:56

## 2021-01-01 RX ADMIN — FERROUS SULFATE TAB EC 324 MG (65 MG FE EQUIVALENT) 324 MG: 324 (65 FE) TABLET DELAYED RESPONSE at 08:38

## 2021-01-01 RX ADMIN — PANTOPRAZOLE SODIUM 40 MG: 40 INJECTION, POWDER, FOR SOLUTION INTRAVENOUS at 20:39

## 2021-01-01 RX ADMIN — PANTOPRAZOLE SODIUM 40 MG: 40 INJECTION, POWDER, FOR SOLUTION INTRAVENOUS at 02:07

## 2021-01-01 RX ADMIN — RIFAXIMIN 550 MG: 550 TABLET ORAL at 17:58

## 2021-01-01 RX ADMIN — SPIRONOLACTONE 50 MG: 25 TABLET, FILM COATED ORAL at 21:09

## 2021-01-01 RX ADMIN — SODIUM CHLORIDE, PRESERVATIVE FREE 3 ML: 5 INJECTION INTRAVENOUS at 20:09

## 2021-01-01 RX ADMIN — DEXTROSE MONOHYDRATE 25 G: 500 INJECTION PARENTERAL at 05:20

## 2021-01-01 RX ADMIN — SODIUM CHLORIDE 500 ML: 9 INJECTION, SOLUTION INTRAVENOUS at 07:13

## 2021-01-01 RX ADMIN — SUCRALFATE 1 G: 1 TABLET ORAL at 08:55

## 2021-01-01 RX ADMIN — SPIRONOLACTONE 50 MG: 25 TABLET, FILM COATED ORAL at 20:09

## 2021-01-01 RX ADMIN — OCTREOTIDE ACETATE 50 MCG/HR: 100 INJECTION, SOLUTION INTRAVENOUS; SUBCUTANEOUS at 21:44

## 2021-01-01 RX ADMIN — INSULIN ASPART 7 UNITS: 100 INJECTION, SOLUTION INTRAVENOUS; SUBCUTANEOUS at 06:45

## 2021-01-01 RX ADMIN — LACTULOSE 20 G: 20 SOLUTION ORAL at 21:09

## 2021-01-01 RX ADMIN — OXYCODONE HYDROCHLORIDE AND ACETAMINOPHEN 500 MG: 500 TABLET ORAL at 14:06

## 2021-01-01 RX ADMIN — SPIRONOLACTONE 50 MG: 25 TABLET, FILM COATED ORAL at 08:48

## 2021-01-01 RX ADMIN — LACTULOSE 20 G: 20 SOLUTION ORAL at 21:32

## 2021-01-01 RX ADMIN — FUROSEMIDE 20 MG: 20 TABLET ORAL at 08:38

## 2021-01-01 RX ADMIN — LACTULOSE 30 G: 20 SOLUTION ORAL at 12:00

## 2021-01-01 RX ADMIN — MIDODRINE HYDROCHLORIDE 5 MG: 5 TABLET ORAL at 17:20

## 2021-01-01 RX ADMIN — Medication 30 ML: at 21:45

## 2021-01-01 RX ADMIN — RIFAXIMIN 550 MG: 550 TABLET ORAL at 21:37

## 2021-01-01 RX ADMIN — DOCUSATE SODIUM 50MG AND SENNOSIDES 8.6MG 1 TABLET: 8.6; 5 TABLET, FILM COATED ORAL at 20:59

## 2021-01-01 RX ADMIN — SUCRALFATE 1 G: 1 TABLET ORAL at 11:27

## 2021-01-01 RX ADMIN — PANTOPRAZOLE SODIUM 40 MG: 40 TABLET, DELAYED RELEASE ORAL at 15:09

## 2021-01-01 RX ADMIN — LACTULOSE 20 G: 20 SOLUTION ORAL at 18:19

## 2021-01-01 RX ADMIN — INSULIN ASPART 2 UNITS: 100 INJECTION, SOLUTION INTRAVENOUS; SUBCUTANEOUS at 06:33

## 2021-01-01 RX ADMIN — LACTULOSE 20 G: 20 SOLUTION ORAL at 20:10

## 2021-01-01 RX ADMIN — LACTULOSE 20 G: 20 SOLUTION ORAL at 08:38

## 2021-01-01 RX ADMIN — INSULIN DETEMIR 15 UNITS: 100 INJECTION, SOLUTION SUBCUTANEOUS at 08:55

## 2021-01-01 RX ADMIN — SODIUM CHLORIDE, PRESERVATIVE FREE 10 ML: 5 INJECTION INTRAVENOUS at 09:06

## 2021-01-01 RX ADMIN — LACTULOSE 20 G: 20 SOLUTION ORAL at 20:38

## 2021-01-01 RX ADMIN — RIFAXIMIN 550 MG: 550 TABLET ORAL at 21:29

## 2021-01-01 RX ADMIN — LACTULOSE 30 G: 20 SOLUTION ORAL at 21:29

## 2021-01-01 RX ADMIN — OCTREOTIDE ACETATE 50 MCG/HR: 100 INJECTION, SOLUTION INTRAVENOUS; SUBCUTANEOUS at 19:52

## 2021-01-01 RX ADMIN — LACTULOSE 20 G: 20 SOLUTION ORAL at 20:59

## 2021-01-01 RX ADMIN — RIFAXIMIN 550 MG: 550 TABLET ORAL at 08:55

## 2021-01-01 RX ADMIN — SUCRALFATE 1 G: 1 TABLET ORAL at 12:39

## 2021-01-01 RX ADMIN — LACTULOSE 20 G: 20 SOLUTION ORAL at 20:34

## 2021-01-01 RX ADMIN — RIFAXIMIN 550 MG: 550 TABLET ORAL at 09:08

## 2021-01-01 RX ADMIN — RIFAXIMIN 550 MG: 550 TABLET ORAL at 21:32

## 2021-01-01 RX ADMIN — SUCRALFATE 1 G: 1 TABLET ORAL at 21:29

## 2021-01-01 RX ADMIN — SODIUM CHLORIDE, PRESERVATIVE FREE 10 ML: 5 INJECTION INTRAVENOUS at 08:57

## 2021-01-01 RX ADMIN — INSULIN DETEMIR 10 UNITS: 100 INJECTION, SOLUTION SUBCUTANEOUS at 10:33

## 2021-01-01 RX ADMIN — DOCUSATE SODIUM 50MG AND SENNOSIDES 8.6MG 1 TABLET: 8.6; 5 TABLET, FILM COATED ORAL at 14:06

## 2021-01-01 RX ADMIN — LACTULOSE 20 G: 20 SOLUTION ORAL at 20:50

## 2021-01-01 RX ADMIN — SUCRALFATE 1 G: 1 TABLET ORAL at 08:30

## 2021-01-01 RX ADMIN — SODIUM CHLORIDE, PRESERVATIVE FREE 10 ML: 5 INJECTION INTRAVENOUS at 09:23

## 2021-01-01 RX ADMIN — IOPAMIDOL 100 ML: 612 INJECTION, SOLUTION INTRAVENOUS at 22:32

## 2021-01-01 RX ADMIN — SUCRALFATE 1 G: 1 TABLET ORAL at 08:46

## 2021-01-01 RX ADMIN — HUMAN INSULIN 3 UNITS: 100 INJECTION, SOLUTION SUBCUTANEOUS at 00:09

## 2021-01-01 RX ADMIN — OXYCODONE HYDROCHLORIDE AND ACETAMINOPHEN 500 MG: 500 TABLET ORAL at 18:20

## 2021-01-01 RX ADMIN — SODIUM CHLORIDE AND POTASSIUM CHLORIDE 75 ML/HR: 9; 1.49 INJECTION, SOLUTION INTRAVENOUS at 10:29

## 2021-01-01 RX ADMIN — POTASSIUM CHLORIDE 40 MEQ: 10 CAPSULE, COATED, EXTENDED RELEASE ORAL at 10:46

## 2021-01-01 RX ADMIN — SPIRONOLACTONE 50 MG: 25 TABLET, FILM COATED ORAL at 08:38

## 2021-01-01 RX ADMIN — SODIUM CHLORIDE, PRESERVATIVE FREE 3 ML: 5 INJECTION INTRAVENOUS at 20:59

## 2021-01-01 RX ADMIN — LACTULOSE 20 G: 20 SOLUTION ORAL at 14:07

## 2021-01-01 RX ADMIN — SUCRALFATE 1 G: 1 TABLET ORAL at 18:20

## 2021-01-01 RX ADMIN — OCTREOTIDE ACETATE 50 MCG/HR: 100 INJECTION, SOLUTION INTRAVENOUS; SUBCUTANEOUS at 08:57

## 2021-01-01 RX ADMIN — PANTOPRAZOLE SODIUM 40 MG: 40 TABLET, DELAYED RELEASE ORAL at 14:06

## 2021-01-01 RX ADMIN — PANTOPRAZOLE SODIUM 40 MG: 40 TABLET, DELAYED RELEASE ORAL at 08:38

## 2021-01-01 RX ADMIN — SPIRONOLACTONE 50 MG: 25 TABLET, FILM COATED ORAL at 08:47

## 2021-01-01 RX ADMIN — INSULIN DETEMIR 10 UNITS: 100 INJECTION, SOLUTION SUBCUTANEOUS at 21:52

## 2021-01-01 RX ADMIN — ALBUMIN HUMAN 25 G: 0.25 SOLUTION INTRAVENOUS at 17:17

## 2021-01-01 RX ADMIN — INSULIN DETEMIR 10 UNITS: 100 INJECTION, SOLUTION SUBCUTANEOUS at 21:29

## 2021-01-01 RX ADMIN — SODIUM CHLORIDE, PRESERVATIVE FREE 10 ML: 5 INJECTION INTRAVENOUS at 09:19

## 2021-01-01 RX ADMIN — SUCRALFATE 1 G: 1 TABLET ORAL at 12:28

## 2021-01-01 RX ADMIN — LACTULOSE 20 G: 20 SOLUTION ORAL at 17:00

## 2021-01-01 RX ADMIN — FUROSEMIDE 40 MG: 40 TABLET ORAL at 08:31

## 2021-01-01 RX ADMIN — OXYCODONE HYDROCHLORIDE AND ACETAMINOPHEN 500 MG: 500 TABLET ORAL at 08:49

## 2021-01-01 RX ADMIN — SODIUM CHLORIDE, PRESERVATIVE FREE 10 ML: 5 INJECTION INTRAVENOUS at 12:31

## 2021-01-01 RX ADMIN — SODIUM CHLORIDE 1000 ML: 9 INJECTION, SOLUTION INTRAVENOUS at 18:32

## 2021-01-01 RX ADMIN — INSULIN DETEMIR 15 UNITS: 100 INJECTION, SOLUTION SUBCUTANEOUS at 21:45

## 2021-01-01 RX ADMIN — FUROSEMIDE 20 MG: 10 INJECTION, SOLUTION INTRAMUSCULAR; INTRAVENOUS at 19:52

## 2021-01-01 RX ADMIN — SUCRALFATE 1 G: 1 TABLET ORAL at 18:37

## 2021-01-01 RX ADMIN — ALBUMIN HUMAN 25 G: 0.25 SOLUTION INTRAVENOUS at 16:28

## 2021-01-01 RX ADMIN — RIFAXIMIN 550 MG: 550 TABLET ORAL at 21:51

## 2021-01-01 RX ADMIN — CEFTRIAXONE 2 G: 2 INJECTION, SOLUTION INTRAVENOUS at 09:11

## 2021-01-01 RX ADMIN — SODIUM CHLORIDE, PRESERVATIVE FREE 10 ML: 5 INJECTION INTRAVENOUS at 21:06

## 2021-01-01 RX ADMIN — PANTOPRAZOLE SODIUM 40 MG: 40 INJECTION, POWDER, FOR SOLUTION INTRAVENOUS at 08:57

## 2021-01-01 RX ADMIN — OXYCODONE HYDROCHLORIDE 5 MG: 5 TABLET ORAL at 14:06

## 2021-01-01 RX ADMIN — LACTULOSE 20 G: 20 SOLUTION ORAL at 17:33

## 2021-01-01 RX ADMIN — RIFAXIMIN 550 MG: 550 TABLET ORAL at 08:21

## 2021-01-01 RX ADMIN — ACETAMINOPHEN 650 MG: 325 TABLET ORAL at 04:56

## 2021-01-01 RX ADMIN — SUCRALFATE 1 G: 1 TABLET ORAL at 18:34

## 2021-01-01 RX ADMIN — SUCRALFATE 1 G: 1 TABLET ORAL at 23:57

## 2021-01-01 RX ADMIN — OXYCODONE HYDROCHLORIDE AND ACETAMINOPHEN 500 MG: 500 TABLET ORAL at 08:31

## 2021-01-01 RX ADMIN — LACTULOSE 20 G: 20 SOLUTION ORAL at 17:06

## 2021-01-01 RX ADMIN — PANTOPRAZOLE SODIUM 40 MG: 40 TABLET, DELAYED RELEASE ORAL at 06:08

## 2021-01-01 RX ADMIN — LACTULOSE 20 G: 20 SOLUTION ORAL at 11:27

## 2021-01-01 RX ADMIN — SPIRONOLACTONE 100 MG: 25 TABLET, FILM COATED ORAL at 08:31

## 2021-01-01 RX ADMIN — LACTULOSE 30 G: 20 SOLUTION ORAL at 08:18

## 2021-01-01 RX ADMIN — RIFAXIMIN 550 MG: 550 TABLET ORAL at 14:05

## 2021-01-01 RX ADMIN — HUMAN INSULIN 4 UNITS: 100 INJECTION, SOLUTION SUBCUTANEOUS at 06:40

## 2021-01-01 RX ADMIN — RIFAXIMIN 550 MG: 550 TABLET ORAL at 08:49

## 2021-01-01 RX ADMIN — SODIUM CHLORIDE AND POTASSIUM CHLORIDE 75 ML/HR: 9; 1.49 INJECTION, SOLUTION INTRAVENOUS at 13:36

## 2021-01-01 RX ADMIN — DOCUSATE SODIUM 50MG AND SENNOSIDES 8.6MG 1 TABLET: 8.6; 5 TABLET, FILM COATED ORAL at 08:48

## 2021-01-01 RX ADMIN — MIDODRINE HYDROCHLORIDE 5 MG: 5 TABLET ORAL at 12:17

## 2021-01-01 RX ADMIN — MIDODRINE HYDROCHLORIDE 5 MG: 5 TABLET ORAL at 06:40

## 2021-01-01 RX ADMIN — HUMAN INSULIN 4 UNITS: 100 INJECTION, SOLUTION SUBCUTANEOUS at 00:33

## 2021-01-01 RX ADMIN — SUCRALFATE 1 G: 1 TABLET ORAL at 13:00

## 2021-01-01 RX ADMIN — INSULIN DETEMIR 15 UNITS: 100 INJECTION, SOLUTION SUBCUTANEOUS at 21:15

## 2021-01-01 RX ADMIN — INSULIN ASPART 7 UNITS: 100 INJECTION, SOLUTION INTRAVENOUS; SUBCUTANEOUS at 12:28

## 2021-01-01 RX ADMIN — RIFAXIMIN 550 MG: 550 TABLET ORAL at 08:38

## 2021-01-01 RX ADMIN — ACETAMINOPHEN 1000 MG: 500 TABLET, COATED ORAL at 02:52

## 2021-01-01 RX ADMIN — DOCUSATE SODIUM 50MG AND SENNOSIDES 8.6MG 1 TABLET: 8.6; 5 TABLET, FILM COATED ORAL at 20:10

## 2021-01-01 RX ADMIN — FUROSEMIDE 20 MG: 20 TABLET ORAL at 08:47

## 2021-01-01 RX ADMIN — DOCUSATE SODIUM 50MG AND SENNOSIDES 8.6MG 1 TABLET: 8.6; 5 TABLET, FILM COATED ORAL at 20:38

## 2021-01-01 RX ADMIN — MEPERIDINE HYDROCHLORIDE 25 MG: 50 INJECTION, SOLUTION INTRAMUSCULAR; INTRAVENOUS; SUBCUTANEOUS at 07:56

## 2021-01-01 RX ADMIN — LACTULOSE 20 G: 20 SOLUTION ORAL at 18:34

## 2021-01-01 RX ADMIN — INSULIN ASPART 3 UNITS: 100 INJECTION, SOLUTION INTRAVENOUS; SUBCUTANEOUS at 06:31

## 2021-01-01 RX ADMIN — HUMAN INSULIN 4 UNITS: 100 INJECTION, SOLUTION SUBCUTANEOUS at 18:12

## 2021-01-01 RX ADMIN — Medication 30 ML: at 08:21

## 2021-01-01 RX ADMIN — LACTULOSE 20 G: 20 SOLUTION ORAL at 21:36

## 2021-01-01 RX ADMIN — LACTULOSE 30 G: 20 SOLUTION ORAL at 11:38

## 2021-01-01 RX ADMIN — INSULIN ASPART 5 UNITS: 100 INJECTION, SOLUTION INTRAVENOUS; SUBCUTANEOUS at 11:27

## 2021-01-01 RX ADMIN — SUCRALFATE 1 G: 1 TABLET ORAL at 11:32

## 2021-01-01 RX ADMIN — MIDODRINE HYDROCHLORIDE 5 MG: 5 TABLET ORAL at 06:42

## 2021-01-01 RX ADMIN — SODIUM CHLORIDE, PRESERVATIVE FREE 10 ML: 5 INJECTION INTRAVENOUS at 09:45

## 2021-01-01 RX ADMIN — INSULIN ASPART 5 UNITS: 100 INJECTION, SOLUTION INTRAVENOUS; SUBCUTANEOUS at 12:18

## 2021-01-01 RX ADMIN — INSULIN DETEMIR 25 UNITS: 100 INJECTION, SOLUTION SUBCUTANEOUS at 21:14

## 2021-01-01 RX ADMIN — METOCLOPRAMIDE 10 MG: 5 INJECTION, SOLUTION INTRAMUSCULAR; INTRAVENOUS at 07:59

## 2021-01-01 RX ADMIN — LACTULOSE 20 G: 20 SOLUTION ORAL at 13:00

## 2021-01-01 RX ADMIN — SUCRALFATE 1 G: 1 TABLET ORAL at 21:32

## 2021-01-01 RX ADMIN — Medication 2 SPRAY: at 14:47

## 2021-01-01 RX ADMIN — CEFTRIAXONE 1 G: 1 INJECTION, SOLUTION INTRAVENOUS at 21:08

## 2021-01-01 RX ADMIN — INSULIN ASPART 5 UNITS: 100 INJECTION, SOLUTION INTRAVENOUS; SUBCUTANEOUS at 09:12

## 2021-01-01 RX ADMIN — LACTULOSE 20 G: 20 SOLUTION ORAL at 21:07

## 2021-01-01 RX ADMIN — SODIUM CHLORIDE, PRESERVATIVE FREE 3 ML: 5 INJECTION INTRAVENOUS at 08:49

## 2021-01-01 RX ADMIN — SUCRALFATE 1 G: 1 TABLET ORAL at 21:51

## 2021-01-01 RX ADMIN — SODIUM CHLORIDE 500 ML: 9 INJECTION, SOLUTION INTRAVENOUS at 19:16

## 2021-01-01 RX ADMIN — NADOLOL 20 MG: 40 TABLET ORAL at 18:20

## 2021-01-01 RX ADMIN — SODIUM CHLORIDE, PRESERVATIVE FREE 10 ML: 5 INJECTION INTRAVENOUS at 09:14

## 2021-01-01 RX ADMIN — SPIRONOLACTONE 50 MG: 25 TABLET, FILM COATED ORAL at 08:30

## 2021-01-01 RX ADMIN — LACTULOSE 20 G: 20 SOLUTION ORAL at 08:47

## 2021-01-01 RX ADMIN — INSULIN ASPART 6 UNITS: 100 INJECTION, SOLUTION INTRAVENOUS; SUBCUTANEOUS at 12:19

## 2021-01-01 RX ADMIN — SODIUM CHLORIDE, PRESERVATIVE FREE 10 ML: 5 INJECTION INTRAVENOUS at 21:32

## 2021-01-01 RX ADMIN — Medication 30 ML: at 09:42

## 2021-01-01 RX ADMIN — SODIUM CHLORIDE, PRESERVATIVE FREE 10 ML: 5 INJECTION INTRAVENOUS at 20:06

## 2021-01-09 NOTE — ED NOTES
Spoke with [facility] MD's requesting Interventional Radiology at this time per LANI Kaminski. Stated that they will call back.     Violeta Olguin  01/09/21 1086

## 2021-01-09 NOTE — ED PROVIDER NOTES
Subjective   Patient is a 70-year-old male with history of liver disease, hepatocellular carcinoma status post TACE procedure, diabetes, and hypertension presenting to the ER for evaluation of dizziness.  Patient is office at bedside and helps with HPI patient states he had a procedure on 1/7/2020 and they used angio-seal at vascular access in the right groin.  Wife states that when he woke up from anesthesia that day he is legs felt heavy, they discussed with the physician they thought it was likely due to the anesthesia.  Yesterday patient felt a bit stronger, did have one episode of emesis last night.  Patient's wife states she went to the pharmacy to  his medications including Levaquin today but when she got home he was laying in the kitchen floor.  She states they were able to get him up in the chair and he had a fruit cup.  She states he laid down to rest them when he tried to get up to the bathroom he was very weak she had to help him down to the floor.  She states she called [facility] again and they were concerned maybe he was dehydrated.  Patient states he has had no other symptoms, states he did not fully lose consciousness.  Did not strike his head any time when he fell.  He denies any pain at the groin site, abdominal pain, fever, headache, vision loss or changes, chest pain, shortness of breath, dysuria, hematuria, or any other symptoms.  Denies any known COVID-19 exposure.          Review of Systems   Constitutional: Positive for fatigue. Negative for chills and fever.   HENT: Negative.    Eyes: Negative.    Respiratory: Negative.    Cardiovascular: Negative.    Gastrointestinal: Positive for vomiting. Negative for abdominal pain, diarrhea and nausea.   Genitourinary: Negative.    Musculoskeletal: Negative.    Skin: Negative.    Allergic/Immunologic: Negative for immunocompromised state.   Neurological: Negative.    Psychiatric/Behavioral: Negative.        Past Medical History:   Diagnosis Date   •  Diabetes mellitus (CMS/HCC)     type II per patient   • Hepatocellular carcinoma (CMS/HCC) 10/25/2018    s/p TACE procedure with no mass noted on fu imaging   • Hypertension    • Liver disease    • Renal cell cancer (CMS/HCC)     s/p cryosurgery   • Umbilical hernia    • Upper respiratory infection    • Urinary symptom or sign    • Urinary tract infection        No Known Allergies    Past Surgical History:   Procedure Laterality Date   • LIVER SURGERY     • TONSILLECTOMY         Family History   Problem Relation Age of Onset   • Colon cancer Mother    • Diabetes Maternal Grandmother    • COPD Father    • No Known Problems Maternal Grandfather    • No Known Problems Paternal Grandmother    • No Known Problems Paternal Grandfather        Social History     Socioeconomic History   • Marital status:      Spouse name: Not on file   • Number of children: Not on file   • Years of education: Not on file   • Highest education level: Not on file   Tobacco Use   • Smoking status: Never Smoker   • Smokeless tobacco: Never Used   Substance and Sexual Activity   • Alcohol use: No   • Drug use: No   • Sexual activity: Defer           Objective   Physical Exam  Vitals signs and nursing note reviewed.     GEN: No acute distress, appears tired but is awake and alert, does not appear toxic.  Skin: Patient appears mildly jaundiced, right groin site has no erythema, tenderness  Head: Normocephalic, atraumatic  Eyes: EOM intact  ENT: Mask in place per protocol  Chest: Nontender to palpation  Cardiovascular: Regular rate and rhythm  Lungs: Clear to auscultation bilaterally without adventitious sounds  Abdomen: Soft, nontender, nondistended, no peritoneal signs or guarding  Extremities: No edema, normal appearance, full range of motion, strength 5 out of 5.  Posterior tibialis pulses are 2+ and equal  Neuro: GCS 15  Psych: Mood and affect are appropriate    Procedures           ED Course  ED Course as of Jan 09 1713   Sat Jan 09,  2021   1359 EKG interpreted by me reveals sinus rhythm with a rate of 94 bpm.  There is a right bundle branch block.  This is an abnormal appearing EKG.    [TB]   1403 Glucose: 78 [LA]   1403 BUN: 23 [LA]   1403 Creatinine(!): 1.37 [LA]   1403 Sodium: 136 [LA]   1403 Potassium: 3.8 [LA]   1403 Chloride: 102 [LA]   1403 CO2: 24.6 [LA]   1403 Calcium: 9.2 [LA]   1403 Total Protein: 7.1 [LA]   1403 Albumin(!): 3.20 [LA]   1403 ALT (SGPT)(!): 498 [LA]   1403 AST (SGOT)(!): 491 [LA]   1403 Alkaline Phosphatase(!): 139 [LA]   1403 Total Bilirubin(!): 3.2 [LA]   1403 eGFR Non  Am(!): 52 [LA]   1403 Globulin: 3.9 [LA]   1403 A/G Ratio: 0.8 [LA]   1403 BUN/Creatinine Ratio: 16.8 [LA]   1403 Anion Gap: 9.4 [LA]   1403 WBC(!): 11.84 [LA]   1403 RBC(!): 4.12 [LA]   1404 Hemoglobin: 14.9 [LA]   1404 Platelets(!): 60 [LA]   1404 Neutrophil Rel %(!): 79.6 [LA]   1404 Lymphocyte Rel %(!): 6.8 [LA]   1404 Monocyte Rel %(!): 12.1 [LA]   1404 Magnesium: 1.8 [LA]   1404 Troponin T(!!): 0.063 [LA]   1423 Narrative & Impression    PROCEDURE: XR CHEST 1 VW-     HISTORY: Weak/Dizzy/AMS triage protocol     COMPARISON: 01/29/2018.     FINDINGS: The heart is normal in size. The mediastinum is unremarkable.  The lungs are clear. There is no pneumothorax.  There are no acute  osseous abnormalities.     IMPRESSION:  No acute cardiopulmonary process.     .     This report was finalized on 1/9/2021 2:13 PM by Yamilex Layne MD.        [LA]   1449 Discussed case with Dr. Marr.  We are going to try to get in contact with interventional radiology at     [LA]   1518  Mds sent me to the ER and I spoke with Dr. Nicolas Haley at  ER who accepted the patient for transfer given his recent procedure at their facility    [LA]   1526 PROCEDURE: CT HEAD WO CONTRAST-     HISTORY: generalized fatigue, lightheadedness     COMPARISON: None.     TECHNIQUE: Multiple axial CT images were performed from the foramen  magnum to the vertex. Individualized  dose reduction techniques using  automated exposure control or adjustment of mA and/or kV according to  the patient size were employed.      FINDINGS: There is mild generalized cerebral atrophy. The ventricles are  enlarged. There is no evidence of edema or hemorrhage.  No masses are  identified. No extra-axial fluid is seen. The paranasal sinuses are  unremarkable.     IMPRESSION:  Atrophy  without acute process.              This report was finalized on 1/9/2021 3:18 PM by Yamilex Layne MD.    [LA]      ED Course User Index  [LA] Yamilex Haley PA-C  [TB] Courtney Marr MD                                           MDM  Number of Diagnoses or Management Options  Elevated troponin:   Fatigue, unspecified type:   Hepatocellular carcinoma (CMS/HCC):   Mild dehydration:   Syncope, near:   Transaminitis:   Diagnosis management comments: On arrival, patient is stable.  He is awake and alert.  Differential include include dehydration, cardiac dysrhythmia, electrolyte abnormalities, anemia, and other concerns.  Have low concern for CVA but given his generalized fatigue will obtain CT the head, chest x-ray, EKG, lab work including troponin, magnesium, urinalysis.  Will continue IV fluids.    EKG was interpreted by the attending.  Patient had a mild leukocytosis of 11, did have a transaminitis with ALT of 498, AST of 491, bilirubin of 3.2.  He had a mild bump in his creatinine of 1.37 from a baseline around 1.24.  Magnesium was within normal limits.  Hemoglobin was stable at 14.9 but platelets were 60. He did have an elevation of troponin of 0.063, continued to deny any chest pain or shortness of breath.  X-ray as read by the radiologist revealed no acute abnormalities.  CT of the head revealed atrophy with no other abnormalities.  Patient's blood pressure did improve was stable.  Continue to appear fatigued and tired.  We attempted to contact interventional radiology at  and  MD sent us to the ER where   Nicolas Haley accepted the patient for transfer for further evaluation, treatment and monitoring given his recent procedure at their facility.        Amount and/or Complexity of Data Reviewed  Clinical lab tests: reviewed and ordered  Tests in the radiology section of CPT®: reviewed and ordered  Discussion of test results with the performing providers: yes  Decide to obtain previous medical records or to obtain history from someone other than the patient: yes  Review and summarize past medical records: yes  Discuss the patient with other providers: yes    Risk of Complications, Morbidity, and/or Mortality  Presenting problems: moderate  Diagnostic procedures: moderate  Management options: moderate    Patient Progress  Patient progress: stable      Final diagnoses:   Hepatocellular carcinoma (CMS/HCC)   Elevated troponin   Syncope, near   Mild dehydration   Transaminitis   Fatigue, unspecified type            Yamilex Haley PA-C  01/09/21 [id number]

## 2021-01-20 NOTE — TELEPHONE ENCOUNTER
Caller: Bonny Richard    Relationship: Emergency Contact    Best call back number: [phone]    What orders are you requesting (i.e. lab or imaging): LFT    In what timeframe would the patient need to come in: NEEDS TO HAVE DONE ON Monday 01/25/21    Where will you receive your lab/imaging services: [facility]    Additional notes: PATIENT HAS BEEN IN  HOSPITAL FOR 3 WEEKS AND IS ON LIVER TRANSPLANT LIST.    WAS TOLD BY HIS DOCTOR AT  THAT HE HAS TO HAVE LFT'S CHECKED BY PRIMARY CARE DOCTOR ON Monday 01/25/21 AND THEN MONTHLY.     PLEASE CALL AND ADVISE      ALSO PATIENT IS REQUESTING ACCU-CHECK ROSEMARY MACHINE TO CHECK HIS SUGAR BECAUSE HIS CURRENT ONE IS NOT WORKING.       PATIENTS PREFERRED PHARMACY:   [facility]/pharmacy #[id number] - [place] - [address] [facility] - [phone] PH

## 2021-02-08 NOTE — PROGRESS NOTES
"Subjective  Garrett Richard is a 70 y.o. male    HPI coming in for a follow-up patient with cirrhosis complicated by hepatocellular carcinoma which has been treated twice with transarterial chemoembolization.  Has type 1 diabetes doing well with his current regimen.  He is awaiting transplant    The following portions of the patient's history were reviewed and updated as appropriate: allergies, current medications, past family history, past medical history, past social history, past surgical history, and problem list.     Review of Systems   Constitutional: Positive for fatigue. Negative for activity change, appetite change and fever.   HENT: Negative for congestion, ear discharge, ear pain and trouble swallowing.    Eyes: Negative for photophobia and visual disturbance.   Respiratory: Negative for cough and shortness of breath.    Cardiovascular: Negative for chest pain and palpitations.   Gastrointestinal: Positive for abdominal distention and abdominal pain. Negative for constipation, diarrhea, nausea and vomiting.   Endocrine: Negative.    Genitourinary: Negative for dysuria, hematuria and urgency.   Musculoskeletal: Positive for arthralgias. Negative for back pain, joint swelling and myalgias.   Skin: Negative for color change and rash.   Allergic/Immunologic: Negative.    Neurological: Negative for dizziness, weakness, light-headedness and headaches.   Hematological: Negative for adenopathy. Does not bruise/bleed easily.   Psychiatric/Behavioral: Positive for sleep disturbance. Negative for agitation, confusion and dysphoric mood. The patient is not nervous/anxious.        Visit Vitals  /60   Pulse 96   Temp 98.2 °F (36.8 °C) (Infrared)   Ht 185.4 cm (73\")   Wt 86.2 kg (190 lb)   SpO2 98%   BMI 25.07 kg/m²       Objective  Physical Exam  Constitutional:       General: He is not in acute distress.     Appearance: He is well-developed.   HENT:      Nose: Nose normal.   Eyes:      General: No scleral " icterus.     Conjunctiva/sclera: Conjunctivae normal.   Neck:      Thyroid: No thyromegaly.      Trachea: No tracheal deviation.   Cardiovascular:      Rate and Rhythm: Normal rate and regular rhythm.      Heart sounds: No murmur. No friction rub.   Pulmonary:      Effort: No respiratory distress.      Breath sounds: No wheezing or rales.   Abdominal:      General: There is distension.      Palpations: Abdomen is soft. There is no mass.      Tenderness: There is no abdominal tenderness. There is no guarding.   Musculoskeletal: Normal range of motion.         General: Deformity present.   Lymphadenopathy:      Cervical: No cervical adenopathy.   Skin:     General: Skin is warm and dry.      Findings: No erythema or rash.   Neurological:      Mental Status: He is alert and oriented to person, place, and time.      Cranial Nerves: No cranial nerve deficit.      Coordination: Coordination normal.      Deep Tendon Reflexes: Reflexes are normal and symmetric.   Psychiatric:         Behavior: Behavior normal.         Thought Content: Thought content normal.         Judgment: Judgment normal.         Diagnoses and all orders for this visit:    Hepatocellular carcinoma (CMS/HCC) recent TACE procedure.  Denies pain or nausea or vomiting    Cirrhosis of liver with ascites, unspecified hepatic cirrhosis type (CMS/HCC) stable repeat CMP    Type 1 diabetes mellitus with diabetic polyneuropathy (CMS/HCC) continue with the dietary restrictions and insulin regimen    Other orders  -     Lantus SoloStar 100 UNIT/ML injection pen; Inject 25 Units as directed Every Night.  -     rifAMPin (RIFADIN) 300 MG capsule; Take 300 mg by mouth 2 (two) times a day.  -     isoniazid (NYDRAZID) 300 MG tablet

## 2021-03-11 NOTE — TELEPHONE ENCOUNTER
PATIENT ASKED FOR A CALL BACK TO DISCUSS SOME ISSUES WITH THE DR.    PATIENT'S CONTACT [phone]

## 2021-03-22 NOTE — PROGRESS NOTES
Subjective  Garrett Richard is a 70 y.o. male    HPI coming in accompanied by his wife who is giving us some of the history has severe fatigue with increasing leg swelling.  Patient with any active LDL related cirrhosis with hepatocellular carcinoma status post transarterial chemoembolization.  Has had varices he had a hemoglobin of around 14 in January by recent lab work done in February showed his hemoglobin down to 8.  He did have a history of black stools however after EGD underwent cauterization of some of his oozing blood vessels in his lower esophagus he denies black stools now.  Complains of generalized fatigue    The following portions of the patient's history were reviewed and updated as appropriate: allergies, current medications, past family history, past medical history, past social history, past surgical history, and problem list.     Review of Systems   Constitutional: Positive for fatigue. Negative for activity change, appetite change and fever.   HENT: Negative for congestion, ear discharge, ear pain and trouble swallowing.    Eyes: Negative for photophobia and visual disturbance.   Respiratory: Positive for shortness of breath. Negative for cough.    Cardiovascular: Positive for leg swelling. Negative for chest pain and palpitations.   Gastrointestinal: Negative for abdominal distention, abdominal pain, constipation, diarrhea, nausea and vomiting.   Endocrine: Negative.    Genitourinary: Negative for dysuria, hematuria and urgency.   Musculoskeletal: Positive for arthralgias. Negative for back pain, joint swelling and myalgias.   Skin: Negative for color change and rash.   Allergic/Immunologic: Negative.    Neurological: Negative for dizziness, weakness, light-headedness and headaches.   Hematological: Negative for adenopathy. Does not bruise/bleed easily.   Psychiatric/Behavioral: Positive for sleep disturbance. Negative for agitation, confusion and dysphoric mood. The patient is not  "nervous/anxious.        Visit Vitals  /59   Pulse 94   Temp 98.7 °F (37.1 °C) (Infrared)   Ht 185.4 cm (73\")   Wt 98 kg (216 lb) Comment: edema   SpO2 97%   BMI 28.50 kg/m²       Objective  Physical Exam  Constitutional:       General: He is not in acute distress.     Appearance: He is well-developed.   HENT:      Nose: Nose normal.   Eyes:      General: No scleral icterus.     Conjunctiva/sclera: Conjunctivae normal.   Neck:      Thyroid: No thyromegaly.      Trachea: No tracheal deviation.   Cardiovascular:      Rate and Rhythm: Normal rate and regular rhythm.      Heart sounds: No murmur heard.   No friction rub.   Pulmonary:      Effort: No respiratory distress.      Breath sounds: No wheezing or rales.   Abdominal:      General: There is no distension.      Palpations: Abdomen is soft. There is no mass.      Tenderness: There is no abdominal tenderness. There is no guarding.   Musculoskeletal:         General: No deformity. Normal range of motion.      Right lower leg: Edema present.      Left lower leg: Edema present.   Lymphadenopathy:      Cervical: No cervical adenopathy.   Skin:     General: Skin is warm and dry.      Findings: No erythema or rash.   Neurological:      Mental Status: He is alert and oriented to person, place, and time.      Cranial Nerves: No cranial nerve deficit.      Coordination: Coordination normal.      Deep Tendon Reflexes: Reflexes are normal and symmetric.   Psychiatric:         Behavior: Behavior normal.         Thought Content: Thought content normal.         Judgment: Judgment normal.         Diagnoses and all orders for this visit:    Iron deficiency anemia due to chronic blood loss repeat lab work start iron with calcium supplement if significantly low hemoglobin will arrange for transfusion    Leg swelling secondary to ascites and heart failure due to anemia discussed leg elevation diuretic therapy    Essential hypertension stable with current meds    Other orders  -   "   vitamin B-6 (PYRIDOXINE) 50 MG tablet; Take 50 mg by mouth Daily.

## 2021-03-23 NOTE — ED PROVIDER NOTES
Subjective   Patient is a 70-year-old male who presents with concerns for low hemoglobin.  Dr. Dominguez called him about the labs that were drawn last week at 11:30 PM this evening and told him to come straight to the hospital.  Patient was told he had a hemoglobin of 5.  Patient does have nonalcoholic liver disease and is currently following with the transplant team at the [facility].  He was recently hospitalized numerous [place] where he had an endoscopy with concerns for anemia.  States that he did have some bleeding that supposedly they had taken care of.  Patient reports that he feels terrible.  He has worsening shortness of breath when he ambulates and that this is nothing new.  States that he is holding a lot of fluid in his abdomen.          Review of Systems   All other systems reviewed and are negative.      Past Medical History:   Diagnosis Date   • Diabetes mellitus (CMS/HCC)     type II per patient   • Hepatocellular carcinoma (CMS/HCC) 10/25/2018    s/p TACE procedure with no mass noted on fu imaging   • Hypertension    • Liver disease    • Renal cell cancer (CMS/HCC)     s/p cryosurgery   • Umbilical hernia    • Upper respiratory infection    • Urinary symptom or sign    • Urinary tract infection        No Known Allergies    Past Surgical History:   Procedure Laterality Date   • LIVER SURGERY     • TONSILLECTOMY         Family History   Problem Relation Age of Onset   • Colon cancer Mother    • Diabetes Maternal Grandmother    • COPD Father    • No Known Problems Maternal Grandfather    • No Known Problems Paternal Grandmother    • No Known Problems Paternal Grandfather        Social History     Socioeconomic History   • Marital status:      Spouse name: Not on file   • Number of children: Not on file   • Years of education: Not on file   • Highest education level: Not on file   Tobacco Use   • Smoking status: Never Smoker   • Smokeless tobacco: Never Used   Substance and Sexual  Activity   • Alcohol use: No   • Drug use: No   • Sexual activity: Defer           Objective   Physical Exam  Vitals and nursing note reviewed.     GEN: No acute distress, patient appears chronically ill, skin is pale  Head: Normocephalic, atraumatic  Eyes: Pupils equal round reactive to light  ENT: Posterior pharynx normal in appearance, oral mucosa is moist  Chest: Nontender to palpation  Cardiovascular: Tachycardic in the low 100s   lungs: Clear to auscultation bilaterally  Abdomen: Soft, nontender, protuberant, no peritoneal signs  Neuro: GCS 15  Psych: Mood and affect are appropriate    Procedures           ED Course  ED Course as of Mar 23 0550   Tue Mar 23, 2021   0313 Hemoglobin(!!): 6.0 [DT]   0313 Hematocrit(!!): 20.6 [DT]   0313 ABO Type: A [DT]   0313 RH type: Positive [DT]   0313 Patient will be typed and crossed for 2 units.  He be transfused in the emergency department.  Once transfusion is complete will be discharged home.   Antibody Screen: Negative [DT]      ED Course User Index  [DT] Loyd Conde MD                                           MDM  Number of Diagnoses or Management Options  Symptomatic anemia  Diagnosis management comments: 35 minutes critical care time was spent by the attending physician excluding separately billable procedures         Amount and/or Complexity of Data Reviewed  Clinical lab tests: ordered and reviewed  Discussion of test results with the performing providers: yes  Decide to obtain previous medical records or to obtain history from someone other than the patient: yes  Obtain history from someone other than the patient: yes  Review and summarize past medical records: yes        Final diagnoses:   Symptomatic anemia       ED Disposition  ED Disposition     ED Disposition Condition Comment    Discharge Stable           Pierce Lubin MD  [address] [place] [address] [phone]    Schedule an appointment as soon as possible for a visit             Medication List      Changed    insulin aspart 100 UNIT/ML solution pen-injector sc pen  Commonly known as: NovoLOG FlexPen  Inject 30 Units under the skin into the appropriate area as directed 3 (Three) Times a Day With Meals.  What changed: how much to take             Loyd Conde MD  03/23/21 0763

## 2021-03-23 NOTE — TELEPHONE ENCOUNTER
PATIENT IS REQUESTING A CALL BACK FROM DR. MURRELL.    DR. ZURITA ADVISED PATIENT TO GO TO EMERGENCY DEPARTMENT.  PATIENT HAD A BLOOD TRANSFUSION AND WAS INSTRUCTED TO CHECK WITH DR. MURRELL TO SEE WHEN TO COME IN TO HAVE HEMOGLOBIN CHECKED TO DETERMINE IF TRANSFUSION HELPED.    PLEASE ADVISE    CALL BACK NUMBER [phone]

## 2021-03-25 NOTE — TELEPHONE ENCOUNTER
Caller: Bonny Richard    Relationship: Emergency Contact    Best call back number: [phone]    What is the best time to reach you: ASAP    Who are you requesting to speak with (clinical staff, provider,  specific staff member): CLINICAL    Do you know the name of the person who called: BONNY    What was the call regarding: PTS WIFE CALLED STATING PTS [facility]ITAL [facility] HAS CONTINUED TO SWELL SINCE HIS APPT   SHE IS REQUESTING TO BE ADVISED    Do you require a callback: YES

## 2021-03-29 NOTE — OUTREACH NOTE
Patient Outreach Note    RN-ACM outreach #2 with patient.  Patient acceptable of f/u appointment with Dr. Wallis as scheduled.  Patient's wife will attend to help with transportation and history.     Elena Iyer RN  Ambulatory     3/29/2021, 14:18 EDT

## 2021-03-29 NOTE — OUTREACH NOTE
Patient Outreach Note    RN-ACM outreach with patient.  Patient had ED visits at [facility] 03/23/21 and 03/25/21.  Patient presented with c/o edema and groin swelling.  Diagnosis descriptions noted as symptomatic anemia, scrotal edema, and edema of both lower extremities.  Patient has history of hepatocellular carcinoma.  Patient was treated and discharged to home to follow with providers.  Medication changes at discharge include increasing Lasix dose of 20 mg to twice a day for 3 days then return to normal dosing of Lasix 20mg once a day.      AVS, education, and medication changes reviewed with patient.  Patient v/u and compliance.  He has returned to his normal Lasix dosage today.  Patient reported continued symptoms.  Patient indicated difficulty communicating with primary clinic.  RN-ACM will assist.  Patient would like f/u with his PCP but will accept appointment with other male MD.     Elena Iyer RN  Ambulatory     3/29/2021, 14:09 EDT

## 2021-03-29 NOTE — OUTREACH NOTE
Care Coordination Note    RN-ACM care coordination with clinical staff at [facility].  Patient's PCP is on vacation.  First available appointment meeting patient's request is with Dr. Wallis on Friday, April 2, 2021 at 11:00.      Elena Iyer RN  Ambulatory     3/29/2021, 14:17 EDT

## 2021-04-02 NOTE — PROGRESS NOTES
"Subjective     Patient ID: Garrett Richard is a 70 y.o. male. Patient is here for management of multiple medical problems.     Chief Complaint   Patient presents with   • Anemia     hospital follow up   • Edema   • Leg Pain     History of Present Illness     On liver transplant since 2018.      Now with edema    Liver cancer.    Hx of NAFLD.  Then Primary liver can found          The following portions of the patient's history were reviewed and updated as appropriate: allergies, current medications, past family history, past medical history, past social history, past surgical history and problem list.    Review of Systems   Constitutional: Negative for diaphoresis and fatigue.   Respiratory: Negative for apnea and shortness of breath.    All other systems reviewed and are negative.      Current Outpatient Medications:   •  Continuous Blood Gluc  (iVengoyle Sohail 14 Day [place]) device, 1 each Every 14 (Fourteen) Days., Disp: 2 Device, Rfl: 3  •  furosemide (LASIX) 20 MG tablet, Take 1 tablet by mouth Daily., Disp: 90 tablet, Rfl: 3  •  insulin aspart (NovoLOG FlexPen) 100 UNIT/ML solution pen-injector sc pen, Inject 30 Units under the skin into the appropriate area as directed 3 (Three) Times a Day With Meals. (Patient taking differently: Inject 10 Units under the skin into the appropriate area as directed 3 (Three) Times a Day With Meals.), Disp: 45 pen, Rfl: 3  •  Insulin Pen Needle (B-D UF III MINI PEN NEEDLES) 31G X 5 MM misc, 4 TIMES DAILY, Disp: 100 each, Rfl: 5  •  Insulin Syringe (SAFETY INSULIN SYRINGES) 29G X 1/2\" 1 ML misc, 1 each 3 (Three) Times a Day., Disp: 200 each, Rfl: 3  •  Insulin Syringe-Needle U-100 (B-D INS SYR ULTRAFINE .3CC/31G) 31G X 5/16\" 0.3 ML misc, 1 Units 3 (Three) Times a Day., Disp: 100 each, Rfl: 3  •  isoniazid (NYDRAZID) 300 MG tablet, , Disp: , Rfl:   •  Lantus SoloStar 100 UNIT/ML injection pen, Inject 25 Units as directed Every Night., Disp: , Rfl:   •  rifAMPin " "(RIFADIN) 300 MG capsule, Take 300 mg by mouth 2 (two) times a day., Disp: , Rfl:   •  sildenafil (REVATIO) 20 MG tablet, Take 3 tablets by mouth Daily As Needed (ed)., Disp: 30 tablet, Rfl: 2  •  vitamin B-6 (PYRIDOXINE) 50 MG tablet, Take 50 mg by mouth Daily., Disp: , Rfl:   •  furosemide (LASIX) 20 MG tablet, Take 1 tablet by mouth 2 (Two) Times a Day for 3 days., Disp: 6 tablet, Rfl: 0  •  furosemide (Lasix) 40 MG tablet, Take 1 tablet by mouth Daily As Needed (edema)., Disp: 30 tablet, Rfl: 0  •  potassium chloride (K-TAB) 20 MEQ tablet controlled-release ER tablet, Take 1 tablet by mouth 1 (One) Time As Needed (take when taking water pill.) for up to 1 dose., Disp: 30 tablet, Rfl: 0    Objective      Blood pressure 112/52, pulse 87, temperature 98.2 °F (36.8 °C), resp. rate 16, height 182.9 cm (72\"), weight 101 kg (223 lb), SpO2 95 %.    Physical Exam     General Appearance:    Alert, cooperative, no distress, appears stated age   Head:    Normocephalic, without obvious abnormality, atraumatic   Eyes:    PERRL, conjunctiva/corneas clear, EOM's intact   Ears:    Normal TM's and external ear canals, both ears   Nose:   Nares normal, septum midline, mucosa normal, no drainage   or sinus tenderness   Throat:   Lips, mucosa, and tongue normal; teeth and gums normal   Neck:   Supple, symmetrical, trachea midline, no adenopathy;        thyroid:  No enlargement/tenderness/nodules; no carotid    bruit or JVD   Back:     Symmetric, no curvature, ROM normal, no CVA tenderness   Lungs:     Clear to auscultation bilaterally, respirations unlabored   Chest wall:    No tenderness or deformity   Heart:    Regular rate and rhythm, S1 and S2 normal, no murmur,        rub or gallop   Abdomen:    distended fluid.     Extremities:  tight hard edema in legs.      Pulses:   2+ and symmetric all extremities   Skin:   Skin color, texture, turgor normal, no rashes or lesions   Lymph nodes:   Cervical, supraclavicular, and axillary " nodes normal   Neurologic:   CNII-XII intact. Normal strength, sensation and reflexes       throughout      Results for orders placed or performed in visit on 03/31/21   CBC No Differential    Specimen: Blood   Result Value Ref Range    WBC 4.88 3.40 - 10.80 10*3/mm3    RBC 2.86 (L) 4.14 - 5.80 10*6/mm3    Hemoglobin 8.0 (L) 13.0 - 17.7 g/dL    Hematocrit 26.0 (L) 37.5 - 51.0 %    MCV 90.9 79.0 - 97.0 fL    MCH 28.0 26.6 - 33.0 pg    MCHC 30.8 (L) 31.5 - 35.7 g/dL    RDW 16.6 (H) 12.3 - 15.4 %    Platelets 77 (L) 140 - 450 10*3/mm3         Assessment/Plan   Will need to be careful with diuresis.   Will increase lasix 20mg qd to 40mg bid today and the 40 mg at least qd if not dizzy.  May repeat 40mg at night if needed on Saturday.  40 mg on Sunday. Repeat bmp Monday AM at the hospital as stat.  F/u with DR Lubin on Monday latter in the day.     Pt will need to contact GI  for paracentesis.           Pt was taken off lisinopril March 9. Due to low bp.            Diagnoses and all orders for this visit:    1. Cirrhosis of liver with ascites, unspecified hepatic cirrhosis type (CMS/HCC) (Primary)  -     Basic metabolic panel    Other orders  -     potassium chloride (K-TAB) 20 MEQ tablet controlled-release ER tablet; Take 1 tablet by mouth 1 (One) Time As Needed (take when taking water pill.) for up to 1 dose.  Dispense: 30 tablet; Refill: 0  -     furosemide (Lasix) 40 MG tablet; Take 1 tablet by mouth Daily As Needed (edema).  Dispense: 30 tablet; Refill: 0      Return in about 3 days (around 4/5/2021), or Dr. Lubin..          There are no Patient Instructions on file for this visit.     eKi Wallis MD    Assessment/Plan

## 2021-04-08 NOTE — TELEPHONE ENCOUNTER
Patient having procedure done 4/9/2021 @ 8:30    Wife states he will have labs done at the hospital and is requesting a recheck on hemoglobin.    Please advise on order

## 2021-04-09 NOTE — TELEPHONE ENCOUNTER
PATIENT HAD A PROCEDURE DONE THIS MORNING TO REMOVE FLUID. THE PATIENT'S WIFE WANTED TO INFORM HER PCP.    CONTACT: [phone]

## 2021-04-13 NOTE — TELEPHONE ENCOUNTER
Garrett seen [facility] transplant team today - weight down 203, hemoglobin 8.4, adding aldactone, cont furosemide 40 mg bid for one week, then back to 20 mg, one potassium daily, recheck labs

## 2021-04-13 NOTE — TELEPHONE ENCOUNTER
Caller: Bonny Richard    Relationship: Emergency Contact    Best call back number: [phone]    What is the best time to reach you: ANYTIME    Who are you requesting to speak with (clinical staff, provider,  specific staff member): PROVIDER     Do you know the name of the person who called: WIFE    What was the call regarding: PATIENT WIFE WANTS TO RECEIVE A CALL.    Do you require a callback: YES

## 2021-04-19 NOTE — PROGRESS NOTES
Subjective  Garrett Richard is a 70 y.o. 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  Recent history of significant ascites did not manage to get a significant amount of fluid drained with ultrasound-guided paracentesis.  However he has responded well to increase Lasix and Aldactone dosing he complains of generalized fatigue.  He has diabetes and is following up with endocrinology at [facility].  He is awaiting transplant his current MELD score is around 12.  Denies melena or hematochezia.  Recent EGD with prior treatment for some gastric varicosities thought to be possible sources of his GI bleed    The following portions of the patient's history were reviewed and updated as appropriate: allergies, current medications, past family history, past medical history, past social history, past surgical history, and problem list.     Review of Systems   Constitutional: Positive for fatigue. Negative for activity change, appetite change and fever.   HENT: Negative for congestion, ear discharge, ear pain and trouble swallowing.    Eyes: Negative for photophobia and visual disturbance.   Respiratory: Positive for shortness of breath. Negative for cough.    Cardiovascular: Negative for chest pain and palpitations.   Gastrointestinal: Positive for abdominal distention and abdominal pain. Negative for constipation, diarrhea, nausea and vomiting.   Endocrine: Negative.    Genitourinary: Negative for dysuria, hematuria and urgency.   Musculoskeletal: Positive for arthralgias and back pain. Negative for joint swelling and myalgias.   Skin: Negative for color change and rash.   Allergic/Immunologic: Negative.    Neurological: Negative for dizziness, weakness, light-headedness and headaches.   Hematological: Negative for adenopathy. Does not bruise/bleed easily.   Psychiatric/Behavioral: Positive for sleep disturbance. Negative for  "agitation, confusion and dysphoric mood. The patient is not nervous/anxious.        Visit Vitals  /62   Pulse 84   Temp 98 °F (36.7 °C) (Infrared)   Ht 182.9 cm (72\")   Wt 91.2 kg (201 lb 1.9 oz)   SpO2 96%   BMI 27.28 kg/m²       Objective  Physical Exam  Constitutional:       General: He is not in acute distress.     Appearance: He is well-developed.   HENT:      Nose: Nose normal.   Eyes:      General: No scleral icterus.     Conjunctiva/sclera: Conjunctivae normal.   Neck:      Thyroid: No thyromegaly.      Trachea: No tracheal deviation.   Cardiovascular:      Rate and Rhythm: Normal rate and regular rhythm.      Heart sounds: No murmur heard.   No friction rub.   Pulmonary:      Effort: No respiratory distress.      Breath sounds: No wheezing or rales.   Abdominal:      General: There is no distension.      Palpations: Abdomen is soft. There is no mass.      Tenderness: There is no abdominal tenderness. There is no guarding.   Musculoskeletal:         General: Deformity present. Normal range of motion.   Lymphadenopathy:      Cervical: No cervical adenopathy.   Skin:     General: Skin is warm and dry.      Findings: No erythema or rash.   Neurological:      Mental Status: He is alert and oriented to person, place, and time.      Cranial Nerves: No cranial nerve deficit.      Coordination: Coordination normal.      Deep Tendon Reflexes: Reflexes are normal and symmetric.   Psychiatric:         Behavior: Behavior normal.         Thought Content: Thought content normal.         Judgment: Judgment normal.         Diagnoses and all orders for this visit:    Type 1 diabetes mellitus with diabetic polyneuropathy (CMS/HCC) continue with the dietary restriction recent A1c shows good control    Cirrhosis of liver with ascites, unspecified hepatic cirrhosis type (CMS/HCC) stable significant reduction in ascites noted by reduction in abdominal distention and weight loss.  Has significant reduction in scrotal and leg " swelling.  Check lab work today to rule out renal insufficiency or electrolyte imbalance.  Sodium level has been reasonably good    Essential hypertension stable with current meds low-salt diet    Other orders  -     spironolactone (ALDACTONE) 50 MG tablet  -     fluticasone (FLONASE) 50 MCG/ACT nasal spray; 2 sprays by Each Nare route Daily.

## 2021-04-20 NOTE — TELEPHONE ENCOUNTER
Caller: Bonny Richard    Relationship to patient: Emergency Contact    Best call back number: [phone]     Patient is needing: BONNY SAID PATIENT RECEIVED A CALL TO LET PATIENT KNOW HIS LAB RESULTS.  BONNY IS NEEDING TO KNOW WHAT DOSE OF FUROSEMIDE THE PATIENT SHOULD BE ON.  BONNY IS REQUESTING CALL BACK.

## 2021-04-20 NOTE — TELEPHONE ENCOUNTER
MINDY FROM [facility] [employer] CALLED AND WAS NEEDING PATIENTS LAB RESULTS FROM YESTERDAYS VISIT    PLEASE CALL TO ADVISE    MINDY: [phone]   [phone]

## 2021-05-09 PROBLEM — R50.9 FEVER: Status: ACTIVE | Noted: 2021-01-01

## 2021-05-11 PROBLEM — K92.1 MELENA: Status: ACTIVE | Noted: 2021-01-01

## 2021-05-11 NOTE — ANESTHESIA PREPROCEDURE EVALUATION
Anesthesia Evaluation     Patient summary reviewed and Nursing notes reviewed   NPO Solid Status: Waived due to emergency  NPO Liquid Status: Waived due to emergency           Airway   Mallampati: II  TM distance: >3 FB  Neck ROM: full  No difficulty expected  Dental      Pulmonary    (+) recent URI, sleep apnea, decreased breath sounds,   (-) not a smoker  Cardiovascular     (+) hypertension, CAD, PVD, hyperlipidemia,       Neuro/Psych  (+) numbness, psychiatric history Anxiety and Depression,     GI/Hepatic/Renal/Endo    (+) obesity,   liver disease history of elevated LFT, renal disease, diabetes mellitus using insulin,     Musculoskeletal     Abdominal    Substance History      OB/GYN          Other   blood dyscrasia anemia,   history of cancer    ROS/Med Hx Other: Labs reviewed  7/24 gluc 115  cxr nad                Anesthesia Plan    ASA 3 - emergent     MAC   (Risks and benefits discussed including risk of aspiration, recall and dental damage. All patient questions answered.    Will continue with plan of care.)  intravenous induction     Anesthetic plan, all risks, benefits, and alternatives have been provided, discussed and informed consent has been obtained with: patient.

## 2021-05-11 NOTE — ANESTHESIA POSTPROCEDURE EVALUATION
Patient: Garrett Richard    Procedure Summary     Date: 05/11/21 Room / Location: [facility] ENDOSCOPY 2 / [facility] ENDOSCOPY    Anesthesia Start: 0740 Anesthesia Stop:     Procedure: ESOPHAGOGASTRODUODENOSCOPY WITH ARGON THERMAL ABLATION (N/A Esophagus) Diagnosis:       Melena      (Melena [K92.1])    Surgeons: Samy Swanson MD Provider: Bimal Arcos CRNA    Anesthesia Type: MAC ASA Status: 3 - Emergent          Anesthesia Type: MAC    Vitals  Vitals Value Taken Time   /60 05/11/21 0819   Temp     Pulse 85 05/11/21 0822   Resp     SpO2 96 % 05/11/21 0822   Vitals shown include unvalidated device data.        Post Anesthesia Care and Evaluation    Patient location during evaluation: PHASE II  Patient participation: complete - patient participated  Level of consciousness: awake  Pain score: 0  Pain management: adequate  Airway patency: patent  Anesthetic complications: No anesthetic complications  PONV Status: none  Cardiovascular status: acceptable  Respiratory status: acceptable and nasal cannula  Hydration status: acceptable    Comments: vsss resp spont, reflexes intact, responsive, report given to pacu nurse

## 2021-05-14 NOTE — OUTREACH NOTE
Prep Survey      Responses   [facility] facility patient discharged from?  [place]   Is LACE score < 7 ?  No   Emergency Room discharge w/ pulse ox?  No   Eligibility  TCM   [facility]   Date of Admission  05/09/21   Date of Discharge  05/14/21   Discharge Disposition  Home or Self Care   Discharge diagnosis  acute cirrhosis with ascites, bacterial peritonitis, anemia, AL   Does the patient have one of the following disease processes/diagnoses(primary or secondary)?  Other   Does the patient have Home health ordered?  No   Is there a DME ordered?  No   Prep survey completed?  Yes          Qian Lockett RN

## 2021-05-17 NOTE — OUTREACH NOTE
Call Center TCM Note      Responses   [facility] patient discharged from?  [facility]   Does the patient have one of the following disease processes/diagnoses(primary or secondary)?  Other   TCM attempt successful?  No   Unsuccessful attempts  Attempt 1 [patient and spouse]          Rebeca Rodriguez RN    5/17/2021, 12:53 EDT

## 2021-05-17 NOTE — OUTREACH NOTE
Call Center TCM Note      Responses   [facility] [place] patient discharged from?  [facility]   Does the patient have one of the following disease processes/diagnoses(primary or secondary)?  Other   TCM attempt successful?  No   Unsuccessful attempts  Attempt 2          Rebeca Rodriguez RN    5/17/2021, 15:29 EDT

## 2021-05-18 NOTE — OUTREACH NOTE
Call Center TCM Note      Responses   [facility] patient discharged from?  [facility]   Does the patient have one of the following disease processes/diagnoses(primary or secondary)?  Other   TCM attempt successful?  Yes   Call start time  1403   Call end time  1404   Discharge diagnosis  acute cirrhosis with ascites, bacterial peritonitis, anemia, AL   Does the patient have all medications ordered at discharge?  Yes   Is the patient taking all medications as directed (includes completed medication regime)?  Yes   Does the patient have a primary care provider?   Yes   Does the patient have an appointment with their PCP within 7 days of discharge?  Yes   Comments regarding PCP  f/u with Dr. Lubin on 5/20   Has the patient kept scheduled appointments due by today?  N/A   Psychosocial issues?  No   Did the patient receive a copy of their discharge instructions?  Yes   Nursing interventions  Reviewed instructions with patient   What is the patient's perception of their health status since discharge?  Improving   Is the patient/caregiver able to teach back signs and symptoms related to disease process for when to call PCP?  Yes   Is the patient/caregiver able to teach back signs and symptoms related to disease process for when to call 911?  Yes   Is the patient/caregiver able to teach back the hierarchy of who to call/visit for symptoms/problems? PCP, Specialist, Home health nurse, Urgent Care, ED, 911  Yes   TCM call completed?  Yes   Wrap up additional comments  States he is doing well, no questions or concerns at this time, confirmed f/u appt with Dr. Lubin for 5/20.          Rebeca Rodriguez RN    5/18/2021, 14:04 EDT

## 2021-05-20 NOTE — PROGRESS NOTES
Transitional Care Follow Up Visit  Subjective     Garrett Tian Rihcard is a 70 y.o. male who presents for a transitional care management visit.    Within 48 business hours after discharge our office contacted him via telephone to coordinate his care and needs.      I reviewed and discussed the details of that call along with the discharge summary, hospital problems, inpatient lab results, inpatient diagnostic studies, and consultation reports with Garrett.     Current outpatient and discharge medications have been reconciled for the patient.  Reviewed by: Pierce Lubin MD      Date of TCM Phone Call 5/14/2021   [facility]   Date of Admission 5/9/2021   Date of Discharge 5/14/2021   Discharge Disposition Home or Self Care     Risk for Readmission (LACE) Score: 16 (5/14/2021  6:01 AM)      History of Present Illness   Course During Hospital Stay: 70-year-old male with type 1 diabetes with cirrhosis thought to be secondary to NAFLD complicated by hepatocellular carcinoma status post TACE x2 has history of ascites with varices and gastrointestinal bleeding.  Was admitted through the emergency room for another bout of weakness and fatigue was noted to be severely anemic due to suspected GI bleed.  Underwent a therapeutic and diagnostic paracentesis which showed evidence of spontaneous bacterial peritonitis secondary to Klebsiella pneumonia.  Was treated with IV Rocephin.  He underwent another paracentesis a few days later for a total of 7.8 L drained.  Subsequently developed hypotension for which she was placed on midodrine.  Now doing significantly better is accompanied by his wife who is giving us some of the history he is on Lasix along with potassium supplement and Aldactone.  Feels significantly better he is awaiting transplant   He is now on Bactrim once daily for SBP prophylaxis    Review of Systems   Constitutional: Negative.  Negative for activity change, appetite change, fatigue and fever.   HENT:  Negative for congestion, ear discharge, ear pain and trouble swallowing.    Eyes: Negative for photophobia and visual disturbance.   Respiratory: Negative for cough and shortness of breath.    Cardiovascular: Negative for chest pain and palpitations.   Gastrointestinal: Negative for abdominal distention, abdominal pain, constipation, diarrhea, nausea and vomiting.   Endocrine: Negative.    Genitourinary: Negative for dysuria, hematuria and urgency.   Musculoskeletal: Positive for arthralgias. Negative for back pain, joint swelling and myalgias.   Skin: Negative for color change and rash.   Allergic/Immunologic: Negative.    Neurological: Negative for dizziness, weakness, light-headedness and headaches.   Hematological: Negative for adenopathy. Does not bruise/bleed easily.   Psychiatric/Behavioral: Negative for agitation, confusion and dysphoric mood. The patient is not nervous/anxious.        Objective   Physical Exam  Constitutional:       General: He is not in acute distress.     Appearance: He is well-developed.   HENT:      Nose: Nose normal.   Eyes:      General: No scleral icterus.     Conjunctiva/sclera: Conjunctivae normal.   Neck:      Thyroid: No thyromegaly.      Trachea: No tracheal deviation.   Cardiovascular:      Rate and Rhythm: Normal rate and regular rhythm.      Heart sounds: No murmur heard.   No friction rub.   Pulmonary:      Effort: No respiratory distress.      Breath sounds: No wheezing or rales.   Abdominal:      General: There is no distension.      Palpations: Abdomen is soft. There is no mass.      Tenderness: There is no abdominal tenderness. There is no guarding.      Comments: ascites   Musculoskeletal:         General: No deformity. Normal range of motion.   Lymphadenopathy:      Cervical: No cervical adenopathy.   Skin:     General: Skin is warm and dry.      Findings: No erythema or rash.   Neurological:      Mental Status: He is alert and oriented to person, place, and time.       Cranial Nerves: No cranial nerve deficit.      Coordination: Coordination normal.      Deep Tendon Reflexes: Reflexes are normal and symmetric.   Psychiatric:         Behavior: Behavior normal.         Thought Content: Thought content normal.         Judgment: Judgment normal.         Assessment/Plan   Diagnoses and all orders for this visit:    1. Cirrhosis of liver with ascites, unspecified hepatic cirrhosis type (CMS/HCC) (Primary) doing significantly better has significant reduction in his ascites continue with salt restriction and diuretic therapy will check CBC and CMP    2. Type 1 diabetes mellitus with diabetic polyneuropathy (CMS/HCC) on insulin regimen with free style adria.  A1c showed very good control during hospital stay    3. Essential hypertension stable with current meds and low-salt diet    4. Hepatocellular carcinoma (CMS/HCC) status post TACE procedure stable

## 2021-05-24 NOTE — OUTREACH NOTE
Medical Week 2 Survey      Responses   [facility] patient discharged from?  [facility]   Does the patient have one of the following disease processes/diagnoses(primary or secondary)?  Other   Week 2 attempt successful?  No   Unsuccessful attempts  Attempt 1          Sara Riley RN

## 2021-05-26 NOTE — OUTREACH NOTE
Medical Week 2 Survey      Responses   [facility] patient discharged from?  [facility]   Does the patient have one of the following disease processes/diagnoses(primary or secondary)?  Other   Week 2 attempt successful?  No   Unsuccessful attempts  Attempt 2          Christel Solitario RN

## 2021-05-28 PROBLEM — N17.9 AKI (ACUTE KIDNEY INJURY) (HCC): Status: ACTIVE | Noted: 2021-01-01

## 2021-05-28 PROBLEM — E11.9 TYPE 2 DIABETES MELLITUS (HCC): Status: ACTIVE | Noted: 2021-01-01

## 2021-05-28 PROBLEM — K76.82 ENCEPHALOPATHY, HEPATIC (HCC): Status: ACTIVE | Noted: 2021-01-01

## 2021-05-28 PROBLEM — K76.82 HEPATIC ENCEPHALOPATHY (HCC): Status: ACTIVE | Noted: 2021-01-01

## 2021-05-28 NOTE — TELEPHONE ENCOUNTER
Caller: Bonny Richard    Relationship: Emergency Contact    Best call back number: [phone]    What is the best time to reach you: ANYTIME    Who are you requesting to speak with (clinical staff, provider,  specific staff member): PROVIDER  RUBI pritchett was the call regarding: PATIENTS WEIGHT AND MEDICATION    Do you require a callback:YES

## 2021-05-28 NOTE — TELEPHONE ENCOUNTER
Called pt wife, she has concern due to Pt seen here last on 05/20/21 weighing 176lbs, then seen at  on 05/26/21 weighing 156lbs, total of 20lbs weight loss. She wonders if fluid pills are cause and if they need to be stopped.

## 2021-05-29 PROBLEM — R79.89 INCREASED AMMONIA LEVEL: Status: ACTIVE | Noted: 2021-01-01

## 2021-05-29 NOTE — OUTREACH NOTE
Medical Week 3 Survey      Responses   [facility] [place] patient discharged from?  [facility]   Does the patient have one of the following disease processes/diagnoses(primary or secondary)?  Other   Week 3 attempt successful?  No   Revoke  Readmitted          Yesika Ball RN

## 2021-05-29 NOTE — OUTREACH NOTE
Prep Survey      Responses   [facility] facility patient discharged from?  [facility]   Is LACE score < 7 ?  No   Emergency Room discharge w/ pulse ox?  No   Eligibility  [facility]   Date of Admission  05/28/21   Date of Discharge  05/29/21   Discharge Disposition  Home-Health Care Sv   Discharge diagnosis  Acute Hepatic encephalopathy   Does the patient have one of the following disease processes/diagnoses(primary or secondary)?  Other   Does the patient have Home health ordered?  Yes   What is the Home health agency?   [facility]   Is there a DME ordered?  No   Prep survey completed?  Yes          Mireya Mota RN

## 2021-05-31 NOTE — OUTREACH NOTE
Call Center TCM Note      Responses   [facility] patient discharged from?  [facility]   Does the patient have one of the following disease processes/diagnoses(primary or secondary)?  Other   TCM attempt successful?  No [Bonny-wife]   Unsuccessful attempts  Attempt 1          Elizabeth Carranza RN    5/31/2021, 13:47 EDT

## 2021-06-01 NOTE — TELEPHONE ENCOUNTER
Discussed with wife .still with significant fatigue.  Advised to have been brought in to the clinic on Thursday

## 2021-06-01 NOTE — OUTREACH NOTE
Call Center TCM Note      Responses   [facility] patient discharged from?  [facility]   Does the patient have one of the following disease processes/diagnoses(primary or secondary)?  Other   TCM attempt successful?  No   Unsuccessful attempts  Attempt 2          Josephine Key MA    6/1/2021, 13:41 EDT

## 2021-06-02 NOTE — OUTREACH NOTE
Call Center TCM Note      Responses   [facility] [place], [facility] patient discharged from?  [facility]   Does the patient have one of the following disease processes/diagnoses(primary or secondary)?  Other   TCM attempt successful?  No [Bonny on verbal release ]   Unsuccessful attempts  Attempt 3          Ayala Kiran RN    6/2/2021, 11:22 EDT

## 2021-06-07 NOTE — OUTREACH NOTE
Medical Week 2 Survey      Responses   [facility] patient discharged from?  [facility]   Does the patient have one of the following disease processes/diagnoses(primary or secondary)?  Other   Week 2 attempt successful?  No   Unsuccessful attempts  Attempt 1          Mariann Cortez RN

## 2021-06-08 NOTE — OUTREACH NOTE
Medical Week 2 Survey      Responses   [facility] patient discharged from?  [facility]   Does the patient have one of the following disease processes/diagnoses(primary or secondary)?  Other   Week 2 attempt successful?  Yes   Call start time  1616   Discharge diagnosis  Acute Hepatic encephalopathy   Call end time  1617   Person spoke with today (if not patient) and relationship  daughter Blanca   Week 2 Call Completed?  Yes   Revoked  No further contact(revokes)-requires comment   Wrap up additional comments  Daughter says he was admitted to  for a 5 days last week.           Josephine Bailey, RN

## 2021-06-13 NOTE — CASE MANAGEMENT/SOCIAL WORK
Discharge Planning Assessment   Oskar     Patient Name: Garrett Richard  MRN: [id number]  Today's Date: 6/13/2021    Admit Date: 6/12/2021    Discharge Needs Assessment     Row Name 06/13/21 1518       Living Environment    Lives With  spouse    Name(s) of Who Lives With Patient  Bonny    Current Living Arrangements  home/apartment/condo    Potentially Unsafe Housing Conditions  unable to assess    Primary Care Provided by  spouse/significant other    Provides Primary Care For  no one    Caregiving Concerns  pt confusion    Family Caregiver if Needed  spouse    Quality of Family Relationships  supportive    Able to Return to Prior Arrangements  yes    Living Arrangement Comments  Lives with his wife in a one story house.  Has a Living Will but not on file, wife plans to bring it in for chart       Resource/Environmental Concerns    Resource/Environmental Concerns  none    Transportation Concerns  car, none       Transition Planning    Patient/Family Anticipated Services at Transition  home health care Pt has [facility] HH will need resumption of care order.    Transportation Anticipated  family or friend will provide       Discharge Needs Assessment    Readmission Within the Last 30 Days  previous discharge plan unsuccessful    Current Outpatient/Agency/Support Group  [facility]care [facility]    Equipment Currently Used at Home  walker, rolling;shower chair;wheelchair    Concerns Comments  Wife is giving Lactulose according to [employer], was told at  if he had 3 BM's per day did not have to take everyday.  Feels she is now being able to get adjusted to pt's BM habits.    Anticipated Changes Related to Illness  inability to care for self    Equipment Needed After Discharge  none    Outpatient/Agency/Support Group Needs  homecare agency    Discharge Facility/Level of Care Needs  home with home health    Provided Post Acute Provider List?  N/A    N/A Provider List Comment  No Needs     Provided Post Acute Provider Quality & Resource List?  N/A    N/A Quality & Resource List Comment  No needs    Patient's Choice of Community Agency(s)  [facility]    Current Discharge Risk  chronically ill        Discharge Plan     Row Name 06/13/21 1528       Plan    Plan  Spoke to pt and wife Bonny in room.  Pt unable to converse due to confusion and drowiness.  Wife Bonny was able to answer DCPlanning questions.  Lives with her in 1 story house.  Has no POA but does have a Living Will which is not on file.  Pt was here 2 weeks ago.  States he was DCed on May 29.  Wife took pt to  on 6/1/21 due to that being where his transplant team is located.  He was admitted there and stayed until June 5.  Went home with [facility] for PT/OT.  They have visited only one time before this admission.  Was brought back to the hospital due his increased confusion.  Wife stated she was told at   if pt had at least 3 bm's per day that he did not have to have the lactulose daily.  She feels she is finally getting him on a BM schedule, but feels he probably should have had more doses for Lactulose as he was getting more confused. Understands he has to have BM's to keep Ammonia levels down.  Next appt with the transplant team at  is Jun 22.  Has all the medical equipment he needs, Walker, WC, Cane.  Does not use home o2.  Plans to go home at discharge and she will transport him.  Denies discharge needs for now.  Cm will continue to follow.        Continued Care and Services - Admitted Since 6/12/2021    Coordination has not been started for this encounter.     Selected Continued Care - Prior Encounters Includes selections from prior encounters from 3/14/2021 to 6/13/2021    Discharged on 5/29/2021 Admission date: 5/28/2021 - Discharge disposition: Home-Health Care c    Home Medical Care     Service Provider Selected Services Address Phone Fax Patient Preferred    [facility]  Home Health Services  [address] [place] [id number] [phone] --       Internal Comment last updated by Malia Oseguera RN 6/1/2021 1316    6/1 called and confirmed accepted patient                                 Demographic Summary     Row Name 06/13/21 1509       General Information    Admission Type  inpatient    Arrived From  emergency department    Required Notices Provided  Important Message from Medicare    Expected Length of Stay (LOS)  greater then 2 midnights    Referral Source  admission list    Reason for Consult  discharge planning     Used During This Interaction  no        Functional Status     Row Name 06/13/21 1510       Functional Status    Usual Activity Tolerance  fair    Current Activity Tolerance  poor    Functional Status Comments  1 person assist,       Functional Status, IADL    Medications  assistive person    Meal Preparation  assistive person    Housekeeping  assistive person    Laundry  assistive person    Shopping  assistive person    IADL Comments  One per son assist, Lives with wife Bonny, and has PT/OT with [facility]       Mental Status    General Appearance WDL  -- Pt is having periods of confusion.  Unable to answer questions appropriately       Mental Status Summary    Recent Changes in Mental Status/Cognitive Functioning  mental status;thought patterns    Mental Status Comments  Recent acute confusion (attributed to Liver Disease)       Employment/    Employment Status  disabled           Shilpi Lockett RN

## 2021-06-13 NOTE — ED PROVIDER NOTES
Subjective   70-year-old male presents to the ER via EMS with chief complaint of altered mental status.  Patient does have chronic history of liver carcinoma and is currently under the care of the transplant team at the [facility].  Patient's history is provided also by spouse.  Patient is currently on lactulose and recently had increasing lactulose without improvement of symptoms.  Patient was recently discharged from [facility] as well as [facility].  Patient spouse states that patient was coherent and was having a good day yesterday however became increasingly confused today.  Spouse denies any head injury.          Review of Systems   Constitutional: Positive for fatigue. Negative for fever.   HENT: Negative.    Respiratory: Negative.    Cardiovascular: Negative.  Negative for chest pain.   Gastrointestinal: Negative.  Negative for abdominal pain.   Endocrine: Negative.    Genitourinary: Negative.  Negative for dysuria.   Skin: Positive for pallor.   Neurological: Negative.    Psychiatric/Behavioral: Positive for behavioral problems and confusion.   All other systems reviewed and are negative.      Past Medical History:   Diagnosis Date   • AL (acute kidney injury) (CMS/HCC) 5/28/2021   • Diabetes mellitus (CMS/HCC)     type II per patient   • Hepatocellular carcinoma (CMS/HCC) 10/25/2018    s/p TACE procedure with no mass noted on fu imaging   • Hypertension    • Liver disease    • Umbilical hernia    • Upper respiratory infection    • Urinary symptom or sign    • Urinary tract infection        No Known Allergies    Past Surgical History:   Procedure Laterality Date   • ENDOSCOPY N/A 5/11/2021    Procedure: ESOPHAGOGASTRODUODENOSCOPY WITH ARGON THERMAL ABLATION;  Surgeon: Samy Swanson MD;  Location: [facility] ENDOSCOPY;  Service: Gastroenterology;  Laterality: N/A;   • LIVER SURGERY     • TONSILLECTOMY         Family History   Problem Relation Age  of Onset   • Colon cancer Mother    • Diabetes Maternal Grandmother    • COPD Father    • No Known Problems Maternal Grandfather    • No Known Problems Paternal Grandmother    • No Known Problems Paternal Grandfather        Social History     Socioeconomic History   • Marital status:      Spouse name: Not on file   • Number of children: Not on file   • Years of education: Not on file   • Highest education level: Not on file   Tobacco Use   • Smoking status: Never Smoker   • Smokeless tobacco: Never Used   Vaping Use   • Vaping Use: Never used   Substance and Sexual Activity   • Alcohol use: No   • Drug use: No   • Sexual activity: Defer           Objective   Physical Exam  Vitals and nursing note reviewed.   Constitutional:       General: He is not in acute distress.     Appearance: He is well-developed. He is not diaphoretic.   HENT:      Head: Normocephalic and atraumatic.      Right Ear: External ear normal.      Left Ear: External ear normal.      Nose: Nose normal.   Eyes:      Conjunctiva/sclera: Conjunctivae normal.   Neck:      Vascular: No JVD.      Trachea: No tracheal deviation.   Cardiovascular:      Rate and Rhythm: Normal rate and regular rhythm.      Heart sounds: No murmur heard.     Pulmonary:      Effort: Pulmonary effort is normal. No respiratory distress.      Breath sounds: No wheezing.   Abdominal:      Palpations: Abdomen is soft.      Tenderness: There is no abdominal tenderness.   Musculoskeletal:         General: No deformity. Normal range of motion.      Cervical back: Normal range of motion and neck supple.   Skin:     General: Skin is warm and dry.      Coloration: Skin is pale.      Findings: No erythema or rash.   Neurological:      Mental Status: He is alert. He is confused.      GCS: GCS eye subscore is 4. GCS verbal subscore is 4. GCS motor subscore is 5.      Cranial Nerves: No cranial nerve deficit.   Psychiatric:         Behavior: Behavior normal.         Thought Content:  Thought content normal.         Procedures           ED Course  ED Course as of Jun 13 1719   Sat Jun 12, 2021 2119 EKG interpreted by me.  Time 2103.  Rate 59.  Sinus bradycardia.  Normal axis.  Nonspecific ST abnormality    [CS]   2243 CT head rad interpreted:  Atrophy and chronic changes without acute process.       [RB]   2313 Discussed w/ Dr. White, pt accepted for admission.     [RB]      ED Course User Index  [CS] Enmanuel Lockett MD  [RB] Semaj Soto II, PA                                           MDM  Number of Diagnoses or Management Options  Hepatic encephalopathy (CMS/HCC): established and worsening     Amount and/or Complexity of Data Reviewed  Clinical lab tests: ordered and reviewed  Tests in the radiology section of CPT®: ordered and reviewed  Decide to obtain previous medical records or to obtain history from someone other than the patient: yes  Discuss the patient with other providers: yes  Independent visualization of images, tracings, or specimens: yes    Risk of Complications, Morbidity, and/or Mortality  Presenting problems: moderate  Diagnostic procedures: moderate  Management options: moderate    Patient Progress  Patient progress: stable      Final diagnoses:   Hepatic encephalopathy (CMS/HCC)       ED Disposition  ED Disposition     ED Disposition Condition Comment    Decision to Admit  Level of Care: Med/Surg [1]   Diagnosis: Hepatic encephalopathy (CMS/HCC) [572.2.ICD-9-CM]   Certification: I Certify That Inpatient Hospital Services Are Medically Necessary For Greater Than 2 Midnights            No follow-up provider specified.       Medication List      No changes were made to your prescriptions during this visit.          Semaj Soto II, PA  06/13/21 3869

## 2021-06-13 NOTE — PROGRESS NOTES
[facility]    Name:  Garrett Richard   Age:  70 y.o.  Sex:  male  :  1950  MRN:  [id number]   Visit Number:  [id number]  Admission Date:  2021  Date Of Service:  21  Primary Care Physician:  Pierce Lubin MD     LOS: 1 day :    Chief Complaint:      Altered mental status    Subjective:    Patient is a chronically ill 70-year-old male with past medical history significant for hepatocellular carcinoma and cirrhosis pending liver transplant with recent history of SBP and Klebsiella bacteremia.  He presented to the emergency room yesterday due to confusion.  Wife at bedside.  Denies any fever, nausea vomiting.  States that they have been compliant with his lactulose although she says that he did not have a bowel movement the previous 2 days.  On admission patient was significantly confused and unable to answer any questions.  Ammonia level was greater than 100.  Patient was admitted to the hospital and started on lactulose.  This morning, wife at bedside and states his confusion has improved significantly but mentation has not yet returned to baseline.    No acute events per nursing staff      Review of Systems:     All systems were reviewed and negative except as mentioned in subjective, assessment and plan.    Vital Signs:    Temp:  [97.4 °F (36.3 °C)-98.2 °F (36.8 °C)] 98.2 °F (36.8 °C)  Heart Rate:  [59-65] 61  Resp:  [16-18] 16  BP: (103-132)/(51-73) 110/51    Intake and output:    No intake/output data recorded.  I/O this shift:  In: -   Out: 600 [Urine:600]    Physical Examination:    General Appearance:  Alert and cooperative.    Head:  Atraumatic and normocephalic.   Eyes: Conjunctivae and sclerae normal, no icterus. No pallor.   Throat: No oral lesions, no thrush, oral mucosa moist.   Neck: Supple, trachea midline, no thyromegaly.   Lungs:   Breath sounds heard bilaterally equally.  No crackles or wheezing. No Pleural rub or bronchial  breathing.   Heart:  Normal S1 and S2, no murmur,    Abdomen:   Normal bowel sounds, Soft, nontender, mildly distended   Extremities: Supple, no edema, no cyanosis, no clubbing.   Skin: No bleeding or rash.   Neurologic: Alert but oriented only to self.  Slow moving extremities/reflexes.  Still with confusion     Laboratory results:    Results from last 7 days   Lab Units 06/13/21  0512 06/12/21  2101   SODIUM mmol/L 141 137   POTASSIUM mmol/L 4.1 4.6   CHLORIDE mmol/L 114* 109*   CO2 mmol/L 21.7* 19.3*   BUN mg/dL 26* 26*   CREATININE mg/dL 0.90 1.14   CALCIUM mg/dL 8.9 8.5*   BILIRUBIN mg/dL  --  1.2   ALK PHOS U/L  --  79   ALT (SGPT) U/L  --  20   AST (SGOT) U/L  --  36   GLUCOSE mg/dL 129* 163*     Results from last 7 days   Lab Units 06/13/21  0512 06/12/21  2101   WBC 10*3/mm3 3.71 4.84   HEMOGLOBIN g/dL 8.6* 9.2*   HEMATOCRIT % 25.7* 28.3*   PLATELETS 10*3/mm3 47* 52*     Results from last 7 days   Lab Units 06/12/21  2147   INR  1.46*     Results from last 7 days   Lab Units 06/12/21  2101   CK TOTAL U/L 90   TROPONIN T ng/mL 0.027         Results from last 7 days   Lab Units 06/12/21  2133   PH, ARTERIAL pH units 7.499*   PO2 ART mm Hg 74.8*   PCO2, ARTERIAL mm Hg 28.1*   HCO3 ART mmol/L 21.8*     I have reviewed the patient's laboratory results.    Radiology results:    CT Head Without Contrast    Result Date: 6/12/2021  FINAL REPORT TECHNIQUE: Multiple axial CT images were performed from the foramen magnum to the vertex. This study was performed with techniques to keep radiation doses as low as reasonably achievable (ALARA). Individualized dose reduction techniques using automated exposure control or adjustment of mA and/or kV according to the patient's size were employed. CLINICAL HISTORY: Mental status change, unknown cause FINDINGS: There is mild generalized cerebral atrophy.  There is decreased attenuation in the white matter, consistent with mild small vessel chronic ischemic change.  The ventricles  are enlarged. There is no evidence of edema or hemorrhage.  No masses are identified.  No extra-axial fluid is seen.  The paranasal sinuses are unremarkable.     Impression: Atrophy and chronic changes without acute process. Authenticated by Timo Higgins MD on 06/12/2021 10:32:17 PM    XR Chest 1 View    Result Date: 6/13/2021  PROCEDURE: XR CHEST 1 VW-  HISTORY: Weak/Dizzy/AMS triage protocol  COMPARISON: 05/28/2021.  FINDINGS: The heart is normal in size. The mediastinum is unremarkable. The lungs are clear. There is no pneumothorax.  There are no acute osseous abnormalities. Mild elevation of the right hemidiaphragm again. Aortic mural calcifications identified.      Impression: No acute cardiopulmonary process.  .  This report was finalized on 6/13/2021 9:06 AM by Yamilex Layne MD.    I have reviewed the patient's radiology reports.    Medication Review:     I have reviewed the patient's active and prn medications.     Problem List:      Hepatic encephalopathy (CMS/HCC)    Reduced libido    Hyperlipidemia    Essential hypertension    Hepatocellular carcinoma (CMS/HCC)    Type 2 diabetes mellitus (CMS/HCC)    Increased ammonia level      Assessment:    Hepatic encephalopathy  Hepatocellular carcinoma, awaiting transplant  Hypertension  Type 2 diabetes with hyperglycemia  Hyperlipidemia  Deconditioning    Plan:    Continue to monitor patient in the hospital.  We will continue with lactulose.  Patient's mentation has improved since arrival to the ER but is still not yet back to baseline.  Continue with diuretics.  Strict I's and O's.  CT of the head without contrast was unremarkable.   Patient has recently completed a course of Bactrim for Klebsiella bacteremia.  He was also treated for SBP last month.  Currently, I am not concerned about underlying infection at this time.  No indication for antibiotics.    Sliding-scale insulin for glucose control.  Further orders as clinical course dictates.    DVT Prophylaxis:  SCD  Code Status: Full  Diet: Consistent carb  Discharge Plan: Home once medically stable    Hernando Cabello DO  06/13/21  16:36 EDT    Dictated utilizing Dragon dictation.

## 2021-06-13 NOTE — H&P
[facility]   HISTORY AND PHYSICAL      Name:  Garrett Richard   Age:  70 y.o.  Sex:  male  :  1950  MRN:  [id number]   Visit Number:  [id number]  Admission Date:  2021  Date Of Service:  21  Primary Care Physician:  Pierce Lubin MD    Chief Complaint:     Confusion     History Of Presenting Illness:    Garrett Richard is a 70 y.o. male with PMh as below and also of   hepatocellular carcinoma, cirrhosis pending liver transplant and recent history of SBP who presented to the ED with confusion and change in MS. No fever. Was found with elevated ammonia. Was suspected of acute hepatic encephalopathy needing admission.    Patient denied fever, chills, sore throat, earache, HA, sneezing/tearing from eyes, cough, CP, back pain, abdominal pain, V/D, rash, or bruising.    No alleviating or aggravating Factors.  Data and labs personally reviewed. Patient is acutely ill and needs hospitalization. Patient  will require at least  one to two midnight stays due to illness. Patient needs Further work up and acute inpatient monitoring.       Review Of Systems:    All systems were reviewed and negative except for above mentioned in HPI.   Cardio: no palpitations GI: No heartburn, no constipation  HEME: no bruising Urinary: no frequency, no incontinence  ENDOCRINE: No heat intolerance, no sweating       Past Medical History: Patient  has a past medical history of AL (acute kidney injury) (CMS/HCC) (2021), Diabetes mellitus (CMS/HCC), Hepatocellular carcinoma (CMS/HCC) (10/25/2018), Hypertension, Liver disease, Umbilical hernia, Upper respiratory infection, Urinary symptom or sign, and Urinary tract infection.    Past Surgical History: Patient  has a past surgical history that includes Tonsillectomy; Liver surgery; and Esophagogastroduodenoscopy (N/A, 2021).    Social History: Patient  reports that he has never smoked. He has never used smokeless tobacco. He reports  that he does not drink alcohol and does not use drugs.    Family History: Patient's family history includes COPD in his father; Colon cancer in his mother; Diabetes in his maternal grandmother; No Known Problems in his maternal grandfather, paternal grandfather, and paternal grandmother.    Allergies:      Patient has no known allergies.    Home Medications:    Prior to Admission Medications     Prescriptions Last Dose Informant Patient Reported? Taking?    Ascorbic Acid (VITAMIN C ER PO)   Yes No    Take 500 mg by mouth Daily.    Continuous Blood Gluc  (FreeStyle Sohail 14 Day [place]) device   No No    1 each Every 14 (Fourteen) Days.    ferrous sulfate 325 (65 FE) MG tablet   Yes No    Take 325 mg by mouth Daily With Breakfast. 2 tablets once a day    furosemide (Lasix) 40 MG tablet  Spouse/Significant Other No No    Take 1 tablet by mouth Daily for 90 days.    Patient taking differently:  Take 20 mg by mouth Daily. Per pt spouse, Dr. Lubin instructed pt to d/c taking medication. Pt to continue medication starting 5/31/21.    insulin aspart (NovoLOG FlexPen) 100 UNIT/ML solution pen-injector sc pen   No No    Inject 30 Units under the skin into the appropriate area as directed 3 (Three) Times a Day With Meals.    Patient taking differently:  Inject 7 Units under the skin into the appropriate area as directed 3 (Three) Times a Day With Meals.    Lantus SoloStar 100 UNIT/ML injection pen   Yes No    Inject 25 Units as directed Every Night.    pantoprazole (Protonix) 40 MG EC tablet   No No    Take 1 tablet by mouth Daily    rifAMPin (RIFADIN) 300 MG capsule  Spouse/Significant Other Yes No    Take 300 mg by mouth 2 (two) times a day.    spironolactone (Aldactone) 100 MG tablet  Spouse/Significant Other No No    Take 1 tablet by mouth Daily    Patient taking differently:  Take 50 mg by mouth 2 (Two) Times a Day. Pt spouse states Dr. Lubin instructed pt to d/c dose until 5/31/21. Can continue with 50 mg BID  instead of the previous 100 mg.    sucralfate (Carafate) 1 g tablet   No No    Take 1 tablet by mouth 4 (Four) Times a Day    sulfamethoxazole-trimethoprim (Bactrim DS) 800-160 MG per tablet   No No    Take 1 tablet by mouth Daily        ED Medications:    Medications   sodium chloride 0.9 % flush 10 mL (has no administration in time range)   lactulose (CHRONULAC) 10 GM/15ML solution 20 g (20 g Oral Given 6/12/21 2122)     Vital Signs:  Temp:  [98.1 °F (36.7 °C)] 98.1 °F (36.7 °C)  Heart Rate:  [59-62] 62  Resp:  [16] 16  BP: (103-132)/(61-68) 132/64        06/12/21 2053   Weight: 72.6 kg (160 lb)     Body mass index is 21.7 kg/m².    Physical Exam:     General Appearance:  Alert and cooperative.    Head:  Atraumatic and normocephalic.   Eyes: Conjunctivae and sclerae normal, no icterus. No pallor.   Ears:  Ears with no abnormalities noted.   Throat: No oral lesions, no thrush, oral mucosa moist.   Neck: Supple, trachea midline, no thyromegaly.   Back:   No kyphoscoliosis present. No tenderness to palpation.   Lungs:   Breath sounds heard bilaterally equally.  No crackles or wheezing. No Pleural rub or bronchial breathing.   Heart:  Normal S1 and S2, no murmur, no gallop, no rub. No JVD.   Abdomen:   Normal bowel sounds, no masses, no organomegaly. Soft, nontender, nondistended, no rebound tenderness.   Extremities: Supple, no edema, no cyanosis, no clubbing.   Pulses: Pulses palpable bilaterally.   Skin: No bleeding or rash.   Neurologic: Alert and oriented x 2. No facial asymmetry. Moves all four limbs. No tremors.      Laboratory data:    I have reviewed the labs done in the emergency room.    Results from last 7 days   Lab Units 06/12/21  2101   SODIUM mmol/L 137   POTASSIUM mmol/L 4.6   CHLORIDE mmol/L 109*   CO2 mmol/L 19.3*   BUN mg/dL 26*   CREATININE mg/dL 1.14   CALCIUM mg/dL 8.5*   BILIRUBIN mg/dL 1.2   ALK PHOS U/L 79   ALT (SGPT) U/L 20   AST (SGOT) U/L 36   GLUCOSE mg/dL 163*     Results from last 7  days   Lab Units 06/12/21 2101   WBC 10*3/mm3 4.84   HEMOGLOBIN g/dL 9.2*   HEMATOCRIT % 28.3*   PLATELETS 10*3/mm3 52*     Results from last 7 days   Lab Units 06/12/21  2147   INR  1.46*     Results from last 7 days   Lab Units 06/12/21  2101   CK TOTAL U/L 90   TROPONIN T ng/mL 0.027                 Results from last 7 days   Lab Units 06/12/21  2133   PH, ARTERIAL pH units 7.499*   PO2 ART mm Hg 74.8*   PCO2, ARTERIAL mm Hg 28.1*   HCO3 ART mmol/L 21.8*           Invalid input(s): USDES,  BLOODU, NITRITITE, BACT, EP  Pain Management Panel     Pain Management Panel Latest Ref Rng & Units 9/29/2020 3/21/2018    CREATININE UR Not Estab. mg/dL 125.5 100          EKG:      See report and reviewed     Radiology:    CT Head Without Contrast    Result Date: 6/12/2021  FINAL REPORT TECHNIQUE: Multiple axial CT images were performed from the foramen magnum to the vertex. This study was performed with techniques to keep radiation doses as low as reasonably achievable (ALARA). Individualized dose reduction techniques using automated exposure control or adjustment of mA and/or kV according to the patient's size were employed. CLINICAL HISTORY: Mental status change, unknown cause FINDINGS: There is mild generalized cerebral atrophy.  There is decreased attenuation in the white matter, consistent with mild small vessel chronic ischemic change.  The ventricles are enlarged. There is no evidence of edema or hemorrhage.  No masses are identified.  No extra-axial fluid is seen.  The paranasal sinuses are unremarkable.     Atrophy and chronic changes without acute process. Authenticated by Timo Higgins MD on 06/12/2021 10:32:17 PM      Assessment:    Active Hospital Problems    Diagnosis    • **Hepatic encephalopathy (CMS/HCC)    • Increased ammonia level    • Type 2 diabetes mellitus (CMS/HCC)    • Hepatocellular carcinoma (CMS/HCC)    • Reduced libido    • Essential hypertension    • Hyperlipidemia        Plan:    Acute hepatic  Encephalopathy  -Admit med surgery with Tele   -Neuro checks  -started lactulose 20g TID-aspirin and statin   -no fever  -continue lasix and aldactone  -monitor in and outs  -on transplant list with [facility]  -Ct Head w/o was negative for acute findings    Additional Orders:   -liver CA: he is on transplant list [facility]  -HTN:restart home bp meds  -continue current management  -tele  monitoring continued    -titrate pain control  -supportive managemnt continued   -will continue to follow and monitor closely   -continue checking vitals as per protocol   -continue to monitor electrolyes and renal function daily   - monitor for any fever or chills   - repeat cbc with diff and BMp in AM   - continue DVT and GI px  - PT:pending    DM type 2 with hyperglycemia  -Place on insulin sliding scale Coverage  -Fasting BMP,Hga1c  -Titrate control  -Diabetic Diet  -Serial Bmp  -BMP and Mg, Phos in the AM  -DM education  -Patient being treated for DM type 2  -If BS less 60 will treat with d50 and please alert MD    Risk assessment High due to multiple comorbid conditions    Ambrocio White MD  06/12/21  23:22 EDT    Dictated utilizing Dragon dictation.

## 2021-06-13 NOTE — ED NOTES
Dr. White called per JORGE LUIS Hernandez with answer. Call transferred.      Gilmer Vitale  06/12/21 2957

## 2021-06-14 NOTE — PLAN OF CARE
Goal Outcome Evaluation:  Plan of Care Reviewed With: patient, spouse        Progress: no change  Outcome Summary: OT eval completed. Patient presents deficits in strength, endurance, balance, mobility and ADL performance. Patient is expected to benefit from continued OT services prior to DC.

## 2021-06-14 NOTE — THERAPY EVALUATION
Patient Name: Garrett Richard  : 1950    MRN: [id number]                              Today's Date: 2021       Admit Date: 2021    Visit Dx:     ICD-10-CM ICD-9-CM   1. Hepatic encephalopathy (CMS/HCC)  K72.90 572.2     Patient Active Problem List   Diagnosis   • Hepatic cirrhosis (CMS/HCC)   • Reduced libido   • Type 1 diabetes mellitus with diabetic polyneuropathy (CMS/HCC)   • Hyperlipidemia   • Essential hypertension   • Hypogonadism in male   • Right renal mass   • Hepatocellular carcinoma (CMS/HCC)   • Fever   • Melena   • Type 2 diabetes mellitus (CMS/HCC)   • Encephalopathy, hepatic (CMS/HCC)   • AL (acute kidney injury) (CMS/HCC)   • Hepatic encephalopathy (CMS/HCC)   • Increased ammonia level     Past Medical History:   Diagnosis Date   • AL (acute kidney injury) (CMS/HCC) 2021   • Diabetes mellitus (CMS/HCC)     type II per patient   • Hepatocellular carcinoma (CMS/HCC) 10/25/2018    s/p TACE procedure with no mass noted on fu imaging   • Hypertension    • Liver disease    • Umbilical hernia    • Upper respiratory infection    • Urinary symptom or sign    • Urinary tract infection      Past Surgical History:   Procedure Laterality Date   • ENDOSCOPY N/A 2021    Procedure: ESOPHAGOGASTRODUODENOSCOPY WITH ARGON THERMAL ABLATION;  Surgeon: Samy Swanson MD;  Location: [facility] ENDOSCOPY;  Service: Gastroenterology;  Laterality: N/A;   • LIVER SURGERY     • TONSILLECTOMY       General Information     Row Name 21 1415          OT Time and Intention    Document Type  evaluation  -SD     Mode of Treatment  occupational therapy  -SD     Row Name 21 1415          General Information    Patient Profile Reviewed  yes  -SD     Prior Level of Function  independent:;all household mobility;min assist:;ADL's Patient has a RW, WC, SPC, BB and BSC. Normally ambulates with walker, wife assists with ADLs.  -SD     Existing Precautions/Restrictions  fall  -SD     Barriers to  Rehab  medically complex;previous functional deficit  -SD     Row Name 06/14/21 1415          Living Environment    Lives With  spouse  -SD     Row Name 06/14/21 1415          Home Main Entrance    Number of Stairs, Main Entrance  one  -SD     Row Name 06/14/21 1415          Stairs Within Home, Primary    Number of Stairs, Within Home, Primary  none  -SD     Row Name 06/14/21 1415          Cognition    Orientation Status (Cognition)  oriented x 4  -SD     [facility] Name 06/14/21 1415          Safety Issues, Functional Mobility    Safety Issues Affecting Function (Mobility)  safety precautions follow-through/compliance;safety precaution awareness;awareness of need for assistance  -SD     Impairments Affecting Function (Mobility)  balance;endurance/activity tolerance;strength  -SD       User Key  (r) = Recorded By, (t) = Taken By, (c) = Cosigned By    Initials Name Provider Type    Lachelle Arenas OT Occupational Therapist          Mobility/ADL's     [facility] Name 06/14/21 1417          Bed Mobility    Bed Mobility  supine-sit  -SD     Supine-Sit [place] (Bed Mobility)  minimum assist (75% patient effort)  -SD     Assistive Device (Bed Mobility)  bed rails;head of bed elevated  -SD     [facility] Name 06/14/21 1417          Transfers    Transfers  sit-stand transfer;toilet transfer  -SD     Sit-Stand [place] (Transfers)  contact guard  -SD     [place] Level (Toilet Transfer)  contact guard  -SD     Assistive Device (Toilet Transfer)  commode, bedside without drop arms  -SD     [facility] Name 06/14/21 1417          Sit-Stand Transfer    Assistive Device (Sit-Stand Transfers)  walker, front-wheeled  -[facility] Name 06/14/21 1417          Toilet Transfer    Type (Toilet Transfer)  stand pivot/stand step  -[facility] Name 06/14/21 1417          Functional Mobility    Functional Mobility- Ind. Level  contact guard assist  -SD     Functional Mobility- Device  rolling walker  -SD     Functional Mobility-Distance (Feet)  48 x2   -SD     Functional Mobility- Safety Issues  balance decreased during turns;sequencing ability decreased;step length decreased;weight-shifting ability decreased  -SD     [facility] Name 06/14/21 1417          Activities of Daily Living    BADL Assessment/Intervention  bathing;upper body dressing;lower body dressing;grooming;feeding;toileting  -SD     [facility] Name 06/14/21 1417          Bathing Assessment/Intervention    [place] Level (Bathing)  moderate assist (50% patient effort)  -SD     Row Name 06/14/21 1417          Upper Body Dressing Assessment/Training    [place] Level (Upper Body Dressing)  contact guard assist  -SD     Row Name 06/14/21 1417          Lower Body Dressing Assessment/Training    [place] Level (Lower Body Dressing)  don;shoes/slippers;socks;minimum assist (75% patient effort)  -SD     Row Name 06/14/21 1417          Grooming Assessment/Training    [place] Level (Grooming)  contact guard assist  -SD     Row Name 06/14/21 1417          Self-Feeding Assessment/Training    [place] Level (Feeding)  set up  -SD     Row Name 06/14/21 1417          Toileting Assessment/Training    [place] Level (Toileting)  adjust/manage clothing;change pad/brief;perform perineal hygiene;moderate assist (50% patient effort)  -SD     Assistive Devices (Toileting)  commode, bedside without drop arms;urinal  -SD       User Key  (r) = Recorded By, (t) = Taken By, (c) = Cosigned By    Initials Name Provider Type    SD Lachelle Wolf OT Occupational Therapist        Obj/Interventions     [facility] Name 06/14/21 1419          Range of Motion Comprehensive    General Range of Motion  bilateral upper extremity ROM WFL  -SD     Row Name 06/14/21 1419          Strength Comprehensive (MMT)    General Manual Muscle Testing (MMT) Assessment  upper extremity strength deficits identified  -SD     Comment, General Manual Muscle Testing (MMT) Assessment  UB 3+/5  -SD       User Key  (r) = Recorded By, (t) = Taken By, (c)  = Cosigned By    Initials Name Provider Type    Lachelle Arenas OT Occupational Therapist        Goals/Plan     [facility] Name 06/14/21 1421          Transfer Goal 1 (OT)    Activity/Assistive Device (Transfer Goal 1, OT)  sit-to-stand/stand-to-sit;walker, rolling  -SD     [place] Level/Cues Needed (Transfer Goal 1, OT)  standby assist  -SD     Time Frame (Transfer Goal 1, OT)  long term goal (LTG)  -SD     Progress/Outcome (Transfer Goal 1, OT)  goal ongoing  -SD     Row Name 06/14/21 1421          Dressing Goal 1 (OT)    Activity/Device (Dressing Goal 1, OT)  lower body dressing  -SD     [place]/Cues Needed (Dressing Goal 1, OT)  contact guard assist  -SD     Time Frame (Dressing Goal 1, OT)  2 weeks  -SD     Progress/Outcome (Dressing Goal 1, OT)  goal ongoing  -SD     Row Name 06/14/21 1421          Toileting Goal 1 (OT)    Activity/Device (Toileting Goal 1, OT)  toileting skills, all;commode  -SD     [place] Level/Cues Needed (Toileting Goal 1, OT)  minimum assist (75% or more patient effort)  -SD     Time Frame (Toileting Goal 1, OT)  2 weeks  -SD     Progress/Outcome (Toileting Goal 1, OT)  goal ongoing  -SD     Row Name 06/14/21 1421          Strength Goal 1 (OT)    Strength Goal 1 (OT)  Patient to perform UB ther ex as tolerated  -SD     Time Frame (Strength Goal 1, OT)  long term goal (LTG)  -SD     Progress/Outcome (Strength Goal 1, OT)  goal ongoing  -SD     Row Name 06/14/21 1421          Therapy Assessment/Plan (OT)    Planned Therapy Interventions (OT)  activity tolerance training;adaptive equipment training;BADL retraining;patient/caregiver education/training;ROM/therapeutic exercise;strengthening exercise;transfer/mobility retraining  -SD       User Key  (r) = Recorded By, (t) = Taken By, (c) = Cosigned By    Initials Name Provider Type    Lachelle Arenas OT Occupational Therapist        Clinical Impression     Row Name 06/14/21 1419          Plan of Care Review    Plan of Care  Reviewed With  patient;spouse  -SD     Progress  no change  -SD     Outcome Summary  OT eval completed. Patient presents deficits in strength, endurance, balance, mobility and ADL performance. Patient is expected to benefit from continued OT services prior to DC.  -SD     Row Name 06/14/21 1419          Therapy Assessment/Plan (OT)    Patient/Family Therapy Goal Statement (OT)  Home with HH  -SD     Rehab Potential (OT)  fair, will monitor progress closely  -SD     Criteria for Skilled Therapeutic Interventions Met (OT)  skilled treatment is necessary  -SD     Therapy Frequency (OT)  3 times/wk 5 times if indicated  -SD     Row Name 06/14/21 1419          Therapy Plan Review/Discharge Plan (OT)    Anticipated Discharge Disposition (OT)  home with assist;home with home health  -SD     Row Name 06/14/21 1419          Positioning and Restraints    Pre-Treatment Position  in bed  -SD     Post Treatment Position  chair  -SD     In Chair  sitting;call light within reach;encouraged to call for assist;exit alarm on;with family/caregiver  -SD       User Key  (r) = Recorded By, (t) = Taken By, (c) = Cosigned By    Initials Name Provider Type    Lachelle Arenas OT Occupational Therapist        Outcome Measures     Row Name 06/14/21 1422          How much help from another is currently needed...    Putting on and taking off regular lower body clothing?  3  -SD     Bathing (including washing, rinsing, and drying)  2  -SD     Toileting (which includes using toilet bed pan or urinal)  2  -SD     Putting on and taking off regular upper body clothing  3  -SD     Taking care of personal grooming (such as brushing teeth)  3  -SD     Eating meals  4  -SD     AM-PAC 6 Clicks Score (OT)  17  -SD     Row Name 06/14/21 1422          Functional Assessment    Outcome Measure Options  AM-PAC 6 Clicks Daily Activity (OT)  -SD       User Key  (r) = Recorded By, (t) = Taken By, (c) = Cosigned By    Initials Name Provider Type    DUDLEY Wolf  RG [place] Occupational Therapist        Occupational Therapy Education                 Title: PT OT SLP Therapies (In Progress)     Topic: Occupational Therapy (In Progress)     Point: ADL training (Done)     Description:   Instruct learner(s) on proper safety adaptation and remediation techniques during self care or transfers.   Instruct in proper use of assistive devices.              Learning Progress Summary           Patient Acceptance, E,TB, VU by SD at 6/14/2021 1423    Comment: Benefit of OT; OT POC   Significant Other Acceptance, E,TB, VU by SD at 6/14/2021 1423    Comment: Benefit of OT; OT POC                   Point: Home exercise program (Not Started)     Description:   Instruct learner(s) on appropriate technique for monitoring, assisting and/or progressing therapeutic exercises/activities.              Learner Progress:  Not documented in this visit.          Point: Precautions (Not Started)     Description:   Instruct learner(s) on prescribed precautions during self-care and functional transfers.              Learner Progress:  Not documented in this visit.          Point: Body mechanics (Not Started)     Description:   Instruct learner(s) on proper positioning and spine alignment during self-care, functional mobility activities and/or exercises.              Learner Progress:  Not documented in this visit.                      User Key     Initials Effective Dates Name Provider Type Discipline    SD 03/07/18 -  Lachelle Wolf OT Occupational Therapist OT              OT Recommendation and Plan  Planned Therapy Interventions (OT): activity tolerance training, adaptive equipment training, BADL retraining, patient/caregiver education/training, ROM/therapeutic exercise, strengthening exercise, transfer/mobility retraining  Therapy Frequency (OT): 3 times/wk (5 times if indicated)  Plan of Care Review  Plan of Care Reviewed With: patient, spouse  Progress: no change  Outcome Summary: RG huizar  completed. Patient presents deficits in strength, endurance, balance, mobility and ADL performance. Patient is expected to benefit from continued OT services prior to DC.     Time Calculation:   Time Calculation- OT     Row Name 06/14/21 1428             Time Calculation- OT    OT Start Time  1335  -SD      OT Received On  06/14/21  -SD      OT Goal Re-Cert Due Date  06/28/21  -SD        User Key  (r) = Recorded By, (t) = Taken By, (c) = Cosigned By    Initials Name Provider Type    Lachelle Arenas OT Occupational Therapist        Therapy Charges for Today     Code Description Service Date Service Provider Modifiers Qty    [id number]  OT EVAL MOD COMPLEXITY 3 6/14/2021 Lachelle Wolf OT GO 1               Lachelle Wolf OT  6/14/2021

## 2021-06-14 NOTE — CONSULTS
"Adult Nutrition  Assessment/PES    Patient Name:  Garrett Richard  YOB: 1950  MRN: [id number]  Admit Date:  6/12/2021    Assessment Date:  6/14/2021    Comments:    Recommend:  1. Consider adding cardiac modifications to current diet order as medically appropriate and tolerated. Continue Low-Protein modification until Ammonia levels improve.   2. Encourage PO intake. Average intake ~75% x 2 meals.   3. RD ordered Arginaid BID.  4. Consider a multivitamin with minerals daily.     RD to follow pt and available PRN.      Reason for Assessment     Row Name 06/14/21 1158          Reason for Assessment    Reason For Assessment  nurse/nurse practitioner consult;diagnosis/disease state;identified at risk by screening criteria     Diagnosis  cardiac disease;cancer diagnosis/related complications;liver disease;diabetes diagnosis/complications hepatic encephalopathy, hepatocellular carcinoma, DM2, cirrhosis, HTN, HLD     Identified At Risk by Screening Criteria  large or nonhealing wound, burn or pressure injury;MST SCORE 2+;unintentional loss of 10 lbs or more in the past 2 mos           Anthropometrics     Row Name 06/14/21 1201 06/14/21 0523       Anthropometrics    Height  182.9 cm (72\")  --    Weight  --  68.5 kg (151 lb 0.2 oz)       Ideal Body Weight (IBW)    Ideal Body Weight (IBW) (kg)  82.07  --        Labs/Tests/Procedures/Meds     Row Name 06/14/21 1159          Labs/Procedures/Meds    Lab Results Reviewed  reviewed, pertinent     Lab Results Comments  Low: Alb, Platelets; High: Cl, BUN, Gluc, Ammonia, bilirubin        Medications    Pertinent Medications Reviewed  reviewed, pertinent     Pertinent Medications Comments  Lasix, Novolog, Lactulose, Protonix         Physical Findings     Row Name 06/14/21 1200          Physical Findings    Skin  other (see comments) skin tears to left arm         Estimated/Assessed Needs     Row Name 06/14/21 1201          Calculation Measurements    Weight Used For " "Calculations  68.5 kg (151 lb 0.2 oz)     Height  182.9 cm (72\")        Estimated/Assessed Needs    Additional Documentation  Fluid Requirements (Group);Protein Requirements (Group);Calorie Requirements (Group);KCAL/KG (Group)        Calorie Requirements    Estimated Calorie Requirement Comment  [id number]        KCAL/KG    KCAL/KG  25 Kcal/Kg (kcal);30 Kcal/Kg (kcal)     25 Kcal/Kg (kcal)  1712.5     30 Kcal/Kg (kcal)  2055        Protein Requirements    Weight Used For Protein Calculations  68.5 kg (151 lb 0.2 oz)     Est Protein Requirement Amount (gms/kg)  1.5 gm protein  grams     Estimated Protein Requirements (gms/day)  102.75        Fluid Requirements    Fluid Requirements (mL/day)  -- 2375-5611 mL     Estimated Fluid Requirement Method  other (see comments) 1 mL/kcal         Nutrition Prescription Ordered     Row Name 06/14/21 1202          Nutrition Prescription PO    Current PO Diet  Regular     Common Modifiers  Consistent Carbohydrate;Low Protein         Evaluation of Received Nutrient/Fluid Intake     Row Name 06/14/21 1201          PO Evaluation    Number of Days PO Intake Evaluated  1 day     Number of Meals  2     % PO Intake  75               Problem/Interventions:  Problem 1     Row Name 06/14/21 1308          Nutrition Diagnoses Problem 1    Problem 1  Altered Nutrition Related to Labs     Etiology (related to)  Medical Diagnosis     Endocrine  DM2     Hepatic  Encephalopathy     Signs/Symptoms (evidenced by)  Biochemical     Specific Labs Noted  Glucose;Other (comment) Ammonia               Intervention Goal     Row Name 06/14/21 1309          Intervention Goal    General  Meet nutritional needs for age/condition;Improved nutrition related lab(s)     PO  Meet estimated needs;Maintain intake     Weight  Maintain weight         Nutrition Intervention     Row Name 06/14/21 1307          Nutrition Intervention    RD/Tech Action  Follow Tx progress;Encourage intake;Recommend/ordered     " Recommended/Ordered  Diet;Supplement         Nutrition Prescription     Row Name 06/14/21 1309          Nutrition Prescription PO    PO Prescription  Begin/change diet;Begin/change supplement     Begin/Change Diet to  Regular     Supplement  Other (comment) Arginaid     Supplement Frequency  2 times a day     Common Modifiers  Cardiac;Consistent Carbohydrate;Low Protein     New PO Prescription Ordered?  Yes supplement ordered        Other Orders    Obtain Weight  Daily     Obtain Weight Ordered?  No, recommended     Supplement  Vitamin mineral supplement     Supplement Ordered?  No, recommended         Education/Evaluation     Row Name 06/14/21 1310          Education    Education  Education not appropriate at this time     Please explain  Patient confusion        Monitor/Evaluation    Monitor  Per protocol;I&O;PO intake;Supplement intake;Pertinent labs;Weight;Skin status           Electronically signed by:  Sirisha Costa RD  06/14/21 13:10 EDT

## 2021-06-14 NOTE — PLAN OF CARE
Goal Outcome Evaluation:  Plan of Care Reviewed With: patient, spouse        Progress: no change  Outcome Summary: Patient participates well in PT evaluation and demonstrates decreased strength, balance, activity tolerance and gait.  Patient walked 48 feet x 2 with RW and CGA.  He is expected to benefit from additional PT services while hospitalized and upon discharge to home with home health care.

## 2021-06-14 NOTE — PROGRESS NOTES
[facility]    Name:  Garrett Richard   Age:  70 y.o.  Sex:  male  :  1950  MRN:  [id number]   Visit Number:  [id number]  Admission Date:  2021  Date Of Service:  21  Primary Care Physician:  Pierce Lubin MD     LOS: 2 days :  Patient Care Team:  Pierce Lubin MD as PCP - General (Internal Medicine):    History taken from:     patient chart    Chief Complaint:      Confusion    Subjective:    Interval History:     Patient seen and examined today.  Reviewed history and physical, lab work, x-rays, chart.    Patient is 70-year-old male with history of hepatocellular carcinoma, cirrhosis due to [place] who is currently on the liver transplant list.  He recently had SBP and presented to the ED with confusion and change in mental status.  Ammonia was found to be elevated.  He was placed on lactulose.  Son-in-law and wife state that he is improved at this point though not back to baseline.  Patient still appears somewhat confused although he can give his history.    He currently denies any chest pressure, shortness of breath, nausea, vomiting, or pain.  He does not have any fevers or chills.  His WBC count is normal.  Urinalysis and chest x-ray were negative.  CT of the head was negative.    Review of Systems:     All systems were reviewed and negative except for:  Neurological: positive for  confusion    Objective:    Vital Signs:    Temp:  [97.8 °F (36.6 °C)-98.7 °F (37.1 °C)] 98.4 °F (36.9 °C)  Heart Rate:  [59-82] 61  Resp:  [17-18] 18  BP: (116-126)/(55-64) 124/57    Physical Exam:    General: Alert and oriented x3, mild distress.  Heart: Regular rate and rhythm without murmur rub or thrill.  Lungs: Clear to auscultation bilaterally without use of accessory muscles or respiration.  Abdomen: Soft/nontender/nondistended.  No HSM noted.  MSK: 4/5 strength in upper/lower extremities bilaterally.     Results Review:      I reviewed the patient's new  clinical results.  I reviewed the patient's new imaging results and agree with the interpretation.  I reviewed the patient's other test results and agree with the interpretation  I personally viewed and interpreted the patient's EKG/Telemetry data    Labs:    Results from last 7 days   Lab Units 06/14/21 0606 06/13/21 0512 06/12/21 2101   SODIUM mmol/L 140 141 137   POTASSIUM mmol/L 4.0 4.1 4.6   CHLORIDE mmol/L 111* 114* 109*   CO2 mmol/L 21.0* 21.7* 19.3*   BUN mg/dL 25* 26* 26*   CREATININE mg/dL 1.09 0.90 1.14   CALCIUM mg/dL 8.9 8.9 8.5*   BILIRUBIN mg/dL 1.9*  --  1.2   ALK PHOS U/L 72  --  79   ALT (SGPT) U/L 18  --  20   AST (SGOT) U/L 29  --  36   GLUCOSE mg/dL 110* 129* 163*     Results from last 7 days   Lab Units 06/14/21 0606 06/13/21 0512 06/12/21 2101   WBC 10*3/mm3 5.04 3.71 4.84   HEMOGLOBIN g/dL 9.0* 8.6* 9.2*   HEMATOCRIT % 27.6* 25.7* 28.3*   PLATELETS 10*3/mm3 66* 47* 52*     Results from last 7 days   Lab Units 06/12/21 2147   INR  1.46*     Results from last 7 days   Lab Units 06/12/21 2101   CK TOTAL U/L 90   TROPONIN T ng/mL 0.027     Results from last 7 days   Lab Units 06/12/21 2147   BLOODCX  No growth at 24 hours  No growth at 24 hours     Results from last 7 days   Lab Units 06/12/21 2133   PH, ARTERIAL pH units 7.499*   PO2 ART mm Hg 74.8*   PCO2, ARTERIAL mm Hg 28.1*   HCO3 ART mmol/L 21.8*        Radiology:    Imaging Results (Last 24 Hours)     ** No results found for the last 24 hours. **          Medication Review:     furosemide, 40 mg, Oral, Daily  insulin aspart, 0-14 Units, Subcutaneous, TID AC  lactulose, 20 g, Oral, 4x Daily  pantoprazole, 40 mg, Oral, Daily  riFAXIMin, 550 mg, Oral, Q12H  sodium chloride, 10 mL, Intravenous, Q12H  sodium chloride, 10 mL, Intravenous, Q12H  spironolactone, 100 mg, Oral, Daily             Assessment:    Principal Problem:    Hepatic encephalopathy (CMS/HCC)  Active Problems:    Reduced libido    Hyperlipidemia    Essential  hypertension    Hepatocellular carcinoma (CMS/HCC)    Type 2 diabetes mellitus (CMS/HCC)    Increased ammonia level      1.  Hepatic encephalopathy  2.  Hepatocellular carcinoma, awaiting transplant  3.  Essential hypertension  4.  Type 2 diabetes with hyperglycemia  5.  Hyperlipidemia  6.  Deconditioning    Plan:    Increase her lactulose.  Check ammonia level, it was 141.  Patient will have rifaximin added to his regimen.  Recheck lab work in the a.m.  Monitor blood pressure.  Analysis was negative, blood cultures were negative as well.  Does not appear to have infection.  Monitor closely.  Spoke to his wife, answered all questions.  Further recommendations will depend on the clinical course.    Mj Pope DO  06/14/21  16:12 EDT

## 2021-06-14 NOTE — PLAN OF CARE
Goal Outcome Evaluation:  Plan of Care Reviewed With: patient        Progress: improving  Outcome Summary: No acute events during the night. Vital Signs Stable. Patient continues on room air. No complaint of pain. Patient becoming more alert as the night went on. WIll continue to monitor.

## 2021-06-14 NOTE — THERAPY EVALUATION
Patient Name: Garrett Richard  : 1950    MRN: [id number]                              Today's Date: 2021       Admit Date: 2021    Visit Dx:     ICD-10-CM ICD-9-CM   1. Hepatic encephalopathy (CMS/HCC)  K72.90 572.2     Patient Active Problem List   Diagnosis   • Hepatic cirrhosis (CMS/HCC)   • Reduced libido   • Type 1 diabetes mellitus with diabetic polyneuropathy (CMS/HCC)   • Hyperlipidemia   • Essential hypertension   • Hypogonadism in male   • Right renal mass   • Hepatocellular carcinoma (CMS/HCC)   • Fever   • Melena   • Type 2 diabetes mellitus (CMS/HCC)   • Encephalopathy, hepatic (CMS/HCC)   • AL (acute kidney injury) (CMS/HCC)   • Hepatic encephalopathy (CMS/HCC)   • Increased ammonia level     Past Medical History:   Diagnosis Date   • AL (acute kidney injury) (CMS/HCC) 2021   • Diabetes mellitus (CMS/HCC)     type II per patient   • Hepatocellular carcinoma (CMS/HCC) 10/25/2018    s/p TACE procedure with no mass noted on fu imaging   • Hypertension    • Impaired functional mobility, balance, gait, and endurance    • Liver disease    • Umbilical hernia    • Upper respiratory infection    • Urinary symptom or sign    • Urinary tract infection      Past Surgical History:   Procedure Laterality Date   • ENDOSCOPY N/A 2021    Procedure: ESOPHAGOGASTRODUODENOSCOPY WITH ARGON THERMAL ABLATION;  Surgeon: Samy Swanson MD;  Location: [facility] ENDOSCOPY;  Service: Gastroenterology;  Laterality: N/A;   • LIVER SURGERY     • TONSILLECTOMY       General Information     Row Name 21 1322          Physical Therapy Time and Intention    Document Type  evaluation  -JR     Mode of Treatment  physical therapy  -JR     Row Name 21 1322          General Information    Patient Profile Reviewed  yes  -JR     Prior Level of Function  independent:;all household mobility  -JR     Existing Precautions/Restrictions  fall  -JR     Barriers to Rehab  medically complex;previous  functional deficit  -     Row Name 06/14/21 1322          Living Environment    Lives With  spouse  -     Row Name 06/14/21 1322          Home Main Entrance    Number of Stairs, Main Entrance  none  -JR     Row Name 06/14/21 1322          Cognition    Orientation Status (Cognition)  oriented x 4  -JR     Row Name 06/14/21 1322          Safety Issues, Functional Mobility    Safety Issues Affecting Function (Mobility)  safety precautions follow-through/compliance;safety precaution awareness;awareness of need for assistance  -     Impairments Affecting Function (Mobility)  balance;endurance/activity tolerance;strength  -       User Key  (r) = Recorded By, (t) = Taken By, (c) = Cosigned By    Initials Name Provider Type    Yesika Diaz PT Physical Therapist        Mobility     Row Name 06/14/21 1322          Bed Mobility    Bed Mobility  supine-sit  -     Supine-Sit [place] (Bed Mobility)  minimum assist (75% patient effort)  -     Assistive Device (Bed Mobility)  head of bed elevated;bed rails  -     Row Name 06/14/21 1322          Sit-Stand Transfer    Sit-Stand [place] (Transfers)  contact guard  -     Assistive Device (Sit-Stand Transfers)  walker, front-wheeled  -JR     [facility] Name 06/14/21 1322          Gait/Stairs (Locomotion)    [place] Level (Gait)  contact guard  -     Assistive Device (Gait)  walker, front-wheeled  -     Distance in Feet (Gait)  48 x 2  -JR     Deviations/Abnormal Patterns (Gait)  festinating/shuffling;stride length decreased;weight shifting decreased  -       User Key  (r) = Recorded By, (t) = Taken By, (c) = Cosigned By    Initials Name Provider Type    Yesika Diaz PT Physical Therapist        Obj/Interventions     Row Name 06/14/21 1322          Range of Motion Comprehensive    General Range of Motion  bilateral lower extremity ROM WFL  -     Row Name 06/14/21 1322          Strength Comprehensive (MMT)    Comment, General Manual Muscle Testing  (MMT) Assessment  BLE grossly 3+/5  -JR     Row Name 06/14/21 1322          Balance    Balance Assessment  sitting static balance;sitting dynamic balance;standing static balance;standing dynamic balance  -JR     Static Sitting Balance  WFL  -JR     Dynamic Sitting Balance  WFL  -JR     Static Standing Balance  WFL  -JR     Dynamic Standing Balance  mild impairment  -JR       User Key  (r) = Recorded By, (t) = Taken By, (c) = Cosigned By    Initials Name Provider Type    Yesika Diaz, PT Physical Therapist        Goals/Plan     Row Name 06/14/21 1322          Bed Mobility Goal 1 (PT)    Activity/Assistive Device (Bed Mobility Goal 1, PT)  bed mobility activities, all  -JR     [place] Level/Cues Needed (Bed Mobility Goal 1, PT)  modified independence  -JR     Time Frame (Bed Mobility Goal 1, PT)  short term goal (STG)  -JR     Progress/Outcomes (Bed Mobility Goal 1, PT)  goal ongoing  -JR     Row Name 06/14/21 1322          Transfer Goal 1 (PT)    Activity/Assistive Device (Transfer Goal 1, PT)  sit-to-stand/stand-to-sit;bed-to-chair/chair-to-bed  -JR     [place] Level/Cues Needed (Transfer Goal 1, PT)  standby assist  -JR     Time Frame (Transfer Goal 1, PT)  1 week  -JR     Progress/Outcome (Transfer Goal 1, PT)  goal ongoing  -JR     Row Name 06/14/21 1322          Gait Training Goal 1 (PT)    Activity/Assistive Device (Gait Training Goal 1, PT)  gait (walking locomotion);walker, rolling  -JR     [place] Level (Gait Training Goal 1, PT)  standby assist  -JR     Distance (Gait Training Goal 1, PT)  250  -JR     Time Frame (Gait Training Goal 1, PT)  1 week  -JR     Progress/Outcome (Gait Training Goal 1, PT)  goal ongoing  -JR     Row Name 06/14/21 1322          Patient Education Goal (PT)    Activity (Patient Education Goal, PT)  Perform BLE exercises x 20 reps  -JR     [place]/Cues/Accuracy (Memory Goal 2, PT)  demonstrates adequately  -JR     Time Frame (Patient Education Goal, PT)  5 days   -JR     Progress/Outcome (Patient Education Goal, PT)  goal ongoing  -       User Key  (r) = Recorded By, (t) = Taken By, (c) = Cosigned By    Initials Name Provider Type    Yesika Diaz, PT Physical Therapist        Clinical Impression     Row Name 06/14/21 1322          Pain    Additional Documentation  Pain Scale: Numbers Pre/Post-Treatment (Group)  -JR     Row Name 06/14/21 1322          Pain Scale: Numbers Pre/Post-Treatment    Pretreatment Pain Rating  0/10 - no pain  -JR     Posttreatment Pain Rating  0/10 - no pain  [facility] Name 06/14/21 1322          Plan of Care Review    Plan of Care Reviewed With  patient;spouse  -     Progress  no change  -     Outcome Summary  Patient participates well in PT evaluation and demonstrates decreased strength, balance, activity tolerance and gait.  Patient walked 48 feet x 2 with RW and CGA.  He is expected to benefit from additional PT services while hospitalized and upon discharge to home with home health care.  [facility] Name 06/14/21 1322          Therapy Assessment/Plan (PT)    Patient/Family Therapy Goals Statement (PT)  Patient wants to go back home  -JR     Rehab Potential (PT)  good, to achieve stated therapy goals  -     Criteria for Skilled Interventions Met (PT)  yes;meets criteria;skilled treatment is necessary  -     Row Name 06/14/21 1322          Positioning and Restraints    Pre-Treatment Position  in bed  -JR     Post Treatment Position  chair  -JR     In Chair  sitting;call light within reach;encouraged to call for assist;exit alarm on;with family/caregiver  -       User Key  (r) = Recorded By, (t) = Taken By, (c) = Cosigned By    Initials Name Provider Type    Yesika Diaz, PT Physical Therapist        Outcome Measures     Row Name 06/14/21 1322          How much help from another person do you currently need...    Turning from your back to your side while in flat bed without using bedrails?  3  -JR     Moving from lying on back  to sitting on the side of a flat bed without bedrails?  3  -JR     Moving to and from a bed to a chair (including a wheelchair)?  3  -JR     Standing up from a chair using your arms (e.g., wheelchair, bedside chair)?  3  -JR     Climbing 3-5 steps with a railing?  2  -JR     To walk in hospital room?  3  -JR     AM-PAC 6 Clicks Score (PT)  17  -JR     Row Name 06/14/21 1422 06/14/21 1322       Functional Assessment    Outcome Measure Options  AM-PAC 6 Clicks Daily Activity (OT)  -SD  AM-PAC 6 Clicks Basic Mobility (PT)  -      User Key  (r) = Recorded By, (t) = Taken By, (c) = Cosigned By    Initials Name Provider Type    Yesika Diaz, PT Physical Therapist    Lachelle Arenas, OT Occupational Therapist        Physical Therapy Education                 Title: PT OT SLP Therapies (In Progress)     Topic: Physical Therapy (In Progress)     Point: Mobility training (Done)     Learning Progress Summary           Patient Acceptance, E,TB, VU by  at 6/14/2021 0019    Comment: Role of PT and POC                   Point: Home exercise program (Not Started)     Learner Progress:  Not documented in this visit.          Point: Body mechanics (Not Started)     Learner Progress:  Not documented in this visit.          Point: Precautions (Not Started)     Learner Progress:  Not documented in this visit.                      User Key     Initials Effective Dates Name Provider Type [facility] 04/03/18 -  Yesika Guaman, PT Physical Therapist PT              PT Recommendation and Plan  Planned Therapy Interventions (PT): strengthening, balance training, bed mobility training, transfer training, gait training, home exercise program, patient/family education  Plan of Care Reviewed With: patient, spouse  Progress: no change  Outcome Summary: Patient participates well in PT evaluation and demonstrates decreased strength, balance, activity tolerance and gait.  Patient walked 48 feet x 2 with RW and CGA.  He is expected to  benefit from additional PT services while hospitalized and upon discharge to home with home health care.     Time Calculation:   PT Charges     Row Name 06/14/21 1821             Time Calculation    Start Time  1322  -JR         Untimed Charges    PT Eval/Re-eval Minutes  41  -JR         Total Minutes    Untimed Charges Total Minutes  41  -JR       Total Minutes  41  -JR        User Key  (r) = Recorded By, (t) = Taken By, (c) = Cosigned By    Initials Name Provider Type     Yesika Guaman, PT Physical Therapist        Therapy Charges for Today     Code Description Service Date Service Provider Modifiers Qty    [id number] HC PT EVAL LOW COMPLEXITY 3 6/14/2021 Yesika Guaman, PT GP 1          PT G-Codes  Outcome Measure Options: AM-PAC 6 Clicks Daily Activity (OT)  AM-PAC 6 Clicks Score (PT): 17  AM-PAC 6 Clicks Score (OT): 17    Yesika Guaman PT  6/14/2021

## 2021-06-15 NOTE — THERAPY TREATMENT NOTE
Patient Name: Garrett Richard  : 1950    MRN: [id number]                              Today's Date: 6/15/2021       Admit Date: 2021    Visit Dx:     ICD-10-CM ICD-9-CM   1. Hepatic encephalopathy (CMS/HCC)  K72.90 572.2     Patient Active Problem List   Diagnosis   • Hepatic cirrhosis (CMS/HCC)   • Reduced libido   • Type 1 diabetes mellitus with diabetic polyneuropathy (CMS/HCC)   • Hyperlipidemia   • Essential hypertension   • Hypogonadism in male   • Right renal mass   • Hepatocellular carcinoma (CMS/HCC)   • Fever   • Melena   • Type 2 diabetes mellitus (CMS/HCC)   • Encephalopathy, hepatic (CMS/HCC)   • AL (acute kidney injury) (CMS/HCC)   • Hepatic encephalopathy (CMS/HCC)   • Increased ammonia level     Past Medical History:   Diagnosis Date   • AL (acute kidney injury) (CMS/HCC) 2021   • Diabetes mellitus (CMS/HCC)     type II per patient   • Hepatocellular carcinoma (CMS/HCC) 10/25/2018    s/p TACE procedure with no mass noted on fu imaging   • Hypertension    • Impaired functional mobility, balance, gait, and endurance    • Liver disease    • Umbilical hernia    • Upper respiratory infection    • Urinary symptom or sign    • Urinary tract infection      Past Surgical History:   Procedure Laterality Date   • ENDOSCOPY N/A 2021    Procedure: ESOPHAGOGASTRODUODENOSCOPY WITH ARGON THERMAL ABLATION;  Surgeon: Samy Swanson MD;  Location: [facility] ENDOSCOPY;  Service: Gastroenterology;  Laterality: N/A;   • LIVER SURGERY     • TONSILLECTOMY       General Information     Row Name 06/15/21 1556          Physical Therapy Time and Intention    Document Type  therapy note (daily note)  -RM     Mode of Treatment  physical therapy  -RM     Row Name 06/15/21 1556          General Information    Patient Profile Reviewed  yes  -RM     Existing Precautions/Restrictions  fall  -RM     Row Name 06/15/21 1558          Cognition    Orientation Status (Cognition)  oriented x 4  -RM     Row  Name 06/15/21 1556          Safety Issues, Functional Mobility    Safety Issues Affecting Function (Mobility)  safety precautions follow-through/compliance;safety precaution awareness;awareness of need for assistance;positioning of assistive device  -RM     Impairments Affecting Function (Mobility)  balance;endurance/activity tolerance;strength  -RM       User Key  (r) = Recorded By, (t) = Taken By, (c) = Cosigned By    Initials Name Provider Type     Luisito Garcia, ANAI Physical Therapy Assistant        Mobility     Row Name 06/15/21 1557          Sit-Stand Transfer    Sit-Stand [place] (Transfers)  standby assist;contact guard;verbal cues  -RM     Assistive Device (Sit-Stand Transfers)  walker, front-wheeled  -RM     Row Name 06/15/21 1557          Gait/Stairs (Locomotion)    [place] Level (Gait)  contact guard;standby assist;verbal cues  -RM     Assistive Device (Gait)  walker, front-wheeled  -RM     Distance in Feet (Gait)  172'  -RM     Deviations/Abnormal Patterns (Gait)  gait speed decreased;base of support, narrow;stride length decreased  -RM     Bilateral Gait Deviations  forward flexed posture;heel strike decreased  -RM       User Key  (r) = Recorded By, (t) = Taken By, (c) = Cosigned By    Initials Name Provider Type     Luisito Garcia, PTA Physical Therapy Assistant        Obj/Interventions    No documentation.       Goals/Plan    No documentation.       Clinical Impression     Row Name 06/15/21 1553          Pain Scale: Numbers Pre/Post-Treatment    Pretreatment Pain Rating  0/10 - no pain  -RM     Posttreatment Pain Rating  0/10 - no pain  -RM     Row Name 06/15/21 1552          Plan of Care Review    Plan of Care Reviewed With  patient  -RM     Progress  improving  -RM     Outcome Summary  PT treatment completed. Pt was able to advance gait distance to 172' cga with rw.  Pt was also able to perform toilet transfers with handrail with sba/cga.  See flowsheet for details  -     Row  Name 06/15/21 1558          Positioning and Restraints    Pre-Treatment Position  sitting in chair/recliner  -RM     Post Treatment Position  chair  -RM     In Chair  sitting;call light within reach;encouraged to call for assist;notified nsg;exit alarm on  -RM       User Key  (r) = Recorded By, (t) = Taken By, (c) = Cosigned By    Initials Name Provider Type    Luisito Dockery, ANAI Physical Therapy Assistant        Outcome Measures     Row Name 06/15/21 1600          How much help from another person do you currently need...    Turning from your back to your side while in flat bed without using bedrails?  3  -RM     Moving from lying on back to sitting on the side of a flat bed without bedrails?  3  -RM     Moving to and from a bed to a chair (including a wheelchair)?  4  -RM     Standing up from a chair using your arms (e.g., wheelchair, bedside chair)?  4  -RM     Climbing 3-5 steps with a railing?  2  -RM     To walk in hospital room?  3  -RM     AM-PAC 6 Clicks Score (PT)  19  -RM     Row Name 06/15/21 1600 06/15/21 1411       Functional Assessment    Outcome Measure Options  AM-PAC 6 Clicks Basic Mobility (PT)  -RM  AM-PAC 6 Clicks Daily Activity (OT)  -SD      User Key  (r) = Recorded By, (t) = Taken By, (c) = Cosigned By    Initials Name Provider Type    Luisito Dockery, ANAI Physical Therapy Assistant    Lahcelle Arenas OT Occupational Therapist        Physical Therapy Education                 Title: PT OT SLP Therapies (In Progress)     Topic: Physical Therapy (In Progress)     Point: Mobility training (Done)     Learning Progress Summary           Patient Acceptance, E,TB,D, VU,NR by  at 6/15/2021 1600    Comment: safety with mobility and transfers    Acceptance, E,TB, VU by  at 6/14/2021 1819    Comment: Role of PT and POC                   Point: Home exercise program (Not Started)     Learner Progress:  Not documented in this visit.          Point: Body mechanics (Not Started)      Learner Progress:  Not documented in this visit.          Point: Precautions (Not Started)     Learner Progress:  Not documented in this visit.                      User Key     Initials Effective Dates Name Provider Type Discipline    JR 04/03/18 -  Yesika Guaman, PT Physical Therapist PT     03/07/18 -  Luisito Garcia, ANAI Physical Therapy Assistant PT              PT Recommendation and Plan     Plan of Care Reviewed With: patient  Progress: improving  Outcome Summary: PT treatment completed. Pt was able to advance gait distance to 172' cga with rw.  Pt was also able to perform toilet transfers with handrail with sba/cga.  See flowsheet for details     Time Calculation:   PT Charges     Row Name 06/15/21 1601             Time Calculation    Start Time  1512  -RM      Stop Time  1536  -RM      Time Calculation (min)  24 min  -RM      PT Received On  06/15/21  -RM      PT Goal Re-Cert Due Date  06/24/21  -RM         Time Calculation- PT    Total Timed Code Minutes- PT  24 minute(s)  -RM         Timed Charges    05729 - Gait Training Minutes   14  -RM      10071 - PT Therapeutic Activity Minutes  10  -RM         Total Minutes    Timed Charges Total Minutes  24  -RM       Total Minutes  24  -RM        User Key  (r) = Recorded By, (t) = Taken By, (c) = Cosigned By    Initials Name Provider Type     Luisito Garcia, ANAI Physical Therapy Assistant        Therapy Charges for Today     Code Description Service Date Service Provider Modifiers Qty    36298288784 HC GAIT TRAINING EA 15 MIN 6/15/2021 Luisito Garcia, PTA GP 1    [id number] HC PT THERAPEUTIC ACT EA 15 MIN 6/15/2021 Luisito Garcia, PTA GP 1          PT G-Codes  Outcome Measure Options: AM-PAC 6 Clicks Basic Mobility (PT)  AM-PAC 6 Clicks Score (PT): 19  AM-PAC 6 Clicks Score (OT): 17    Luisito Garcia PTA  6/15/2021

## 2021-06-15 NOTE — PLAN OF CARE
Goal Outcome Evaluation:  Plan of Care Reviewed With: patient        Progress: improving  Outcome Summary: PT treatment completed. Pt was able to advance gait distance to 172' cga with rw.  Pt was also able to perform toilet transfers with handrail with sba/cga.  See flowsheet for details

## 2021-06-15 NOTE — CASE MANAGEMENT/SOCIAL WORK
Continued Stay Note   Oskar     Patient Name: Garrett Richard  MRN: [id number]  Today's Date: 6/15/2021    Admit Date: 6/12/2021    Discharge Plan     Row Name 06/15/21 0929       Plan    Plan  confirmed with [facility], will need a resumption of care on d/c  12:57 EDT  Pt resting in chair, wife at in room; informed plan to resume [facility], completed imm; wife stated md plans to send pt home tomorrow; emotional support given        Discharge Codes    No documentation.             Malia Oseguera RN

## 2021-06-15 NOTE — THERAPY TREATMENT NOTE
Patient Name: Garrett Richard  : 1950    MRN: [id number]                              Today's Date: 6/15/2021       Admit Date: 2021    Visit Dx:     ICD-10-CM ICD-9-CM   1. Hepatic encephalopathy (CMS/HCC)  K72.90 572.2     Patient Active Problem List   Diagnosis   • Hepatic cirrhosis (CMS/HCC)   • Reduced libido   • Type 1 diabetes mellitus with diabetic polyneuropathy (CMS/HCC)   • Hyperlipidemia   • Essential hypertension   • Hypogonadism in male   • Right renal mass   • Hepatocellular carcinoma (CMS/HCC)   • Fever   • Melena   • Type 2 diabetes mellitus (CMS/HCC)   • Encephalopathy, hepatic (CMS/HCC)   • AL (acute kidney injury) (CMS/HCC)   • Hepatic encephalopathy (CMS/HCC)   • Increased ammonia level     Past Medical History:   Diagnosis Date   • AL (acute kidney injury) (CMS/HCC) 2021   • Diabetes mellitus (CMS/HCC)     type II per patient   • Hepatocellular carcinoma (CMS/HCC) 10/25/2018    s/p TACE procedure with no mass noted on fu imaging   • Hypertension    • Impaired functional mobility, balance, gait, and endurance    • Liver disease    • Umbilical hernia    • Upper respiratory infection    • Urinary symptom or sign    • Urinary tract infection      Past Surgical History:   Procedure Laterality Date   • ENDOSCOPY N/A 2021    Procedure: ESOPHAGOGASTRODUODENOSCOPY WITH ARGON THERMAL ABLATION;  Surgeon: Samy Swanson MD;  Location: [facility] ENDOSCOPY;  Service: Gastroenterology;  Laterality: N/A;   • LIVER SURGERY     • TONSILLECTOMY       General Information     Row Name 06/15/21 1408          OT Time and Intention    Document Type  therapy note (daily note)  -SD     Mode of Treatment  occupational therapy  -SD       User Key  (r) = Recorded By, (t) = Taken By, (c) = Cosigned By    Initials Name Provider Type    SD Lachelle Wolf OT Occupational Therapist          Mobility/ADL's     Row Name 06/15/21 1405          Bed Mobility    Comment (Bed Mobility)  sitting in  chair  -SD       User Key  (r) = Recorded By, (t) = Taken By, (c) = Cosigned By    Initials Name Provider Type    Lachelle Arenas OT Occupational Therapist        Obj/Interventions     Row Name 06/15/21 1408          Shoulder (Therapeutic Exercise)    Shoulder (Therapeutic Exercise)  strengthening exercise;AROM (active range of motion)  -SD     Shoulder AROM (Therapeutic Exercise)  bilateral;flexion;extension;aBduction;aDduction;10 repetitions  -SD     Shoulder Strengthening (Therapeutic Exercise)  bilateral;flexion;extension;aBduction;aDduction;horizontal aBduction/aDduction;yellow;10 repetitions  -SD     Row Name 06/15/21 1408          Elbow/Forearm (Therapeutic Exercise)    Elbow/Forearm (Therapeutic Exercise)  strengthening exercise;AROM (active range of motion)  -SD     Elbow/Forearm AROM (Therapeutic Exercise)  bilateral;flexion;extension;10 repetitions  -SD     Elbow/Forearm Strengthening (Therapeutic Exercise)  bilateral;flexion;extension;yellow;10 repetitions  -SD     Row Name 06/15/21 1408          Therapeutic Exercise    Therapeutic Exercise  shoulder;elbow/forearm  -SD       User Key  (r) = Recorded By, (t) = Taken By, (c) = Cosigned By    Initials Name Provider Type    Lachelle Arenas OT Occupational Therapist        Goals/Plan    No documentation.       Clinical Impression     Row Name 06/15/21 1409          Pain Scale: Numbers Pre/Post-Treatment    Pretreatment Pain Rating  0/10 - no pain  -SD     Posttreatment Pain Rating  0/10 - no pain  -SD     Row Name 06/15/21 1409          Plan of Care Review    Plan of Care Reviewed With  patient  -SD     Progress  improving  -SD     Outcome Summary  OT tx completed. Patient completed BUE ther ex using yellow theraband. Continue OT POC  -SD     Row Name 06/15/21 1409          Positioning and Restraints    Pre-Treatment Position  sitting in chair/recliner  -SD     Post Treatment Position  chair  -SD     In Chair  sitting;call light within  reach;encouraged to call for assist  -SD       User Key  (r) = Recorded By, (t) = Taken By, (c) = Cosigned By    Initials Name Provider Type    Lachelle Arenas OT Occupational Therapist        Outcome Measures     Row Name 06/15/21 1411          How much help from another is currently needed...    Putting on and taking off regular lower body clothing?  3  -SD     Bathing (including washing, rinsing, and drying)  2  -SD     Toileting (which includes using toilet bed pan or urinal)  2  -SD     Putting on and taking off regular upper body clothing  3  -SD     Taking care of personal grooming (such as brushing teeth)  3  -SD     Eating meals  4  -SD     AM-PAC 6 Clicks Score (OT)  17  -SD     Row Name 06/15/21 1411          Functional Assessment    Outcome Measure Options  AM-PAC 6 Clicks Daily Activity (OT)  -SD       User Key  (r) = Recorded By, (t) = Taken By, (c) = Cosigned By    Initials Name Provider Type    Lachelle Arenas OT Occupational Therapist        Occupational Therapy Education                 Title: PT OT SLP Therapies (In Progress)     Topic: Occupational Therapy (In Progress)     Point: ADL training (Done)     Description:   Instruct learner(s) on proper safety adaptation and remediation techniques during self care or transfers.   Instruct in proper use of assistive devices.              Learning Progress Summary           Patient Acceptance, E,TB, VU by SD at 6/14/2021 1423    Comment: Benefit of OT; OT POC   Significant Other Acceptance, E,TB, VU by SD at 6/14/2021 1423    Comment: Benefit of OT; OT POC                   Point: Home exercise program (Done)     Description:   Instruct learner(s) on appropriate technique for monitoring, assisting and/or progressing therapeutic exercises/activities.              Learning Progress Summary           Patient Acceptance, E,TB, VU by SD at 6/15/2021 1411    Comment: UB HEP using yellow theraband                   Point: Precautions (Not Started)      Description:   Instruct learner(s) on prescribed precautions during self-care and functional transfers.              Learner Progress:  Not documented in this visit.          Point: Body mechanics (Not Started)     Description:   Instruct learner(s) on proper positioning and spine alignment during self-care, functional mobility activities and/or exercises.              Learner Progress:  Not documented in this visit.                      User Key     Initials Effective Dates Name Provider Type Discipline    SD 03/07/18 -  Lachelle Wolf OT Occupational Therapist OT              OT Recommendation and Plan  Planned Therapy Interventions (OT): activity tolerance training, adaptive equipment training, BADL retraining, patient/caregiver education/training, ROM/therapeutic exercise, strengthening exercise, transfer/mobility retraining  Therapy Frequency (OT): 3 times/wk (5 times if indicated)  Plan of Care Review  Plan of Care Reviewed With: patient  Progress: improving  Outcome Summary: OT tx completed. Patient completed BUE ther ex using yellow theraband. Continue OT POC     Time Calculation:   Time Calculation- OT     Row Name 06/15/21 1411             Time Calculation- OT    OT Start Time  1320  -SD      OT Stop Time  1338  -SD      OT Time Calculation (min)  18 min  -SD      OT Received On  06/15/21  -SD      OT Goal Re-Cert Due Date  06/28/21  -SD         Timed Charges    21036 - OT Therapeutic Exercise Minutes  18  -SD         Total Minutes    Timed Charges Total Minutes  18  -SD       Total Minutes  18  -SD        User Key  (r) = Recorded By, (t) = Taken By, (c) = Cosigned By    Initials Name Provider Type    SD Lachelle Wolf OT Occupational Therapist        Therapy Charges for Today     Code Description Service Date Service Provider Modifiers Qty    36833645857  OT EVAL MOD COMPLEXITY 3 6/14/2021 Lachelle Wolf OT GO 1    [id number]  OT THER PROC EA 15 MIN 6/15/2021 Lachelle Wolf OT GO 1                Lachelle Wolf, OT  6/15/2021

## 2021-06-15 NOTE — CASE MANAGEMENT/SOCIAL WORK
Continued Stay Note   Oskar     Patient Name: Garrett Richard  MRN: [id number]  Today's Date: 6/15/2021    Admit Date: 6/12/2021    Discharge Plan     Row Name 06/15/21 1544       Plan    Plan  Consult for pre auth on Xifaxan. Will fax form and clinicals when completed. [employer] has 24-72 hours to complete. Reference number [id number]. Call for status to [phone]        Discharge Codes    No documentation.       Expected Discharge Date and Time     Expected Discharge Date Expected Discharge Time    Jun 16, 2021             Lupe Manrique LCSW

## 2021-06-15 NOTE — PLAN OF CARE
Goal Outcome Evaluation:               More alert today. Able to answer some questions appropriately. Confused to time and situation. Participated with PT/OT. Had several BM this shift. Difficulty with some coordination difficulty with use of utensils with meals noted. Continue to monitor patient progress.

## 2021-06-15 NOTE — PLAN OF CARE
Goal Outcome Evaluation:  Plan of Care Reviewed With: patient        Progress: improving  Outcome Summary: OT tx completed. Patient completed BUE ther ex using yellow theraband. Continue OT POC

## 2021-06-15 NOTE — PROGRESS NOTES
[facility]    Name:  Garrett Richard   Age:  70 y.o.  Sex:  male  :  1950  MRN:  [id number]   Visit Number:  [id number]  Admission Date:  2021  Date Of Service:  06/15/21  Primary Care Physician:  Pierce Lubin MD     LOS: 3 days :  Patient Care Team:  Pierce Lubin MD as PCP - General (Internal Medicine):    History taken from:     patient chart    Chief Complaint:      Confusion    Subjective:    Interval History:     Patient seen and examined again today.    Patient is 70-year-old male with history of hepatocellular carcinoma, cirrhosis due to [place] who is currently on the liver transplant list.  He recently had SBP and presented to the ED with confusion and change in mental status.  Ammonia was found to be elevated.  He was placed on lactulose.      He currently denies any chest pressure, shortness of breath, nausea, vomiting, or pain.  He does not have any fevers or chills.  Do not suspect infection in this patient.  Spoke to his wife was present, he is close to baseline.  His ammonia has improved.  He is now on lactulose and rifaximin.  Patient needs to have 3-4 bowel movements per day, spoke to his wife regarding this.    Anticipate patient will be discharged home tomorrow if stable.    Review of Systems:     All systems were reviewed and negative except for:  Neurological: positive for  confusion    Objective:    Vital Signs:    Temp:  [97.6 °F (36.4 °C)-98.7 °F (37.1 °C)] 98.4 °F (36.9 °C)  Heart Rate:  [61-69] 66  Resp:  [16-18] 18  BP: (120-165)/(52-57) 120/53    Physical Exam:    General: Alert and oriented x3, mild distress.  Heart: Regular rate and rhythm without murmur rub or thrill.  Lungs: Clear to auscultation bilaterally without use of accessory muscles or respiration.  Abdomen: Soft/nontender/nondistended.  No HSM noted.  MSK: 4/5 strength in upper/lower extremities bilaterally.     Results Review:      I reviewed the patient's  new clinical results.  I reviewed the patient's new imaging results and agree with the interpretation.  I reviewed the patient's other test results and agree with the interpretation  I personally viewed and interpreted the patient's EKG/Telemetry data    Labs:    Results from last 7 days   Lab Units 06/15/21  0553 06/14/21  0606 06/13/21 0512 06/12/21 2101   SODIUM mmol/L 137 140 141 137   POTASSIUM mmol/L 4.2 4.0 4.1 4.6   CHLORIDE mmol/L 110* 111* 114* 109*   CO2 mmol/L 20.3* 21.0* 21.7* 19.3*   BUN mg/dL 21 25* 26* 26*   CREATININE mg/dL 1.03 1.09 0.90 1.14   CALCIUM mg/dL 8.9 8.9 8.9 8.5*   BILIRUBIN mg/dL 1.7* 1.9*  --  1.2   ALK PHOS U/L 84 72  --  79   ALT (SGPT) U/L 18 18  --  20   AST (SGOT) U/L 32 29  --  36   GLUCOSE mg/dL 143* 110* 129* 163*     Results from last 7 days   Lab Units 06/15/21  0553 06/14/21  0606 06/13/21  0512   WBC 10*3/mm3 5.37 5.04 3.71   HEMOGLOBIN g/dL 8.9* 9.0* 8.6*   HEMATOCRIT % 27.8* 27.6* 25.7*   PLATELETS 10*3/mm3 58* 66* 47*     Results from last 7 days   Lab Units 06/12/21 2147   INR  1.46*     Results from last 7 days   Lab Units 06/12/21 2101   CK TOTAL U/L 90   TROPONIN T ng/mL 0.027     Results from last 7 days   Lab Units 06/12/21 2147   BLOODCX  No growth at 2 days  No growth at 2 days     Results from last 7 days   Lab Units 06/12/21 2133   PH, ARTERIAL pH units 7.499*   PO2 ART mm Hg 74.8*   PCO2, ARTERIAL mm Hg 28.1*   HCO3 ART mmol/L 21.8*        Radiology:    Imaging Results (Last 24 Hours)     ** No results found for the last 24 hours. **          Medication Review:     ascorbic acid, 500 mg, Oral, Daily  [START ON 6/16/2021] ferrous sulfate, 324 mg, Oral, Daily With Breakfast  furosemide, 40 mg, Oral, Daily  insulin aspart, 0-14 Units, Subcutaneous, TID AC  insulin detemir, 25 Units, Subcutaneous, Nightly  lactulose, 20 g, Oral, 4x Daily  nadolol, 20 mg, Oral, Daily  pantoprazole, 40 mg, Oral, Daily  riFAXIMin, 550 mg, Oral, Q12H  sodium chloride, 10 mL,  Intravenous, Q12H  sodium chloride, 10 mL, Intravenous, Q12H  spironolactone, 100 mg, Oral, Daily  sucralfate, 1 g, Oral, 4x Daily             Assessment:    Principal Problem:    Hepatic encephalopathy (CMS/HCC)  Active Problems:    Reduced libido    Hyperlipidemia    Essential hypertension    Hepatocellular carcinoma (CMS/HCC)    Type 2 diabetes mellitus (CMS/HCC)    Increased ammonia level      1.  Hepatic encephalopathy  2.  Hepatocellular carcinoma, awaiting transplant  3.  Essential hypertension  4.  Type 2 diabetes with hyperglycemia  5.  Hyperlipidemia  6.  Deconditioning    Plan:    Increased lactulose.  Continue rifaximin.  Recheck lab work in the a.m.  Monitor blood pressure.    Spoke to his wife, answered all questions.  Anticipate discharge tomorrow.  Further recommendations will depend on the clinical course.    Mj Pope DO  06/15/21  14:08 EDT

## 2021-06-16 NOTE — DISCHARGE SUMMARY
[facility]      Name:  Garrett Richard   Age:  70 y.o.  Sex:  male  :  1950  MRN:  [id number]   Visit Number:  [id number]  Primary Care Physician:  Pierce Lubin MD  Date of Discharge:  2021  Admission Date:  2021      Discharge Diagnosis:     1.  Hepatic encephalopathy  2.  Hepatocellular carcinoma, awaiting transplant  3.  Essential hypertension  4.  Type 2 diabetes with hyperglycemia  5.  Hyperlipidemia  6.    Impaired ADLs, deconditioning  7.  Anemia of chronic disease.  Active Hospital Problems    Diagnosis  POA   • **Hepatic encephalopathy (CMS/HCC) [K72.90]  Yes   • Increased ammonia level [R79.89]  Yes   • Type 2 diabetes mellitus (CMS/HCC) [E11.9]  Yes   • Hepatocellular carcinoma (CMS/HCC) [C22.0]  Yes   • Reduced libido [R68.82]  Yes   • Essential hypertension [I10]  Yes   • Hyperlipidemia [E78.5]  Yes      Resolved Hospital Problems   No resolved problems to display.         Presenting Problem/History of Present Illness:    Hepatic encephalopathy (CMS/HCC) [K72.90]         Hospital Course:    Patient is 70-year-old male with history of hepatocellular carcinoma, cirrhosis due to [place] who is currently on the liver transplant list.  He recently had SBP and presented to the ED with confusion and change in mental status.  Ammonia was found to be elevated.  He was placed on lactulose.       He currently denies any chest pressure, shortness of breath, nausea, vomiting, or pain.  He does not have any fevers or chills.  Do not suspect infection in this patient.  Spoke to his wife was present, he is close to baseline.  His ammonia has improved.  He is now on lactulose and rifaximin.  Patient needs to have 3-4 bowel movements per day, spoke to his wife regarding this.    Patient to continue rifaximin.  [facility] will assist him in the payment of this.  He appears back to baseline.  He denies any other problems.  He will follow-up  with [facility] transplant clinic as soon as possible.  Follow-up with PCP in 1 week.    Procedures Performed:           Consults:     Consults     Date and Time Order Name Status Description    6/12/2021 11:25 PM IP General Consult (Use specialty-specific consult if known)      5/10/2021  1:32 AM Inpatient Gastroenterology Consult Completed           Pertinent Test Results:     Results from last 7 days   Lab Units 06/16/21  0523 06/15/21  0553 06/14/21  0606   SODIUM mmol/L 134* 137 140   POTASSIUM mmol/L 3.9 4.2 4.0   CHLORIDE mmol/L 106 110* 111*   CO2 mmol/L 19.9* 20.3* 21.0*   BUN mg/dL 20 21 25*   CREATININE mg/dL 1.26 1.03 1.09   CALCIUM mg/dL 8.4* 8.9 8.9   BILIRUBIN mg/dL 1.5* 1.7* 1.9*   ALK PHOS U/L 88 84 72   ALT (SGPT) U/L 18 18 18   AST (SGOT) U/L 29 32 29   GLUCOSE mg/dL 159* 143* 110*     Results from last 7 days   Lab Units 06/16/21  0523 06/15/21  0553 06/14/21  0606   WBC 10*3/mm3 4.92 5.37 5.04   HEMOGLOBIN g/dL 9.1* 8.9* 9.0*   HEMATOCRIT % 27.5* 27.8* 27.6*   PLATELETS 10*3/mm3 57* 58* 66*     Results from last 7 days   Lab Units 06/12/21  2147   INR  1.46*     Results from last 7 days   Lab Units 06/12/21  2101   CK TOTAL U/L 90   TROPONIN T ng/mL 0.027                 Results from last 7 days   Lab Units 06/12/21  2133   PH, ARTERIAL pH units 7.499*   PO2 ART mm Hg 74.8*   PCO2, ARTERIAL mm Hg 28.1*   HCO3 ART mmol/L 21.8*     Results from last 7 days   Lab Units 06/13/21  0002   COLOR UA  Yellow   GLUCOSE UA  Negative   KETONES UA  Negative   LEUKOCYTES UA  Negative   PH, URINE  7.0   BILIRUBIN UA  Negative   UROBILINOGEN UA  1.0 E.U./dL     Pain Management Panel     Pain Management Panel Latest Ref Rng & Units 9/29/2020 3/21/2018    CREATININE UR Not Estab. mg/dL 125.5 100        Results from last 7 days   Lab Units 06/12/21  2147   BLOODCX  No growth at 3 days  No growth at 3 days       Imaging Results (All)     Procedure Component Value Units Date/Time    XR Chest 1 View [[id number]] Collected:  "06/13/21 0906     Updated: 06/13/21 0909    Narrative:      PROCEDURE: XR CHEST 1 VW-     HISTORY: Weak/Dizzy/AMS triage protocol     COMPARISON: 05/28/2021.     FINDINGS: The heart is normal in size. The mediastinum is unremarkable.  The lungs are clear. There is no pneumothorax.  There are no acute  osseous abnormalities. Mild elevation of the right hemidiaphragm again.  Aortic mural calcifications identified.       Impression:      No acute cardiopulmonary process.     .     This report was finalized on 6/13/2021 9:06 AM by Yamilex Layne MD.    CT Head Without Contrast [[id number]] Collected: 06/12/21 2232     Updated: 06/12/21 2233    Narrative:      FINAL REPORT    TECHNIQUE:  Multiple axial CT images were performed from the foramen magnum  to the vertex. This study was performed with techniques to keep  radiation doses as low as reasonably achievable (ALARA).  Individualized dose reduction techniques using automated  exposure control or adjustment of mA and/or kV according to the  patient's size were employed.    CLINICAL HISTORY:  Mental status change, unknown cause    FINDINGS:  There is mild generalized cerebral atrophy.  There is decreased  attenuation in the white matter, consistent with mild small  vessel chronic ischemic change.  The ventricles are enlarged.  There is no evidence of edema or hemorrhage.  No masses are  identified.  No extra-axial fluid is seen.  The paranasal  sinuses are unremarkable.      Impression:      Atrophy and chronic changes without acute process.    Authenticated by Timo Higgins MD on 06/12/2021 10:32:17 PM          Results for orders placed in visit on 08/13/19    SCANNED - ECHOCARDIOGRAM      Condition on Discharge:      Stable    Vital Signs:    /52 (BP Location: Left arm, Patient Position: Sitting)   Pulse 60   Temp 98.1 °F (36.7 °C) (Oral)   Resp 19   Ht 182.9 cm (72\")   Wt 67.2 kg (148 lb 2.4 oz)   SpO2 95%   BMI 20.09 kg/m²     Physical Exam:    General: " Alert and oriented x3, mild distress.  Heart: Regular rate and rhythm without murmur rub or thrill.  Lungs: Clear to auscultation bilaterally without use of accessory muscles or respiration.  Abdomen: Soft/nontender/nondistended.  No HSM noted.  MSK: 4/5 strength in upper/lower extremities bilaterally.    Discharge Disposition:    Home-Health Care [facility]    Discharge Medication:       Discharge Medications      New Medications      Instructions Start Date   riFAXIMin 550 MG tablet  Commonly known as: XIFAXAN   Take 1 tablet by mouth Every 12 (Twelve) Hours         Changes to Medications      Instructions Start Date   furosemide 40 MG tablet  Commonly known as: Lasix  What changed:   · how much to take  · additional instructions   40 mg, Oral, Daily      insulin aspart 100 UNIT/ML solution pen-injector sc pen  Commonly known as: NovoLOG FlexPen  What changed: how much to take   30 Units, Subcutaneous, 3 Times Daily With Meals      lactulose 10 GM/15ML solution  Commonly known as: CHRONULAC  What changed: when to take this   20 g, Oral, 4 Times Daily      spironolactone 100 MG tablet  Commonly known as: Aldactone  What changed:   · how much to take  · when to take this  · additional instructions   Take 1 tablet by mouth Daily         Continue These Medications      Instructions Start Date   ferrous sulfate 325 (65 FE) MG tablet   325 mg, Oral, Daily With Breakfast, 2 tablets once a day       FreeStyle Sohail 14 Day Wilkinson device   1 each, Does not apply, Every 14 Days      Lantus SoloStar 100 UNIT/ML injection pen  Generic drug: Insulin Glargine   25 Units, Injection, Nightly      nadolol 20 MG tablet  Commonly known as: CORGARD   20 mg, Oral, Daily      pantoprazole 40 MG EC tablet  Commonly known as: Protonix   Take 1 tablet by mouth Daily      sucralfate 1 g tablet  Commonly known as: Carafate   Take 1 tablet by mouth 4 (Four) Times a Day      VITAMIN C ER PO   500 mg, Oral, Daily         Stop These Medications     sulfamethoxazole-trimethoprim 800-160 MG per tablet  Commonly known as: Bactrim DS            Discharge Diet:     Diet Instructions     Diet: Consistent Carbohydrate, Specialty Diet; Thin Liquids, No Restrictions; Low Protein      Discharge Diet:  Consistent Carbohydrate  Specialty Diet       Fluid Consistency: Thin Liquids, No Restrictions    Specialty Diets: Low Protein          Activity at Discharge:     Activity Instructions     Activity as Tolerated            Follow-up Appointments:    Future Appointments   Date Time Provider Department Center   7/1/2021  2:00 PM Pierce Murrell MD [facility]     Additional Instructions for the Follow-ups that You Need to Schedule     Ambulatory Referral to Home Health   As directed      Face to Face Visit Date: 6/16/2021    Follow-up provider for Plan of Care?: I treated the patient in an acute care facility and will not continue treatment after discharge.    Follow-up provider: PIERCE MURRELL [[id number]]    Reason/Clinical Findings: Impaired ADLs, weakness, hepatic encephalopathy, cirrhosis    Describe mobility limitations that make leaving home difficult: Impaired ADLs, weakness, hepatic encephalopathy, cirrhosis    Nursing/Therapeutic Services Requested: Skilled Nursing Physical Therapy    Skilled nursing orders: Cardiopulmonary assessments Medication education    PT orders: Strengthening Therapeutic exercise Transfer training Home safety assessment    Frequency: 1 Week 1         Discharge Follow-up with PCP   As directed       Currently Documented PCP:    Pierce Murrell MD    PCP Phone Number:    [phone]     Follow Up Details: 1 week         Discharge Follow-up with Specified Provider: Follow-up with liver transplant unit at  as soon as possible.   As directed      To: Follow-up with liver transplant unit at  as soon as possible.               Test Results Pending at Discharge:    Pending Labs     Order Current Status    Blood Culture - Blood, Arm, Left  Preliminary result    Blood Culture - Blood, Arm, Right Preliminary result        Discharge took less than 30 minutes.     Mj Pope DO  06/16/21  11:48 EDT

## 2021-06-16 NOTE — CASE MANAGEMENT/SOCIAL WORK
Continued Stay Note   [facility]     Patient Name: Garrett Richard  MRN: [id number]  Today's Date: 6/16/2021    Admit Date: 6/12/2021    Discharge Plan     Row Name 06/16/21 1221       Plan    Plan  called [facility] with new orders and dcing today; agreed to follow up with pt    Row Name 06/16/21 1142       Plan    Plan  Pre auth on medication approved, but still 900 dollars. Discussed Medicare extra help with patient and wife. They will follow up. Wife called  transplant team and the coordinator stated they would cover it. Notified MD.        Discharge Codes    No documentation.       Expected Discharge Date and Time     Expected Discharge Date Expected Discharge Time    Jun 16, 2021             Malia Oseguera RN

## 2021-06-16 NOTE — CASE MANAGEMENT/SOCIAL WORK
Case Management Discharge Note           Provided Post Acute Provider List?: N/A  N/A Provider List Comment: No Needs  Provided Post Acute Provider Quality & Resource List?: N/A  N/A Quality & Resource List Comment: No needs    Selected Continued Care - Discharged on 6/16/2021 Admission date: 6/12/2021 - Discharge disposition: Home-Health Care [facility] [place]        Home Medical Care Coordination complete    Service Provider Selected Services Address Phone Fax Patient Preferred    [facility] [address] [address] [phone] --                Selected Continued Care - Prior Encounters Includes selections from prior encounters from 3/14/2021 to 6/16/2021    Discharged on 5/29/2021 Admission date: 5/28/2021 - Discharge disposition: Home-Health Care [facility] [place]    Home Medical Care     Service Provider Selected Services Address Phone Fax Patient Preferred    [facility] [address] [address] [phone] --       Internal Comment last updated by Malia Oseguera RN 6/1/2021 1316    6/1 called and confirmed accepted patient                               Transportation Services  Private: Car    Final Discharge Disposition Code: 06 - home with home health care

## 2021-06-16 NOTE — OUTREACH NOTE
Prep Survey      Responses   [facility]?  [facility]   Is LACE score < 7 ?  No   Emergency Room discharge w/ pulse ox?  No   Eligibility  [facility]   Date of Admission  06/12/21   Date of Discharge  06/16/21   Discharge Disposition  Home-Health Care [facility]   Discharge diagnosis  Hepatic encephalopathy   Does the patient have one of the following disease processes/diagnoses(primary or secondary)?  Other   Does the patient have Home health ordered?  Yes   What is the Home health agency?   [facility]   Is there a DME ordered?  No   Prep survey completed?  Yes          Sandra Lucas RN

## 2021-06-16 NOTE — CASE MANAGEMENT/SOCIAL WORK
Continued Stay Note   [facility]     Patient Name: Garrett Richard  MRN: [id number]  Today's Date: 6/16/2021    Admit Date: 6/12/2021    Discharge Plan     Row Name 06/16/21 1142       Plan    Plan  Pre auth on medication approved, but still 900 dollars. Discussed Medicare extra help with patient and wife. They will follow up. Wife called [facility] transplant team and the coordinator stated they would cover it. Notified MD.        Discharge Codes    No documentation.       Expected Discharge Date and Time     Expected Discharge Date Expected Discharge Time    Jun 16, 2021             Lupe Manrique LCSW

## 2021-06-16 NOTE — PLAN OF CARE
Goal Outcome Evaluation:   Patient discharged to home with wife at this time. 20G to the right wrist discontinued with tip intact, no redness or edema at the site. Telemetry removed, cleaned and placed back on unit. Discussed f/u appointments, discharge activity and medications, verbalized understanding. Awaiting meds to beds currently.

## 2021-06-17 NOTE — OUTREACH NOTE
Call Center TCM Note      Responses   [facility] patient discharged from?  [facility]   Does the patient have one of the following disease processes/diagnoses(primary or secondary)?  Other   TCM attempt successful?  No   Unsuccessful attempts  Attempt 1 [patient and spouse]          Rebeca Rodriguez RN    6/17/2021, 14:34 EDT

## 2021-06-17 NOTE — OUTREACH NOTE
Call Center TCM Note      Responses   [facility] [place] patient discharged from?  [facility]   Does the patient have one of the following disease processes/diagnoses(primary or secondary)?  Other   TCM attempt successful?  No   Unsuccessful attempts  Attempt 2          Rebeca Rodriguez RN    6/17/2021, 15:52 EDT

## 2021-06-18 PROBLEM — E11.9 TYPE 2 DIABETES MELLITUS (HCC): Status: RESOLVED | Noted: 2021-01-01 | Resolved: 2021-01-01

## 2021-06-18 PROBLEM — K76.82 HEPATIC ENCEPHALOPATHY (HCC): Status: RESOLVED | Noted: 2021-01-01 | Resolved: 2021-01-01

## 2021-06-18 NOTE — OUTREACH NOTE
Call Center TCM Note      Responses   [facility] patient discharged from?  [facility]   Does the patient have one of the following disease processes/diagnoses(primary or secondary)?  Other   TCM attempt successful?  No [Bonny-wife]   Unsuccessful attempts  Attempt 3          Elizabeth Carranza RN    6/18/2021, 09:47 EDT

## 2021-06-18 NOTE — PROGRESS NOTES
Transitional Care Follow Up Visit  Subjective     Garrett Tian Richard is a 70 y.o. male who presents for a transitional care management visit.    Within 48 business hours after discharge our office contacted him via telephone to coordinate his care and needs.      I reviewed and discussed the details of that call along with the discharge summary, hospital problems, inpatient lab results, inpatient diagnostic studies, and consultation reports with Garrett.     Current outpatient and discharge medications have been reconciled for the patient.  Reviewed by: Pierce Lubin MD      Date of TCM Phone Call 6/16/2021   [facility]   Date of Admission 6/12/2021   Date of Discharge 6/16/2021   Discharge Disposition Home-Health Care [facility]     Risk for Readmission (LACE) Score: 17 (6/16/2021  6:02 AM)      History of Present Illness   Course During Hospital Stay: Coming in after recent hospital admission for hepatic encephalopathy.  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  History of renal mass status post resection.  Has had significant ascites.  Admitted with complaints of confusion noted to have elevated ammonia level.  He has stabilized in the hospital and since then is on lactulose 4 times a day.  He has also been placed on Xifaxan which he has not had filled yet because of the high cost.  Some of the history provided by his wife who is accompanying him.  Latest meld score is at 11   Today he appears comfortable awake alert.  Denies significant leg swelling or abdominal distention has reasonably good oral intake.  Has not had a bowel movement today.  Wife is concerned.  The following portions of the patient's history were reviewed and updated as appropriate: allergies, current medications, past family history, past medical history, past social history, past surgical history and problem list.    Review of Systems   Constitutional: Positive  for fatigue. Negative for activity change, appetite change and fever.   HENT: Negative for congestion, ear discharge, ear pain and trouble swallowing.    Eyes: Negative for photophobia and visual disturbance.   Respiratory: Negative for cough and shortness of breath.    Cardiovascular: Negative for chest pain and palpitations.   Gastrointestinal: Negative for abdominal distention, abdominal pain, constipation, diarrhea, nausea and vomiting.   Endocrine: Negative.    Genitourinary: Negative for dysuria, hematuria and urgency.   Musculoskeletal: Positive for arthralgias. Negative for back pain, joint swelling and myalgias.   Skin: Negative for color change and rash.   Allergic/Immunologic: Negative.    Neurological: Negative for dizziness, weakness, light-headedness and headaches.   Hematological: Negative for adenopathy. Does not bruise/bleed easily.   Psychiatric/Behavioral: Positive for sleep disturbance. Negative for agitation, confusion and dysphoric mood. The patient is not nervous/anxious.        Objective   Physical Exam  Constitutional:       General: He is not in acute distress.     Appearance: He is well-developed.   HENT:      Nose: Nose normal.   Eyes:      General: No scleral icterus.     Conjunctiva/sclera: Conjunctivae normal.   Neck:      Thyroid: No thyromegaly.      Trachea: No tracheal deviation.   Cardiovascular:      Rate and Rhythm: Normal rate and regular rhythm.      Heart sounds: No murmur heard.   No friction rub.   Pulmonary:      Effort: No respiratory distress.      Breath sounds: No wheezing or rales.   Abdominal:      General: There is no distension.      Palpations: Abdomen is soft. There is no mass.      Tenderness: There is no abdominal tenderness. There is no guarding.   Musculoskeletal:         General: No deformity. Normal range of motion.   Lymphadenopathy:      Cervical: No cervical adenopathy.   Skin:     General: Skin is warm and dry.      Findings: No erythema or rash.    Neurological:      Mental Status: He is alert and oriented to person, place, and time.      Cranial Nerves: No cranial nerve deficit.      Coordination: Coordination normal.      Deep Tendon Reflexes: Reflexes are normal and symmetric.   Psychiatric:         Behavior: Behavior normal.         Thought Content: Thought content normal.         Judgment: Judgment normal.         Assessment/Plan   Diagnoses and all orders for this visit:    1. Encephalopathy, hepatic (CMS/HCC) (Primary) continue with bowel regimen today I have advised him to increase his lactulose dosing to 45 mL every 2-4 hours as needed until he has a bowel movement.  Encouraged to drink enough fluids    2. Type 1 diabetes mellitus with diabetic polyneuropathy (CMS/HCC) stable readings at home show good control

## 2021-06-22 NOTE — TELEPHONE ENCOUNTER
PHONE CALL FROM [facility]YS[facility]ERAP[facility].  THEY WOULD LIKE A VERBAL ORDER FOR PHYSICAL THERAPY FOR MUSCLE STRENGTHENING, BALANCE AND GAIT TRAINING AND PATIENT EDUCATION.  FOR ONCE WEEKLY X 5 WEEKS.     PLEASE CALL JULITA @ [phone]

## 2021-06-23 NOTE — OUTREACH NOTE
Medical Week 2 Survey      Responses   [facility] patient discharged from?  [facility]   Does the patient have one of the following disease processes/diagnoses(primary or secondary)?  Other   Week 2 attempt successful?  No   Unsuccessful attempts  Attempt 2          Ayala Kiran RN

## 2021-06-29 NOTE — OUTREACH NOTE
Medical Week 3 Survey      Responses   [facility] patient discharged from?  [facility]   Does the patient have one of the following disease processes/diagnoses(primary or secondary)?  Other   Week 3 attempt successful?  No   Unsuccessful attempts  Attempt 1          Marilyn Monroe RN

## 2021-06-30 NOTE — OUTREACH NOTE
Medical Week 3 Survey      Responses   [facility] patient discharged from?  [facility]   Does the patient have one of the following disease processes/diagnoses(primary or secondary)?  Other   Week 3 attempt successful?  No   Unsuccessful attempts  Attempt 2          Jessica Sena RN

## 2021-07-01 NOTE — PROGRESS NOTES
"Subjective  Garrett Richard is a 70 y.o. 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  Status post TACE procedure.  Awaiting transplant now has had history of hepatic encephalopathy however now with a regular lactulose dosing schedule has been doing reasonably well denies recent melena or hematochezia he is scheduled for endoscopic evaluation again tomorrow    The following portions of the patient's history were reviewed and updated as appropriate: allergies, current medications, past family history, past medical history, past social history, past surgical history, and problem list.     Review of Systems   Constitutional: Positive for fatigue. Negative for activity change, appetite change and fever.   HENT: Negative for congestion, ear discharge, ear pain and trouble swallowing.    Eyes: Negative for photophobia and visual disturbance.   Respiratory: Negative for cough and shortness of breath.    Cardiovascular: Negative for chest pain and palpitations.   Gastrointestinal: Negative for abdominal distention, abdominal pain, constipation, diarrhea, nausea and vomiting.   Endocrine: Negative.    Genitourinary: Negative for dysuria, hematuria and urgency.   Musculoskeletal: Negative for arthralgias, back pain, joint swelling and myalgias.   Skin: Negative for color change and rash.   Allergic/Immunologic: Negative.    Neurological: Negative for dizziness, weakness, light-headedness and headaches.   Hematological: Negative for adenopathy. Does not bruise/bleed easily.   Psychiatric/Behavioral: Positive for sleep disturbance. Negative for agitation, confusion and dysphoric mood. The patient is not nervous/anxious.        Visit Vitals  /60   Pulse 66   Temp 97.5 °F (36.4 °C) (Infrared)   Ht 182.9 cm (72\")   Wt 67.6 kg (149 lb 1.9 oz)   SpO2 97%   BMI 20.22 kg/m²       Objective  Physical Exam  Constitutional:       General: He is not in " acute distress.     Appearance: He is well-developed.   HENT:      Nose: Nose normal.   Eyes:      General: No scleral icterus.     Conjunctiva/sclera: Conjunctivae normal.   Neck:      Thyroid: No thyromegaly.      Trachea: No tracheal deviation.   Cardiovascular:      Rate and Rhythm: Normal rate and regular rhythm.      Heart sounds: No murmur heard.   No friction rub.   Pulmonary:      Effort: No respiratory distress.      Breath sounds: No wheezing or rales.   Abdominal:      General: There is no distension.      Palpations: Abdomen is soft. There is no mass.      Tenderness: There is no abdominal tenderness. There is no guarding.   Musculoskeletal:         General: No deformity. Normal range of motion.   Lymphadenopathy:      Cervical: No cervical adenopathy.   Skin:     General: Skin is warm and dry.      Findings: No erythema or rash.   Neurological:      Mental Status: He is alert and oriented to person, place, and time.      Cranial Nerves: No cranial nerve deficit.      Coordination: Coordination normal.      Deep Tendon Reflexes: Reflexes are normal and symmetric.   Psychiatric:         Behavior: Behavior normal.         Thought Content: Thought content normal.         Judgment: Judgment normal.         Diagnoses and all orders for this visit:    Encephalopathy, hepatic (CMS/HCC) stable on lactulose has not been able to get Xifaxan due to high cost    Other orders  -     sennosides-docusate (PERICOLACE) 8.6-50 MG per tablet; Take 1 tablet by mouth. otc

## 2021-07-09 NOTE — TELEPHONE ENCOUNTER
Provider: MUMTAZ MURRELL MD    Caller: DUARTE MOROCHO    Relationship to Patient: WIFE     Pharmacy:   [facility]/pharmacy #[id number] - [place] - [address] [facility] - [phone] [facility] [phone] FX      Phone Number: [phone] (U)    Reason for Call:   WIFE CALLED TO INFORM DR. MURRELL THAT PATIENT HAS BEEN RUNNING A LOW GRADE FEVER, SHE WILL ALSO LIKE TO KNOW THAT IN THE EVENT THAT THE PATIENT'S FEVER SPIKES HIGH WHAT MEDICATION SHOULD SHE PROVIDE TO REDUCE THE FEVER AS THE PATIENT DOES HAVE A LIVER DISEASE. PATIENT HAS ALSO BEEN COMPLAINING OF CHILLS, AND BODY IS WARM TO THE TOUCH, WIFE STATES THAT SHE BELIEVES THAT THE PATIENT IS RETAINING FLUID  IN THE STOMACH. PATIENT HAS BEEN WEAK IN THE LEGS AS WELL THROUGHOUT THE NIGHT.    When was the patient last seen: 7/1/21    When did it start: 7/8/21 @ 7PM    Where is it located: PATIENT BODY    Timing- Is it constant or intermittent: CONSTANT SINCE LAST NIGHT    What makes it worse: NO    What makes it better: NO    What therapies/medications have you tried: NONE AT THIS POINT SEEKING PROFESSIONAL MEDICAL ADVISE.    PATIENT HAS BEEN ADVISED TO ALLOW 48 HOURS FOR THE CLINICAL TEAM TO FOLLOW UP ON THIS REQUEST,  IF SYMPTOMS WORSENS TO SEEK OUT EMERGENT CARE. PATIENT  FULLY UNDERSTANDS.

## 2021-07-09 NOTE — TELEPHONE ENCOUNTER
Called wife, per verbal orders from dR Lubin I advised her he could use Tylenol 325mg every 12 hours prn.  I inquired whether he was taking the Xifaxin and she stated [facility] was still working on getting patient assistance to get it for him so he has not started it yet but he is having bowel movements with the lactulose.  Advised wife if he gets any worse over the weekend to go the ER.  She verbalized understanding.

## 2021-07-12 NOTE — TELEPHONE ENCOUNTER
Continue nadolol as long as the blood pressure is stable that is systolic above 110 most of the time

## 2021-07-12 NOTE — TELEPHONE ENCOUNTER
Caller: oBnny Richard    Relationship: Emergency Contact    Best call back number:     What is the best time to reach you: ANYTIME    Who are you requesting to speak with (clinical staff, provider,  specific staff member): CLINCAL STAFF    Do you know the name of the person who called: BONNY    What was the call regarding: PATIENT WAS GIVEM NADOLOL 20 MG WHILE AT THE ER AT  BACK IN June 2021 AND DIDN'T KNOW IF THEY SHOULD CONTINUE THE MEDIATION OR STOP; PLEASE CALL TO ADVISE    Do you require a callback: YES

## 2021-07-12 NOTE — TELEPHONE ENCOUNTER
Bonny informed via detailed voice mail per verbal release     Ok for [facility] to deliver if patient calls back

## 2021-07-16 NOTE — TELEPHONE ENCOUNTER
PATIENT'S WIFE DUARTE CALLED TO REPORT THAT PATIENT'S BLOOD PRESSURE READ 90/56 TODAY. PATIENT IS STILL TAKING THE nadolol (CORGARD) 20 MG tablet DUARTE NEEDS TO KNOW SHOULD PATIENT STOP TAKING IT FOR GOOD. PLEASE RETURN CALL [phone]

## 2021-07-16 NOTE — TELEPHONE ENCOUNTER
Discussed with wife.  Patient not symptomatic with hypotension.  Denies lightheadedness or fatigue.  Advised to continue Corgard for now

## 2021-07-21 NOTE — TELEPHONE ENCOUNTER
Caller: Bonny Richard    Relationship: Emergency Contact    Best call back number: [phone]    What medication are you requesting: SUCRALFATE 1 GRAM TABLET, TAKES 1 TABLET 4 TIMES DAILY    If a prescription is needed, what is your preferred pharmacy and phone number: [facility]/PHARMACY #[id number] [facility] [address] [facility] - [phone] [facility] [phone]      Additional notes: BONNY STATED THE PATIENT WAS PRESCRIBED THIS ON 6/16/21 BY A DOCTOR AT [facility]. PATIENT HAS 3 DAYS LEFT OF THIS MEDICATION AND WOULD LIKE A REFILL CALLED IN AS SOON AS POSSIBLE

## 2021-07-21 NOTE — TELEPHONE ENCOUNTER
Please advise and prescribe medication if appropriate since requested medication was given in the hospital

## 2021-08-06 NOTE — TELEPHONE ENCOUNTER
Haresh Mann gave him verbal orders for muscle strengthening, gait training, balance and patient education for 1 week 6

## 2021-08-06 NOTE — TELEPHONE ENCOUNTER
Caller: [facility] KRAIG    Best call back number: [phone]    What orders are you requesting (i.e. lab or imaging): PHYSICAL THERAPY    Where will you receive your lab/imaging services: HOME    Additional notes: KRAIG NEEDS A VERBAL ORDER FOR MUSCLE STRENGTHENING, GAIT TRAINING, BALANCE AND PATIENT EDUCATION FOR 1 WEEK 6.

## 2021-08-08 PROBLEM — E11.21 TYPE 2 DIABETES MELLITUS WITH NEPHROPATHY: Status: ACTIVE | Noted: 2021-01-01

## 2021-08-08 PROBLEM — N18.30 CHRONIC KIDNEY DISEASE, STAGE III (MODERATE) (HCC): Status: ACTIVE | Noted: 2021-01-01

## 2021-08-08 PROBLEM — K76.82 HEPATIC ENCEPHALOPATHY (HCC): Status: ACTIVE | Noted: 2021-01-01

## 2021-08-08 NOTE — ED PROVIDER NOTES
TRIAGE CHIEF COMPLAINT:     Nursing and triage notes reviewed    Chief Complaint   Patient presents with   • Fall   • Weakness - Generalized      HPI: Garrett Richard is a 71 y.o. male who presents to the emergency department complaining of generalized weakness and multiple falls. Patient has a history of hepatocellular carcinoma and liver disease. Patient is on transplant list at the [facility]. Patient has been having worsening weakness intermittently over the past year. Patient has good days and bad days according to family. Patient has fallen 2 times in the last 24 hours. It is uncertain if patient has lost his balance and fallen or if he has passed out although patient states he has not passed out. Family states he has had issues with his ammonia in the past as well as infection. Is not had any fevers recently. Patient states he did not hit his head when he fell. He denies any nausea or vomiting.    REVIEW OF SYSTEMS: All other systems reviewed and are negative     PAST MEDICAL HISTORY:   Past Medical History:   Diagnosis Date   • AL (acute kidney injury) (CMS/HCC) 5/28/2021   • Diabetes mellitus (CMS/HCC)     type II per patient   • Hepatocellular carcinoma (CMS/HCC) 10/25/2018    s/p TACE procedure with no mass noted on fu imaging   • History of transfusion    • Hypertension    • Impaired functional mobility, balance, gait, and endurance    • Liver disease    • Umbilical hernia    • Upper respiratory infection    • Urinary symptom or sign    • Urinary tract infection         FAMILY HISTORY:   Family History   Problem Relation Age of Onset   • Colon cancer Mother    • Diabetes Maternal Grandmother    • COPD Father    • No Known Problems Maternal Grandfather    • No Known Problems Paternal Grandmother    • No Known Problems Paternal Grandfather         SOCIAL HISTORY:   Social History     Socioeconomic History   • Marital status:      Spouse name: Not on file   • Number of children: Not  on file   • Years of education: Not on file   • Highest education level: Not on file   Tobacco Use   • Smoking status: Never Smoker   • Smokeless tobacco: Former User     Types: Snuff   Vaping Use   • Vaping Use: Never used   Substance and Sexual Activity   • Alcohol use: No   • Drug use: No   • Sexual activity: Defer        SURGICAL HISTORY:   Past Surgical History:   Procedure Laterality Date   • ENDOSCOPY N/A 5/11/2021    Procedure: ESOPHAGOGASTRODUODENOSCOPY WITH ARGON THERMAL ABLATION;  Surgeon: Samy Swanson MD;  Location: [facility] ENDOSCOPY;  Service: Gastroenterology;  Laterality: N/A;   • LIVER SURGERY     • TONSILLECTOMY          CURRENT MEDICATIONS:      Medication List      CONTINUE taking these medications    B-D ULTRAFINE III SHORT PEN 31G X 8 MM misc  Generic drug: Insulin Pen Needle     FreeStyle Sohail 14 Day [place] device  1 each Every 14 (Fourteen) Days.        ASK your doctor about these medications    ascorbic acid 500 MG tablet  Commonly known as: VITAMIN C     ferrous sulfate 325 (65 FE) MG tablet     furosemide 40 MG tablet  Commonly known as: Lasix  Take 1 tablet by mouth Daily for 90 days.     insulin aspart 100 UNIT/ML solution pen-injector sc pen  Commonly known as: NovoLOG FlexPen  Inject 30 Units under the skin into the appropriate area as directed 3 (Three) Times a Day With Meals.     lactulose 10 GM/15ML solution  Commonly known as: CHRONULAC  Take 30 mL by mouth 4 (Four) Times a Day.     Lantus SoloStar 100 UNIT/ML injection pen  Generic drug: Insulin Glargine     pantoprazole 40 MG EC tablet  Commonly known as: Protonix  Take 1 tablet by mouth Daily     riFAXIMin 550 MG tablet  Commonly known as: XIFAXAN     sennosides-docusate 8.6-50 MG per tablet  Commonly known as: PERICOLACE     spironolactone 100 MG tablet  Commonly known as: Aldactone  Take 1 tablet by mouth Daily     sucralfate 1 g tablet  Commonly known as: Carafate  Take 1 tablet by mouth 4 (Four) Times a Day.              ALLERGIES: Patient has no known allergies.     PHYSICAL EXAM:   VITAL SIGNS:   Vitals:    08/09/21 0700   BP: 122/61   Pulse: 76   Resp: 18   Temp: 97.9 °F (36.6 °C)   SpO2: 97%      CONSTITUTIONAL: Awake, oriented, appears non-toxic   HENT: Atraumatic, normocephalic, oral mucosa pink and moist, airway patent. Nares patent without drainage. External ears normal.   EYES: Conjunctiva clear, EOMI, PERRL   NECK: Trachea midline, non-tender, supple   CARDIOVASCULAR: Normal heart rate, Normal rhythm, No murmurs, rubs, gallops   PULMONARY/CHEST: Clear to auscultation, no rhonchi, wheezes, or rales. Symmetrical breath sounds   ABDOMINAL: Mild distention, soft, non-tender - no rebound or guarding.   NEUROLOGIC: Non-focal, moving all four extremities, no gross sensory or motor deficits.   EXTREMITIES: No clubbing, cyanosis, or edema. There are skin tears on the bilateral elbows but no obvious bony tenderness.  SKIN: Warm, Dry, skin tears as mentioned above. Multiple bruises on the arms.    ED COURSE / MEDICAL DECISION MAKING:   Garrett Richard is a 71 y.o. male who presents to the emergency department for evaluation of generalized weakness and multiple falls. Patient is nondistressed on arrival in the emergency department. Vital signs are stable on arrival. Physical examination does reveal multiple skin tears on the elbows as well as several bruises. Patient has mild distention of his abdomen consistent with his history of liver disease. Exam is otherwise largely unremarkable. Will obtain labs, imaging, EKG for further evaluation.    EKG interpreted by me reveals sinus rhythm with rate 82 bpm. There are nonspecific ST-T wave changes. This is an abnormal appearing EKG.    Critical Care  Performed by: Jose Valadez DO  Authorized by: Jose Valadez DO     Critical care provider statement:     Critical care time (minutes): 45    Critical care time was exclusive of:  Separately billable procedures and treating other  patients    Critical care was necessary to treat or prevent imminent or life-threatening deterioration of the following conditions: Hyperammonemia, hepatic encephalopathy, generalized weakness, chronic liver failure other    Critical care was time spent personally by me on the following activities:  Ordering and performing treatments and interventions, development of treatment plan with patient or surrogate, discussions with consultants, evaluation of patient's response to treatment, examination of patient, ordering and review of laboratory studies, ordering and review of radiographic studies, pulse oximetry, re-evaluation of patient's condition and review of old charts        DECISION TO DISCHARGE/ADMIT: see ED care timeline     FINAL IMPRESSION:   1 --hepatic encephalopathy  2 --end-stage liver disease.  3 --altered mental status.  Electronically signed by: Courtney Marr MD, 8/9/2021 08:48 EDT      Jose Valadez DO  I received this patient on signout from Dr. Marr.  Patient presented to the ED with generalized weakness multiple falls and some alteration in his mental status.  Laboratory results are consistent with his known end-stage liver disease.  His ammonia was greater than 160.  He has been taking lactulose and Xifaxan at home per the wife.  Patient is very chronically ill-appearing currently on the list for a liver transplant at .  The wife does not wish to be transferred to .  We will treat him here medically.  Discussed with hospitalist Dr. Mcgraw, who graciously accepts patient for admission for further evaluation and medical management.  Patient may benefit from hospice consult while here in the facility.     Jose Valadez,   08/09/21 1948

## 2021-08-08 NOTE — PLAN OF CARE
Goal Outcome Evaluation:  Plan of Care Reviewed With: patient     Interventions implemented as appropriate.

## 2021-08-08 NOTE — H&P
[facility]   HISTORY AND PHYSICAL      Name:  Garrett Richard   Age:  71 y.o.  Sex:  male  :  1950  MRN:  [id number]   Visit Number:  [id number]  Admission Date:  2021  Date Of Service:  21  Primary Care Physician:  Pierce Lubin MD    Chief Complaint:     Drowsiness and generalized weakness.    History Of Presenting Illness:      Mr. Richard is a 71-year-old male with history of end-stage liver disease, hepatocellular carcinoma who apparently is followed at [facility] was brought to the emergency room by his family with progressive generalized weakness and drowsiness.  Patient apparently has had a couple of falls in the last 24 hours due to his generalized weakness.  This falls were witnessed by his wife.  Patient did get on the right side of the head 1 time.  No history of loss of consciousness.  No history of any fevers, nausea or vomiting.  Patient has been compliant with his lactulose and rifaximin.    In the emergency room, he was afebrile and hemodynamically stable saturating at 99% at room air.  Blood work done in the emergency room including CMP and CBC was remarkable for a glucose of 220, creatinine 1.36, albumin of 2.5, hemoglobin of 10.4 and a platelet count of 66.  Procalcitonin, magnesium and troponin levels were within normal limits.  Ammonia was elevated at 166 but lactic acid was normal around 2.  CT of the head was unremarkable.  X-ray of the left elbow was negative for any fractures.  Patient was given normal saline 100 mL bolus in the emergency room and was also given 20 g of lactulose.  He is currently being admitted to the medical floor for further evaluation and management of his hepatic encephalopathy.  Patient is currently alert and answering questions appropriately.    Patient does have history of liver cirrhosis likely related to fatty liver disease and also has had hepatocellular carcinoma treated by .   "Patient has undergone laser photocoagulation of the liver mass in the kidney metastatic lesion in the past and had another recurrence of liver mass in January of this year undergoing laser photocoagulation.  According to the wife, patient has \"gone downhill\" since then.  He does use a walker to ambulate at home.    Review Of Systems:    All systems were reviewed and negative except for: Generalized weakness, confusion, drowsiness, falls.    Past Medical History: Patient  has a past medical history of AL (acute kidney injury) (CMS/HCC) (5/28/2021), Diabetes mellitus (CMS/HCC), Hepatocellular carcinoma (CMS/HCC) (10/25/2018), Hypertension, Impaired functional mobility, balance, gait, and endurance, Liver disease, Umbilical hernia, Upper respiratory infection, Urinary symptom or sign, and Urinary tract infection.    Past Surgical History: Patient  has a past surgical history that includes Tonsillectomy; Liver surgery; and Esophagogastroduodenoscopy (N/A, 5/11/2021).    Social History: Patient  reports that he has never smoked. He has never used smokeless tobacco. He reports that he does not drink alcohol and does not use drugs.    Family History: Patient's family history includes COPD in his father; Colon cancer in his mother; Diabetes in his maternal grandmother; No Known Problems in his maternal grandfather, paternal grandfather, and paternal grandmother.    Allergies:      Patient has no known allergies.    Home Medications:    Prior to Admission Medications     Prescriptions Last Dose Informant Patient Reported? Taking?    ascorbic acid (VITAMIN C) 500 MG tablet   Yes No    Take 500 mg by mouth Daily.    Continuous Blood Gluc  (Cryptonatoryle Sohail 14 Day [place]) device   No No    1 each Every 14 (Fourteen) Days.    ferrous sulfate 325 (65 FE) MG tablet   Yes No    Take 325 mg by mouth Daily With Breakfast. 2 tablets once a day    furosemide (Lasix) 40 MG tablet  Spouse/Significant Other No No    Take 1 tablet " by mouth Daily for 90 days.    Patient taking differently:  Take 20 mg by mouth Daily. Per pt spouse, Dr. Lubin instructed pt to d/c taking medication. Pt to continue medication starting 5/31/21.    insulin aspart (NovoLOG FlexPen) 100 UNIT/ML solution pen-injector sc pen   No No    Inject 30 Units under the skin into the appropriate area as directed 3 (Three) Times a Day With Meals.    Patient taking differently:  Inject 7 Units under the skin into the appropriate area as directed 3 (Three) Times a Day With Meals. Sliding scale - depending on glucose readings    Insulin Pen Needle (B-D ULTRAFINE III SHORT PEN) 31G X 8 MM misc   Yes No    lactulose (CHRONULAC) 10 GM/15ML solution   No No    Take 30 mL by mouth 4 (Four) Times a Day.    Lantus SoloStar 100 UNIT/ML injection pen   Yes No    Inject 10 Units as directed Every Night. 10 units at bedtime if the first morning reading is over 184    nadolol (CORGARD) 20 MG tablet   Yes No    Take 20 mg by mouth Daily.    pantoprazole (Protonix) 40 MG EC tablet   No No    Take 1 tablet by mouth Daily    sennosides-docusate (PERICOLACE) 8.6-50 MG per tablet   Yes No    Take 1 tablet by mouth. otc    spironolactone (Aldactone) 100 MG tablet  Spouse/Significant Other No No    Take 1 tablet by mouth Daily    Patient taking differently:  Take 50 mg by mouth 2 (Two) Times a Day. Pt spouse states Dr. Lubin instructed pt to d/c dose until 5/31/21. Can continue with 50 mg BID instead of the previous 100 mg.    sucralfate (Carafate) 1 g tablet   No No    Take 1 tablet by mouth 4 (Four) Times a Day.        ED Medications:    Medications   sodium chloride 0.9 % flush 10 mL (has no administration in time range)   sodium chloride 0.9 % bolus 500 mL (0 mL Intravenous Stopped 8/8/21 6799)   lactulose (CHRONULAC) 10 GM/15ML solution 20 g (20 g Oral Given 8/8/21 0914)     Vital Signs:  Temp:  [98.3 °F (36.8 °C)] 98.3 °F (36.8 °C)  Heart Rate:  [81-88] 83  Resp:  [18] 18  BP: (121-133)/(70-80)  122/71        08/08/21  0545   Weight: 67.6 kg (149 lb)     Body mass index is 20.21 kg/m².    Physical Exam:     General Appearance:  Alert and cooperative.    Head:  Normocephalic.  Ecchymotic patch noted on the right temple.   Eyes: Conjunctivae and sclerae normal, no icterus. No pallor.   Ears:  Ears with no abnormalities noted.   Throat: No oral lesions, no thrush, oral mucosa moist.   Neck: Supple, trachea midline, no thyromegaly.   Back:   No kyphoscoliosis present. No tenderness to palpation.   Lungs:   Breath sounds heard bilaterally equally.  No crackles or wheezing. No Pleural rub or bronchial breathing.   Heart:  Normal S1 and S2, no murmur, no gallop, no rub. No JVD.   Abdomen:   Normal bowel sounds, no masses, no organomegaly. Soft, nontender, mild distention, no rebound tenderness.   Extremities: Supple, no edema, no cyanosis, no clubbing.  Ecchymotic patches noted in bilateral upper extremities.  Surgical bandage noted on the left elbow region.   Pulses: Pulses palpable bilaterally.   Skin: No bleeding or rash.   Neurologic: Alert and oriented x 3. No facial asymmetry. Moves all four limbs.  Asterixis noted..      Laboratory data:    I have reviewed the labs done in the emergency room.    Results from last 7 days   Lab Units 08/08/21  0548   SODIUM mmol/L 136   POTASSIUM mmol/L 3.8   CHLORIDE mmol/L 105   CO2 mmol/L 21.8*   BUN mg/dL 20   CREATININE mg/dL 1.36*   CALCIUM mg/dL 9.0   BILIRUBIN mg/dL 1.5*   ALK PHOS U/L 89   ALT (SGPT) U/L 24   AST (SGOT) U/L 34   GLUCOSE mg/dL 220*     Results from last 7 days   Lab Units 08/08/21  0548   WBC 10*3/mm3 6.15   HEMOGLOBIN g/dL 10.4*   HEMATOCRIT % 30.4*   PLATELETS 10*3/mm3 66*         Results from last 7 days   Lab Units 08/08/21  0548   TROPONIN T ng/mL 0.021     EKG:      EKG done in the emergency room was reviewed by me.  It shows sinus rhythm at 82 bpm.  Normal axis.  Baseline artifact noted.  No significant ST-T changes were  noted.    Radiology:    XR Elbow 3+ View Left    Result Date: 8/8/2021  PROCEDURE: XR ELBOW 3+ VW LEFT-  HISTORY: fall  FINDINGS:  Three views show no evidence of acute fracture or dislocation. Mild degenerative changes are noted. There is a chronic calcification adjacent to the lateral humeral epicondyle. No foreign body is identified.      No acute bony abnormality identified.        This report was finalized on 8/8/2021 7:00 AM by Jose Melendez MD.    CT Head Without Contrast    Result Date: 8/8/2021  PROCEDURE: CT HEAD WO CONTRAST-  HISTORY: weakness, fall  COMPARISON: 06/12/2021  TECHNIQUE: Multiple axial CT images were performed from the foramen magnum to the vertex without contrast. Coronal reformatted images were also obtained.This study was performed with techniques to keep radiation doses as low as reasonably achievable, (ALARA). Individualized dose reduction techniques using automated exposure control or adjustment of mA and/or kV according to the patient size were employed.   FINDINGS: There is generalized age-appropriate atrophy. There is a stable chronic lacunar infarct in the left centrum semiovale. Also noted is a chronic lacunar infarct in the right abdiel that is stable.The ventricles are normal in size. There is no evidence of hemorrhage .  No masses are identified.  No abnormal extra-axial fluid collection is seen.  There is no evidence of shift of the midline structures. Images of the sinuses reveal no evidence of mucosal thickening. No bony abnormality is seen on the bone window images.      No acute intracranial abnormality.  Stable appearance of the brain.   This report was finalized on 8/8/2021 7:24 AM by Jose Melendez MD.    Assessment:    1.  Acute hepatic encephalopathy, present on admission.  2.  End-stage liver disease with hepatocellular carcinoma, awaiting transplant.  3.  Chronic kidney disease stage III.  4.  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  5.   Essential hypertension.  6.  Anemia of chronic disease.  7.  Impaired ADLs and mobility.    Plan:    Mr. Richard is currently being admitted to the medical floor with telemetry.  He is already feeling better after receiving lactulose in the emergency room.  We will continue lactulose 30 g 3 times daily and continue the rifaximin twice daily.  His home medications will be continued.    At this time, there is no evidence of infection and I will hold off on antibiotics therapy.  He does not have any significant ascites.  We will start him on physical and occupational therapy.  He already has home health services at home.  I have discussed the patient's advanced directives with him and he has a living will and is DNR.  He is agreeable however for short-term intubation if he developed pneumonia or respiratory failure.  I have requested the living will to be brought so that the copy can be scanned to the chart.  I have discussed the patient's condition and treatment plan with his wife Bonny who is at the bedside.  Overall, patient's long-term prognosis is extremely poor due to his end-stage liver disease and hepatocellular carcinoma.    Risk Assessment: Moderate to high risk due to his advanced liver disease.  DVT Prophylaxis: SCDs.  Code Status: DNR  Diet: Diabetic    Advance Care Planning      ACP discussion was held with the patient during this visit. Patient has an advance directive (not in EMR), copy requested.      Charles Mcgraw MD  08/08/21  09:53 EDT    Dictated utilizing Dragon dictation.

## 2021-08-09 NOTE — NURSING NOTE
Seen for wound consult. Noted to have skin tears to bilateral arms. Care was reportedly provided per protocol with petroleum gauze and rolled gauze. Coccyx with stage 2 pressure injury complicated by MASD from previous incontinence episodes. Recommended care includes turning schedule, floating heels on pillows, barrier ointment twice daily and prn incontinence. Nutrition has already been consulted for dietary needs. Thank you for the consult. IF further skin issues arise do not hesitate to contact me.

## 2021-08-09 NOTE — THERAPY EVALUATION
Patient Name: Garrett Richard  : 1950    MRN: [id number]                              Today's Date: 2021       Admit Date: 2021    Visit Dx:     ICD-10-CM ICD-9-CM   1. Hepatic encephalopathy (CMS/HCC)  K72.90 572.2   2. Weakness  R53.1 780.79   3. Altered mental status, unspecified altered mental status type  R41.82 780.97   4. Encephalopathy due to ammonia  T59.891A 349.82    G92 E982.8   5. 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)   • Encephalopathy, hepatic (CMS/HCC)   • AL (acute kidney injury) (CMS/HCC)   • Increased ammonia level   • Hepatic encephalopathy (CMS/HCC)   • Chronic kidney disease, stage III (moderate) (CMS/HCC)     Past Medical History:   Diagnosis Date   • AL (acute kidney injury) (CMS/HCC) 2021   • Diabetes mellitus (CMS/HCC)     type II per patient   • Hepatocellular carcinoma (CMS/HCC) 10/25/2018    s/p TACE procedure with no mass noted on fu imaging   • History of transfusion    • Hypertension    • Impaired functional mobility, balance, gait, and endurance    • Liver disease    • Umbilical hernia    • Upper respiratory infection    • Urinary symptom or sign    • Urinary tract infection      Past Surgical History:   Procedure Laterality Date   • ENDOSCOPY N/A 2021    Procedure: ESOPHAGOGASTRODUODENOSCOPY WITH ARGON THERMAL ABLATION;  Surgeon: Samy Swanson MD;  Location: [facility] ENDOSCOPY;  Service: Gastroenterology;  Laterality: N/A;   • LIVER SURGERY     • TONSILLECTOMY       General Information     Row Name 21 1347          OT Time and Intention    Document Type  evaluation  -SD     Mode of  Treatment  occupational therapy  -SD     Row Name 08/09/21 1347          General Information    Patient Profile Reviewed  yes  -SD     Prior Level of Function  min assist:;all household mobility;ADL's  -SD     Existing Precautions/Restrictions  fall  -SD     Barriers to Rehab  medically complex;previous functional deficit  -SD     Row Name 08/09/21 1347          Living Environment    Lives With  spouse  -SD     Row Name 08/09/21 1347          Home Main Entrance    Number of Stairs, Main Entrance  one  -SD     Row Name 08/09/21 1347          Stairs Within Home, Primary    Number of Stairs, Within Home, Primary  none  -SD     Row Name 08/09/21 1347          Cognition    Orientation Status (Cognition)  oriented x 3;verbal cues/prompts needed for orientation  -SD     Row Name 08/09/21 1347          Safety Issues, Functional Mobility    Safety Issues Affecting Function (Mobility)  safety precautions follow-through/compliance;safety precaution awareness;awareness of need for assistance;insight into deficits/self-awareness;positioning of assistive device;sequencing abilities  -SD     Impairments Affecting Function (Mobility)  balance;endurance/activity tolerance;strength;postural/trunk control  -SD       User Key  (r) = Recorded By, (t) = Taken By, (c) = Cosigned By    Initials Name Provider Type    SD Lachelle Wolf OT Occupational Therapist          Mobility/ADL's     Row Name 08/09/21 1348          Bed Mobility    Bed Mobility  supine-sit  -SD     Supine-Sit White (Bed Mobility)  minimum assist (75% patient effort)  -SD     Bed Mobility, Safety Issues  decreased use of arms for pushing/pulling;decreased use of legs for bridging/pushing;impaired trunk control for bed mobility  -SD     Assistive Device (Bed Mobility)  bed rails;head of bed elevated  -SD     Row Name 08/09/21 1348          Transfers    Transfers  sit-stand transfer;toilet transfer  -SD     Sit-Stand White (Transfers)  minimum assist (75%  patient effort)  -SD     [place] Level (Toilet Transfer)  minimum assist (75% patient effort)  -SD     Assistive Device (Toilet Transfer)  commode, bedside without drop arms  -SD     Row Name 08/09/21 1348          Sit-Stand Transfer    Assistive Device (Sit-Stand Transfers)  walker, front-wheeled  -SD     Row Name 08/09/21 1348          Toilet Transfer    Type (Toilet Transfer)  stand pivot/stand step  -SD     Row Name 08/09/21 1348          Functional Mobility    Functional Mobility- Ind. Level  minimum assist (75% patient effort)  -SD     Functional Mobility- Device  rolling walker  -SD     Functional Mobility-Distance (Feet)  5  -SD     Functional Mobility- Safety Issues  balance decreased during turns;sequencing ability decreased;step length decreased;weight-shifting ability decreased  -SD     Row Name 08/09/21 1348          Activities of Daily Living    BADL Assessment/Intervention  bathing;upper body dressing;lower body dressing;grooming;feeding;toileting  -SD     Row Name 08/09/21 1348          Bathing Assessment/Intervention    [place] Level (Bathing)  lower body;distal lower extremities/feet;perineal area;maximum assist (25% patient effort)  -SD     Row Name 08/09/21 1348          Upper Body Dressing Assessment/Training    [place] Level (Upper Body Dressing)  minimum assist (75% patient effort)  -SD     Row Name 08/09/21 134          Lower Body Dressing Assessment/Training    [place] Level (Lower Body Dressing)  don;maximum assist (25% patient effort);socks;pants/bottoms;shoes/slippers  -SD     Row Name 08/09/21 1348          Grooming Assessment/Training    [place] Level (Grooming)  minimum assist (75% patient effort)  -SD     Row Name 08/09/21 1348          Self-Feeding Assessment/Training    [place] Level (Feeding)  supervision  -SD     Row Name 08/09/21 1348          Toileting Assessment/Training    [place] Level (Toileting)  maximum assist (25% patient effort)  -SD      Assistive Devices (Toileting)  commode, bedside without drop arms  -SD       User Key  (r) = Recorded By, (t) = Taken By, (c) = Cosigned By    Initials Name Provider Type    Lachelle Arenas OT Occupational Therapist        Obj/Interventions     [facility] Name 08/09/21 1350          Range of Motion Comprehensive    General Range of Motion  bilateral upper extremity ROM WFL  -SD     Row Name 08/09/21 1350          Strength Comprehensive (MMT)    General Manual Muscle Testing (MMT) Assessment  upper extremity strength deficits identified  -SD     Comment, General Manual Muscle Testing (MMT) Assessment  UB 3+/5 - 4-/5  -SD       User Key  (r) = Recorded By, (t) = Taken By, (c) = Cosigned By    Initials Name Provider Type    Lachelle Arenas OT Occupational Therapist        Goals/Plan     Row Name 08/09/21 1353          Transfer Goal 1 (OT)    Activity/Assistive Device (Transfer Goal 1, OT)  sit-to-stand/stand-to-sit;walker, rolling  -SD     [place] Level/Cues Needed (Transfer Goal 1, OT)  contact guard assist  -SD     Time Frame (Transfer Goal 1, OT)  long term goal (LTG)  -SD     Progress/Outcome (Transfer Goal 1, OT)  goal ongoing  -SD     Row Name 08/09/21 1353          Dressing Goal 1 (OT)    Activity/Device (Dressing Goal 1, OT)  lower body dressing  -SD     [place]/Cues Needed (Dressing Goal 1, OT)  minimum assist (75% or more patient effort)  -SD     Time Frame (Dressing Goal 1, OT)  2 weeks  -SD     Progress/Outcome (Dressing Goal 1, OT)  goal ongoing  -SD     Row Name 08/09/21 [facility]          Toileting Goal 1 (OT)    Activity/Device (Toileting Goal 1, OT)  toileting skills, all;commode, bedside without drop arms  -SD     [place] Level/Cues Needed (Toileting Goal 1, OT)  minimum assist (75% or more patient effort)  -SD     Time Frame (Toileting Goal 1, OT)  2 weeks  -SD     Progress/Outcome (Toileting Goal 1, OT)  goal ongoing  -SD     Row Name 08/09/21 1353          Strength Goal 1 (OT)     Strength Goal 1 (OT)  Patient to perform UB ther ex as tolerated  -SD     Time Frame (Strength Goal 1, OT)  long term goal (LTG)  -SD     Progress/Outcome (Strength Goal 1, OT)  goal ongoing  -SD     Row Name 08/09/21 1355          Therapy Assessment/Plan (OT)    Planned Therapy Interventions (OT)  activity tolerance training;adaptive equipment training;BADL retraining;patient/caregiver education/training;ROM/therapeutic exercise;strengthening exercise;transfer/mobility retraining  -SD       User Key  (r) = Recorded By, (t) = Taken By, (c) = Cosigned By    Initials Name Provider Type    Lachelle Arenas OT Occupational Therapist        Clinical Impression     Row Name 08/09/21 1351          Pain Scale: Numbers Pre/Post-Treatment    Pretreatment Pain Rating  0/10 - no pain  -SD     Posttreatment Pain Rating  0/10 - no pain  -SD     Row Name 08/09/21 1351          Plan of Care Review    Plan of Care Reviewed With  patient;spouse  -SD     Progress  no change  -SD     Outcome Summary  OT eval completed. Patient presents deficits in strength, endurance, balance, mobility and ADL performance. [place] bed mobility and [place] [facility] [place] min A using RW. Requires increased assistance with all ADLs. Patient is expected to benefit from continued OT services prior to DC.  -SD     Row Name 08/09/21 1351          Therapy Assessment/Plan (OT)    Patient/Family Therapy Goal Statement (OT)  Return home with wife and  services  -SD     Rehab Potential (OT)  fair, will monitor progress closely  -SD     Criteria for Skilled Therapeutic Interventions Met (OT)  skilled treatment is necessary  -SD     Therapy Frequency (OT)  3 times/wk 5 times if indicated  -SD     Row Name 08/09/21 1351          Therapy Plan Review/Discharge Plan (OT)    Anticipated Discharge Disposition (OT)  home with assist;home with home health  -SD     Row Name 08/09/21 1351          Positioning and Restraints    Pre-Treatment Position   in bed  -SD     Post Treatment Position  chair  -SD     In Chair  sitting;call light within reach;encouraged to call for assist;exit alarm on;with family/caregiver  -SD       User Key  (r) = Recorded By, (t) = Taken By, (c) = Cosigned By    Initials Name Provider Type    Lachelle Arenas OT Occupational Therapist        Outcome Measures     Row Name 08/09/21 1354          How much help from another is currently needed...    Putting on and taking off regular lower body clothing?  2  -SD     Bathing (including washing, rinsing, and drying)  2  -SD     Toileting (which includes using toilet bed pan or urinal)  2  -SD     Putting on and taking off regular upper body clothing  3  -SD     Taking care of personal grooming (such as brushing teeth)  3  -SD     Eating meals  3  -SD     AM-PAC 6 Clicks Score (OT)  15  -SD     Row Name 08/09/21 1035          How much help from another person do you currently need...    Turning from your back to your side while in flat bed without using bedrails?  3  (Pended)   -CW     Moving from lying on back to sitting on the side of a flat bed without bedrails?  3  (Pended)   -CW     Moving to and from a bed to a chair (including a wheelchair)?  3  (Pended)   -CW     Standing up from a chair using your arms (e.g., wheelchair, bedside chair)?  3  (Pended)   -CW     Climbing 3-5 steps with a railing?  2  (Pended)   -CW     To walk in hospital room?  2  (Pended)   -CW     AM-PAC 6 Clicks Score (PT)  16  (Pended)   -CW     Row Name 08/09/21 1354 08/09/21 1035       Functional Assessment    Outcome Measure Options  AM-PAC 6 Clicks Daily Activity (OT)  -SD  AM-PAC 6 Clicks Basic Mobility (PT)  (Pended)   -CW      User Key  (r) = Recorded By, (t) = Taken By, (c) = Cosigned By    Initials Name Provider Type    Lachelle Arenas OT Occupational Therapist    CW Abigail Rushing, PT Student PT Student          Occupational Therapy Education                 Title: PT OT SLP Therapies (In Progress)      Topic: Occupational Therapy (In Progress)     Point: ADL training (Done)     Description:   Instruct learner(s) on proper safety adaptation and remediation techniques during self care or transfers.   Instruct in proper use of assistive devices.              Learning Progress Summary           Patient Acceptance, E,TB, VU by SD at 8/9/2021 1354    Comment: Benefit of OT; OT POC   Significant Other Acceptance, E,TB, VU by SD at 8/9/2021 1354    Comment: Benefit of OT; OT POC                   Point: Home exercise program (Not Started)     Description:   Instruct learner(s) on appropriate technique for monitoring, assisting and/or progressing therapeutic exercises/activities.              Learner Progress:  Not documented in this visit.          Point: Precautions (Not Started)     Description:   Instruct learner(s) on prescribed precautions during self-care and functional transfers.              Learner Progress:  Not documented in this visit.          Point: Body mechanics (Not Started)     Description:   Instruct learner(s) on proper positioning and spine alignment during self-care, functional mobility activities and/or exercises.              Learner Progress:  Not documented in this visit.                      User Key     Initials Effective Dates Name Provider Type Discipline    SD 06/16/21 -  Lachelle Wolf OT Occupational Therapist OT              OT Recommendation and Plan  Planned Therapy Interventions (OT): activity tolerance training, adaptive equipment training, BADL retraining, patient/caregiver education/training, ROM/therapeutic exercise, strengthening exercise, transfer/mobility retraining  Therapy Frequency (OT): 3 times/wk (5 times if indicated)  Plan of Care Review  Plan of Care Reviewed With: patient, spouse  Progress: no change  Outcome Summary: OT eval completed. Patient presents deficits in strength, endurance, balance, mobility and ADL performance. Patient completed bed mobility and  transfer to [facility] and chair with min A using RW. Requires increased assistance with all ADLs. Patient is expected to benefit from continued OT services prior to DC.     Time Calculation:   Time Calculation- OT     Row Name 08/09/21 1355             Time Calculation- OT    OT Start Time  1107  -SD      OT Received On  08/09/21  -SD      OT Goal Re-Cert Due Date  08/19/21  -SD         Untimed Charges    OT Eval/Re-eval Minutes  40  -SD         Total Minutes    Untimed Charges Total Minutes  40  -SD       Total Minutes  40  -SD        User Key  (r) = Recorded By, (t) = Taken By, (c) = Cosigned By    Initials Name Provider Type    SD Lachelle Wolf, OT Occupational Therapist        Therapy Charges for Today     Code Description Service Date Service Provider Modifiers Qty    [id number] HC OT EVAL LOW COMPLEXITY 3 8/9/2021 Lachelle Wolf OT GO 1               Lachelle Wolf OT  8/9/2021

## 2021-08-09 NOTE — PLAN OF CARE
Goal Outcome Evaluation:  Plan of Care Reviewed With: patient, spouse        Progress: improving  Outcome Summary: VSS.  No c/o pain.  Patient up in chair today.  Patient possible dc tomorrow.

## 2021-08-09 NOTE — DISCHARGE PLACEMENT REQUEST
"Please evaluate for rehab  Audrey Funez -[phone] fax [phone]      Garrett Richard (71 y.o. Male)     Date of Birth Social Security Number Address Home Phone MRN    1950  [address]   [place] [address] [phone] [id number]    [employer] Marital Status          Congregational        Admission Date Admission Type Admitting Provider Attending Provider Department, Room/Bed    21 Emergency Charles Mcgraw MD Pais, Roshan, MD [facility] MED SURG  4, 412/    Discharge Date Discharge Disposition Discharge Destination                       Attending Provider: Charles Mcgraw MD    Allergies: No Known Allergies    Isolation: None   Infection: None   Code Status: No CPR    Ht: 182.9 cm (72\")   Wt: 68.3 kg (150 lb 9.2 oz)    Admission Cmt: None   Principal Problem: Hepatic encephalopathy (CMS/HCC) [K72.90]                 Active Insurance as of 2021     Primary Coverage     Payor Plan Insurance Group Employer/Plan Group    HUMANA MEDICARE REPLACEMENT HUMANA MEDICARE REPLACEMENT [id number]     Payor Plan Address Payor Plan Phone Number Payor Plan Fax Number Effective Dates    [address] [address] [phone]  2020 - None Entered    [facility] [id number]       Subscriber Name Subscriber Birth Date Member ID       GARRETT RICHARD 1950 [id number]                 Emergency Contacts      (Rel.) Home Phone Work Phone Mobile Phone    Bonny Richard (Spouse) [phone] -- [phone]    OZZY DODD (Daughter) [phone] -- --               History & Physical      Charles Mcgraw MD at 21 0953              [facility] HOSPITALIST   HISTORY AND PHYSICAL      Name:  Garrett Richard   Age:  71 y.o.  Sex:  male  :  1950  MRN:  [id number]   Visit Number:  [id number]  Admission Date:  2021  Date Of Service:  21  Primary Care Physician:  Pierce Lubin MD    Chief Complaint:     Drowsiness and generalized " "weakness.    History Of Presenting Illness:      Mr. Richard is a 71-year-old male with history of end-stage liver disease, hepatocellular carcinoma who apparently is followed at [facility] was brought to the emergency room by his family with progressive generalized weakness and drowsiness.  Patient apparently has had a couple of falls in the last 24 hours due to his generalized weakness.  This falls were witnessed by his wife.  Patient did get on the right side of the head 1 time.  No history of loss of consciousness.  No history of any fevers, nausea or vomiting.  Patient has been compliant with his lactulose and rifaximin.    In the emergency room, he was afebrile and hemodynamically stable saturating at 99% at room air.  Blood work done in the emergency room including CMP and CBC was remarkable for a glucose of 220, creatinine 1.36, albumin of 2.5, hemoglobin of 10.4 and a platelet count of 66.  Procalcitonin, magnesium and troponin levels were within normal limits.  Ammonia was elevated at 166 but lactic acid was normal around 2.  CT of the head was unremarkable.  X-ray of the left elbow was negative for any fractures.  Patient was given normal saline 100 mL bolus in the emergency room and was also given 20 g of lactulose.  He is currently being admitted to the medical floor for further evaluation and management of his hepatic encephalopathy.  Patient is currently alert and answering questions appropriately.    Patient does have history of liver cirrhosis likely related to fatty liver disease and also has had hepatocellular carcinoma treated by .  Patient has undergone laser photocoagulation of the liver mass in the kidney metastatic lesion in the past and had another recurrence of liver mass in January of this year undergoing laser photocoagulation.  According to the wife, patient has \"gone downhill\" since then.  He does use a walker to ambulate at home.    Review Of Systems:    All " systems were reviewed and negative except for: Generalized weakness, confusion, drowsiness, falls.    Past Medical History: Patient  has a past medical history of AL (acute kidney injury) (CMS/HCC) (5/28/2021), Diabetes mellitus (CMS/HCC), Hepatocellular carcinoma (CMS/HCC) (10/25/2018), Hypertension, Impaired functional mobility, balance, gait, and endurance, Liver disease, Umbilical hernia, Upper respiratory infection, Urinary symptom or sign, and Urinary tract infection.    Past Surgical History: Patient  has a past surgical history that includes Tonsillectomy; Liver surgery; and Esophagogastroduodenoscopy (N/A, 5/11/2021).    Social History: Patient  reports that he has never smoked. He has never used smokeless tobacco. He reports that he does not drink alcohol and does not use drugs.    Family History: Patient's family history includes COPD in his father; Colon cancer in his mother; Diabetes in his maternal grandmother; No Known Problems in his maternal grandfather, paternal grandfather, and paternal grandmother.    Allergies:      Patient has no known allergies.    Home Medications:    Prior to Admission Medications     Prescriptions Last Dose Informant Patient Reported? Taking?    ascorbic acid (VITAMIN C) 500 MG tablet   Yes No    Take 500 mg by mouth Daily.    Continuous Blood Gluc  (FreeStyle Sohail 14 Day Vaiden) device   No No    1 each Every 14 (Fourteen) Days.    ferrous sulfate 325 (65 FE) MG tablet   Yes No    Take 325 mg by mouth Daily With Breakfast. 2 tablets once a day    furosemide (Lasix) 40 MG tablet  Spouse/Significant Other No No    Take 1 tablet by mouth Daily for 90 days.    Patient taking differently:  Take 20 mg by mouth Daily. Per pt spouse, Dr. Lubin instructed pt to d/c taking medication. Pt to continue medication starting 5/31/21.    insulin aspart (NovoLOG FlexPen) 100 UNIT/ML solution pen-injector sc pen   No No    Inject 30 Units under the skin into the appropriate area as  directed 3 (Three) Times a Day With Meals.    Patient taking differently:  Inject 7 Units under the skin into the appropriate area as directed 3 (Three) Times a Day With Meals. Sliding scale - depending on glucose readings    Insulin Pen Needle (B-D ULTRAFINE III SHORT PEN) 31G X 8 MM misc   Yes No    lactulose (CHRONULAC) 10 GM/15ML solution   No No    Take 30 mL by mouth 4 (Four) Times a Day.    Lantus SoloStar 100 UNIT/ML injection pen   Yes No    Inject 10 Units as directed Every Night. 10 units at bedtime if the first morning reading is over 184    nadolol (CORGARD) 20 MG tablet   Yes No    Take 20 mg by mouth Daily.    pantoprazole (Protonix) 40 MG EC tablet   No No    Take 1 tablet by mouth Daily    sennosides-docusate (PERICOLACE) 8.6-50 MG per tablet   Yes No    Take 1 tablet by mouth. otc    spironolactone (Aldactone) 100 MG tablet  Spouse/Significant Other No No    Take 1 tablet by mouth Daily    Patient taking differently:  Take 50 mg by mouth 2 (Two) Times a Day. Pt spouse states Dr. Lubin instructed pt to d/c dose until 5/31/21. Can continue with 50 mg BID instead of the previous 100 mg.    sucralfate (Carafate) 1 g tablet   No No    Take 1 tablet by mouth 4 (Four) Times a Day.        ED Medications:    Medications   sodium chloride 0.9 % flush 10 mL (has no administration in time range)   sodium chloride 0.9 % bolus 500 mL (0 mL Intravenous Stopped 8/8/21 1569)   lactulose (CHRONULAC) 10 GM/15ML solution 20 g (20 g Oral Given 8/8/21 0914)     Vital Signs:  Temp:  [98.3 °F (36.8 °C)] 98.3 °F (36.8 °C)  Heart Rate:  [81-88] 83  Resp:  [18] 18  BP: (121-133)/(70-80) 122/71        08/08/21  0545   Weight: 67.6 kg (149 lb)     Body mass index is 20.21 kg/m².    Physical Exam:     General Appearance:  Alert and cooperative.    Head:  Normocephalic.  Ecchymotic patch noted on the right temple.   Eyes: Conjunctivae and sclerae normal, no icterus. No pallor.   Ears:  Ears with no abnormalities noted.   Throat:  No oral lesions, no thrush, oral mucosa moist.   Neck: Supple, trachea midline, no thyromegaly.   Back:   No kyphoscoliosis present. No tenderness to palpation.   Lungs:   Breath sounds heard bilaterally equally.  No crackles or wheezing. No Pleural rub or bronchial breathing.   Heart:  Normal S1 and S2, no murmur, no gallop, no rub. No JVD.   Abdomen:   Normal bowel sounds, no masses, no organomegaly. Soft, nontender, mild distention, no rebound tenderness.   Extremities: Supple, no edema, no cyanosis, no clubbing.  Ecchymotic patches noted in bilateral upper extremities.  Surgical bandage noted on the left elbow region.   Pulses: Pulses palpable bilaterally.   Skin: No bleeding or rash.   Neurologic: Alert and oriented x 3. No facial asymmetry. Moves all four limbs.  Asterixis noted..      Laboratory data:    I have reviewed the labs done in the emergency room.    Results from last 7 days   Lab Units 08/08/21  0548   SODIUM mmol/L 136   POTASSIUM mmol/L 3.8   CHLORIDE mmol/L 105   CO2 mmol/L 21.8*   BUN mg/dL 20   CREATININE mg/dL 1.36*   CALCIUM mg/dL 9.0   BILIRUBIN mg/dL 1.5*   ALK PHOS U/L 89   ALT (SGPT) U/L 24   AST (SGOT) U/L 34   GLUCOSE mg/dL 220*     Results from last 7 days   Lab Units 08/08/21  0548   WBC 10*3/mm3 6.15   HEMOGLOBIN g/dL 10.4*   HEMATOCRIT % 30.4*   PLATELETS 10*3/mm3 66*         Results from last 7 days   Lab Units 08/08/21  0548   TROPONIN T ng/mL 0.021     EKG:      EKG done in the emergency room was reviewed by me.  It shows sinus rhythm at 82 bpm.  Normal axis.  Baseline artifact noted.  No significant ST-T changes were noted.    Radiology:    XR Elbow 3+ View Left    Result Date: 8/8/2021  PROCEDURE: XR ELBOW 3+ VW LEFT-  HISTORY: fall  FINDINGS:  Three views show no evidence of acute fracture or dislocation. Mild degenerative changes are noted. There is a chronic calcification adjacent to the lateral humeral epicondyle. No foreign body is identified.      No acute bony  abnormality identified.        This report was finalized on 8/8/2021 7:00 AM by Jose Melendez MD.    CT Head Without Contrast    Result Date: 8/8/2021  PROCEDURE: CT HEAD WO CONTRAST-  HISTORY: weakness, fall  COMPARISON: 06/12/2021  TECHNIQUE: Multiple axial CT images were performed from the foramen magnum to the vertex without contrast. Coronal reformatted images were also obtained.This study was performed with techniques to keep radiation doses as low as reasonably achievable, (ALARA). Individualized dose reduction techniques using automated exposure control or adjustment of mA and/or kV according to the patient size were employed.   FINDINGS: There is generalized age-appropriate atrophy. There is a stable chronic lacunar infarct in the left centrum semiovale. Also noted is a chronic lacunar infarct in the right abdiel that is stable.The ventricles are normal in size. There is no evidence of hemorrhage .  No masses are identified.  No abnormal extra-axial fluid collection is seen.  There is no evidence of shift of the midline structures. Images of the sinuses reveal no evidence of mucosal thickening. No bony abnormality is seen on the bone window images.      No acute intracranial abnormality.  Stable appearance of the brain.   This report was finalized on 8/8/2021 7:24 AM by Jose Melendez MD.    Assessment:    1.  Acute hepatic encephalopathy, present on admission.  2.  End-stage liver disease with hepatocellular carcinoma, awaiting transplant.  3.  Chronic kidney disease stage III.  4.  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  5.  Essential hypertension.  6.  Anemia of chronic disease.  7.  Impaired ADLs and mobility.    Plan:    Mr. Richard is currently being admitted to the medical floor with telemetry.  He is already feeling better after receiving lactulose in the emergency room.  We will continue lactulose 30 g 3 times daily and continue the rifaximin twice daily.  His home medications  will be continued.    At this time, there is no evidence of infection and I will hold off on antibiotics therapy.  He does not have any significant ascites.  We will start him on physical and occupational therapy.  He already has home health services at home.  I have discussed the patient's advanced directives with him and he has a living will and is DNR.  He is agreeable however for short-term intubation if he developed pneumonia or respiratory failure.  I have requested the living will to be brought so that the copy can be scanned to the chart.  I have discussed the patient's condition and treatment plan with his wife Bonny who is at the bedside.  Overall, patient's long-term prognosis is extremely poor due to his end-stage liver disease and hepatocellular carcinoma.    Risk Assessment: Moderate to high risk due to his advanced liver disease.  DVT Prophylaxis: SCDs.  Code Status: DNR  Diet: Diabetic    Advance Care Planning      ACP discussion was held with the patient during this visit. Patient has an advance directive (not in EMR), copy requested.      Charles Mcgraw MD  08/08/21  09:53 EDT    Dictated utilizing Dragon dictation.    Electronically signed by Charles Mcgraw MD at 08/08/21 1237       Oxygen Therapy (last day)     Date/Time   SpO2   Device (Oxygen Therapy)   Flow (L/min)   Oxygen Concentration (%)   ETCO2 (mmHg)    08/09/21 1600   --   room air   --   --   --    08/09/21 1500   95   --   --   --   --    08/09/21 1200   --   room air   --   --   --    08/09/21 1100   97   room air   --   --   --    08/09/21 0800   --   room air   --   --   --    08/09/21 0700   97   room air   --   --   --    08/09/21 0340   98   room air   --   --   --    08/09/21 0000   --   room air   --   --   --    08/08/21 2343   96   room air   --   --   --    08/08/21 2000   --   room air   --   --   --    08/08/21 1943   96   room air   --   --   --    08/08/21 1616   --   room air   --   --   --    08/08/21 1456   96   room  air   --   --   --    08/08/21 1230   --   room air   --   --   --    08/08/21 1100   --   room air   --   --   --    08/08/21 1020   98   --   --   --   --    08/08/21 0940   98   --   --   --   --    08/08/21 0930   97   --   --   --   --    08/08/21 0916   99   --   --   --   --    08/08/21 0908   95   --   --   --   --    08/08/21 0900   99   --   --   --   --    08/08/21 0823   94   --   --   --   --    08/08/21 0757   97   --   --   --   --    08/08/21 0730   --   room air   --   --   --    08/08/21 0723   98   --   --   --   --    08/08/21 0706   98   --   --   --   --    08/08/21 0647   97   --   --   --   --    08/08/21 0630   97   --   --   --   --    08/08/21 0600   98   --   --   --   --    08/08/21 0545   99   room air   --   --   --                Current Facility-Administered Medications   Medication Dose Route Frequency Provider Last Rate Last Admin   • ascorbic acid (VITAMIN C) tablet 500 mg  500 mg Oral Daily Charles Mcgraw MD   500 mg at 08/09/21 0838   • ferrous sulfate EC tablet 324 mg  324 mg Oral Daily With Breakfast Charles Mcgraw MD   324 mg at 08/09/21 0838   • furosemide (LASIX) tablet 20 mg  20 mg Oral Daily Charles Mcgraw MD   20 mg at 08/09/21 0838   • lactulose (CHRONULAC) 10 GM/15ML solution 20 g  20 g Oral 4x Daily Charles Mcgraw MD   20 g at 08/09/21 1623   • ondansetron (ZOFRAN) injection 4 mg  4 mg Intravenous Q6H PRN Charles Mcgraw MD       • oxyCODONE (ROXICODONE) immediate release tablet 5 mg  5 mg Oral Q4H PRN Charles Mcgraw MD   5 mg at 08/08/21 1406   • pantoprazole (PROTONIX) EC tablet 40 mg  40 mg Oral Daily Charles Mcgraw MD   40 mg at 08/09/21 0838   • riFAXIMin (XIFAXAN) tablet 550 mg  550 mg Oral Q12H Charles Mcgraw MD   550 mg at 08/09/21 0838   • sennosides-docusate (PERICOLACE) 8.6-50 MG per tablet 1 tablet  1 tablet Oral BID Charles Mcgraw MD   1 tablet at 08/09/21 0838   • sodium chloride 0.9 % flush 10 mL  10 mL Intravenous Courtney Llanes MD       • sodium  chloride 0.9 % flush 3 mL  3 mL Intravenous Q12H Charles Mcgraw MD   3 mL at 21 0838   • sodium chloride 0.9 % flush 3-10 mL  3-10 mL Intravenous PRN Charles Mcgraw MD       • spironolactone (ALDACTONE) tablet 50 mg  50 mg Oral BID Charles Mcgraw MD   50 mg at 21 0838        Physician Progress Notes (last 24 hours) (Notes from 21 180 through 21 180)      Charles Mcgraw MD at 21 1605              [facility]    Name:  Garrett Richard   Age:  71 y.o.  Sex:  male  :  1950  MRN:  [id number]   Visit Number:  [id number]  Admission Date:  2021  Date Of Service:  21  Primary Care Physician:  Pierce Lubin MD     LOS: 1 day :    Chief Complaint:      Generalized weakness.    Subjective:    Mr. Richard was seen and examined this morning.  He is currently sitting up on the chair and is feeling better.  He had a bowel movement last night.  His tremor has significantly improved.  He still states that he is very weak and was not able to even feed himself last night.  Denies any chest pain or shortness of breath.  He was seen by physical therapy this morning.  No significant overnight events.    Review of Systems:     All systems were reviewed and negative except as mentioned in subjective, assessment and plan.    Vital Signs:    Temp:  [97.9 °F (36.6 °C)-98.7 °F (37.1 °C)] 98 °F (36.7 °C)  Heart Rate:  [74-91] 91  Resp:  [17-18] 18  BP: (114-133)/(56-65) 130/56    Intake and output:    I/O last 3 completed shifts:  In: 1097 [P.O.:597; IV Piggyback:500]  Out: 500 [Urine:500]  I/O this shift:  In: 720 [P.O.:720]  Out: -     Physical Examination:    General Appearance:  Alert and cooperative.  Ill-appearing male.   Head:  Atraumatic and normocephalic.   Eyes: Conjunctivae and sclerae normal, no icterus. No pallor.   Throat: No oral lesions, no thrush, oral mucosa moist.   Neck: Supple, trachea midline, no thyromegaly.   Lungs:   Breath  sounds heard bilaterally equally.  No crackles or wheezing. No Pleural rub or bronchial breathing.   Heart:  Normal S1 and S2, no murmur, no gallop, no rub. No JVD.   Abdomen:   Normal bowel sounds, no masses, no organomegaly. Soft, nontender, nondistended, no rebound tenderness.   Extremities: Supple, no edema, no cyanosis, no clubbing.  Ecchymotic patches noted in bilateral upper extremities.  Surgical bandage noted on the left elbow region.   Skin: No bleeding or rash.   Neurologic: Alert and oriented x 3. No facial asymmetry. Moves all four limbs.  He does have flapping tremor but it is significantly better compared to yesterday.      Laboratory results:    Results from last 7 days   Lab Units 08/09/21  0629 08/08/21  0548   SODIUM mmol/L 135* 136   POTASSIUM mmol/L 4.2 3.8   CHLORIDE mmol/L 106 105   CO2 mmol/L 20.8* 21.8*   BUN mg/dL 19 20   CREATININE mg/dL 1.22 1.36*   CALCIUM mg/dL 8.4* 9.0   BILIRUBIN mg/dL 1.7* 1.5*   ALK PHOS U/L 76 89   ALT (SGPT) U/L 23 24   AST (SGOT) U/L 32 34   GLUCOSE mg/dL 200* 220*     Results from last 7 days   Lab Units 08/09/21  0629 08/08/21  0548   WBC 10*3/mm3 5.26 6.15   HEMOGLOBIN g/dL 9.8* 10.4*   HEMATOCRIT % 29.1* 30.4*   PLATELETS 10*3/mm3 51* 66*     Results from last 7 days   Lab Units 08/09/21  0629   INR  1.36*     Results from last 7 days   Lab Units 08/08/21  0548   TROPONIN T ng/mL 0.021     Results from last 7 days   Lab Units 08/08/21  0745 08/08/21  0720   BLOODCX  No growth at 24 hours No growth at 24 hours     I have reviewed the patient's laboratory results.    Radiology results:    XR Elbow 3+ View Left    Result Date: 8/8/2021  PROCEDURE: XR ELBOW 3+ VW LEFT-  HISTORY: fall  FINDINGS:  Three views show no evidence of acute fracture or dislocation. Mild degenerative changes are noted. There is a chronic calcification adjacent to the lateral humeral epicondyle. No foreign body is identified.      Impression: No acute bony abnormality identified.         This report was finalized on 8/8/2021 7:00 AM by Jose Melendez MD.    CT Head Without Contrast    Result Date: 8/8/2021  PROCEDURE: CT HEAD WO CONTRAST-  HISTORY: weakness, fall  COMPARISON: 06/12/2021  TECHNIQUE: Multiple axial CT images were performed from the foramen magnum to the vertex without contrast. Coronal reformatted images were also obtained.This study was performed with techniques to keep radiation doses as low as reasonably achievable, (ALARA). Individualized dose reduction techniques using automated exposure control or adjustment of mA and/or kV according to the patient size were employed.   FINDINGS: There is generalized age-appropriate atrophy. There is a stable chronic lacunar infarct in the left centrum semiovale. Also noted is a chronic lacunar infarct in the right abdiel that is stable.The ventricles are normal in size. There is no evidence of hemorrhage .  No masses are identified.  No abnormal extra-axial fluid collection is seen.  There is no evidence of shift of the midline structures. Images of the sinuses reveal no evidence of mucosal thickening. No bony abnormality is seen on the bone window images.      Impression: No acute intracranial abnormality.  Stable appearance of the brain.   This report was finalized on 8/8/2021 7:24 AM by Jose Melendez MD.    I have reviewed the patient's radiology reports.    Medication Review:     I have reviewed the patient's active and prn medications.     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  Acute hepatic encephalopathy, present on admission, improving.  2.  End-stage liver disease with hepatocellular carcinoma, awaiting transplant.  3.  Chronic kidney disease stage III.  4.  Diabetes mellitus type 2 with  hyperglycemia and nephropathy.  5.  Essential hypertension.  6.  Anemia of chronic disease.  7.  Impaired ADLs and mobility.    Plan:    Mr. Richard is currently doing much better compared to yesterday.  He will be continued on physical therapy for his generalized weakness.  He will be continued on lactulose and rifaximin.  His renal function is improving.  We will observe him for another night to make sure he has stable gait and go home tomorrow with home health services.  Patient is not keen on going to short-term rehabilitation.  I have discussed the patient's condition and treatment plan with his wife Bonny who is at the bedside.  Discussed with nursing staff and multidisciplinary team.    DVT Prophylaxis: SCDs  Code Status: DNR  Diet: Diabetic  Discharge Plan: Home with home health tomorrow.    Charles Mcgraw MD  08/09/21  16:05 EDT    Dictated utilizing Dragon dictation.      Electronically signed by Charles Mcgraw MD at 08/09/21 1613       Consult Notes (last 24 hours) (Notes from 08/08/21 1809 through 08/09/21 1809)    No notes of this type exist for this encounter.            Physical Therapy Notes (last 24 hours) (Notes from 08/08/21 1809 through 08/09/21 1809)      Abigail Rushing, PT Student at 08/09/21 1313  Version 1 of 1    Attestation signed by Abhi An PT at 08/09/21 1617    I attest to the accuracy and completeness of this note.                 Goal Outcome Evaluation:  Plan of Care Reviewed With: (P) patient, spouse        Progress: (P) no change  Outcome Summary: (P) PT eval was completed this date. Pt was recieved in bed and was willing to participate in eval. Pt was min assist for all bed mobility and transfers (supine> sit> stand) and went bed to bedside commode. Pt was found to have a small wound opening in the sacral area to which a foam dressing was applied. Pt ambulated 5 ft min assist with RW to the chair. Pt had deficits in strength and endurance. The patient would benefit from  skilled PT intervention during inpatient stay to incerase functional mobility.    Electronically signed by Abhi An, PT at 21 1617     Abigail Rushing, PT Student at 21 1315  Version 1 of 1    Attestation signed by Abhi An, PT at 21 1617    I attest to the accuracy and completeness of this note.                   Patient Name: Garrett Richard  : 1950    MRN: [id number]                              Today's Date: 2021       Admit Date: 2021    Visit Dx:     ICD-10-CM ICD-9-CM   1. Hepatic encephalopathy (CMS/HCC)  K72.90 572.2   2. Weakness  R53.1 780.79   3. Altered mental status, unspecified altered mental status type  R41.82 780.97   4. Encephalopathy due to ammonia  T59.891A 349.82    G92 E982.8   5. 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)   • Encephalopathy, hepatic (CMS/HCC)   • AL (acute kidney injury) (CMS/HCC)   • Increased ammonia level   • Hepatic encephalopathy (CMS/HCC)   • Chronic kidney disease, stage III (moderate) (CMS/HCC)     Past Medical History:   Diagnosis Date   • AL (acute kidney injury) (CMS/HCC) 2021   • Diabetes mellitus (CMS/HCC)     type II per patient   • Hepatocellular carcinoma (CMS/HCC) 10/25/2018    s/p TACE procedure with no mass noted on fu imaging   • History of transfusion    • Hypertension    • Impaired functional mobility, balance, gait, and endurance    • Liver disease    • Umbilical hernia    • Upper respiratory infection    • Urinary symptom or sign    • Urinary tract infection      Past Surgical History:   Procedure Laterality Date   • ENDOSCOPY N/A 2021     Procedure: ESOPHAGOGASTRODUODENOSCOPY WITH ARGON THERMAL ABLATION;  Surgeon: Samy Swanson MD;  Location: [facility] ENDOSCOPY;  Service: Gastroenterology;  Laterality: N/A;   • LIVER SURGERY     • TONSILLECTOMY       General Information     Row Name 08/09/21 1035          Physical Therapy Time and Intention    Document Type  evaluation  (Pended)   -CW     Mode of Treatment  physical therapy  (Pended)   -CW     Row Name 08/09/21 1035          General Information    Patient Profile Reviewed  yes  (Pended)   -CW     Prior Level of Function  independent:;all household mobility  (Pended)   -CW     Existing Precautions/Restrictions  fall  (Pended)   -CW     Barriers to Rehab  medically complex  (Pended)   -CW     Row Name 08/09/21 1035          Living Environment    Lives With  spouse  (Pended)   -CW     Row Name 08/09/21 1035          Home Main Entrance    Number of Stairs, Main Entrance  one  (Pended)   -CW     Stair Railings, Main Entrance  none  (Pended)   -CW     Row Name 08/09/21 1035          Cognition    Orientation Status (Cognition)  oriented x 4  (Pended)   -CW     Row Name 08/09/21 1035          Safety Issues, Functional Mobility    Safety Issues Affecting Function (Mobility)  ability to follow commands;friction/shear risk;positioning of assistive device;sequencing abilities  (Pended)   -CW     Impairments Affecting Function (Mobility)  sensation/sensory awareness;balance;endurance/activity tolerance;strength  (Pended)   -CW       User Key  (r) = Recorded By, (t) = Taken By, (c) = Cosigned By    Initials Name Provider Type    CW Abigail Rushing, PT Student PT Student        Mobility     Row Name 08/09/21 1035          Bed Mobility    Bed Mobility  rolling left;rolling right;supine-sit  (Pended)   -CW     Rolling Left [place] (Bed Mobility)  minimum assist (75% patient effort)  (Pended)   -CW     Rolling Right [place] (Bed Mobility)  minimum assist (75% patient effort)  (Pended)   -CW      Supine-Sit [place] (Bed Mobility)  minimum assist (75% patient effort)  (Pended)   -     Assistive Device (Bed Mobility)  bed rails;head of bed elevated  (Pended)   -CW     Row Name 08/09/21 1035          Bed-Chair Transfer    Bed-Chair [place] (Transfers)  minimum assist (75% patient effort);2 person assist;verbal cues  (Pended)  bed to bedside commode  -     Assistive Device (Bed-Chair Transfers)  --  (Pended)  HHA  -CW     Row Name 08/09/21 1035          Sit-Stand Transfer    Sit-Stand [place] (Transfers)  minimum assist (75% patient effort)  (Pended)   -     Assistive Device (Sit-Stand Transfers)  walker, front-wheeled  (Pended)   -CW     Row Name 08/09/21 1035          Gait/Stairs (Locomotion)    [place] Level (Gait)  contact guard  (Pended)   -     Distance in Feet (Gait)  5ft  (Pended)   -       User Key  (r) = Recorded By, (t) = Taken By, (c) = Cosigned By    Initials Name Provider Type    CW Abigail Rushing, PT Student PT Student        Obj/Interventions     Row Name 08/09/21 1035          Range of Motion Comprehensive    General Range of Motion  no range of motion deficits identified  (Pended)   -CW     Row Name 08/09/21 1035          Strength Comprehensive (MMT)    General Manual Muscle Testing (MMT) Assessment  no strength deficits identified  (Pended)   -CW     Row Name 08/09/21 1035          Balance    Balance Assessment  sitting static balance;sitting dynamic balance;standing static balance;standing dynamic balance  (Pended)   -     Static Sitting Balance  WFL  (Pended)   -     Dynamic Sitting Balance  WFL  (Pended)   -     Static Standing Balance  WFL  (Pended)   -     Dynamic Standing Balance  WFL  (Pended)   -CW     Row Name 08/09/21 1035          Sensory Assessment (Somatosensory)    Sensory Assessment (Somatosensory)  --  (Pended)  LE sensation diminished  -       User Key  (r) = Recorded By, (t) = Taken By, (c) = Cosigned By    Initials Name Provider Type     CW Abigail Rushing, PT Student PT Student        Goals/Plan     [facility] Name 08/09/21 1035          Bed Mobility Goal 1 (PT)    Activity/Assistive Device (Bed Mobility Goal 1, PT)  bed mobility activities, all  (Pended)   -CW     [place] Level/Cues Needed (Bed Mobility Goal 1, PT)  supervision required  (Pended)   -CW     Time Frame (Bed Mobility Goal 1, PT)  long term goal (LTG);10 days  (Pended)   -CW     Progress/Outcomes (Bed Mobility Goal 1, PT)  goal ongoing  (Pended)   -[facility] Name 08/09/21 1035          Transfer Goal 1 (PT)    Activity/Assistive Device (Transfer Goal 1, PT)  transfers, all  (Pended)   -CW     [place] Level/Cues Needed (Transfer Goal 1, PT)  contact guard assist  (Pended)   -CW     Time Frame (Transfer Goal 1, PT)  long term goal (LTG);10 days  (Pended)   -CW     Progress/Outcome (Transfer Goal 1, PT)  goal ongoing  (Pended)   -CW     Row Name 08/09/21 1035          Gait Training Goal 1 (PT)    Activity/Assistive Device (Gait Training Goal 1, PT)  gait (walking locomotion);assistive device use;decrease fall risk;walker, rolling  (Pended)   -CW     [place] Level (Gait Training Goal 1, PT)  contact guard assist  (Pended)   -CW     Distance (Gait Training Goal 1, PT)  100ft  (Pended)   -CW     Time Frame (Gait Training Goal 1, PT)  long term goal (LTG);10 days  (Pended)   -CW     Progress/Outcome (Gait Training Goal 1, PT)  goal ongoing  (Pended)   -CW       User Key  (r) = Recorded By, (t) = Taken By, (c) = Cosigned By    Initials Name Provider Type    CW Abigail Rushing, PT Student PT Student        Clinical Impression     Row Name 08/09/21 1035          Pain    Additional Documentation  Pain Scale: Numbers Pre/Post-Treatment (Group)  (Pended)   -CW     Row Name 08/09/21 1035          Pain Scale: Numbers Pre/Post-Treatment    Pretreatment Pain Rating  0/10 - no pain  (Pended)   -CW     Posttreatment Pain Rating  0/10 - no pain  (Pended)   -CW     Row Name 08/09/21 1035           Plan of Care Review    Plan of Care Reviewed With  patient;spouse  (Pended)   -CW     Progress  no change  (Pended)   -CW     Outcome Summary  PT eval was completed this date. Pt was recieved in bed and was willing to participate in eval. Pt was min assist for all bed mobility and transfers (supine> sit> stand) and went bed to bedside commode. Pt was found to have a small wound opening in the sacral area to which a foam dressing was applied. Pt ambulated 5 ft min assist with RW to the chair. Pt had deficits in strength and endurance. The patient would benefit from skilled PT intervention during inpatient stay to incerase functional mobility.  (Pended)   -CW     Row Name 08/09/21 1035          Therapy Assessment/Plan (PT)    Patient/Family Therapy Goals Statement (PT)  to go home  (Pended)   -CW     Rehab Potential (PT)  good, to achieve stated therapy goals  (Pended)   -CW     Criteria for Skilled Interventions Met (PT)  yes;meets criteria;skilled treatment is necessary  (Pended)   -CW     Predicted Duration of Therapy Intervention (PT)  Until discharge  (Pended)   -CW     Row Name 08/09/21 1035          Vital Signs    Intratreatment Heart Rate (beats/min)  84  (Pended)   -CW     O2 Delivery Pre Treatment  room air  (Pended)   -CW     Intra SpO2 (%)  94  (Pended)   -CW     O2 Delivery Intra Treatment  room air  (Pended)   -CW     O2 Delivery Post Treatment  room air  (Pended)   -CW     Pre Patient Position  Supine  (Pended)   -CW     Intra Patient Position  Standing  (Pended)   -CW     Post Patient Position  Sitting  (Pended)   -CW     Row Name 08/09/21 1035          Positioning and Restraints    Pre-Treatment Position  in bed  (Pended)   -CW     Post Treatment Position  chair  (Pended)   -CW     In Chair  sitting;call light within reach;encouraged to call for assist;exit alarm on;with family/caregiver  (Pended)   -CW       User Key  (r) = Recorded By, (t) = Taken By, (c) = Cosigned By    Initials Name Provider Type      Abigail Rushing, PT Student PT Student        Outcome Measures     Row Name 08/09/21 1035          How much help from another person do you currently need...    Turning from your back to your side while in flat bed without using bedrails?  3  (Pended)   -CW     Moving from lying on back to sitting on the side of a flat bed without bedrails?  3  (Pended)   -CW     Moving to and from a bed to a chair (including a wheelchair)?  3  (Pended)   -CW     Standing up from a chair using your arms (e.g., wheelchair, bedside chair)?  3  (Pended)   -CW     Climbing 3-5 steps with a railing?  2  (Pended)   -CW     To walk in hospital room?  2  (Pended)   -CW     AM-PAC 6 Clicks Score (PT)  16  (Pended)   -CW     Row Name 08/09/21 1035          Functional Assessment    Outcome Measure Options  AM-PAC 6 Clicks Basic Mobility (PT)  (Pended)   -       User Key  (r) = Recorded By, (t) = Taken By, (c) = Cosigned By    Initials Name Provider Type    CW Abigail Rushing, PT Student PT Student                       Physical Therapy Education                 Title: PT OT SLP Therapies (Done)     Topic: Physical Therapy (Done)     Point: Mobility training (Done)     Learning Progress Summary           Patient Acceptance, E, VU by  at 8/9/2021 1312    Comment: Instructed the patient on the benefit of PT for functional mobility. Education was provided on the proper use and positioning of the RW during transfers and gait.                   Point: Body mechanics (Done)     Learning Progress Summary           Patient Acceptance, E, VU by  at 8/9/2021 1312    Comment: Instructed the patient on the benefit of PT for functional mobility. Education was provided on the proper use and positioning of the RW during transfers and gait.                               User Key     Initials Effective Dates Name Provider Type Discipline     07/26/21 -  Abigail Rushing, PT Student PT Student PT              PT Recommendation and Plan  Planned Therapy  Interventions (PT): (P) bed mobility training, gait training, home exercise program, patient/family education, strengthening, transfer training  Plan of Care Reviewed With: (P) patient, spouse  Progress: (P) no change  Outcome Summary: (P) PT eval was completed this date. Pt was recieved in bed and was willing to participate in eval. Pt was min assist for all bed mobility and transfers (supine> sit> stand) and went bed to bedside commode. Pt was found to have a small wound opening in the sacral area to which a foam dressing was applied. Pt ambulated 5 ft min assist with RW to the chair. Pt had deficits in strength and endurance. The patient would benefit from skilled PT intervention during inpatient stay to incerase functional mobility.     Time Calculation:   PT Charges     Row Name 21 1314             Untimed Charges    PT Eval/Re-eval Minutes  46  (Pended)   -CW         Total Minutes    Untimed Charges Total Minutes  46  (Pended)   -CW       Total Minutes  46  (Pended)   -CW        User Key  (r) = Recorded By, (t) = Taken By, (c) = Cosigned By    Initials Name Provider Type    CW Abigail Rushing, PT Student PT Student        Therapy Charges for Today     Code Description Service Date Service Provider Modifiers Qty    [id number] HC PT EVAL LOW COMPLEXITY 3 2021 Abigail Rushing, PT Student GP 1          PT G-Codes  Outcome Measure Options: (P) AM-PAC 6 Clicks Basic Mobility (PT)  AM-PAC 6 Clicks Score (PT): (P) 16    ABIGAIL RUSHING PT Student  2021      Electronically signed by Abhi An, PT at 21 1617          Occupational Therapy Notes (last 24 hours) (Notes from 21 1809 through 21 1809)      Lachelle Wolf, OT at 21 1350          Patient Name: Garrett Richard  : 1950    MRN: [id number]                              Today's Date: 2021       Admit Date: 2021    Visit Dx:     ICD-10-CM ICD-9-CM   1. Hepatic encephalopathy (CMS/HCC)  K72.90 572.2   2.  Weakness  R53.1 780.79   3. Altered mental status, unspecified altered mental status type  R41.82 780.97   4. Encephalopathy due to ammonia  T59.891A 349.82    G92 E982.8   5. 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)   • Encephalopathy, hepatic (CMS/HCC)   • AL (acute kidney injury) (CMS/HCC)   • Increased ammonia level   • Hepatic encephalopathy (CMS/HCC)   • Chronic kidney disease, stage III (moderate) (CMS/HCC)     Past Medical History:   Diagnosis Date   • AL (acute kidney injury) (CMS/HCC) 5/28/2021   • Diabetes mellitus (CMS/HCC)     type II per patient   • Hepatocellular carcinoma (CMS/HCC) 10/25/2018    s/p TACE procedure with no mass noted on fu imaging   • History of transfusion    • Hypertension    • Impaired functional mobility, balance, gait, and endurance    • Liver disease    • Umbilical hernia    • Upper respiratory infection    • Urinary symptom or sign    • Urinary tract infection      Past Surgical History:   Procedure Laterality Date   • ENDOSCOPY N/A 5/11/2021    Procedure: ESOPHAGOGASTRODUODENOSCOPY WITH ARGON THERMAL ABLATION;  Surgeon: Samy Swanson MD;  Location: [facility] ENDOSCOPY;  Service: Gastroenterology;  Laterality: N/A;   • LIVER SURGERY     • TONSILLECTOMY       General Information     Row Name 08/09/21 1347          OT Time and Intention    Document Type  evaluation  -SD     Mode of Treatment  occupational therapy  -SD     Row Name 08/09/21 1347          General Information    Patient Profile Reviewed  yes  -SD     Prior Level of Function  min assist:;all household mobility;ADL's  -SD     Existing  Precautions/Restrictions  fall  -SD     Barriers to Rehab  medically complex;previous functional deficit  -SD     [facility] Name 08/09/21 1347          Living Environment    Lives With  spouse  -SD     [facility] Name 08/09/21 1347          Home Main Entrance    Number of Stairs, Main Entrance  one  -SD     [facility] Name 08/09/21 1347          Stairs Within Home, Primary    Number of Stairs, Within Home, Primary  none  -SD     Row Name 08/09/21 1347          Cognition    Orientation Status (Cognition)  oriented x 3;verbal cues/prompts needed for orientation  -SD     [facility] Name 08/09/21 1347          Safety Issues, Functional Mobility    Safety Issues Affecting Function (Mobility)  safety precautions follow-through/compliance;safety precaution awareness;awareness of need for assistance;insight into deficits/self-awareness;positioning of assistive device;sequencing abilities  -SD     Impairments Affecting Function (Mobility)  balance;endurance/activity tolerance;strength;postural/trunk control  -SD       User Key  (r) = Recorded By, (t) = Taken By, (c) = Cosigned By    Initials Name Provider Type    SD Lachelle Wolf OT Occupational Therapist          Mobility/ADL's     [facility] Name 08/09/21 1348          Bed Mobility    Bed Mobility  supine-sit  -SD     Supine-Sit [place] (Bed Mobility)  minimum assist (75% patient effort)  -SD     Bed Mobility, Safety Issues  decreased use of arms for pushing/pulling;decreased use of legs for bridging/pushing;impaired trunk control for bed mobility  -SD     Assistive Device (Bed Mobility)  bed rails;head of bed elevated  -SD     [facility] Name 08/09/21 1348          Transfers    Transfers  sit-stand transfer;toilet transfer  -SD     Sit-Stand [place] (Transfers)  minimum assist (75% patient effort)  -SD     [place] Level (Toilet Transfer)  minimum assist (75% patient effort)  -SD     Assistive Device (Toilet Transfer)  commode, bedside without drop arms  -[facility] Name 08/09/21 1345           Sit-Stand Transfer    Assistive Device (Sit-Stand Transfers)  walker, front-wheeled  -SD     Row Name 08/09/21 1348          Toilet Transfer    Type (Toilet Transfer)  stand pivot/stand step  -SD     Row Name 08/09/21 1348          Functional Mobility    Functional Mobility- Ind. Level  minimum assist (75% patient effort)  -SD     Functional Mobility- Device  rolling walker  -SD     Functional Mobility-Distance (Feet)  5  -SD     Functional Mobility- Safety Issues  balance decreased during turns;sequencing ability decreased;step length decreased;weight-shifting ability decreased  -SD     Row Name 08/09/21 1348          Activities of Daily Living    BADL Assessment/Intervention  bathing;upper body dressing;lower body dressing;grooming;feeding;toileting  -SD     Row Name 08/09/21 1348          Bathing Assessment/Intervention    [place] Level (Bathing)  lower body;distal lower extremities/feet;perineal area;maximum assist (25% patient effort)  -SD     Row Name 08/09/21 [facility]          Upper Body Dressing Assessment/Training    [place] Level (Upper Body Dressing)  minimum assist (75% patient effort)  -SD     Row Name 08/09/21 [facility]          Lower Body Dressing Assessment/Training    [place] Level (Lower Body Dressing)  don;maximum assist (25% patient effort);socks;pants/bottoms;shoes/slippers  -SD     Row Name 08/09/21 1348          Grooming Assessment/Training    [place] Level (Grooming)  minimum assist (75% patient effort)  -SD     Row Name 08/09/21 1348          Self-Feeding Assessment/Training    [place] Level (Feeding)  supervision  -SD     Row Name 08/09/21 134          Toileting Assessment/Training    [place] Level (Toileting)  maximum assist (25% patient effort)  -SD     Assistive Devices (Toileting)  commode, bedside without drop arms  -SD       User Key  (r) = Recorded By, (t) = Taken By, (c) = Cosigned By    Initials Name Provider Type    Lachelle Arenas OT Occupational  Therapist        Obj/Interventions     Row Name 08/09/21 1350          Range of Motion Comprehensive    General Range of Motion  bilateral upper extremity ROM WFL  -SD     [facility] Name 08/09/21 1350          Strength Comprehensive (MMT)    General Manual Muscle Testing (MMT) Assessment  upper extremity strength deficits identified  -SD     Comment, General Manual Muscle Testing (MMT) Assessment  UB 3+/5 - 4-/5  -SD       User Key  (r) = Recorded By, (t) = Taken By, (c) = Cosigned By    Initials Name Provider Type    SD Lachelle Wolf OT Occupational Therapist        Goals/Plan     [facility] Name 08/09/21 1353          Transfer Goal 1 (OT)    Activity/Assistive Device (Transfer Goal 1, OT)  sit-to-stand/stand-to-sit;walker, rolling  -SD     [place] Level/Cues Needed (Transfer Goal 1, OT)  contact guard assist  -SD     Time Frame (Transfer Goal 1, OT)  long term goal (LTG)  -SD     Progress/Outcome (Transfer Goal 1, OT)  goal ongoing  -SD     Row Name 08/09/21 [facility]          Dressing Goal 1 (OT)    Activity/Device (Dressing Goal 1, OT)  lower body dressing  -SD     [place]/Cues Needed (Dressing Goal 1, OT)  minimum assist (75% or more patient effort)  -SD     Time Frame (Dressing Goal 1, OT)  2 weeks  -SD     Progress/Outcome (Dressing Goal 1, OT)  goal ongoing  -SD     Row Name 08/09/21 [facility]          Toileting Goal 1 (OT)    Activity/Device (Toileting Goal 1, OT)  toileting skills, all;commode, bedside without drop arms  -SD     [place] Level/Cues Needed (Toileting Goal 1, OT)  minimum assist (75% or more patient effort)  -SD     Time Frame (Toileting Goal 1, OT)  2 weeks  -SD     Progress/Outcome (Toileting Goal 1, OT)  goal ongoing  -SD     Row Name 08/09/21 [facility]          Strength Goal 1 (OT)    Strength Goal 1 (OT)  Patient to perform UB ther ex as tolerated  -SD     Time Frame (Strength Goal 1, OT)  long term goal (LTG)  -SD     Progress/Outcome (Strength Goal 1, OT)  goal ongoing  -SD     Row Name  08/09/21 [facility]          Therapy Assessment/Plan (OT)    Planned Therapy Interventions (OT)  activity tolerance training;adaptive equipment training;BADL retraining;patient/caregiver education/training;ROM/therapeutic exercise;strengthening exercise;transfer/mobility retraining  -SD       User Key  (r) = Recorded By, (t) = Taken By, (c) = Cosigned By    Initials Name Provider Type    Lachelle Arenas OT Occupational Therapist        Clinical Impression     Row Name 08/09/21 [facility]          Pain Scale: Numbers Pre/Post-Treatment    Pretreatment Pain Rating  0/10 - no pain  -SD     Posttreatment Pain Rating  0/10 - no pain  -SD     Row Name 08/09/21 [facility]          Plan of Care Review    Plan of Care Reviewed With  patient;spouse  -SD     Progress  no change  -SD     Outcome Summary  OT eval completed. Patient presents deficits in strength, endurance, balance, mobility and ADL performance. Patient completed bed mobility and transfer to [facility] and chair with min A using RW. Requires increased assistance with all ADLs. Patient is expected to benefit from continued OT services prior to DC.  -SD     Row Name 08/09/21 [facility]          Therapy Assessment/Plan (OT)    Patient/Family Therapy Goal Statement (OT)  Return home with wife and HH services  -SD     Rehab Potential (OT)  fair, will monitor progress closely  -SD     Criteria for Skilled Therapeutic Interventions Met (OT)  skilled treatment is necessary  -SD     Therapy Frequency (OT)  3 times/wk 5 times if indicated  -SD     Row Name 08/09/21 [facility]          Therapy Plan Review/Discharge Plan (OT)    Anticipated Discharge Disposition (OT)  home with assist;home with home health  -SD     Row Name 08/09/21 [facility]          Positioning and Restraints    Pre-Treatment Position  in bed  -SD     Post Treatment Position  chair  -SD     In Chair  sitting;call light within reach;encouraged to call for assist;exit alarm on;with family/caregiver  -SD       User Key  (r) = Recorded By, (t) =  Taken By, (c) = Cosigned By    Initials Name Provider Type    Lachelle Arenas OT Occupational Therapist        Outcome Measures     Row Name 08/09/21 1354          How much help from another is currently needed...    Putting on and taking off regular lower body clothing?  2  -SD     Bathing (including washing, rinsing, and drying)  2  -SD     Toileting (which includes using toilet bed pan or urinal)  2  -SD     Putting on and taking off regular upper body clothing  3  -SD     Taking care of personal grooming (such as brushing teeth)  3  -SD     Eating meals  3  -SD     AM-PAC 6 Clicks Score (OT)  15  -SD     Row Name 08/09/21 1035          How much help from another person do you currently need...    Turning from your back to your side while in flat bed without using bedrails?  3  (Pended)   -CW     Moving from lying on back to sitting on the side of a flat bed without bedrails?  3  (Pended)   -CW     Moving to and from a bed to a chair (including a wheelchair)?  3  (Pended)   -CW     Standing up from a chair using your arms (e.g., wheelchair, bedside chair)?  3  (Pended)   -CW     Climbing 3-5 steps with a railing?  2  (Pended)   -CW     To walk in hospital room?  2  (Pended)   -CW     AM-PAC 6 Clicks Score (PT)  16  (Pended)   -CW     Row Name 08/09/21 1354 08/09/21 1035       Functional Assessment    Outcome Measure Options  AM-PAC 6 Clicks Daily Activity (OT)  -SD  AM-PAC 6 Clicks Basic Mobility (PT)  (Pended)   -CW      User Key  (r) = Recorded By, (t) = Taken By, (c) = Cosigned By    Initials Name Provider Type    Lachelle Arenas OT Occupational Therapist    CW Abigail Rushing, PT Student PT Student          Occupational Therapy Education                 Title: PT OT SLP Therapies (In Progress)     Topic: Occupational Therapy (In Progress)     Point: ADL training (Done)     Description:   Instruct learner(s) on proper safety adaptation and remediation techniques during self care or transfers.    Instruct in proper use of assistive devices.              Learning Progress Summary           Patient Acceptance, E,TB, VU by SD at 8/9/2021 1354    Comment: Benefit of OT; OT POC   Significant Other Acceptance, E,TB, VU by SD at 8/9/2021 1354    Comment: Benefit of OT; OT POC                   Point: Home exercise program (Not Started)     Description:   Instruct learner(s) on appropriate technique for monitoring, assisting and/or progressing therapeutic exercises/activities.              Learner Progress:  Not documented in this visit.          Point: Precautions (Not Started)     Description:   Instruct learner(s) on prescribed precautions during self-care and functional transfers.              Learner Progress:  Not documented in this visit.          Point: Body mechanics (Not Started)     Description:   Instruct learner(s) on proper positioning and spine alignment during self-care, functional mobility activities and/or exercises.              Learner Progress:  Not documented in this visit.                      User Key     Initials Effective Dates Name Provider Type Discipline    SD 06/16/21 -  Lachelle Wolf OT Occupational Therapist OT              OT Recommendation and Plan  Planned Therapy Interventions (OT): activity tolerance training, adaptive equipment training, BADL retraining, patient/caregiver education/training, ROM/therapeutic exercise, strengthening exercise, transfer/mobility retraining  Therapy Frequency (OT): 3 times/wk (5 times if indicated)  Plan of Care Review  Plan of Care Reviewed With: patient, spouse  Progress: no change  Outcome Summary: OT eval completed. Patient presents deficits in strength, endurance, balance, mobility and ADL performance. Patient completed bed mobility and transfer to BSC and chair with min A using RW. Requires increased assistance with all ADLs. Patient is expected to benefit from continued OT services prior to DC.     Time Calculation:   Time Calculation- OT      Row Name 08/09/21 1355             Time Calculation- OT    OT Start Time  1107  -SD      OT Received On  08/09/21  -SD      OT Goal Re-Cert Due Date  08/19/21  -SD         Untimed Charges    OT Eval/Re-eval Minutes  40  -SD         Total Minutes    Untimed Charges Total Minutes  40  -SD       Total Minutes  40  -SD        User Key  (r) = Recorded By, (t) = Taken By, (c) = Cosigned By    Initials Name Provider Type    SD Lachelle Wolf OT Occupational Therapist        Therapy Charges for Today     Code Description Service Date Service Provider Modifiers Qty    [id number]  OT EVAL LOW COMPLEXITY 3 8/9/2021 Lachelle Wolf OT GO 1               Lachelle Wolf OT  8/9/2021    Electronically signed by Lachelle Wolf OT at 08/09/21 2456     Lachelle Wolf OT at 08/09/21 1357        Goal Outcome Evaluation:  Plan of Care Reviewed With: patient, spouse        Progress: no change  Outcome Summary: OT eval completed. Patient presents deficits in strength, endurance, balance, mobility and ADL performance. Patient completed bed mobility and transfer to BSC and chair with min A using RW. Requires increased assistance with all ADLs. Patient is expected to benefit from continued OT services prior to DC.    Electronically signed by Lachelle Wolf OT at 08/09/21 2265       Speech Language Pathology Notes (most recent note)    No notes exist for this encounter.

## 2021-08-09 NOTE — THERAPY EVALUATION
Patient Name: Garrett Richard  : 1950    MRN: [id number]                              Today's Date: 2021       Admit Date: 2021    Visit Dx:     ICD-10-CM ICD-9-CM   1. Hepatic encephalopathy (CMS/HCC)  K72.90 572.2   2. Weakness  R53.1 780.79   3. Altered mental status, unspecified altered mental status type  R41.82 780.97   4. Encephalopathy due to ammonia  T59.891A 349.82    G92 E982.8   5. 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)   • Encephalopathy, hepatic (CMS/HCC)   • AL (acute kidney injury) (CMS/HCC)   • Increased ammonia level   • Hepatic encephalopathy (CMS/HCC)   • Chronic kidney disease, stage III (moderate) (CMS/HCC)     Past Medical History:   Diagnosis Date   • AL (acute kidney injury) (CMS/HCC) 2021   • Diabetes mellitus (CMS/HCC)     type II per patient   • Hepatocellular carcinoma (CMS/HCC) 10/25/2018    s/p TACE procedure with no mass noted on fu imaging   • History of transfusion    • Hypertension    • Impaired functional mobility, balance, gait, and endurance    • Liver disease    • Umbilical hernia    • Upper respiratory infection    • Urinary symptom or sign    • Urinary tract infection      Past Surgical History:   Procedure Laterality Date   • ENDOSCOPY N/A 2021    Procedure: ESOPHAGOGASTRODUODENOSCOPY WITH ARGON THERMAL ABLATION;  Surgeon: Samy Swanson MD;  Location: [facility] ENDOSCOPY;  Service: Gastroenterology;  Laterality: N/A;   • LIVER SURGERY     • TONSILLECTOMY       General Information     Row Name 21 1035          Physical Therapy Time and Intention    Document Type  evaluation   (Pended)   -CW     Mode of Treatment  physical therapy  (Pended)   -     Row Name 08/09/21 1035          General Information    Patient Profile Reviewed  yes  (Pended)   -CW     Prior Level of Function  independent:;all household mobility  (Pended)   -CW     Existing Precautions/Restrictions  fall  (Pended)   -CW     Barriers to Rehab  medically complex  (Pended)   -CW     Row Name 08/09/21 1035          Living Environment    Lives With  spouse  (Pended)   -     Row Name 08/09/21 1035          Home Main Entrance    Number of Stairs, Main Entrance  one  (Pended)   -CW     Stair Railings, Main Entrance  none  (Pended)   -CW     Row Name 08/09/21 1035          Cognition    Orientation Status (Cognition)  oriented x 4  (Pended)   -     Row Name 08/09/21 1035          Safety Issues, Functional Mobility    Safety Issues Affecting Function (Mobility)  ability to follow commands;friction/shear risk;positioning of assistive device;sequencing abilities  (Pended)   -CW     Impairments Affecting Function (Mobility)  sensation/sensory awareness;balance;endurance/activity tolerance;strength  (Pended)   -CW       User Key  (r) = Recorded By, (t) = Taken By, (c) = Cosigned By    Initials Name Provider Type    CW Abigail Rushing, PT Student PT Student        Mobility     Row Name 08/09/21 1035          Bed Mobility    Bed Mobility  rolling left;rolling right;supine-sit  (Pended)   -     Rolling Left [place] (Bed Mobility)  minimum assist (75% patient effort)  (Pended)   -CW     Rolling Right [place] (Bed Mobility)  minimum assist (75% patient effort)  (Pended)   -     Supine-Sit [place] (Bed Mobility)  minimum assist (75% patient effort)  (Pended)   -     Assistive Device (Bed Mobility)  bed rails;head of bed elevated  (Pended)   -     Row Name 08/09/21 1035          Bed-Chair Transfer    Bed-Chair [place] (Transfers)  minimum assist (75% patient effort);2 person assist;verbal cues  (Pended)  bed to  bedside commode  -     Assistive Device (Bed-Chair Transfers)  --  (Pended)  HHA  -CW     Row Name 08/09/21 1035          Sit-Stand Transfer    Sit-Stand [place] (Transfers)  minimum assist (75% patient effort)  (Pended)   -     Assistive Device (Sit-Stand Transfers)  walker, front-wheeled  (Pended)   -CW     Row Name 08/09/21 1035          Gait/Stairs (Locomotion)    [place] Level (Gait)  contact guard  (Pended)   -     Distance in Feet (Gait)  5ft  (Pended)   -       User Key  (r) = Recorded By, (t) = Taken By, (c) = Cosigned By    Initials Name Provider Type     Abigail Rushing, PT Student PT Student        Obj/Interventions     Row Name 08/09/21 1035          Range of Motion Comprehensive    General Range of Motion  no range of motion deficits identified  (Pended)   -CW     Row Name 08/09/21 1035          Strength Comprehensive (MMT)    General Manual Muscle Testing (MMT) Assessment  no strength deficits identified  (Pended)   -CW     Row Name 08/09/21 1035          Balance    Balance Assessment  sitting static balance;sitting dynamic balance;standing static balance;standing dynamic balance  (Pended)   -     Static Sitting Balance  WFL  (Pended)   -     Dynamic Sitting Balance  WFL  (Pended)   -     Static Standing Balance  WFL  (Pended)   -     Dynamic Standing Balance  WFL  (Pended)   -CW     Row Name 08/09/21 1035          Sensory Assessment (Somatosensory)    Sensory Assessment (Somatosensory)  --  (Pended)  LE sensation diminished  -       User Key  (r) = Recorded By, (t) = Taken By, (c) = Cosigned By    Initials Name Provider Type    CW Abigail Rushing, PT Student PT Student        Goals/Plan     Row Name 08/09/21 1035          Bed Mobility Goal 1 (PT)    Activity/Assistive Device (Bed Mobility Goal 1, PT)  bed mobility activities, all  (Pended)   -     [place] Level/Cues Needed (Bed Mobility Goal 1, PT)  supervision required  (Pended)   -     Time Frame (Bed Mobility  Goal 1, PT)  long term goal (LTG);10 days  (Pended)   -CW     Progress/Outcomes (Bed Mobility Goal 1, PT)  goal ongoing  (Pended)   -CW     Row Name 08/09/21 1035          Transfer Goal 1 (PT)    Activity/Assistive Device (Transfer Goal 1, PT)  transfers, all  (Pended)   -CW     [place] Level/Cues Needed (Transfer Goal 1, PT)  contact guard assist  (Pended)   -CW     Time Frame (Transfer Goal 1, PT)  long term goal (LTG);10 days  (Pended)   -CW     Progress/Outcome (Transfer Goal 1, PT)  goal ongoing  (Pended)   -CW     Row Name 08/09/21 1035          Gait Training Goal 1 (PT)    Activity/Assistive Device (Gait Training Goal 1, PT)  gait (walking locomotion);assistive device use;decrease fall risk;walker, rolling  (Pended)   -CW     [place] Level (Gait Training Goal 1, PT)  contact guard assist  (Pended)   -CW     Distance (Gait Training Goal 1, PT)  100ft  (Pended)   -CW     Time Frame (Gait Training Goal 1, PT)  long term goal (LTG);10 days  (Pended)   -CW     Progress/Outcome (Gait Training Goal 1, PT)  goal ongoing  (Pended)   -CW       User Key  (r) = Recorded By, (t) = Taken By, (c) = Cosigned By    Initials Name Provider Type    CW Abigail Rushing, PT Student PT Student        Clinical Impression     Row Name 08/09/21 1035          Pain    Additional Documentation  Pain Scale: Numbers Pre/Post-Treatment (Group)  (Pended)   -CW     Row Name 08/09/21 1035          Pain Scale: Numbers Pre/Post-Treatment    Pretreatment Pain Rating  0/10 - no pain  (Pended)   -CW     Posttreatment Pain Rating  0/10 - no pain  (Pended)   -CW     Row Name 08/09/21 1035          Plan of Care Review    Plan of Care Reviewed With  patient;spouse  (Pended)   -CW     Progress  no change  (Pended)   -CW     Outcome Summary  PT eval was completed this date. Pt was recieved in bed and was willing to participate in eval. Pt was min assist for all bed mobility and transfers (supine> sit> stand) and went bed to bedside commode. Pt  was found to have a small wound opening in the sacral area to which a foam dressing was applied. Pt ambulated 5 ft min assist with RW to the chair. Pt had deficits in strength and endurance. The patient would benefit from skilled PT intervention during inpatient stay to incerase functional mobility.  (Pended)   -CW     Row Name 08/09/21 1035          Therapy Assessment/Plan (PT)    Patient/Family Therapy Goals Statement (PT)  to go home  (Pended)   -CW     Rehab Potential (PT)  good, to achieve stated therapy goals  (Pended)   -CW     Criteria for Skilled Interventions Met (PT)  yes;meets criteria;skilled treatment is necessary  (Pended)   -CW     Predicted Duration of Therapy Intervention (PT)  Until discharge  (Pended)   -CW     Row Name 08/09/21 1035          Vital Signs    Intratreatment Heart Rate (beats/min)  84  (Pended)   -CW     O2 Delivery Pre Treatment  room air  (Pended)   -CW     Intra SpO2 (%)  94  (Pended)   -CW     O2 Delivery Intra Treatment  room air  (Pended)   -CW     O2 Delivery Post Treatment  room air  (Pended)   -CW     Pre Patient Position  Supine  (Pended)   -CW     Intra Patient Position  Standing  (Pended)   -CW     Post Patient Position  Sitting  (Pended)   -CW     Row Name 08/09/21 1035          Positioning and Restraints    Pre-Treatment Position  in bed  (Pended)   -CW     Post Treatment Position  chair  (Pended)   -CW     In Chair  sitting;call light within reach;encouraged to call for assist;exit alarm on;with family/caregiver  (Pended)   -CW       User Key  (r) = Recorded By, (t) = Taken By, (c) = Cosigned By    Initials Name Provider Type    CW Abigail Rushing, PT Student PT Student        Outcome Measures     Row Name 08/09/21 1035          How much help from another person do you currently need...    Turning from your back to your side while in flat bed without using bedrails?  3  (Pended)   -CW     Moving from lying on back to sitting on the side of a flat bed without bedrails?   3  (Pended)   -CW     Moving to and from a bed to a chair (including a wheelchair)?  3  (Pended)   -CW     Standing up from a chair using your arms (e.g., wheelchair, bedside chair)?  3  (Pended)   -CW     Climbing 3-5 steps with a railing?  2  (Pended)   -CW     To walk in hospital room?  2  (Pended)   -CW     AM-PAC 6 Clicks Score (PT)  16  (Pended)   -CW     Row Name 08/09/21 1035          Functional Assessment    Outcome Measure Options  AM-PAC 6 Clicks Basic Mobility (PT)  (Pended)   -CW       User Key  (r) = Recorded By, (t) = Taken By, (c) = Cosigned By    Initials Name Provider Type    CW Abigail Rushing, PT Student PT Student                       Physical Therapy Education                 Title: PT OT SLP Therapies (Done)     Topic: Physical Therapy (Done)     Point: Mobility training (Done)     Learning Progress Summary           Patient Acceptance, E, VU by CW at 8/9/2021 1312    Comment: Instructed the patient on the benefit of PT for functional mobility. Education was provided on the proper use and positioning of the RW during transfers and gait.                   Point: Body mechanics (Done)     Learning Progress Summary           Patient Acceptance, E, VU by CW at 8/9/2021 1312    Comment: Instructed the patient on the benefit of PT for functional mobility. Education was provided on the proper use and positioning of the RW during transfers and gait.                               User Key     Initials Effective Dates Name Provider Type Discipline     07/26/21 -  Abigail Rushing, PT Student PT Student PT              PT Recommendation and Plan  Planned Therapy Interventions (PT): (P) bed mobility training, gait training, home exercise program, patient/family education, strengthening, transfer training  Plan of Care Reviewed With: (P) patient, spouse  Progress: (P) no change  Outcome Summary: (P) PT eval was completed this date. Pt was recieved in bed and was willing to participate in eval. Pt was min  assist for all bed mobility and transfers (supine> sit> stand) and went bed to bedside commode. Pt was found to have a small wound opening in the sacral area to which a foam dressing was applied. Pt ambulated 5 ft min assist with RW to the chair. Pt had deficits in strength and endurance. The patient would benefit from skilled PT intervention during inpatient stay to incerase functional mobility.     Time Calculation:   PT Charges     Row Name 08/09/21 1314             Untimed Charges    PT Eval/Re-eval Minutes  46  (Pended)   -CW         Total Minutes    Untimed Charges Total Minutes  46  (Pended)   -CW       Total Minutes  46  (Pended)   -CW        User Key  (r) = Recorded By, (t) = Taken By, (c) = Cosigned By    Initials Name Provider Type    CW Abigail Rushing PT Student PT Student        Therapy Charges for Today     Code Description Service Date Service Provider Modifiers Qty    [id number] HC PT EVAL LOW COMPLEXITY 3 8/9/2021 Abigail Rushing PT Student GP 1          PT G-Codes  Outcome Measure Options: (P) AM-PAC 6 Clicks Basic Mobility (PT)  AM-PAC 6 Clicks Score (PT): (P) 16    DIGNA RICHARDSON  8/9/2021

## 2021-08-09 NOTE — PLAN OF CARE
Goal Outcome Evaluation:  Plan of Care Reviewed With: patient        Progress: no change  Outcome Summary: No acute events overnight. Patient has rested well. VSS. Continue to monitor.

## 2021-08-09 NOTE — PLAN OF CARE
Goal Outcome Evaluation:  Plan of Care Reviewed With: patient, spouse        Progress: no change  Outcome Summary: OT eval completed. Patient presents deficits in strength, endurance, balance, mobility and ADL performance. Patient completed bed mobility and transfer to BSC and chair with min A using RW. Requires increased assistance with all ADLs. Patient is expected to benefit from continued OT services prior to DC.

## 2021-08-09 NOTE — PLAN OF CARE
Goal Outcome Evaluation:  Plan of Care Reviewed With: (P) patient, spouse        Progress: (P) no change  Outcome Summary: (P) PT eval was completed this date. Pt was recieved in bed and was willing to participate in eval. Pt was min assist for all bed mobility and transfers (supine> sit> stand) and went bed to bedside commode. Pt was found to have a small wound opening in the sacral area to which a foam dressing was applied. Pt ambulated 5 ft min assist with RW to the chair. Pt had deficits in strength and endurance. The patient would benefit from skilled PT intervention during inpatient stay to incerase functional mobility.

## 2021-08-09 NOTE — PROGRESS NOTES
[facility]    Name:  Garrett Richard   Age:  71 y.o.  Sex:  male  :  1950  MRN:  [id number]   Visit Number:  [id number]  Admission Date:  2021  Date Of Service:  21  Primary Care Physician:  Pierce Lubin MD     LOS: 1 day :    Chief Complaint:      Generalized weakness.    Subjective:    Mr. Richard was seen and examined this morning.  He is currently sitting up on the chair and is feeling better.  He had a bowel movement last night.  His tremor has significantly improved.  He still states that he is very weak and was not able to even feed himself last night.  Denies any chest pain or shortness of breath.  He was seen by physical therapy this morning.  No significant overnight events.    Review of Systems:     All systems were reviewed and negative except as mentioned in subjective, assessment and plan.    Vital Signs:    Temp:  [97.9 °F (36.6 °C)-98.7 °F (37.1 °C)] 98 °F (36.7 °C)  Heart Rate:  [74-91] 91  Resp:  [17-18] 18  BP: (114-133)/(56-65) 130/56    Intake and output:    I/O last 3 completed shifts:  In: 1097 [P.O.:597; IV Piggyback:500]  Out: 500 [Urine:500]  I/O this shift:  In: 720 [P.O.:720]  Out: -     Physical Examination:    General Appearance:  Alert and cooperative.  Ill-appearing male.   Head:  Atraumatic and normocephalic.   Eyes: Conjunctivae and sclerae normal, no icterus. No pallor.   Throat: No oral lesions, no thrush, oral mucosa moist.   Neck: Supple, trachea midline, no thyromegaly.   Lungs:   Breath sounds heard bilaterally equally.  No crackles or wheezing. No Pleural rub or bronchial breathing.   Heart:  Normal S1 and S2, no murmur, no gallop, no rub. No JVD.   Abdomen:   Normal bowel sounds, no masses, no organomegaly. Soft, nontender, nondistended, no rebound tenderness.   Extremities: Supple, no edema, no cyanosis, no clubbing.  Ecchymotic patches noted in bilateral upper extremities.  Surgical bandage noted on the  left elbow region.   Skin: No bleeding or rash.   Neurologic: Alert and oriented x 3. No facial asymmetry. Moves all four limbs.  He does have flapping tremor but it is significantly better compared to yesterday.      Laboratory results:    Results from last 7 days   Lab Units 08/09/21  0629 08/08/21  0548   SODIUM mmol/L 135* 136   POTASSIUM mmol/L 4.2 3.8   CHLORIDE mmol/L 106 105   CO2 mmol/L 20.8* 21.8*   BUN mg/dL 19 20   CREATININE mg/dL 1.22 1.36*   CALCIUM mg/dL 8.4* 9.0   BILIRUBIN mg/dL 1.7* 1.5*   ALK PHOS U/L 76 89   ALT (SGPT) U/L 23 24   AST (SGOT) U/L 32 34   GLUCOSE mg/dL 200* 220*     Results from last 7 days   Lab Units 08/09/21  0629 08/08/21  0548   WBC 10*3/mm3 5.26 6.15   HEMOGLOBIN g/dL 9.8* 10.4*   HEMATOCRIT % 29.1* 30.4*   PLATELETS 10*3/mm3 51* 66*     Results from last 7 days   Lab Units 08/09/21  0629   INR  1.36*     Results from last 7 days   Lab Units 08/08/21  0548   TROPONIN T ng/mL 0.021     Results from last 7 days   Lab Units 08/08/21  0745 08/08/21  0720   BLOODCX  No growth at 24 hours No growth at 24 hours     I have reviewed the patient's laboratory results.    Radiology results:    XR Elbow 3+ View Left    Result Date: 8/8/2021  PROCEDURE: XR ELBOW 3+ VW LEFT-  HISTORY: fall  FINDINGS:  Three views show no evidence of acute fracture or dislocation. Mild degenerative changes are noted. There is a chronic calcification adjacent to the lateral humeral epicondyle. No foreign body is identified.      Impression: No acute bony abnormality identified.        This report was finalized on 8/8/2021 7:00 AM by Jose Melendez MD.    CT Head Without Contrast    Result Date: 8/8/2021  PROCEDURE: CT HEAD WO CONTRAST-  HISTORY: weakness, fall  COMPARISON: 06/12/2021  TECHNIQUE: Multiple axial CT images were performed from the foramen magnum to the vertex without contrast. Coronal reformatted images were also obtained.This study was performed with techniques to keep radiation doses as low  as reasonably achievable, (ALARA). Individualized dose reduction techniques using automated exposure control or adjustment of mA and/or kV according to the patient size were employed.   FINDINGS: There is generalized age-appropriate atrophy. There is a stable chronic lacunar infarct in the left centrum semiovale. Also noted is a chronic lacunar infarct in the right abdiel that is stable.The ventricles are normal in size. There is no evidence of hemorrhage .  No masses are identified.  No abnormal extra-axial fluid collection is seen.  There is no evidence of shift of the midline structures. Images of the sinuses reveal no evidence of mucosal thickening. No bony abnormality is seen on the bone window images.      Impression: No acute intracranial abnormality.  Stable appearance of the brain.   This report was finalized on 8/8/2021 7:24 AM by Jose Melendez MD.    I have reviewed the patient's radiology reports.    Medication Review:     I have reviewed the patient's active and prn medications.     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  Acute hepatic encephalopathy, present on admission, improving.  2.  End-stage liver disease with hepatocellular carcinoma, awaiting transplant.  3.  Chronic kidney disease stage III.  4.  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  5.  Essential hypertension.  6.  Anemia of chronic disease.  7.  Impaired ADLs and mobility.    Plan:    Mr. Richard is currently doing much better compared to yesterday.  He will be continued on physical therapy for his generalized weakness.  He will be continued on lactulose and rifaximin.  His renal function is improving.  We will observe him for another night to make sure he has stable gait and go  home tomorrow with home health services.  Patient is not keen on going to short-term rehabilitation.  I have discussed the patient's condition and treatment plan with his wife Bonny who is at the bedside.  Discussed with nursing staff and multidisciplinary team.    DVT Prophylaxis: SCDs  Code Status: DNR  Diet: Diabetic  Discharge Plan: Home with home health tomorrow.    Charles Mcgraw MD  08/09/21  16:05 EDT    Dictated utilizing Dragon dictation.

## 2021-08-09 NOTE — PAYOR COMM NOTE
"Garrett Richard (71 y.o. Male)     Date of Birth Social Security Number Address Home Phone MRN    1950  [address] [place] [address] [phone] [id number]    Gnosticist Marital Status          Worship        Admission Date Admission Type Admitting Provider Attending Provider Department, Room/Bed    21 Emergency Charles Mcgraw MD Pais, Roshan, MD [facility] MED SURG  4, 412/    Discharge Date Discharge Disposition Discharge Destination                       Attending Provider: Charles Mcgraw MD    Allergies: No Known Allergies    Isolation: None   Infection: None   Code Status: No CPR    Ht: 182.9 cm (72\")   Wt: 68.3 kg (150 lb 9.2 oz)    Admission Cmt: None   Principal Problem: Hepatic encephalopathy (CMS/HCC) [K72.90]                 Active Insurance as of 2021     Primary Coverage     Payor Plan Insurance Group Employer/Plan Group    HUMANA MEDICARE REPLACEMENT HUMANA MEDICARE REPLACEMENT [id number]     Payor Plan Address Payor Plan Phone Number Payor Plan Fax Number Effective Dates    [address] [address] [phone]  2020 - None Entered    [facility] [id number]       Subscriber Name Subscriber Birth Date Member ID       GARRETT RICHARD 1950 [id number]                 Emergency Contacts      (Rel.) Home Phone Work Phone Mobile Phone    Bonny Richard (Spouse) [phone] -- [phone]    OZZY DODD (Daughter) [phone] -- --               History & Physical      Charles Mcgraw MD at 21 0953              [facility] HOSPITALIST   HISTORY AND PHYSICAL      Name:  Garrett Richard   Age:  71 y.o.  Sex:  male  :  1950  MRN:  [id number]   Visit Number:  [id number]  Admission Date:  2021  Date Of Service:  21  Primary Care Physician:  Pierce Lubin MD    Chief Complaint:     Drowsiness and generalized weakness.    History Of Presenting Illness:      Mr. Richard is a 71-year-old male with " "history of end-stage liver disease, hepatocellular carcinoma who apparently is followed at [facility] was brought to the emergency room by his family with progressive generalized weakness and drowsiness.  Patient apparently has had a couple of falls in the last 24 hours due to his generalized weakness.  This falls were witnessed by his wife.  Patient did get on the right side of the head 1 time.  No history of loss of consciousness.  No history of any fevers, nausea or vomiting.  Patient has been compliant with his lactulose and rifaximin.    In the emergency room, he was afebrile and hemodynamically stable saturating at 99% at room air.  Blood work done in the emergency room including CMP and CBC was remarkable for a glucose of 220, creatinine 1.36, albumin of 2.5, hemoglobin of 10.4 and a platelet count of 66.  Procalcitonin, magnesium and troponin levels were within normal limits.  Ammonia was elevated at 166 but lactic acid was normal around 2.  CT of the head was unremarkable.  X-ray of the left elbow was negative for any fractures.  Patient was given normal saline 100 mL bolus in the emergency room and was also given 20 g of lactulose.  He is currently being admitted to the medical floor for further evaluation and management of his hepatic encephalopathy.  Patient is currently alert and answering questions appropriately.    Patient does have history of liver cirrhosis likely related to fatty liver disease and also has had hepatocellular carcinoma treated by .  Patient has undergone laser photocoagulation of the liver mass in the kidney metastatic lesion in the past and had another recurrence of liver mass in January of this year undergoing laser photocoagulation.  According to the wife, patient has \"gone downhill\" since then.  He does use a walker to ambulate at home.    Review Of Systems:    All systems were reviewed and negative except for: Generalized weakness, confusion, " drowsiness, falls.    Past Medical History: Patient  has a past medical history of AL (acute kidney injury) (CMS/HCC) (5/28/2021), Diabetes mellitus (CMS/HCC), Hepatocellular carcinoma (CMS/HCC) (10/25/2018), Hypertension, Impaired functional mobility, balance, gait, and endurance, Liver disease, Umbilical hernia, Upper respiratory infection, Urinary symptom or sign, and Urinary tract infection.    Past Surgical History: Patient  has a past surgical history that includes Tonsillectomy; Liver surgery; and Esophagogastroduodenoscopy (N/A, 5/11/2021).    Social History: Patient  reports that he has never smoked. He has never used smokeless tobacco. He reports that he does not drink alcohol and does not use drugs.    Family History: Patient's family history includes COPD in his father; Colon cancer in his mother; Diabetes in his maternal grandmother; No Known Problems in his maternal grandfather, paternal grandfather, and paternal grandmother.    Allergies:      Patient has no known allergies.    Home Medications:    Prior to Admission Medications     Prescriptions Last Dose Informant Patient Reported? Taking?    ascorbic acid (VITAMIN C) 500 MG tablet   Yes No    Take 500 mg by mouth Daily.    Continuous Blood Gluc  (FreeStyle Sohail 14 Day Ellsworth) device   No No    1 each Every 14 (Fourteen) Days.    ferrous sulfate 325 (65 FE) MG tablet   Yes No    Take 325 mg by mouth Daily With Breakfast. 2 tablets once a day    furosemide (Lasix) 40 MG tablet  Spouse/Significant Other No No    Take 1 tablet by mouth Daily for 90 days.    Patient taking differently:  Take 20 mg by mouth Daily. Per pt spouse, Dr. Lubin instructed pt to d/c taking medication. Pt to continue medication starting 5/31/21.    insulin aspart (NovoLOG FlexPen) 100 UNIT/ML solution pen-injector sc pen   No No    Inject 30 Units under the skin into the appropriate area as directed 3 (Three) Times a Day With Meals.    Patient taking differently:   Inject 7 Units under the skin into the appropriate area as directed 3 (Three) Times a Day With Meals. Sliding scale - depending on glucose readings    Insulin Pen Needle (B-D ULTRAFINE III SHORT PEN) 31G X 8 MM misc   Yes No    lactulose (CHRONULAC) 10 GM/15ML solution   No No    Take 30 mL by mouth 4 (Four) Times a Day.    Lantus SoloStar 100 UNIT/ML injection pen   Yes No    Inject 10 Units as directed Every Night. 10 units at bedtime if the first morning reading is over 184    nadolol (CORGARD) 20 MG tablet   Yes No    Take 20 mg by mouth Daily.    pantoprazole (Protonix) 40 MG EC tablet   No No    Take 1 tablet by mouth Daily    sennosides-docusate (PERICOLACE) 8.6-50 MG per tablet   Yes No    Take 1 tablet by mouth. otc    spironolactone (Aldactone) 100 MG tablet  Spouse/Significant Other No No    Take 1 tablet by mouth Daily    Patient taking differently:  Take 50 mg by mouth 2 (Two) Times a Day. Pt spouse states Dr. Lubin instructed pt to d/c dose until 5/31/21. Can continue with 50 mg BID instead of the previous 100 mg.    sucralfate (Carafate) 1 g tablet   No No    Take 1 tablet by mouth 4 (Four) Times a Day.        ED Medications:    Medications   sodium chloride 0.9 % flush 10 mL (has no administration in time range)   sodium chloride 0.9 % bolus 500 mL (0 mL Intravenous Stopped 8/8/21 7679)   lactulose (CHRONULAC) 10 GM/15ML solution 20 g (20 g Oral Given 8/8/21 0914)     Vital Signs:  Temp:  [98.3 °F (36.8 °C)] 98.3 °F (36.8 °C)  Heart Rate:  [81-88] 83  Resp:  [18] 18  BP: (121-133)/(70-80) 122/71        08/08/21  0545   Weight: 67.6 kg (149 lb)     Body mass index is 20.21 kg/m².    Physical Exam:     General Appearance:  Alert and cooperative.    Head:  Normocephalic.  Ecchymotic patch noted on the right temple.   Eyes: Conjunctivae and sclerae normal, no icterus. No pallor.   Ears:  Ears with no abnormalities noted.   Throat: No oral lesions, no thrush, oral mucosa moist.   Neck: Supple, trachea  midline, no thyromegaly.   Back:   No kyphoscoliosis present. No tenderness to palpation.   Lungs:   Breath sounds heard bilaterally equally.  No crackles or wheezing. No Pleural rub or bronchial breathing.   Heart:  Normal S1 and S2, no murmur, no gallop, no rub. No JVD.   Abdomen:   Normal bowel sounds, no masses, no organomegaly. Soft, nontender, mild distention, no rebound tenderness.   Extremities: Supple, no edema, no cyanosis, no clubbing.  Ecchymotic patches noted in bilateral upper extremities.  Surgical bandage noted on the left elbow region.   Pulses: Pulses palpable bilaterally.   Skin: No bleeding or rash.   Neurologic: Alert and oriented x 3. No facial asymmetry. Moves all four limbs.  Asterixis noted..      Laboratory data:    I have reviewed the labs done in the emergency room.    Results from last 7 days   Lab Units 08/08/21  0548   SODIUM mmol/L 136   POTASSIUM mmol/L 3.8   CHLORIDE mmol/L 105   CO2 mmol/L 21.8*   BUN mg/dL 20   CREATININE mg/dL 1.36*   CALCIUM mg/dL 9.0   BILIRUBIN mg/dL 1.5*   ALK PHOS U/L 89   ALT (SGPT) U/L 24   AST (SGOT) U/L 34   GLUCOSE mg/dL 220*     Results from last 7 days   Lab Units 08/08/21  0548   WBC 10*3/mm3 6.15   HEMOGLOBIN g/dL 10.4*   HEMATOCRIT % 30.4*   PLATELETS 10*3/mm3 66*         Results from last 7 days   Lab Units 08/08/21  0548   TROPONIN T ng/mL 0.021     EKG:      EKG done in the emergency room was reviewed by me.  It shows sinus rhythm at 82 bpm.  Normal axis.  Baseline artifact noted.  No significant ST-T changes were noted.    Radiology:    XR Elbow 3+ View Left    Result Date: 8/8/2021  PROCEDURE: XR ELBOW 3+ VW LEFT-  HISTORY: fall  FINDINGS:  Three views show no evidence of acute fracture or dislocation. Mild degenerative changes are noted. There is a chronic calcification adjacent to the lateral humeral epicondyle. No foreign body is identified.      No acute bony abnormality identified.        This report was finalized on 8/8/2021 7:00 AM by  Jose Melendez MD.    CT Head Without Contrast    Result Date: 8/8/2021  PROCEDURE: CT HEAD WO CONTRAST-  HISTORY: weakness, fall  COMPARISON: 06/12/2021  TECHNIQUE: Multiple axial CT images were performed from the foramen magnum to the vertex without contrast. Coronal reformatted images were also obtained.This study was performed with techniques to keep radiation doses as low as reasonably achievable, (ALARA). Individualized dose reduction techniques using automated exposure control or adjustment of mA and/or kV according to the patient size were employed.   FINDINGS: There is generalized age-appropriate atrophy. There is a stable chronic lacunar infarct in the left centrum semiovale. Also noted is a chronic lacunar infarct in the right abdiel that is stable.The ventricles are normal in size. There is no evidence of hemorrhage .  No masses are identified.  No abnormal extra-axial fluid collection is seen.  There is no evidence of shift of the midline structures. Images of the sinuses reveal no evidence of mucosal thickening. No bony abnormality is seen on the bone window images.      No acute intracranial abnormality.  Stable appearance of the brain.   This report was finalized on 8/8/2021 7:24 AM by Jose Melendez MD.    Assessment:    1.  Acute hepatic encephalopathy, present on admission.  2.  End-stage liver disease with hepatocellular carcinoma, awaiting transplant.  3.  Chronic kidney disease stage III.  4.  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  5.  Essential hypertension.  6.  Anemia of chronic disease.  7.  Impaired ADLs and mobility.    Plan:    Mr. Richard is currently being admitted to the medical floor with telemetry.  He is already feeling better after receiving lactulose in the emergency room.  We will continue lactulose 30 g 3 times daily and continue the rifaximin twice daily.  His home medications will be continued.    At this time, there is no evidence of infection and I  will hold off on antibiotics therapy.  He does not have any significant ascites.  We will start him on physical and occupational therapy.  He already has home health services at home.  I have discussed the patient's advanced directives with him and he has a living will and is DNR.  He is agreeable however for short-term intubation if he developed pneumonia or respiratory failure.  I have requested the living will to be brought so that the copy can be scanned to the chart.  I have discussed the patient's condition and treatment plan with his wife Bonny who is at the bedside.  Overall, patient's long-term prognosis is extremely poor due to his end-stage liver disease and hepatocellular carcinoma.    Risk Assessment: Moderate to high risk due to his advanced liver disease.  DVT Prophylaxis: SCDs.  Code Status: DNR  Diet: Diabetic    Advance Care Planning      ACP discussion was held with the patient during this visit. Patient has an advance directive (not in EMR), copy requested.      Charles Mcgraw MD  08/08/21  09:53 EDT    Dictated utilizing Dragon dictation.    Electronically signed by Charles Mcgraw MD at 08/08/21 1237         Current Facility-Administered Medications   Medication Dose Route Frequency Provider Last Rate Last Admin   • ascorbic acid (VITAMIN C) tablet 500 mg  500 mg Oral Daily Charles Mcgraw MD   500 mg at 08/09/21 0838   • ferrous sulfate EC tablet 324 mg  324 mg Oral Daily With Breakfast Charles Mcgraw MD   324 mg at 08/09/21 0838   • furosemide (LASIX) tablet 20 mg  20 mg Oral Daily Charles Mcgraw MD   20 mg at 08/09/21 0838   • lactulose (CHRONULAC) 10 GM/15ML solution 20 g  20 g Oral 4x Daily Charles Mcgraw MD   20 g at 08/09/21 1110   • ondansetron (ZOFRAN) injection 4 mg  4 mg Intravenous Q6H PRN Charles Mcgraw MD       • oxyCODONE (ROXICODONE) immediate release tablet 5 mg  5 mg Oral Q4H PRN Charles Mcgraw MD   5 mg at 08/08/21 1406   • pantoprazole (PROTONIX) EC tablet 40 mg  40 mg Oral Daily Mariluz  MD Charles   40 mg at 08/09/21 0838   • riFAXIMin (XIFAXAN) tablet 550 mg  550 mg Oral Q12H Charles Mcgraw MD   550 mg at 08/09/21 0838   • sennosides-docusate (PERICOLACE) 8.6-50 MG per tablet 1 tablet  1 tablet Oral BID Charles Mcgraw MD   1 tablet at 08/09/21 0838   • sodium chloride 0.9 % flush 10 mL  10 mL Intravenous PRN Courtney Marr MD       • sodium chloride 0.9 % flush 3 mL  3 mL Intravenous Q12H Charles Mcgraw MD   3 mL at 08/09/21 0838   • sodium chloride 0.9 % flush 3-10 mL  3-10 mL Intravenous PRN Charles Mcgraw MD       • spironolactone (ALDACTONE) tablet 50 mg  50 mg Oral BID Charles Mcgraw MD   50 mg at 08/09/21 0838     Lab Results (last 48 hours)     Procedure Component Value Units Date/Time    Ferritin [[id number]]  (Normal) Collected: 08/09/21 0629    Specimen: Blood Updated: 08/09/21 0939     Ferritin 110.10 ng/mL     Narrative:      Results may be falsely decreased if patient taking Biotin.      Iron Profile [[id number]]  (Abnormal) Collected: 08/09/21 0629    Specimen: Blood Updated: 08/09/21 0930     Iron 85 mcg/dL      Iron Saturation 37 %      Transferrin 155 mg/dL      TIBC 231 mcg/dL     Folate [[id number]] Collected: 08/08/21 0548    Specimen: Blood Updated: 08/09/21 0918    Vitamin B12 [[id number]] Collected: 08/08/21 0548    Specimen: Blood Updated: 08/09/21 0918    Blood Culture With NYA - Blood, Hand, Left [[id number]] Collected: 08/08/21 0745    Specimen: Blood from Hand, Left Updated: 08/09/21 0800     Blood Culture No growth at 24 hours    Blood Culture With NAY - Blood, Hand, Right [[id number]] Collected: 08/08/21 0720    Specimen: Blood from Hand, Right Updated: 08/09/21 0800     Blood Culture No growth at 24 hours    Scan Slide [[id number]] Collected: 08/09/21 0629    Specimen: Blood Updated: 08/09/21 0739     Macrocytes Mod/2+     WBC Morphology Normal     Platelet Estimate Decreased    CBC Auto Differential [[id number]]  (Abnormal) Collected: 08/09/21 0629    Specimen: Blood  Updated: 08/09/21 0738     WBC 5.26 10*3/mm3      RBC 2.70 10*6/mm3      Hemoglobin 9.8 g/dL      Hematocrit 29.1 %      .8 fL      MCH 36.3 pg      MCHC 33.7 g/dL      RDW 15.7 %      RDW-SD 61.9 fl      MPV 9.4 fL      Platelets 51 10*3/mm3      Neutrophil % 72.8 %      Lymphocyte % 12.7 %      Monocyte % 8.9 %      Eosinophil % 4.2 %      Basophil % 1.0 %      Immature Grans % 0.4 %      Neutrophils, Absolute 3.83 10*3/mm3      Lymphocytes, Absolute 0.67 10*3/mm3      Monocytes, Absolute 0.47 10*3/mm3      Eosinophils, Absolute 0.22 10*3/mm3      Basophils, Absolute 0.05 10*3/mm3      Immature Grans, Absolute 0.02 10*3/mm3      nRBC 0.0 /100 WBC     Comprehensive Metabolic Panel [[id number]]  (Abnormal) Collected: 08/09/21 0629    Specimen: Blood Updated: 08/09/21 0709     Glucose 200 mg/dL      Comment: Glucose >180, Hemoglobin A1C recommended.        BUN 19 mg/dL      Creatinine 1.22 mg/dL      Sodium 135 mmol/L      Potassium 4.2 mmol/L      Chloride 106 mmol/L      CO2 20.8 mmol/L      Calcium 8.4 mg/dL      Total Protein 6.0 g/dL      Albumin 2.30 g/dL      ALT (SGPT) 23 U/L      AST (SGOT) 32 U/L      Alkaline Phosphatase 76 U/L      Total Bilirubin 1.7 mg/dL      eGFR Non African Amer 59 mL/min/1.73      Globulin 3.7 gm/dL      A/G Ratio 0.6 g/dL      BUN/Creatinine Ratio 15.6     Anion Gap 8.2 mmol/L     Narrative:      GFR Normal >60  Chronic Kidney Disease <60  Kidney Failure <15      Protime-INR [[id number]]  (Abnormal) Collected: 08/09/21 0629    Specimen: Blood Updated: 08/09/21 0703     Protime 17.3 Seconds      INR 1.36    Narrative:      Suggested INR therapeutic range for stable oral anticoagulant therapy:    Low Intensity therapy:   1.5-2.0  Moderate Intensity therapy:   2.0-3.0  High Intensity therapy:   2.5-4.0    COVID PRE-OP / PRE-PROCEDURE SCREENING ORDER (NO ISOLATION) - Swab, Nasal Cavity [[id number]]  (Normal) Collected: 08/08/21 1403    Specimen: Swab from Nasal Cavity Updated:  08/08/21 1448    Narrative:      The following orders were created for panel order COVID PRE-OP / PRE-PROCEDURE SCREENING ORDER (NO ISOLATION) - Swab, Nasal Cavity.  Procedure                               Abnormality         Status                     ---------                               -----------         ------                     COVID-19,[facility]...[[id number]]  Normal              Final result                 Please view results for these tests on the individual orders.    COVID-19,[facility] IN-HOUSE,Nasal Swab No Transport Media 3-4 HR TAT - Swab, Nasal Cavity [[id number]]  (Normal) Collected: 08/08/21 1403    Specimen: Swab from Nasal Cavity Updated: 08/08/21 1448     COVID19 Not Detected    Narrative:      Fact sheet for providers: https://www.fda.gov/media/091942/download     Fact sheet for patients: https://www.fda.gov/media/621553/download    Test performed by PCR.    Consider negative results in combination with clinical observations, patient history, and epidemiological information.    Ammonia [[id number]]  (Abnormal) Collected: 08/08/21 0720    Specimen: Blood Updated: 08/08/21 0805     Ammonia 166 umol/L     Procalcitonin [[id number]]  (Normal) Collected: 08/08/21 0548    Specimen: Blood Updated: 08/08/21 0717     Procalcitonin 0.17 ng/mL     Narrative:      As a Marker for Sepsis (Non-Neonates):     1. <0.5 ng/mL represents a low risk of severe sepsis and/or septic shock.  2. >2 ng/mL represents a high risk of severe sepsis and/or septic shock.    As a Marker for Lower Respiratory Tract Infections that require antibiotic therapy:  PCT on Admission     Antibiotic Therapy             6-12 Hrs later  >0.5                          Strongly Recommended            >0.25 - <0.5             Recommended  0.1 - 0.25                  Discouraged                       Remeasure/reassess PCT  <0.1                         Strongly Discouraged         Remeasure/reassess PCT      As 28 day mortality risk  "marker: \"Change in Procalcitonin Result\" (>80% or <=80%) if Day 0 (or Day 1) and Day 4 values are available. Refer to http://www.[facility]-pct-calculator.com/    Change in PCT <=80 %   A decrease of PCT levels below or equal to 80% defines a positive change in PCT test result representing a higher risk for 28-day all-cause mortality of patients diagnosed with severe sepsis or septic shock.    Change in PCT >80 %   A decrease of PCT levels of more than 80% defines a negative change in PCT result representing a lower risk for 28-day all-cause mortality of patients diagnosed with severe sepsis or septic shock.              Results may be falsely decreased if patient taking Biotin.     Lactic Acid, Plasma [[id number]]  (Normal) Collected: 08/08/21 0549    Specimen: Blood Updated: 08/08/21 0713     Lactate 2.0 mmol/L     [facility] Draw [[id number]] Collected: 08/08/21 0548    Specimen: Blood Updated: 08/08/21 0700    Narrative:      The following orders were created for panel order [facility] Draw.  Procedure                               Abnormality         Status                     ---------                               -----------         ------                     Green Top (Gel)[[id number]]                                  Final result               Lavender Top[[id number]]                                     Final result               Gold Top - SST[[id number]]                                   Final result                 Please view results for these tests on the individual orders.    Lavender Top [[id number]] Collected: 08/08/21 0548    Specimen: Blood Updated: 08/08/21 0700     Extra Tube hold for add-on     Comment: Auto resulted       Green Top (Gel) [[id number]] Collected: 08/08/21 0548    Specimen: Blood Updated: 08/08/21 0700     Extra Tube Hold for add-ons.     Comment: Auto resulted.       Gold Top - SST [[id number]] Collected: 08/08/21 0548    Specimen: Blood Updated: 08/08/21 0700     Extra Tube Hold for add-ons.    "  Comment: Auto resulted.       Comprehensive Metabolic Panel [[id number]]  (Abnormal) Collected: 08/08/21 0548    Specimen: Blood Updated: 08/08/21 0615     Glucose 220 mg/dL      Comment: Glucose >180, Hemoglobin A1C recommended.        BUN 20 mg/dL      Creatinine 1.36 mg/dL      Sodium 136 mmol/L      Potassium 3.8 mmol/L      Chloride 105 mmol/L      CO2 21.8 mmol/L      Calcium 9.0 mg/dL      Total Protein 6.6 g/dL      Albumin 2.50 g/dL      ALT (SGPT) 24 U/L      AST (SGOT) 34 U/L      Alkaline Phosphatase 89 U/L      Total Bilirubin 1.5 mg/dL      eGFR Non African Amer 52 mL/min/1.73      Globulin 4.1 gm/dL      A/G Ratio 0.6 g/dL      BUN/Creatinine Ratio 14.7     Anion Gap 9.2 mmol/L     Narrative:      GFR Normal >60  Chronic Kidney Disease <60  Kidney Failure <15      Troponin [[id number]]  (Normal) Collected: 08/08/21 0548    Specimen: Blood Updated: 08/08/21 0615     Troponin T 0.021 ng/mL     Narrative:      Troponin T Reference Range:  <= 0.03 ng/mL-   Negative for AMI  >0.03 ng/mL-     Abnormal for myocardial necrosis.  Clinicians would have to utilize clinical acumen, EKG, Troponin and serial changes to determine if it is an Acute Myocardial Infarction or myocardial injury due to an underlying chronic condition.       Results may be falsely decreased if patient taking Biotin.      Magnesium [[id number]]  (Normal) Collected: 08/08/21 0548    Specimen: Blood Updated: 08/08/21 0615     Magnesium 1.6 mg/dL     CBC & Differential [[id number]]  (Abnormal) Collected: 08/08/21 0548    Specimen: Blood Updated: 08/08/21 0559    Narrative:      The following orders were created for panel order CBC & Differential.  Procedure                               Abnormality         Status                     ---------                               -----------         ------                     CBC Auto Differential[[id number]]        Abnormal            Final result                 Please view results for these tests on  the individual orders.    CBC Auto Differential [[id number]]  (Abnormal) Collected: 08/08/21 0548    Specimen: Blood Updated: 08/08/21 0559     WBC 6.15 10*3/mm3      RBC 2.83 10*6/mm3      Hemoglobin 10.4 g/dL      Hematocrit 30.4 %      .4 fL      MCH 36.7 pg      MCHC 34.2 g/dL      RDW 15.4 %      RDW-SD 60.3 fl      MPV 8.8 fL      Platelets 66 10*3/mm3      Neutrophil % 75.4 %      Lymphocyte % 11.9 %      Monocyte % 7.3 %      Eosinophil % 4.1 %      Basophil % 0.8 %      Immature Grans % 0.5 %      Neutrophils, Absolute 4.64 10*3/mm3      Lymphocytes, Absolute 0.73 10*3/mm3      Monocytes, Absolute 0.45 10*3/mm3      Eosinophils, Absolute 0.25 10*3/mm3      Basophils, Absolute 0.05 10*3/mm3      Immature Grans, Absolute 0.03 10*3/mm3      nRBC 0.0 /100 WBC           Imaging Results (Last 48 Hours)     Procedure Component Value Units Date/Time    CT Head Without Contrast [[id number]] Collected: 08/08/21 0722     Updated: 08/08/21 0726    Narrative:      PROCEDURE: CT HEAD WO CONTRAST-     HISTORY: weakness, fall     COMPARISON: 06/12/2021     TECHNIQUE: Multiple axial CT images were performed from the foramen  magnum to the vertex without contrast. Coronal reformatted images were  also obtained.This study was performed with techniques to keep radiation  doses as low as reasonably achievable, (ALARA). Individualized dose  reduction techniques using automated exposure control or adjustment of  mA and/or kV according to the patient size were employed.        FINDINGS: There is generalized age-appropriate atrophy. There is a  stable chronic lacunar infarct in the left centrum semiovale. Also noted  is a chronic lacunar infarct in the right abdiel that is stable.The  ventricles are normal in size. There is no evidence of hemorrhage .  No  masses are identified.  No abnormal extra-axial fluid collection is  seen.  There is no evidence of shift of the midline structures. Images  of the sinuses reveal no evidence  of mucosal thickening. No bony  abnormality is seen on the bone window images.       Impression:      No acute intracranial abnormality.     Stable appearance of the brain.        This report was finalized on 8/8/2021 7:24 AM by Jose Melendez MD.    XR Elbow 3+ View Left [[id number]] Collected: 08/08/21 0659     Updated: 08/08/21 0708    Narrative:      PROCEDURE: XR ELBOW 3+ VW LEFT-     HISTORY: fall     FINDINGS:  Three views show no evidence of acute fracture or  dislocation. Mild degenerative changes are noted. There is a chronic  calcification adjacent to the lateral humeral epicondyle. No foreign  body is identified.       Impression:      No acute bony abnormality identified.                       This report was finalized on 8/8/2021 7:00 AM by Jose Melendez MD.        Physician Progress Notes (last 24 hours) (Notes from 08/08/21 1612 through 08/09/21 1612)    No notes of this type exist for this encounter.

## 2021-08-09 NOTE — CONSULTS
"Adult Nutrition  Assessment/PES    Patient Name:  Garrett Richard  YOB: 1950  MRN: [id number]  Admit Date:  8/8/2021    Assessment Date:  8/9/2021    Comments:    Recommend:  1. Consider adding low-sodium modifications to current diet order as medically appropriate and tolerated.  2. Encourage PO intake. Average intake ~12% x 2 meals.   3. RD ordered Arginaid BID and Novasource Renal daily.  4. Consider a renal multivitamin with minerals daily.  5. Continue to monitor and replace electrolytes PRN.   6. RD completed MSA with NFPE; pt meets criteria for severe malnutrition with a BMI of 20.42.      RD to follow pt and available PRN.      Reason for Assessment     Row Name 08/09/21 0915          Reason for Assessment    Reason For Assessment  physician consult     Diagnosis  diabetes diagnosis/complications;cardiac disease;cancer diagnosis/related complications;liver disease;renal disease;hematological/related complications liver encephalopathy, cirrhosis, HTN, hepatocellular carcinoma, DM 2, CKD 3, anemia           Anthropometrics     Row Name 08/09/21 0942          Anthropometrics    Height  182.9 cm (72\")        Ideal Body Weight (IBW)    Ideal Body Weight (IBW) (kg)  82.07         Labs/Tests/Procedures/Meds     Row Name 08/09/21 0941          Labs/Procedures/Meds    Lab Results Reviewed  reviewed, pertinent     Lab Results Comments  Low: Na, Alb, Platelets; High: Ammonia, Gluc, Bilirubin        Medications    Pertinent Medications Reviewed  reviewed, pertinent     Pertinent Medications Comments  Vitamin C, ferrous sulfate, Lasix, Lactulose, Protonix, Pericolace           Estimated/Assessed Needs     Row Name 08/09/21 0942          Calculation Measurements    Weight Used For Calculations  68.3 kg (150 lb 9.2 oz)     Height  182.9 cm (72\")        Estimated/Assessed Needs    Additional Documentation  Fluid Requirements (Group);Calorie Requirements (Group);Protein Requirements (Group);[place]-St. " Tasha Equation (Group)        Calorie Requirements    Estimated Calorie Requirement Comment  5015-4290        [place]-. Ronakor Equation    RMR ([place]-St. Jeor Equation)  1486     [place]-StAsha Simonsor Activity Factors  -- 1.3 AF        Protein Requirements    Weight Used For Protein Calculations  68.3 kg (150 lb 9.2 oz)     Est Protein Requirement Amount (gms/kg)  1.2 gm protein 68-81 grams     Estimated Protein Requirements (gms/day)  81.96        Fluid Requirements    Fluid Requirements (mL/day)  -- 1931--2131 mL     Estimated Fluid Requirement Method  other (see comments) 1 mL/kcal         Nutrition Prescription Ordered     Row Name 08/09/21 0943          Nutrition Prescription PO    Current PO Diet  Regular     Common Modifiers  Consistent Carbohydrate         Evaluation of Received Nutrient/Fluid Intake     Row Name 08/09/21 0942          PO Evaluation    Number of Days PO Intake Evaluated  1 day     Number of Meals  2     % PO Intake  12           Malnutrition Severity Assessment     Row Name 08/09/21 1141          Malnutrition Severity Assessment    Malnutrition Type  Chronic Disease - Related Malnutrition        Muscle Loss    Loss of Muscle Mass Findings  Severe     Mandaeism Region  Severe - deep hollowing/scooping, lack of muscle to touch, facial bones well defined     Clavicle Bone Region  Severe - protruding prominent bone     Acromion Bone Region  Severe - squared shoulders, bones, and acromion process protrusion prominent     Scapular Bone Region  Severe - prominent bones, depressions easily visible between ribs, scapula, spine, shoulders     Dorsal Hand Region  Severe - prominent depression     Patellar Region  Severe - prominent bone, square looking, very little muscle definition     Anterior Thigh Region  Severe - line/depression along thigh, obviously thin     Posterior Calf Region  Severe - thin with very little definition/firmness        Fat Loss    Subcutaneous Fat Loss Findings  Severe     Orbital  Region   Severe - pronounced hollowness/depression, dark circles, loose saggy skin     Upper Arm Region  Severe - mostly skin, very little space between folds, fingers touch     Thoracic & Lumbar Region  Severe - ribs visible with prominent depressions, iliac crest very prominent        Criteria Met (Must meet criteria for severity in at least 2 of these categories: M Wasting, Fat Loss, Fluid, Secondary Signs, Wt. Status, Intake)    Patient meets criteria for   Severe Malnutrition           Problem/Interventions:  Problem 1     Row Name 08/09/21 0943          Nutrition Diagnoses Problem 1    Problem 1  Altered Nutrition Related to Labs     Etiology (related to)  Medical Diagnosis     Endocrine  DM2     Hepatic  Cirrhosis;Encephalopathy     Signs/Symptoms (evidenced by)  Biochemical     Specific Labs Noted  Glucose;Total bilirubin;Other (comment) Ammonia         Problem 2     Row Name 08/09/21 0945          Nutrition Diagnoses Problem 2    Problem 2  Malnutrition     Etiology (related to)  Medical Diagnosis     Oncology  Hepatic cancer     Signs/Symptoms (evidenced by)  BMI     BMI  20 - 20.9             Intervention Goal     Row Name 08/09/21 0945          Intervention Goal    General  Meet nutritional needs for age/condition;Improved nutrition related lab(s)     PO  Meet estimated needs;Increase intake;PO intake (%)     PO Intake %  50 %     Weight  Appropriate weight gain         Nutrition Intervention     Row Name 08/09/21 0945          Nutrition Intervention    RD/Tech Action  Follow Tx progress;Encourage intake;Recommend/ordered     Recommended/Ordered  Diet;Supplement         Nutrition Prescription     Row Name 08/09/21 0945          Nutrition Prescription PO    PO Prescription  Begin/change diet;Begin/change supplement     Begin/Change Diet to  Regular     Supplement  Other (comment);Nova Renal Arginaid     Supplement Frequency  2 times a day;Daily     Common Modifiers  Consistent Carbohydrate;Low Sodium      New PO Prescription Ordered?  Yes supplements ordered        Other Orders    Obtain Weight  Daily     Obtain Weight Ordered?  No, recommended     Supplement  Vitamin mineral supplement;Iron supplement     Supplement Ordered?  No, recommended     Other  Continue to monitor and replace electrolytes PRN         Education/Evaluation     Row Name 08/09/21 0946          Education    Education  Will Instruct as appropriate     Please explain  --        Monitor/Evaluation    Monitor  Per protocol;I&O;PO intake;Supplement intake;Pertinent labs;Weight;Skin status           Electronically signed by:  Sirisha Costa RD  08/09/21 12:00 EDT

## 2021-08-09 NOTE — CASE MANAGEMENT/SOCIAL WORK
Discharge Planning Assessment  [facility]     Patient Name: Garrett Richard  MRN: [id number]  Today's Date: 2021    Admit Date: 2021    Discharge Needs Assessment     Row Name 21 9301       Living Environment    Name(s) of Who Lives With Patient  Bonny Richard    Current Living Arrangements  home/apartment/condo    Primary Care Provided by  spouse/significant other    Provides Primary Care For  no one, unable/limited ability to care for self    Caregiving Concerns  Wife is exhausted.  She was a caregiver to her mother last year til she , now caregiver to him.    Quality of Family Relationships  supportive    Able to Return to Prior Arrangements  other (see comments) Patients wife called me late in the day and requested rehab placement.  Referrals sent.       Resource/Environmental Concerns    Transportation Concerns  car, none       Transition Planning    Patient/Family Anticipates Transition to  inpatient rehabilitation facility       Discharge Needs Assessment    Readmission Within the Last 30 Days  no previous admission in last 30 days    Equipment Currently Used at Home  walker, rolling;cane, straight;wheelchair;commode;bath bench    Equipment Needed After Discharge  -- None if goes to rehab.  Wife said she had everything she thinks they need.    Current Discharge Risk  chronically ill;dependent with mobility/activities of daily living;physical impairment    Row Name 21 0208       Living Environment    Lives With  spouse        Discharge Plan    Met with patient in room. Wife said he has a Living will-  No copy on file.  No POA.  Demographics verified. New plan is to go to rehab at [place]. That's why the referral was sent out late in the day. Original plan (@ 1200 21) was to go home with the same set up he had.  [facility], no oxygen. CM card given to patient, password set up, enrolled in meds to beds. Encouraged to call with any discharge needs.        Continued Care and  Services - Admitted Since 8/8/2021     Destination     Service Provider Request Status Selected Services Address Phone Fax Patient Preferred    [facility]  Pending - Request Sent N/A [address] [place] [address] [phone] --    [facility]  Pending - Request Sent N/A [address] [place] [address] [phone] --          Home Medical Care     Service Provider Request Status Selected Services Address Phone Fax Patient Preferred    [facility] [address] [place] [place] [address] [phone] --       Internal Comment last updated by Audrey Funez RN 8/9/2021 1833    Current provider for PT.  If patient goes home,  The wife is requesting Nursing and OT be added as well.- Audrey Funez 1830 8/9/21                       Selected Continued Care - Prior Encounters Includes selections from prior encounters from 5/10/2021 to 8/9/2021    Discharged on 6/16/2021 Admission date: 6/12/2021 - Discharge disposition: Home-Health Care [facility]     Service Provider Selected Services Address Phone Fax Patient Preferred    [facility] [address] [place] [place] [address] [phone] --                Discharged on 5/29/2021 Admission date: 5/28/2021 - Discharge disposition: Home-Health Care [facility]     Service Provider Selected Services Address Phone Fax Patient Preferred    [facility] [address] [place] [address] [phone] --       Internal Comment last updated by Malia Oseguera RN 6/1/2021 1316    6/1 called and confirmed accepted patient                                 Demographic Summary     Row Name 08/09/21 1817       General Information    Admission Type  inpatient    Arrived From  emergency  department    Required Notices Provided  Important Message from Medicare done thru admissions dept    Referral Source  admission list    Reason for Consult  decision-making;discharge planning    Preferred Language  English     Used During This Interaction  no        Functional Status     Row Name 08/09/21 1816       Functional Status    Usual Activity Tolerance  poor    Current Activity Tolerance  poor       Functional Status, IADL    Medications  completely dependent    Meal Preparation  completely dependent    Housekeeping  completely dependent    Laundry  completely dependent    Shopping  completely dependent    IADL Comments  Patient is completely dependent currently.  Is having trouble even feeding himself now.       Mental Status Summary    Recent Changes in Mental Status/Cognitive Functioning  mood    Mental Status Comments  The wife said (and I witnessed) he has frustration from his inability to do anything.        Psychosocial     Row Name 08/09/21 1818       Values/Beliefs    Spiritual, Cultural Beliefs, Rastafari Practices, Values that Affect Care  no        Abuse/Neglect    No documentation.       Legal    No documentation.       Substance Abuse    No documentation.       Patient Forms    No documentation.           Audrey Funez RN

## 2021-08-10 PROBLEM — E43 SEVERE MALNUTRITION (HCC): Status: ACTIVE | Noted: 2021-01-01

## 2021-08-10 NOTE — PLAN OF CARE
Goal Outcome Evaluation:  Plan of Care Reviewed With: patient        Progress: no change  Outcome Summary: No acute events overnight. Patient has still not yet had a bowel movement. VSS. Continue to monitor.

## 2021-08-10 NOTE — PLAN OF CARE
Goal Outcome Evaluation:  Plan of Care Reviewed With: patient        Progress: no change  Outcome Summary: VSS.  No c/o pain.  Pt. had BM today.

## 2021-08-10 NOTE — PROGRESS NOTES
[facility]    Name:  Garrett Richard   Age:  71 y.o.  Sex:  male  :  1950  MRN:  [id number]   Visit Number:  [id number]  Admission Date:  2021  Date Of Service:  08/10/21  Primary Care Physician:  Pierce Lubin MD     LOS: 2 days :    Chief Complaint:      Generalized weakness.    Subjective:    Mr. Richard was seen and examined this morning.  He is currently lying down on the bed and is comfortable at rest.  He states that he has not had a bowel movement since yesterday.  Denies any chest pain.  He does complain of continued generalized weakness.  No fevers.    Hospital course:    Mr. Richard is a 71-year-old male with history of end-stage liver disease, hepatocellular carcinoma who apparently is followed at [facility] was brought to the emergency room by his family with progressive generalized weakness and drowsiness.  He was admitted for hepatic encephalopathy and was placed on lactulose and rifaximin.  Due to his generalized weakness, he was started on physical and occupational therapy.  He did have constipation and was given enema.  After discussion with his wife, it was felt that the patient would benefit from going to short-term rehabilitation.  Case management was consulted.  Patient follows up with [facility] hepatology and apparently is on the liver transplant list.  He apparently has an appointment with  hepatology at the end of August.    Review of Systems:     All systems were reviewed and negative except as mentioned in subjective, assessment and plan.    Vital Signs:    Temp:  [97.3 °F (36.3 °C)-98.4 °F (36.9 °C)] 97.3 °F (36.3 °C)  Heart Rate:  [76-94] 94  Resp:  [16-20] 18  BP: (127-144)/(60-66) 127/65    Intake and output:    I/O last 3 completed shifts:  In: 1317 [P.O.:1317]  Out: 450 [Urine:450]  I/O this shift:  In: 360 [P.O.:360]  Out: -     Physical Examination:    General Appearance:  Alert  and cooperative.  Ill-appearing male.   Head:  Atraumatic and normocephalic.   Eyes: Conjunctivae and sclerae normal, no icterus. No pallor.   Throat: No oral lesions, no thrush, oral mucosa moist.   Neck: Supple, trachea midline, no thyromegaly.   Lungs:   Breath sounds heard bilaterally equally.  No crackles or wheezing. No Pleural rub or bronchial breathing.   Heart:  Normal S1 and S2, no murmur, no gallop, no rub. No JVD.   Abdomen:   Normal bowel sounds, no masses, no organomegaly. Soft, nontender, nondistended, no rebound tenderness.   Extremities: Supple, no edema, no cyanosis, no clubbing.  Ecchymotic patches noted in bilateral upper extremities. Surgical bandage noted on the left elbow region.   Skin: No bleeding or rash.   Neurologic: Alert and oriented x 3. No facial asymmetry. Moves all four limbs.  He does have flapping tremor but it is significantly better compared to yesterday.      Laboratory results:    Results from last 7 days   Lab Units 08/10/21  0957 08/09/21  0629 08/08/21  0548   SODIUM mmol/L 130* 135* 136   POTASSIUM mmol/L 4.6 4.2 3.8   CHLORIDE mmol/L 101 106 105   CO2 mmol/L 20.1* 20.8* 21.8*   BUN mg/dL 20 19 20   CREATININE mg/dL 1.15 1.22 1.36*   CALCIUM mg/dL 8.8 8.4* 9.0   BILIRUBIN mg/dL 1.6* 1.7* 1.5*   ALK PHOS U/L 79 76 89   ALT (SGPT) U/L 23 23 24   AST (SGOT) U/L 30 32 34   GLUCOSE mg/dL 258* 200* 220*     Results from last 7 days   Lab Units 08/09/21  0629 08/08/21  0548   WBC 10*3/mm3 5.26 6.15   HEMOGLOBIN g/dL 9.8* 10.4*   HEMATOCRIT % 29.1* 30.4*   PLATELETS 10*3/mm3 51* 66*     Results from last 7 days   Lab Units 08/09/21  0629   INR  1.36*     Results from last 7 days   Lab Units 08/08/21  0548   TROPONIN T ng/mL 0.021     Results from last 7 days   Lab Units 08/08/21  0745 08/08/21  0720   BLOODCX  No growth at 2 days No growth at 2 days     I have reviewed the patient's laboratory results.    Radiology results:    No radiology results from the last 24  hrs    Medication Review:     I have reviewed the patient's active and prn medications.     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  Acute hepatic encephalopathy, present on admission, improving.  2.  End-stage liver disease with hepatocellular carcinoma, awaiting transplant.  3.  Chronic kidney disease stage III.  4.  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  5.  Essential hypertension.  6.  Anemia of chronic disease.  7.  Impaired ADLs and mobility.    Plan:    Mr. Richard continues to improve with regards to his hepatic encephalopathy.  His creatinine level also has improved.  He does have low sodium of 130 today.  He is not on any IV fluids.  He continues to have generalized weakness however and will be seen by physical therapy today.    Patient is constipated with bloating abdomen.  We will give him a Fleet enema today.  He will be continued on lactulose and rifaximin.    I have discussed the patient's condition with his wife Bonny who is at the bedside.  Patient would benefit from going to short-term rehabilitation and the patient and wife are agreeable.  Case management has been consulted.  Discussed with nursing staff and multidisciplinary team.      DVT Prophylaxis: SCDs  Code Status: DNR  Diet: Diabetic  Discharge Plan: Rehab.    Charles Mcgraw MD  08/10/21  15:57 EDT    Dictated utilizing Dragon dictation.

## 2021-08-10 NOTE — THERAPY TREATMENT NOTE
Patient Name: Garrett Richard  : 1950    MRN: [id number]                              Today's Date: 8/10/2021       Admit Date: 2021    Visit Dx:     ICD-10-CM ICD-9-CM   1. Hepatic encephalopathy (CMS/HCC)  K72.90 572.2   2. Weakness  R53.1 780.79   3. Altered mental status, unspecified altered mental status type  R41.82 780.97   4. Encephalopathy due to ammonia  T59.891A 349.82    G92 E982.8   5. 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)   • Encephalopathy, hepatic (CMS/HCC)   • AL (acute kidney injury) (CMS/HCC)   • Increased ammonia level   • Hepatic encephalopathy (CMS/HCC)   • Chronic kidney disease, stage III (moderate) (CMS/HCC)   • Severe malnutrition (CMS/HCC)     Past Medical History:   Diagnosis Date   • AL (acute kidney injury) (CMS/HCC) 2021   • Diabetes mellitus (CMS/HCC)     type II per patient   • Hepatocellular carcinoma (CMS/HCC) 10/25/2018    s/p TACE procedure with no mass noted on fu imaging   • History of transfusion    • Hypertension    • Impaired functional mobility, balance, gait, and endurance    • Liver disease    • Umbilical hernia    • Upper respiratory infection    • Urinary symptom or sign    • Urinary tract infection      Past Surgical History:   Procedure Laterality Date   • ENDOSCOPY N/A 2021    Procedure: ESOPHAGOGASTRODUODENOSCOPY WITH ARGON THERMAL ABLATION;  Surgeon: Samy Swanson MD;  Location: [facility] ENDOSCOPY;  Service: Gastroenterology;  Laterality: N/A;   • LIVER SURGERY     • TONSILLECTOMY       General Information     Row Name 08/10/21 1440          OT Time and Intention    Document  Type  therapy note (daily note)  -SD     Mode of Treatment  occupational therapy  -SD       User Key  (r) = Recorded By, (t) = Taken By, (c) = Cosigned By    Initials Name Provider Type    Lachelle Arenas OT Occupational Therapist          Mobility/ADL's     Row Name 08/10/21 1440          Bed Mobility    Bed Mobility  supine-sit  -SD     Supine-Sit Craven (Bed Mobility)  contact guard  -SD     Assistive Device (Bed Mobility)  bed rails;head of bed elevated  -SD     Row Name 08/10/21 1440          Transfers    Transfers  sit-stand transfer  -SD     Sit-Stand Craven (Transfers)  minimum assist (75% patient effort)  -SD     Row Name 08/10/21 1440          Sit-Stand Transfer    Assistive Device (Sit-Stand Transfers)  walker, front-wheeled  -SD     Row Name 08/10/21 1440          Functional Mobility    Functional Mobility- Ind. Level  minimum assist (75% patient effort)  -SD     Functional Mobility- Device  rolling walker  -SD     Functional Mobility-Distance (Feet)  12  -SD     Functional Mobility- Safety Issues  balance decreased during turns;sequencing ability decreased;step length decreased;weight-shifting ability decreased  -SD     Row Name 08/10/21 1440          Bathing Assessment/Intervention    Craven Level (Bathing)  upper body;upper extremities;chest/trunk;minimum assist (75% patient effort)  -SD     Position (Bathing)  supported sitting  -SD     Row Name 08/10/21 1440          Lower Body Dressing Assessment/Training    Craven Level (Lower Body Dressing)  don;pants/bottoms;maximum assist (25% patient effort)  -SD     Row Name 08/10/21 1440          Grooming Assessment/Training    Craven Level (Grooming)  oral care regimen;wash face, hands;standby assist  -SD       User Key  (r) = Recorded By, (t) = Taken By, (c) = Cosigned By    Initials Name Provider Type    Lachelle Arenas OT Occupational Therapist        Obj/Interventions     Row Name 08/10/21 1442          Shoulder  (Therapeutic Exercise)    Shoulder (Therapeutic Exercise)  strengthening exercise  -SD     Shoulder Strengthening (Therapeutic Exercise)  bilateral;flexion;extension;aBduction;aDduction;yellow;10 repetitions  -SD     Row Name 08/10/21 1442          Elbow/Forearm (Therapeutic Exercise)    Elbow/Forearm (Therapeutic Exercise)  strengthening exercise  -SD     Elbow/Forearm Strengthening (Therapeutic Exercise)  bilateral;flexion;extension;10 repetitions;yellow  -SD     Row Name 08/10/21 1442          Therapeutic Exercise    Therapeutic Exercise  shoulder;elbow/forearm  -SD       User Key  (r) = Recorded By, (t) = Taken By, (c) = Cosigned By    Initials Name Provider Type    Lachelle Arenas OT Occupational Therapist        Goals/Plan    No documentation.       Clinical Impression     Row Name 08/10/21 1442          Pain Scale: Numbers Pre/Post-Treatment    Pretreatment Pain Rating  0/10 - no pain  -SD     Posttreatment Pain Rating  0/10 - no pain  -SD     Row Name 08/10/21 1442          Plan of Care Review    Plan of Care Reviewed With  patient  -SD     Progress  improving  -SD     Outcome Summary  OT tx completed. Patient completed bed mobility with CGA, transfer and functional mobility x 12' with min A, completed UBB with min A. LBD with max A, grooming tasks with SBA; followed by UB resistance exercises using yellow theraband. Continue OT POC  -SD     Row Name 08/10/21 1442          Positioning and Restraints    Pre-Treatment Position  in bed  -SD     Post Treatment Position  chair  -SD     In Chair  sitting;call light within reach;encouraged to call for assist;exit alarm on  -SD       User Key  (r) = Recorded By, (t) = Taken By, (c) = Cosigned By    Initials Name Provider Type    Lachelle Arenas OT Occupational Therapist        Outcome Measures     Row Name 08/10/21 1443          How much help from another is currently needed...    Putting on and taking off regular lower body clothing?  2  -SD     Bathing  (including washing, rinsing, and drying)  2  -SD     Toileting (which includes using toilet bed pan or urinal)  2  -SD     Putting on and taking off regular upper body clothing  3  -SD     Taking care of personal grooming (such as brushing teeth)  3  -SD     Eating meals  3  -SD     AM-PAC 6 Clicks Score (OT)  15  -SD     Row Name 08/10/21 1443          Functional Assessment    Outcome Measure Options  AM-PAC 6 Clicks Daily Activity (OT)  -SD       User Key  (r) = Recorded By, (t) = Taken By, (c) = Cosigned By    Initials Name Provider Type    Lachelle Arenas OT Occupational Therapist          Occupational Therapy Education                 Title: PT OT SLP Therapies (In Progress)     Topic: Occupational Therapy (In Progress)     Point: ADL training (Done)     Description:   Instruct learner(s) on proper safety adaptation and remediation techniques during self care or transfers.   Instruct in proper use of assistive devices.              Learning Progress Summary           Patient Acceptance, E,TB, VU by SD at 8/10/2021 1445    Comment: Safety and sequencing during functional transfer and mobility.    Acceptance, E,TB, VU by SD at 8/9/2021 1354    Comment: Benefit of OT; OT POC   Significant Other Acceptance, E,TB, VU by SD at 8/9/2021 1354    Comment: Benefit of OT; OT POC                   Point: Home exercise program (Not Started)     Description:   Instruct learner(s) on appropriate technique for monitoring, assisting and/or progressing therapeutic exercises/activities.              Learner Progress:  Not documented in this visit.          Point: Precautions (Not Started)     Description:   Instruct learner(s) on prescribed precautions during self-care and functional transfers.              Learner Progress:  Not documented in this visit.          Point: Body mechanics (Not Started)     Description:   Instruct learner(s) on proper positioning and spine alignment during self-care, functional mobility  activities and/or exercises.              Learner Progress:  Not documented in this visit.                      User Key     Initials Effective Dates Name Provider Type Discipline    SD 06/16/21 -  Lachelle Wolf OT Occupational Therapist OT              OT Recommendation and Plan  Planned Therapy Interventions (OT): activity tolerance training, adaptive equipment training, BADL retraining, patient/caregiver education/training, ROM/therapeutic exercise, strengthening exercise, transfer/mobility retraining  Therapy Frequency (OT): 3 times/wk (5 times if indicated)  Plan of Care Review  Plan of Care Reviewed With: patient  Progress: improving  Outcome Summary: OT tx completed. Patient completed bed mobility with CGA, transfer and functional mobility x 12' with min A, completed UBB with min A. LBD with max A, grooming tasks with SBA; followed by UB resistance exercises using yellow theraband. Continue OT POC     Time Calculation:   Time Calculation- OT     Row Name 08/10/21 1445             Time Calculation- OT    OT Start Time  1345  -SD      OT Stop Time  1409  -SD      OT Time Calculation (min)  24 min  -SD      OT Received On  08/10/21  -SD      OT Goal Re-Cert Due Date  08/19/21  -SD         Timed Charges    67499 - OT Therapeutic Exercise Minutes  10  -SD      30557 - OT Therapeutic Activity Minutes  5  -SD      35718 - OT Self Care/Mgmt Minutes  9  -SD         Total Minutes    Timed Charges Total Minutes  24  -SD       Total Minutes  24  -SD        User Key  (r) = Recorded By, (t) = Taken By, (c) = Cosigned By    Initials Name Provider Type    SD Lachelle Wolf OT Occupational Therapist        Therapy Charges for Today     Code Description Service Date Service Provider Modifiers Qty    66430944109  OT EVAL LOW COMPLEXITY 3 8/9/2021 Lachelle Wolf OT GO 1    21820808771 HC OT THER PROC EA 15 MIN 8/10/2021 Lachelle Wolf OT GO 1    [id number]  OT SELF CARE/MGMT/TRAIN EA 15 MIN 8/10/2021 Maryann  Lachelle, OT GO 1               Lachelle Wolf, RG  8/10/2021

## 2021-08-10 NOTE — PLAN OF CARE
Goal Outcome Evaluation:  Plan of Care Reviewed With: patient        Progress: improving  Outcome Summary: OT tx completed. Patient completed bed mobility with CGA, transfer and functional mobility x 12' with min A, completed UBB with min A. LBD with max A, grooming tasks with SBA; followed by UB resistance exercises using yellow theraband. Continue OT POC

## 2021-08-10 NOTE — CASE MANAGEMENT/SOCIAL WORK
Continued Stay Note   Oskar     Patient Name: Garrett Richard  MRN: [id number]  Today's Date: 8/10/2021    Admit Date: 8/8/2021    Discharge Plan     Row Name 08/10/21 1214       Plan    Plan  spoke to pt wife regarding discharge plans She would like rehab and is preferring [facility] then [facility] Referrals have been sent  Left voice message with [facility] admissions     Pt has been accpeted to [facility] they will start precert         Discharge Codes    No documentation.       Expected Discharge Date and Time     Expected Discharge Date Expected Discharge Time    Aug 13, 2021             María Mahan RN

## 2021-08-10 NOTE — PLAN OF CARE
Goal Outcome Evaluation:  Plan of Care Reviewed With: patient        Progress: no change  Outcome Summary: Pt found sitting UIC. PT reports feeling very tired this date and wished to perform seated exercises only.  See flowsheet for details

## 2021-08-10 NOTE — THERAPY TREATMENT NOTE
Patient Name: Garrett Richard  : 1950    MRN: [id number]                              Today's Date: 8/10/2021       Admit Date: 2021    Visit Dx:     ICD-10-CM ICD-9-CM   1. Hepatic encephalopathy (CMS/HCC)  K72.90 572.2   2. Weakness  R53.1 780.79   3. Altered mental status, unspecified altered mental status type  R41.82 780.97   4. Encephalopathy due to ammonia  T59.891A 349.82    G92 E982.8   5. 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)   • Encephalopathy, hepatic (CMS/HCC)   • AL (acute kidney injury) (CMS/HCC)   • Increased ammonia level   • Hepatic encephalopathy (CMS/HCC)   • Chronic kidney disease, stage III (moderate) (CMS/HCC)   • Severe malnutrition (CMS/HCC)     Past Medical History:   Diagnosis Date   • AL (acute kidney injury) (CMS/HCC) 2021   • Diabetes mellitus (CMS/HCC)     type II per patient   • Hepatocellular carcinoma (CMS/HCC) 10/25/2018    s/p TACE procedure with no mass noted on fu imaging   • History of transfusion    • Hypertension    • Impaired functional mobility, balance, gait, and endurance    • Liver disease    • Umbilical hernia    • Upper respiratory infection    • Urinary symptom or sign    • Urinary tract infection      Past Surgical History:   Procedure Laterality Date   • ENDOSCOPY N/A 2021    Procedure: ESOPHAGOGASTRODUODENOSCOPY WITH ARGON THERMAL ABLATION;  Surgeon: Samy Swanson MD;  Location: [facility] ENDOSCOPY;  Service: Gastroenterology;  Laterality: N/A;   • LIVER SURGERY     • TONSILLECTOMY       General Information     Row Name 08/10/21 3787          Physical Therapy Time and Intention     Document Type  therapy note (daily note)  -RM     Mode of Treatment  physical therapy  -RM     Row Name 08/10/21 1637          General Information    Patient Profile Reviewed  yes  -RM     Existing Precautions/Restrictions  fall  -RM     Row Name 08/10/21 1637          Cognition    Orientation Status (Cognition)  oriented x 3;verbal cues/prompts needed for orientation  -RM     Row Name 08/10/21 1637          Safety Issues, Functional Mobility    Safety Issues Affecting Function (Mobility)  safety precautions follow-through/compliance;steps too close to assistive device;awareness of need for assistance;insight into deficits/self-awareness;sequencing abilities  -RM     Impairments Affecting Function (Mobility)  balance;endurance/activity tolerance;strength;postural/trunk control  -RM       User Key  (r) = Recorded By, (t) = Taken By, (c) = Cosigned By    Initials Name Provider Type    RM Luisito Garcia, PTA Physical Therapy Assistant        Mobility    No documentation.       Obj/Interventions     Row Name 08/10/21 1638          Motor Skills    Therapeutic Exercise  hip;knee;ankle  -RM     Row Name 08/10/21 1638          Hip (Therapeutic Exercise)    Hip (Therapeutic Exercise)  isometric exercises;strengthening exercise  -RM     Hip Isometrics (Therapeutic Exercise)  bilateral;gluteal sets;10 repetitions;5 repetitions  -RM     Hip Strengthening (Therapeutic Exercise)  bilateral;marching while seated;10 repetitions;5 repetitions  -RM     Row Name 08/10/21 1638          Knee (Therapeutic Exercise)    Knee (Therapeutic Exercise)  strengthening exercise  -RM     Knee Strengthening (Therapeutic Exercise)  bilateral;LAQ (long arc quad);5 repetitions;10 repetitions  -RM     Row Name 08/10/21 1638          Ankle (Therapeutic Exercise)    Ankle (Therapeutic Exercise)  AROM (active range of motion)  -RM     Ankle AROM (Therapeutic Exercise)  bilateral;dorsiflexion;plantarflexion;10 repetitions;5 repetitions  -RM        User Key  (r) = Recorded By, (t) = Taken By, (c) = Cosigned By    Initials Name Provider Type    Luisito Dockery, ANAI Physical Therapy Assistant        Goals/Plan    No documentation.       Clinical Impression     Row Name 08/10/21 1639          Pain Scale: Numbers Pre/Post-Treatment    Pretreatment Pain Rating  0/10 - no pain  -RM     Posttreatment Pain Rating  0/10 - no pain  -RM     Row Name 08/10/21 1639          Plan of Care Review    Plan of Care Reviewed With  patient  -RM     Progress  no change  -RM     Outcome Summary  Pt found sitting UIC. PT reports feeling very tired this date and wished to perform seated exercises only.  See flowsheet for details  -RM     Row Name 08/10/21 1639          Positioning and Restraints    Pre-Treatment Position  sitting in chair/recliner  -RM     Post Treatment Position  chair  -RM     In Chair  sitting;call light within reach;encouraged to call for assist;exit alarm on;notified nsg  -RM       User Key  (r) = Recorded By, (t) = Taken By, (c) = Cosigned By    Initials Name Provider Type    Luisito Dockery, ANAI Physical Therapy Assistant        Outcome Measures     Row Name 08/10/21 1640          How much help from another person do you currently need...    Turning from your back to your side while in flat bed without using bedrails?  3  -RM     Moving from lying on back to sitting on the side of a flat bed without bedrails?  3  -RM     Moving to and from a bed to a chair (including a wheelchair)?  3  -RM     Standing up from a chair using your arms (e.g., wheelchair, bedside chair)?  3  -RM     Climbing 3-5 steps with a railing?  2  -RM     To walk in hospital room?  2  -RM     AM-PAC 6 Clicks Score (PT)  16  -RM     Row Name 08/10/21 1640 08/10/21 1443       Functional Assessment    Outcome Measure Options  AM-PAC 6 Clicks Basic Mobility (PT)  -RM  AM-PAC 6 Clicks Daily Activity (OT)  -SD      User Key  (r) = Recorded By, (t) = Taken By, (c) = Cosigned By     Initials Name Provider Type     Luisito Garcia, ANAI Physical Therapy Assistant    Lachelle Arenas OT Occupational Therapist                       Physical Therapy Education                 Title: PT OT SLP Therapies (In Progress)     Topic: Physical Therapy (Done)     Point: Mobility training (Done)     Learning Progress Summary           Patient Acceptance, E, VU by  at 8/9/2021 1312    Comment: Instructed the patient on the benefit of PT for functional mobility. Education was provided on the proper use and positioning of the RW during transfers and gait.                   Point: Home exercise program (Done)     Learning Progress Summary           Patient Acceptance, E,TB,D, VU,NR by  at 8/10/2021 1641    Comment: exercise techniques                   Point: Body mechanics (Done)     Learning Progress Summary           Patient Acceptance, E, VU by  at 8/9/2021 1312    Comment: Instructed the patient on the benefit of PT for functional mobility. Education was provided on the proper use and positioning of the RW during transfers and gait.                               User Key     Initials Effective Dates Name Provider Type Discipline     06/16/21 -  Luisito Garcia PTA Physical Therapy Assistant PT     07/26/21 -  Abigail Rushing PT Student PT Student PT              PT Recommendation and Plan     Plan of Care Reviewed With: patient  Progress: no change  Outcome Summary: Pt found sitting UIC. PT reports feeling very tired this date and wished to perform seated exercises only.  See flowsheet for details     Time Calculation:   PT Charges     Row Name 08/10/21 1641             Time Calculation    Start Time  1444  -RM      Stop Time  1454  -RM      Time Calculation (min)  10 min  -RM      PT Received On  08/10/21  -RM      PT Goal Re-Cert Due Date  08/19/21  -RM         Time Calculation- PT    Total Timed Code Minutes- PT  10 minute(s)  -RM         Timed Charges    36880 - PT Therapeutic  Exercise Minutes  10  -RM         Total Minutes    Timed Charges Total Minutes  10  -RM       Total Minutes  10  -RM        User Key  (r) = Recorded By, (t) = Taken By, (c) = Cosigned By    Initials Name Provider Type    Luisito Dockery PTA Physical Therapy Assistant        Therapy Charges for Today     Code Description Service Date Service Provider Modifiers Qty    21242969185 HC PT THER PROC EA 15 MIN 8/10/2021 Luisito Garcia PTA GP 1          PT G-Codes  Outcome Measure Options: AM-PAC 6 Clicks Basic Mobility (PT)  AM-PAC 6 Clicks Score (PT): 16  AM-PAC 6 Clicks Score (OT): 15    Luisito Garcia PTA  8/10/2021

## 2021-08-11 NOTE — CASE MANAGEMENT/SOCIAL WORK
Discharge Planning Assessment   Oskar     Patient Name: Garrett Richard  MRN: [id number]  Today's Date: 8/11/2021    Admit Date: 8/8/2021    Discharge Needs Assessment      Discharge Plan     Row Name 08/11/21 0931       Plan    Plan Comments TERESA followed up with Sandra from [facility] regarding precert. Sandra states that they have not received the precert yet, but will update TERESA when they get it. SW will continue to follow and assist as needed.    12:49 EDT  Received a call from Sandra from [facility] and they received the precert. Pt can discharge to facility today. TERESA updated Dr. Mcgraw.     Report: Ask for JENNY Ott Nurse [phone]  Fax: [phone]        Continued Care and Services - Admitted Since 8/8/2021     Destination Coordination complete.    Service Provider Request Status Selected Services Address Phone Fax Patient Preferred    [facility]   Selected Skilled Nursing [address] [place] [address] [phone] --       Internal Comment last updated by Jody aCrreon MSW 8/11/2021 0933    Precert started 8/10/21               [facility]  Pending - Request Sent N/A [address] [facility] [address] [phone] --    [facility]  Pending - Request Sent N/A [address] [place] [address] [phone] --    [facility]  Pending - Request Sent N/A [address] [facility] [address] [phone] --          Home Medical Care     Service Provider Request Status Selected Services Address Phone Fax Patient Preferred    [facility]   Selected Home Health Services [address] [place] [address] [phone] --       Internal Comment last updated by Audrey Funez, RN 8/9/2021 1833    Current provider for PT.  If patient goes home,  The wife is requesting Nursing and OT be added as well.- Audrey Funez 0840 8/9/21                        Selected Continued Care - Prior Encounters Includes selections from prior encounters from 5/10/2021 to 8/11/2021    Discharged on 6/16/2021 Admission date: 6/12/2021 - Discharge disposition: Home-Health Care [facility]     Service Provider Selected Services Address Phone Fax Patient Preferred    [facility] [address] [place] [address] [phone] --                Discharged on 5/29/2021 Admission date: 5/28/2021 - Discharge disposition: Home-Health Care [facility]     Service Provider Selected Services Address Phone Fax Patient [facility] [address] [place] [address] [phone] --       Internal Comment last updated by Malia Oseguera RN 6/1/2021 1316    6/1 called and confirmed accepted patient                               RYLEY Salcido

## 2021-08-11 NOTE — DISCHARGE SUMMARY
[facility]   DISCHARGE SUMMARY      Name:  Garrett Richard   Age:  71 y.o.  Sex:  male  :  1950  MRN:  [id number]   Visit Number:  [id number]    Admission Date:  2021  Date of Discharge:  2021  Primary Care Physician:  Pierce Lubin MD    Important issues to note:    1.  No change has been made to his home medication regimen.  2.  Patient is being transferred to short-term rehabilitation due to his generalized weakness.  3.  Follow-up with  hepatology as scheduled end of August (2021).  4.  Follow-up with primary care physician 1 week following discharge from rehab facility.    Discharge Diagnoses:     1.  Acute hepatic encephalopathy, present on admission, improving.  2.  End-stage liver disease with hepatocellular carcinoma, awaiting transplant.  3.  Chronic kidney disease stage III.  4.  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  5.  Essential hypertension.  6.  Anemia of chronic disease.  7.  Impaired ADLs and mobility.    Problem List:     Active Hospital Problems    Diagnosis  POA   • **Hepatic encephalopathy (CMS/HCC) [K72.90]  Yes   • Hepatocellular carcinoma (CMS/HCC) [C22.0]  Yes     Priority: High   • Hepatic cirrhosis (CMS/HCC) [K74.60]  Yes     Priority: High   • Severe malnutrition (CMS/HCC) [E43]  Yes   • Chronic kidney disease, stage III (moderate) (CMS/HCC) [N18.30]  Yes   • Type 2 diabetes mellitus with nephropathy (CMS/HCC) [E11.21]  Yes   • Essential hypertension [I10]  Yes      Resolved Hospital Problems   No resolved problems to display.     Presenting Problem:    Chief Complaint   Patient presents with   • Fall   • Weakness - Generalized      Consults:     Consulting Physician(s)             None          Procedures Performed:    None.    History of presenting illness/Hospital Course:    Mr. Richard is a 71-year-old male with history of end-stage liver disease, hepatocellular carcinoma who apparently is followed at  [facility] was brought to the emergency room by his family with progressive generalized weakness and drowsiness.  He was admitted for hepatic encephalopathy and was placed on lactulose and rifaximin.  Due to his generalized weakness, he was started on physical and occupational therapy.  He did have constipation and was given enema.  After discussion with his wife, it was felt that the patient would benefit from going to short-term rehabilitation.  Case management was consulted.  Patient follows up with [facility] hepatology and apparently is on the liver transplant list.  He apparently has an appointment with  hepatology at the end of August.    Patient is currently feeling much better and had a large bowel movement last night.  He did not have any significant flapping tremors today.  His sodium and renal function is stable.  He is currently being discharged to short-term rehab facility today.  Discussed with nursing staff and multidisciplinary team.  No change has been made to his home medication regimen.  The changes made below are just updates from case home medication regimen changes prior to arrival.  Patient is advised to follow-up with his primary care provider 1 week following discharge from rehab facility.  He also has an appointment with  hepatology at the end of August.    Vital Signs:    Temp:  [97.3 °F (36.3 °C)-98.8 °F (37.1 °C)] 98.1 °F (36.7 °C)  Heart Rate:  [77-96] 77  Resp:  [18-20] 18  BP: (116-140)/(57-66) 116/62    Physical Exam:    General Appearance:  Alert and cooperative.   Head:  Atraumatic and normocephalic.   Eyes: Conjunctivae and sclerae normal, no icterus. No pallor.   Ears:  Ears with no abnormalities noted.   Throat: No oral lesions, no thrush, oral mucosa moist.   Neck: Supple, trachea midline, no thyromegaly.   Back:   No kyphoscoliosis present. No tenderness to palpation.   Lungs:   Breath sounds heard bilaterally equally.  No crackles  or wheezing. No Pleural rub or bronchial breathing.   Heart:  Normal S1 and S2, no murmur, no gallop, no rub. No JVD.   Abdomen:   Normal bowel sounds, no masses, no organomegaly. Soft, nontender, nondistended, no rebound tenderness.   Extremities: Supple, no edema, no cyanosis, no clubbing.  Ecchymotic patches noted in bilateral upper extremities. Surgical bandage noted on the left elbow region.   Pulses: Pulses palpable bilaterally.   Skin: No bleeding or rash.   Neurologic: Alert and oriented x 3. No facial asymmetry. Moves all four limbs.  He does have flapping tremor but it is significantly better compared to yesterday.      Pertinent Lab Results:     Results from last 7 days   Lab Units 08/11/21  0550 08/10/21  0957 08/09/21  0629 08/08/21  0548 08/08/21  0548   SODIUM mmol/L 134* 130* 135*   < > 136   POTASSIUM mmol/L 4.4 4.6 4.2   < > 3.8   CHLORIDE mmol/L 106 101 106   < > 105   CO2 mmol/L 20.3* 20.1* 20.8*   < > 21.8*   BUN mg/dL 19 20 19   < > 20   CREATININE mg/dL 1.24 1.15 1.22   < > 1.36*   CALCIUM mg/dL 8.5* 8.8 8.4*   < > 9.0   BILIRUBIN mg/dL  --  1.6* 1.7*  --  1.5*   ALK PHOS U/L  --  79 76  --  89   ALT (SGPT) U/L  --  23 23  --  24   AST (SGOT) U/L  --  30 32  --  34   GLUCOSE mg/dL 221* 258* 200*   < > 220*    < > = values in this interval not displayed.     Results from last 7 days   Lab Units 08/11/21  0550 08/09/21  0629 08/08/21  0548   WBC 10*3/mm3 4.89 5.26 6.15   HEMOGLOBIN g/dL 10.0* 9.8* 10.4*   HEMATOCRIT % 29.3* 29.1* 30.4*   PLATELETS 10*3/mm3 61* 51* 66*     Results from last 7 days   Lab Units 08/09/21  0629   INR  1.36*     Results from last 7 days   Lab Units 08/08/21  0548   TROPONIN T ng/mL 0.021       Results from last 7 days   Lab Units 08/08/21  0745 08/08/21  0720   BLOODCX  No growth at 3 days No growth at 3 days     Pertinent Radiology Results:    Imaging Results (All)     Procedure Component Value Units Date/Time    CT Head Without Contrast [[id number]] Collected:  08/08/21 0722     Updated: 08/08/21 0726    Narrative:      PROCEDURE: CT HEAD WO CONTRAST-     HISTORY: weakness, fall     COMPARISON: 06/12/2021     TECHNIQUE: Multiple axial CT images were performed from the foramen  magnum to the vertex without contrast. Coronal reformatted images were  also obtained.This study was performed with techniques to keep radiation  doses as low as reasonably achievable, (ALARA). Individualized dose  reduction techniques using automated exposure control or adjustment of  mA and/or kV according to the patient size were employed.        FINDINGS: There is generalized age-appropriate atrophy. There is a  stable chronic lacunar infarct in the left centrum semiovale. Also noted  is a chronic lacunar infarct in the right abdiel that is stable.The  ventricles are normal in size. There is no evidence of hemorrhage .  No  masses are identified.  No abnormal extra-axial fluid collection is  seen.  There is no evidence of shift of the midline structures. Images  of the sinuses reveal no evidence of mucosal thickening. No bony  abnormality is seen on the bone window images.       Impression:      No acute intracranial abnormality.     Stable appearance of the brain.        This report was finalized on 8/8/2021 7:24 AM by Jose Melendez MD.    XR Elbow 3+ View Left [[id number]] Collected: 08/08/21 0659     Updated: 08/08/21 0708    Narrative:      PROCEDURE: XR ELBOW 3+ VW LEFT-     HISTORY: fall     FINDINGS:  Three views show no evidence of acute fracture or  dislocation. Mild degenerative changes are noted. There is a chronic  calcification adjacent to the lateral humeral epicondyle. No foreign  body is identified.       Impression:      No acute bony abnormality identified.        This report was finalized on 8/8/2021 7:00 AM by Jose Melendez MD.        Condition on Discharge:      Stable.    Code status during the hospital stay:    Code Status and Medical Interventions:   Ordered at: 08/08/21  1156     Limited Support to NOT Include:    Cardioversion/Defibrillation     Level Of Support Discussed With:    Patient    Health Care Surrogate     Code Status:    No CPR     Medical Interventions (Level of Support Prior to Arrest):    Limited     Discharge Disposition:    Rehab Facility or Unit (DC - External)    Discharge Medications:       Discharge Medications      Changes to Medications      Instructions Start Date   spironolactone 50 MG tablet  Commonly known as: Aldactone  What changed:   · medication strength  · how much to take  · when to take this   50 mg, Oral, 2 Times Daily         Continue These Medications      Instructions Start Date   ascorbic acid 500 MG tablet  Commonly known as: VITAMIN C   500 mg, Oral, Daily      B-D ULTRAFINE III SHORT PEN 31G X 8 MM misc  Generic drug: Insulin Pen Needle   No dose, route, or frequency recorded.      ferrous sulfate 325 (65 FE) MG tablet   325 mg, Oral, Daily With Breakfast, 2 tablets once a day       FreeStyle Sohail 14 Day [place] device   1 each, Does not apply, Every 14 Days      furosemide 40 MG tablet  Commonly known as: Lasix   20 mg, Oral, Daily      lactulose 10 GM/15ML solution  Commonly known as: CHRONULAC   20 g, Oral, 4 Times Daily      Lantus SoloStar 100 UNIT/ML injection pen  Generic drug: Insulin Glargine   10 Units, Injection, Nightly, 10 units at bedtime if the first morning reading is over 184      NovoLOG FlexPen 100 UNIT/ML solution pen-injector sc pen  Generic drug: insulin aspart   7 Units, Subcutaneous, 3 Times Daily With Meals, Sliding scale - depending on glucose readings      pantoprazole 40 MG EC tablet  Commonly known as: Protonix   Take 1 tablet by mouth Daily      riFAXIMin 550 MG tablet  Commonly known as: XIFAXAN   550 mg, Oral, Every 12 Hours Scheduled      sennosides-docusate 8.6-50 MG per tablet  Commonly known as: PERICOLACE   1 tablet, Oral, otc       sucralfate 1 g tablet  Commonly known as: Carafate   1 g, Oral, 4 Times  Daily           Discharge Diet:     Diet Instructions     Diet: Consistent Carbohydrate; Thin      Discharge Diet: Consistent Carbohydrate    Fluid Consistency: Thin        Activity at Discharge:     Activity Instructions     Activity as Tolerated          Follow-up Appointments:     Contact information for follow-up providers     Pierce Lubin MD .    Specialty: Internal Medicine  Contact information:  [address]  [address]  [place] [id number] [phone]                   Contact information for after-discharge care     Destination     [facility] .    Service: Skilled Nursing  Contact information:  [address] [place] [facility] [address] [phone]                 Home Medical Care     [facility] .    Service: Home Health Services  Contact information:  [address] [place] [address] [phone]                           Future Appointments   Date Time Provider Department Center   10/1/2021  3:15 PM Pierce Lubin MD [facility]     Test Results Pending at Discharge:    Pending Labs     Order Current Status    Blood Culture With NAY - Blood, Hand, Left Preliminary result    Blood Culture With NAY - Blood, Hand, Right Preliminary result             Charles Mcgraw MD  08/11/21  13:01 EDT    Time: I spent 20 minutes on this discharge activity which included: face-to-face encounter with the patient, reviewing the data in the system, coordination of the care with the nursing staff as well as consultants, documentation, and entering orders.     Dictated utilizing Dragon dictation.

## 2021-08-11 NOTE — PLAN OF CARE
Goal Outcome Evaluation:  Plan of Care Reviewed With: patient, spouse        Progress: improving  Outcome Summary: Patient appropriate for discharge.

## 2021-08-11 NOTE — PLAN OF CARE
Goal Outcome Evaluation:  Plan of Care Reviewed With: patient        Progress: no change  Outcome Summary: No acute events overnight. Patient did have several loose bowel movements overnight. VSS. Continue to monitor.

## 2021-08-11 NOTE — CASE MANAGEMENT/SOCIAL WORK
Case Management Discharge Note                Selected Continued Care - Discharged on 8/11/2021 Admission date: 8/8/2021 - Discharge disposition: Rehab Facility or Unit (DC - External)    Destination Coordination complete.    Service Provider Selected Services Address Phone Fax Patient Preferred    [facility]  Skilled Nursing [address] [address] [phone] --       Internal Comment last updated by Jody Carreon MSW 8/11/2021 0933    Precert started 8/10/21                     Durable Medical Equipment    No services have been selected for the patient.              Dialysis/Infusion    No services have been selected for the patient.              Home Medical Care     Service Provider Selected Services Address Phone Fax Patient Preferred    [facility] [address] [place] [place] [address] [phone] --       Internal Comment last updated by Audrey Funez RN 8/9/2021 1833    Current provider for PT.  If patient goes home,  The wife is requesting Nursing and OT be added as well.- Audrey Funez 1830 8/9/21                     Therapy    No services have been selected for the patient.              Community Resources    No services have been selected for the patient.              Community & DME    No services have been selected for the patient.                Selected Continued Care - Prior Encounters Includes selections from prior encounters from 5/10/2021 to 8/11/2021    Discharged on 6/16/2021 Admission date: 6/12/2021 - Discharge disposition: Home-Health Care [facility]    Home Medical Care     Service Provider Selected Services Address Phone Fax Patient Preferred    [facility] [address] [place] [place] [address] [phone] --                Discharged on 5/29/2021 Admission date: 5/28/2021 - Discharge disposition: Home-Health Care [facility]     Home Medical Care     Service Provider Selected Services Address Phone Fax Patient Preferred    [facility]  [facility] Health Services [address] [place] [place] [address] [phone] --       Internal Comment last updated by Malia Oseguera RN 6/1/2021 1316    6/1 called and confirmed accepted patient                               Transportation Services  Private: Car    Final Discharge Disposition Code: 03 - skilled nursing facility (SNF)

## 2021-08-12 NOTE — PROGRESS NOTES
Nursing Home Progress Note        Christopher Patel DO []  SAURABH Rosales []  [address] [place] [address]  Phone: [phone]  Fax: [phone] Beka Myas MD []  Krzysztof Arias DO []  Nirali Gonzalez PA-C [x]   [address] [place] [address]  Phone: [phone]  Fax: [phone]     PATIENT NAME: Garrett Richard                                                                          YOB: 1950           DATE OF SERVICE: 8/12/2021  FACILITY: [facility]    CHIEF COMPLAINT:  Follow-up on hepatic encephalopathy      HISTORY OF PRESENT ILLNESS:   Mr. Richard is a 71-year of age male with history of end-stage liver disease, hepatocellular carcinoma who is followed at [facility] awaiting liver transplant.  He was noted to have progressive weakness and drowsiness and subsequently brought to [facility] for further evaluation.  He was admitted for hepatic encephalopathy and placed on lactulose and rifaximin.  As his mentation stabilized along with his electrolytes and renal function it was felt best he transferred to this facility for short-term rehabilitation.  Per nursing he continues to have generalized weakness, spending the majority of time in bed.  Mentation has been appropriate, appearing at his baseline.  He continues on lactulose.      PAST MEDICAL & SURGICAL HISTORY:   Past Medical History:   Diagnosis Date   • AL (acute kidney injury) (CMS/HCC) 5/28/2021   • Diabetes mellitus (CMS/HCC)     type II per patient   • Hepatocellular carcinoma (CMS/HCC) 10/25/2018    s/p TACE procedure with no mass noted on fu imaging   • History of transfusion    • Hypertension    • Impaired functional mobility, balance, gait, and endurance    • Liver disease    • Umbilical hernia    • Upper respiratory infection    • Urinary symptom or sign    • Urinary tract infection       Past Surgical History:   Procedure Laterality Date   • ENDOSCOPY N/A 5/11/2021     Procedure: ESOPHAGOGASTRODUODENOSCOPY WITH ARGON THERMAL ABLATION;  Surgeon: Samy Swanson MD;  Location: [facility] ENDOSCOPY;  Service: Gastroenterology;  Laterality: N/A;   • LIVER SURGERY     • TONSILLECTOMY           MEDICATIONS:  I have reviewed and reconciled the patients medication list in the patients chart at the skilled nursing facility today.      ALLERGIES:  No Known Allergies      SOCIAL HISTORY:  Social History     Socioeconomic History   • Marital status:      Spouse name: Not on file   • Number of children: Not on file   • Years of education: Not on file   • Highest education level: Not on file   Tobacco Use   • Smoking status: Never Smoker   • Smokeless tobacco: Former User     Types: Snuff   Vaping Use   • Vaping Use: Never used   Substance and Sexual Activity   • Alcohol use: No   • Drug use: No   • Sexual activity: Defer       FAMILY HISTORY:  Family History   Problem Relation Age of Onset   • Colon cancer Mother    • Diabetes Maternal Grandmother    • COPD Father    • No Known Problems Maternal Grandfather    • No Known Problems Paternal Grandmother    • No Known Problems Paternal Grandfather        REVIEW OF SYSTEMS:    Generalized weakness, fatigue, chronic abdominal distention.    All other systems were reviewed and are negative.     PHYSICAL EXAMINATION:     VITAL SIGNS:  /67   Pulse 67   Temp 97.9 °F (36.6 °C)   Resp 18   Wt 68.7 kg (151 lb 6 oz)   SpO2 96%   BMI 20.53 kg/m²     Nursing notes and vital signs reviewed.   General Appearance:  Chronically ill appearing male lying in bed, NAD.    Head: Normocephalic and atraumatic, without obvious abnormality     Eyes: PERRLA.  Conjunctivae and sclerae normal.  EOM are within normal limits.    Neck: Normal range of motion. Neck supple. No JVD present.   Cardiovascular: Normal rate, regular rhythm.  Pulses palpable equal bilaterally.    Pulmonary/Chest: Effort normal and breath sounds normal. No respiratory distress.    Abdominal: Soft. Bowel sounds are normal. Mild distention, no apparent tenderness.       Musculoskeletal: Normal range of motion. No edema.   Neurological: Alert and oriented at baseline.    Skin: Skin is warm and dry.   Psychiatric:  Normal mood and affect. Normal behavior       RECORDS REVIEW:   Discharge summary reviewed.     ASSESSMENT     Diagnoses and all orders for this visit:    1. Encephalopathy, hepatic (CMS/HCC) (Primary)    2. Hepatocellular carcinoma (CMS/HCC)    3. Stage 3 chronic kidney disease, unspecified whether stage 3a or 3b CKD (CMS/HCC)    4. Essential hypertension    5. Anemia of chronic disease    6. Unspecified severe protein-calorie malnutrition (CMS/HCC)        PLAN  Hepatic encephalopathy with history of hepatocellular carcinoma: Mentation appears to be at his baseline.  Continue with lactulose, current dose 20 g 4 times a day.  Monitor patient closely to ensure 2-3 bowel movements per day.  Will adjust lactulose dosing accordingly.    CKD:  Clinically stable, monitor renal function upon admission labs scheduled for tomorrow.      HTN:  Controlled, continue current medications.    Anemia:  Clinically stable, monitor H/H upon admission labs.      Protein/calorie malnutrition:  Present upon admission, continue with supportive care.  Encourage hydration and monitor closely.     [x]  Discussed Patient in detail with nursing/staff, addressed all needs today.     [x]  Plan of Care Reviewed   []  PT/OT Reviewed   []  Order Changes  []  Discharge Plans Reviewed  [x]  Advance Directive on file with Nursing Home.   [x]  POA on file with Nursing Home.    [x]  Code Status: FULL          Nirali Gonzalez PA-C.  8/17/2021

## 2021-08-13 NOTE — OUTREACH NOTE
Ambulatory Case Management Note    SNF Follow-up    Questions/Answers      Responses   Acute Facility Discharged From  [facility]   Acute Discharge Date  08/11/21   Name of the Skilled Nursing Facility?  [facility] [phone]   Purpose of SNF Admission  PT, OT, SN   Estimated length of stay for the patient?  Undetermined (weakness)   Who is the insurance provider or payor of patient stay?  Medicare   Progression of Patient?  SNF admission following 3 day acute event at [facility]               Elena Iyer RN  Ambulatory Case Management    8/13/2021, 10:28 EDT

## 2021-08-17 PROBLEM — U07.1 COVID-19: Status: ACTIVE | Noted: 2021-01-01

## 2021-08-17 NOTE — ED NOTES
Room assignment changed, awaiting room number to give report.      Lupe Calix, RN  08/17/21 4186

## 2021-08-17 NOTE — ED NOTES
At this time, Dr. Fontanez requested to speak to the hospitalist. Call transferred.     Blossom Blackwell  08/17/21 2005

## 2021-08-17 NOTE — ED PROVIDER NOTES
Subjective   71-year-old male presenting with altered mental status.  Patient is coming in from the nursing home, he is essentially obtunded and provides no history.  Nursing home reports that patient seemed to be less responsive yesterday and then today he would only respond to painful stimuli.  Patient does have a history of hepatocellular carcinoma, per his previous hospital notes he is currently awaiting evaluation by [facility] and is possibly on the transplant list, he is also notably a DNR.  again patient provides no history and no history provided by the nursing home.          Review of Systems   Unable to perform ROS: Mental status change       Past Medical History:   Diagnosis Date   • AL (acute kidney injury) (CMS/HCC) 5/28/2021   • Diabetes mellitus (CMS/HCC)     type II per patient   • Hepatocellular carcinoma (CMS/HCC) 10/25/2018    s/p TACE procedure with no mass noted on fu imaging   • History of transfusion    • Hypertension    • Impaired functional mobility, balance, gait, and endurance    • Liver disease    • Umbilical hernia    • Upper respiratory infection    • Urinary symptom or sign    • Urinary tract infection        No Known Allergies    Past Surgical History:   Procedure Laterality Date   • ENDOSCOPY N/A 5/11/2021    Procedure: ESOPHAGOGASTRODUODENOSCOPY WITH ARGON THERMAL ABLATION;  Surgeon: Samy Swanson MD;  Location: [facility] ENDOSCOPY;  Service: Gastroenterology;  Laterality: N/A;   • LIVER SURGERY     • TONSILLECTOMY         Family History   Problem Relation Age of Onset   • Colon cancer Mother    • Diabetes Maternal Grandmother    • COPD Father    • No Known Problems Maternal Grandfather    • No Known Problems Paternal Grandmother    • No Known Problems Paternal Grandfather        Social History     Socioeconomic History   • Marital status:      Spouse name: Not on file   • Number of children: Not on file   • Years of education: Not on file   • Highest education  level: Not on file   Tobacco Use   • Smoking status: Never Smoker   • Smokeless tobacco: Former User     Types: Snuff   Vaping Use   • Vaping Use: Never used   Substance and Sexual Activity   • Alcohol use: No   • Drug use: No   • Sexual activity: Defer           Objective   Physical Exam  Vitals reviewed.   Constitutional:       Appearance: He is not diaphoretic.      Comments: Chronically ill-appearing   HENT:      Head: Normocephalic and atraumatic.      Right Ear: External ear normal.      Left Ear: External ear normal.      Nose: Nose normal.      Mouth/Throat:      Mouth: Mucous membranes are moist.      Pharynx: Oropharynx is clear.   Eyes:      Extraocular Movements: Extraocular movements intact.      Conjunctiva/sclera: Conjunctivae normal.      Pupils: Pupils are equal, round, and reactive to light.   Cardiovascular:      Rate and Rhythm: Normal rate and regular rhythm.      Pulses: Normal pulses.      Heart sounds: Normal heart sounds.   Pulmonary:      Effort: Pulmonary effort is normal. No respiratory distress.      Breath sounds: Normal breath sounds.   Abdominal:      General: Bowel sounds are normal.      Comments: Mild distention, he grimaces with any palpation of the abdomen   Musculoskeletal:         General: No swelling, tenderness or deformity. Normal range of motion.      Cervical back: Normal range of motion and neck supple.   Skin:     General: Skin is warm and dry.      Capillary Refill: Capillary refill takes less than 2 seconds.      Comments: Scattered contusions and telangiectasia   Neurological:      Comments: Patient responds only to noxious stimuli   Psychiatric:      Comments: Unable to assess         Procedures           ED Course                                           MDM  Number of Diagnoses or Management Options  Acute respiratory failure with hypoxia (CMS/HCC)  Closed fracture of thoracic vertebra, unspecified fracture morphology, unspecified thoracic vertebral level, initial  encounter (CMS/[facility])  Closed fracture of transverse process of lumbar vertebra, initial encounter (CMS/[facility])  Confusion  COVID-19  Lactic acidosis  Diagnosis management comments: 71-year-old male with altered mental status.  Patient is essentially obtunded and responds only to painful stimuli.  His exam is otherwise as above.  Will obtain broad labs, imaging.  Will give IV fluids.  Disposition pending though anticipate admission.    DDx: Hepatic encephalopathy, electrolyte abnormality, dehydration, gastroenteritis, intra-abdominal infection, pneumonia, UTI    EKG interpreted by me: Sinus rhythm, normal rate, no acute ST/T changes, low-voltage QRS complexes, this is an atypical EKG    Blood work notable for lactic acidosis, otherwise fairly stable compared to previous values.  On his imaging he does have an acute appearing thoracic compression fracture and lumbar transverse process fracture.  They also noted a nonspecific groundglass opacity in the left lower lobe.  He has become hypoxic requiring supplemental oxygen.  Given this we added a viral swab which is unfortunately positive for COVID-19.  After fluids his mental status has improved a bit.  Given all of his issues today I have recommended admission, wife is agreeable.  Discussed with Dr. Cabello who graciously excepted patient for admission.       Amount and/or Complexity of Data Reviewed  Decide to obtain previous medical records or to obtain history from someone other than the patient: yes        Final diagnoses:   COVID-19   Acute respiratory failure with hypoxia (CMS/[facility])   Lactic acidosis   Closed fracture of thoracic vertebra, unspecified fracture morphology, unspecified thoracic vertebral level, initial encounter (CMS/[facility])   Closed fracture of transverse process of lumbar vertebra, initial encounter (CMS/[facility])   Confusion        Kei Fontanez MD  08/17/21 2775

## 2021-08-17 NOTE — H&P
"    [facility]      Name:  Garrett Richard   Age:  71 y.o.  Sex:  male  :  1950  MRN:  [id number]   Visit Number:  [id number]  Admission Date:  2021  Date Of Service:  21  Primary Care Physician:  Pierce Lubin MD    Chief Complaint:     Generalized weakness, vomiting    History Of Presenting Illness:      Patient is a 71-year-old male with history significant for end-stage liver disease, hepatocellular carcinoma with multiple hospitalizations for hepatic encephalopathy who is followed at the [facility] awaiting liver transplant.  Patient was recently seen in our facility for hepatic encephalopathy and was discharged  to [facility] for inpatient rehabilitation.  History largely obtained from his wife who is present at bedside.  She states that he initially was doing okay and had been exercising on the bike when he became progressively more weak.  He was not eating and behaving normally.  Initially, thought that maybe his ammonia was elevated.  It was checked by nursing home staff and was found to be elevated at greater than 100 for which she was treated with lactulose and did have some mild improvement in cognition and energy.  However, patient and became weak again and would not cooperate with therapy.  This morning, he was eating breakfast when he had \"projectile vomiting\" and was sent to the emergency room for further evaluation.  In the ER, patient was found to be hemodynamically stable.  Initially he was 96% on room air.  Labs were largely unremarkable, ammonia level was 62, lactic acid mildly elevated at 2.9.  Procalcitonin within normal limits.  No leukocytosis, renal function normal.  CT head, abdomen pelvis and chest revealed groundglass opacity in the left lower lobe favored to be a early viral pneumonia.  Other findings include mild compression deformity of T12 and right L1 transverse process likely from " a recent fall.  Other findings are chronic.  Respiratory PCR returned positive for COVID-19 infection.  Patient has been fully vaccinated since March 2021 and received Pfizer vaccine.    Review Of Systems:    All systems were reviewed and negative except as mentioned in history of presenting illness, assessment and plan.    Past Medical History: Patient  has a past medical history of AL (acute kidney injury) (CMS/HCC) (5/28/2021), Diabetes mellitus (CMS/HCC), Hepatocellular carcinoma (CMS/HCC) (10/25/2018), History of transfusion, Hypertension, Impaired functional mobility, balance, gait, and endurance, Liver disease, Umbilical hernia, Upper respiratory infection, Urinary symptom or sign, and Urinary tract infection.    Past Surgical History: Patient  has a past surgical history that includes Tonsillectomy; Liver surgery; and Esophagogastroduodenoscopy (N/A, 5/11/2021).    Social History: Patient  reports that he has never smoked. He quit smokeless tobacco use about 2 months ago.  His smokeless tobacco use included snuff. He reports that he does not drink alcohol and does not use drugs.    Family History: Patient's family history includes COPD in his father; Colon cancer in his mother; Diabetes in his maternal grandmother; No Known Problems in his maternal grandfather, paternal grandfather, and paternal grandmother.    Allergies:      Patient has no known allergies.    Home Medications:    Prior to Admission Medications     Prescriptions Last Dose Informant Patient Reported? Taking?    ascorbic acid (VITAMIN C) 500 MG tablet   Yes No    Take 500 mg by mouth Daily.    Continuous Blood Gluc  (FreeStyle Sohail 14 Day Wise) device   No No    1 each Every 14 (Fourteen) Days.    ferrous sulfate 325 (65 FE) MG tablet   Yes No    Take 325 mg by mouth Daily With Breakfast. 2 tablets once a day    furosemide (Lasix) 40 MG tablet   No No    Take 0.5 tablets by mouth Daily for 90 days.    insulin aspart (NovoLOG  FlexPen) 100 UNIT/ML solution pen-injector sc pen   No No    Inject 7 Units under the skin into the appropriate area as directed 3 (Three) Times a Day With Meals. Sliding scale - depending on glucose readings    Insulin Pen Needle (B-D ULTRAFINE III SHORT PEN) 31G X 8 MM misc   Yes No    lactulose (CHRONULAC) 10 GM/15ML solution   No No    Take 30 mL by mouth 4 (Four) Times a Day.    Lantus SoloStar 100 UNIT/ML injection pen   Yes No    Inject 10 Units as directed Every Night. 10 units at bedtime if the first morning reading is over 184    riFAXIMin (XIFAXAN) 550 MG tablet  Spouse/Significant Other Yes No    Take 550 mg by mouth Every 12 (Twelve) Hours.    sennosides-docusate (PERICOLACE) 8.6-50 MG per tablet   Yes No    Take 1 tablet by mouth. otc    spironolactone (Aldactone) 50 MG tablet   No No    Take 1 tablet by mouth 2 (Two) Times a Day for 90 days.    sucralfate (Carafate) 1 g tablet   No No    Take 1 tablet by mouth 4 (Four) Times a Day.        ED Medications:    Medications   AZITHROMYCIN 500 MG/250 ML 0.9% NS IVPB (vial-mate) (has no administration in time range)   iopamidol (ISOVUE-300) 61 % injection 100 mL (100 mL Intravenous Given 8/17/21 1149)   AZITHROMYCIN 500 MG/250 ML 0.9% NS IVPB (vial-mate) (0 mg Intravenous Stopped 8/17/21 1545)   dexamethasone sodium phosphate injection 6 mg (6 mg Intravenous Given 8/17/21 1722)     Vital Signs:  Temp:  [97.1 °F (36.2 °C)] 97.1 °F (36.2 °C)  Heart Rate:  [92] 92  Resp:  [16] 16  BP: (141)/(79) 141/79        08/17/21  1031   Weight: 68 kg (150 lb)     Body mass index is 20.34 kg/m².    Physical Exam:     General Appearance:   Chronically ill-appearing  male, no acute distress   Head:  Atraumatic and normocephalic.   Eyes: Conjunctivae and sclerae normal, No pallor.   Ears:  Ears with no abnormalities noted.   Throat: No oral lesions, no thrush, oral mucosa dry   Neck: Supple, trachea midline,    Back:   No kyphoscoliosis present. No tenderness to  palpation.   Lungs:   Breath sounds heard bilaterally equally.  No crackles or wheezing.    Heart:  Normal S1 and S2, no murmur, No JVD.   Abdomen:   Normal bowel Soft, nontender, nondistended, no rebound tenderness.   Extremities: Supple, no edema, no cyanosis, no clubbing.   Pulses: Pulses palpable bilaterally.   Skin: No bleeding or rash.   Neurologic: Alert, will answer yes/no questions by shaking his head.  Agrees that he is tired.  Responds appropriately.  Does not follow further commands.     Laboratory data:    I have reviewed the labs done in the emergency room.    Results from last 7 days   Lab Units 08/17/21  1110 08/11/21  0550   SODIUM mmol/L 132* 134*   POTASSIUM mmol/L 5.0 4.4   CHLORIDE mmol/L 104 106   CO2 mmol/L 18.3* 20.3*   BUN mg/dL 20 19   CREATININE mg/dL 1.22 1.24   CALCIUM mg/dL 8.4* 8.5*   BILIRUBIN mg/dL 1.6*  --    ALK PHOS U/L 85  --    ALT (SGPT) U/L 32  --    AST (SGOT) U/L 43*  --    GLUCOSE mg/dL 203* 221*     Results from last 7 days   Lab Units 08/17/21  1110 08/11/21  0550   WBC 10*3/mm3 5.01 4.89   HEMOGLOBIN g/dL 10.7* 10.0*   HEMATOCRIT % 31.8* 29.3*   PLATELETS 10*3/mm3 64* 61*     Results from last 7 days   Lab Units 08/17/21  1110   INR  1.27*     Results from last 7 days   Lab Units 08/17/21  1110   TROPONIN T ng/mL 0.021     Results from last 7 days   Lab Units 08/17/21  1110   PROBNP pg/mL 115.2                       Invalid input(s): USDES,  BLOODU, NITRITITE, BACT, EP    Pain Management Panel     Pain Management Panel Latest Ref Rng & Units 9/29/2020 3/21/2018    CREATININE UR Not Estab. mg/dL 125.5 100          EKG:      EKG personally reviewed, sinus rhythm no signs of acute infarct or ischemia.    Radiology:    CT Head Without Contrast    Result Date: 8/17/2021  PROCEDURE: CT HEAD WO CONTRAST-  INDICATION: ams  TECHNIQUE: Multiple axial CT images were performed from the foramen magnum to the vertex without contrast.   Coronal reconstruction images were obtained from  the axial data.  COMPARISON: 8/8/2021.  FINDINGS: There is no mass effect or midline shift.  The ventricles are symmetric in size and configuration.  There is no hydrocephalus.  There are no extraaxial fluid collections.  There is no intraventricular or intraparenchymal hemorrhage. There is an old lacunar infarct in the right abdiel. The posterior fossa is without acute abnormality. The basilar cisterns are preserved.  The soft tissues are within normal limits. No acute osseous abnormalities are present.      No mass effect, midline shift, or intracranial hemorrhage. No acute abnormality.      This study was performed with techniques to keep radiation doses as low as reasonably achievable (ALARA). Individualized dose reduction techniques using automated exposure control or adjustment of mA and/or kV according to the patient size were employed.  This report was finalized on 8/17/2021 11:58 AM by Nellie Soto MD.    CT Chest With Contrast Diagnostic    Result Date: 8/17/2021  CT OF THE CHEST, ABDOMEN AND PELVIS WITH CONTRAST  INDICATION: ams, diarrhea, abd tenderness  TECHNIQUE:  Thin section axial images were obtained from the lung apices to the pubic symphysis following IV and oral contrast administration. Coronal reconstruction images were obtained from the axial data.  COMPARISON: 5/9/2021.  FINDINGS:  CHEST:  There is no mediastinal, hilar, or axillary lymphadenopathy. There is no pleural or pericardial effusion.  There is stable right lower lobe atelectasis. There is a new round area of groundglass opacity in the left lower lobe on image 46 which is favored to be infectious or inflammatory.  ABDOMEN: The liver is small and nodular in contour consistent with cirrhosis. No focal liver lesion is visualized.  The gallbladder is present.  The spleen is mildly enlarged at 14 cm. The adrenal glands and pancreas are without acute abnormality.  There is no hydronephrosis. Hypodensity in the right mid kidney is unchanged.  Gastric wall thickening is nonspecific in the setting of ascites. There is no evidence of small bowel obstruction. There is long segment colonic wall thickening. This is nonspecific in the setting of ascites. Colitis is not excluded. There is no abdominal ascites. Recanalized paraumbilical veins are noted and unchanged from prior. There is perihepatic and perisplenic ascites which is slightly improved compared to prior.  PELVIS: The prostate and urinary bladder are unremarkable. Long segment colonic wall thickening is nonspecific in the setting of ascites. Colitis not excluded. Pelvic ascites has decreased. There is no pelvic lymphadenopathy.   BONES: There is a new mild compression deformity of T12. A fracture of the right L1 transverse process is also new from prior exam. Subtle lucent lesions in the right iliac crest are unchanged.      1. New rounded groundglass opacity in the left lower lobe which is favored to be infectious or inflammatory. Early viral pneumonia is not entirely excluded. 2. Cirrhosis with findings of portal hypertension including splenomegaly, venous collaterals, and ascites. 3. Long segment colonic wall thickening, nonspecific in the setting of ascites. Colitis not excluded. 4. New mild compression deformity of T12 and right L1 transverse process fracture. A history of trauma was not provided. Consider MRI if indicated.       This study was performed with techniques to keep radiation doses as low as reasonably achievable (ALARA). Individualized dose reduction techniques using automated exposure control or adjustment of mA and/or kV according to the patient size were employed.  This report was finalized on 8/17/2021 12:09 PM by Nellie Soto MD.    CT Abdomen Pelvis With Contrast    Result Date: 8/17/2021  CT OF THE CHEST, ABDOMEN AND PELVIS WITH CONTRAST  INDICATION: ams, diarrhea, abd tenderness  TECHNIQUE:  Thin section axial images were obtained from the lung apices to the pubic symphysis  following IV and oral contrast administration. Coronal reconstruction images were obtained from the axial data.  COMPARISON: 5/9/2021.  FINDINGS:  CHEST:  There is no mediastinal, hilar, or axillary lymphadenopathy. There is no pleural or pericardial effusion.  There is stable right lower lobe atelectasis. There is a new round area of groundglass opacity in the left lower lobe on image 46 which is favored to be infectious or inflammatory.  ABDOMEN: The liver is small and nodular in contour consistent with cirrhosis. No focal liver lesion is visualized.  The gallbladder is present.  The spleen is mildly enlarged at 14 cm. The adrenal glands and pancreas are without acute abnormality.  There is no hydronephrosis. Hypodensity in the right mid kidney is unchanged. Gastric wall thickening is nonspecific in the setting of ascites. There is no evidence of small bowel obstruction. There is long segment colonic wall thickening. This is nonspecific in the setting of ascites. Colitis is not excluded. There is no abdominal ascites. Recanalized paraumbilical veins are noted and unchanged from prior. There is perihepatic and perisplenic ascites which is slightly improved compared to prior.  PELVIS: The prostate and urinary bladder are unremarkable. Long segment colonic wall thickening is nonspecific in the setting of ascites. Colitis not excluded. Pelvic ascites has decreased. There is no pelvic lymphadenopathy.   BONES: There is a new mild compression deformity of T12. A fracture of the right L1 transverse process is also new from prior exam. Subtle lucent lesions in the right iliac crest are unchanged.      1. New rounded groundglass opacity in the left lower lobe which is favored to be infectious or inflammatory. Early viral pneumonia is not entirely excluded. 2. Cirrhosis with findings of portal hypertension including splenomegaly, venous collaterals, and ascites. 3. Long segment colonic wall thickening, nonspecific in the  setting of ascites. Colitis not excluded. 4. New mild compression deformity of T12 and right L1 transverse process fracture. A history of trauma was not provided. Consider MRI if indicated.       This study was performed with techniques to keep radiation doses as low as reasonably achievable (ALARA). Individualized dose reduction techniques using automated exposure control or adjustment of mA and/or kV according to the patient size were employed.  This report was finalized on 8/17/2021 12:09 PM by Nellie Soto MD.      Assessment:    COVID-19 pneumonia, POA  Generalized weakness with nausea/vomiting secondary to above, POA  Closed fracture transverse process of T12 and L1 vertebra  Stage liver disease secondary to hepatocellular carcinoma  History of hepatic encephalopathy  Impaired functional mobility balance gait and endurance  Type 2 diabetes, insulin-dependent    Plan:    We will met patient to the hospital Covid wing, isolation precautions.  We will treat patient with azithromycin and Decadron.  Fortunately, patient only requiring a minimal amount of supplemental oxygen.  May titrate down to see if he can tolerate room air.  Albuterol MDI, incentive spirometer.  PT/OT.  Ammonia level appropriate for patient.  We will continue with his current home dose of lactulose to titrate to 3-4 bowel movements per day.  We will also continue rifaximin.  Continue Lasix and spironolactone.  Patient not eating, will only order low-dose sliding scale correction for glucose control.  Patient may benefit from palliative care services, consult has been placed.  Further orders clinical course dictates.  Patient is high risk.    Risk Assessment: High  DVT Prophylaxis: SCDs, avoid chemical prophylaxis due to thrombocytopenia  Code Status: DNR/DNI  Diet:\Diabetic    Advance Care Planning   ACP discussion was declined by the patient. Patient does not have an advance directive, declines further assistance.      Hernando Cabello,  DO  08/17/21  17:54 EDT    Dictated utilizing Dragon dictation.

## 2021-08-18 NOTE — PLAN OF CARE
Problem: Adult Inpatient Plan of Care  Goal: Plan of Care Review  Recent Flowsheet Documentation  Taken 8/18/2021 [id number] by Lachelle Wolf OT  Progress: no change  Plan of Care Reviewed With: patient  Outcome Summary: OT eval completed. Patient presents deficits in strength, endurance, balance, mobility and ADL performance. Patient completed bed mobility with max A x 2 , sit to stand with min A x 2, requiring several sit to stands for perineal hygiene d/t incontinence. Patient required mod A x 2 for transfer to chair. Patient requires max A for all ADLs. Patient is expected to benefit from continued OT services prior to DC.   Goal Outcome Evaluation:  Plan of Care Reviewed With: patient        Progress: no change  Outcome Summary: OT eval completed. Patient presents deficits in strength, endurance, balance, mobility and ADL performance. Patient completed bed mobility with max A x 2 , sit to stand with min A x 2, requiring several sit to stands for perineal hygiene d/t incontinence. Patient required mod A x 2 for transfer to chair. Patient requires max A for all ADLs. Patient is expected to benefit from continued OT services prior to DC.

## 2021-08-18 NOTE — PLAN OF CARE
Goal Outcome Evaluation:  Plan of Care Reviewed With: patient, spouse        Progress: improving     Patient's oxygen satuation has been 96% on room air for majority of shift.  Patient is still extremely lethargic and somewhat hesitant to help himself as much as he could.    Patient able to use RUE to touch ear, but wanted someone to hold the phone while speaking with his spouse earlier today.

## 2021-08-18 NOTE — PLAN OF CARE
Goal Outcome Evaluation:  Plan of Care Reviewed With: patient        Progress: no change  Outcome Summary: New admit from ED. Placed in isolation precautions. VSS on O2 3L nasal cannula. Fall precautions initiated.

## 2021-08-18 NOTE — CONSULTS
"Adult Nutrition  Assessment/PES    Patient Name:  Garrett Richard  YOB: 1950  MRN: [id number]  Admit Date:  8/17/2021    Assessment Date:  8/18/2021    Comments:    Recommend:  1. Consider adding renal modifications to current diet order as medically appropriate and tolerated.  2. Encourage PO intake. Current intake ~25% x 1 meal.   3. RD ordered Arginaid BID and Novasource Renal BID.  4. Consider a renal multivitamin with minerals daily.  5. Continue to monitor and replace electrolytes PRN.      RD to follow pt and available PRN.      Reason for Assessment     Row Name 08/18/21 1117          Reason for Assessment    Reason For Assessment  diagnosis/disease state;physician consult;nurse/nurse practitioner consult;identified at risk by screening criteria     Diagnosis  cardiac disease;diabetes diagnosis/complications;cancer diagnosis/related complications;pulmonary disease;renal disease;liver disease COVID, cirrhosis, DM 2, HLD, HTN, CKD 3, hepatocellular carcinoma     Identified At Risk by Screening Criteria  large or nonhealing wound, burn or pressure injury;BMI           Anthropometrics     Row Name 08/18/21 1120          Anthropometrics    Height  182.9 cm (72\")        Ideal Body Weight (IBW)    Ideal Body Weight (IBW) (kg)  82.07         Labs/Tests/Procedures/Meds     Row Name 08/18/21 1119          Labs/Procedures/Meds    Lab Results Reviewed  reviewed, pertinent     Lab Results Comments  Low: Na, Alb, Platelets; High: Ammonia, Bilirubin, Gluc, K, BUN        Medications    Pertinent Medications Reviewed  reviewed, pertinent     Pertinent Medications Comments  Vitamin C, Decadron, Lasix, Novolin R, Lactulose, Levemir,         Physical Findings     Row Name 08/18/21 1120          Physical Findings    Skin  pressure injury;other (see comments) coccyx PI, bilateral MASD, left and right arm skin tears         Estimated/Assessed Needs     Row Name 08/18/21 1120          Calculation Measurements    " "Weight Used For Calculations  69.3 kg (152 lb 12.5 oz)     Height  182.9 cm (72\")        Estimated/Assessed Needs    Additional Documentation  Fluid Requirements (Group);Protein Requirements (Group);Calorie Requirements (Group);D[place]-St. Jeor Equation (Group)        Calorie Requirements    Estimated Calorie Requirement Comment  [id number]        D[place]-St. Jeor Equation    RMR (D[place]-St. Jeor Equation)  1496     D[place]-St. Jeor Activity Factors  -- 1.3 AF        Protein Requirements    Weight Used For Protein Calculations  69.3 kg (152 lb 12.5 oz)     Est Protein Requirement Amount (gms/kg)  0.8 gm protein 41-55 grams     Estimated Protein Requirements (gms/day)  55.44        Fluid Requirements    Fluid Requirements (mL/day)  -- 1173-3216 mL     Estimated Fluid Requirement Method  other (see comments) 1 mL/kcal         Nutrition Prescription Ordered     Row Name 08/18/21 1122          Nutrition Prescription PO    Current PO Diet  Regular     Common Modifiers  Cardiac;Consistent Carbohydrate         Evaluation of Received Nutrient/Fluid Intake     Row Name 08/18/21 1120          PO Evaluation    Number of Days PO Intake Evaluated  1 day     Number of Meals  1     % PO Intake  25               Problem/Interventions:  Problem 1     Row Name 08/18/21 1122          Nutrition Diagnoses Problem 1    Problem 1  Increased Nutrient Needs     Macronutrient  Protein     Micronutrient  Vitamin;Mineral     Etiology (related to)  Medical Diagnosis     Pulmonary/Critical Care  Other (comment) COVID     Skin  Skin breakdown;Pressure injury     Signs/Symptoms (evidenced by)  Report/Observation     Reported/Observed By  MD;RN         Problem 2     Row Name 08/18/21 1123          Nutrition Diagnoses Problem 2    Problem 2  Altered Nutrition Related to Labs     Etiology (related to)  Medical Diagnosis     Endocrine  DM Type 2     Renal  CKD     Signs/Symptoms (evidenced by)  Biochemical     Specific Labs Noted  BUN;Glucose;K+     "         Intervention Goal     Row Name 08/18/21 1123          Intervention Goal    General  Meet nutritional needs for age/condition;Improved nutrition related lab(s)     PO  Meet estimated needs;Increase intake;PO intake (%)     PO Intake %  50 %     Weight  Maintain weight         Nutrition Intervention     Row Name 08/18/21 1123          Nutrition Intervention    RD/Tech Action  Follow Tx progress;Encourage intake;Recommend/ordered     Recommended/Ordered  Diet;Supplement         Nutrition Prescription     Row Name 08/18/21 1123          Nutrition Prescription PO    PO Prescription  Begin/change diet;Begin/change supplement     Begin/Change Diet to  Regular     Supplement  Nova Renal;Other (comment) Arginaid     Supplement Frequency  2 times a day     Common Modifiers  Cardiac;Consistent Carbohydrate;Renal     New PO Prescription Ordered?  Yes supplement ordered        Other Orders    Obtain Weight  Daily     Obtain Weight Ordered?  No, recommended     Supplement  Vitamin mineral supplement Renal     Supplement Ordered?  No, recommended     Other  Continue to monitor and replace electrolytes PRN         Education/Evaluation     Row Name 08/18/21 1124          Education    Education  Education not appropriate at this time     Please explain  Other (comment) isolation status        Monitor/Evaluation    Monitor  Per protocol;I&O;PO intake;Supplement intake;Pertinent labs;Skin status;Weight           Electronically signed by:  Sirisha Costa RD  08/18/21 11:25 EDT

## 2021-08-18 NOTE — PROGRESS NOTES
[facility]    Name:  Garrett Richard   Age:  71 y.o.  Sex:  male  :  1950  MRN:  [id number]   Visit Number:  [id number]  Admission Date:  2021  Date Of Service:  21  Primary Care Physician:  Pierce Lubin MD     LOS: 1 day :    Chief Complaint:      Generalized weakness, vomiting    Subjective:    Patient seen and evaluated today.  Resting comfortably in bed.  Tells me that he is tired but is much more alert and interactive today than yesterday.  Is able to answer all questions appropriately, alert and oriented x3.  No acute events overnight.    Review of Systems:     All systems were reviewed and negative except as mentioned in subjective, assessment and plan.    Vital Signs:    Temp:  [98.2 °F (36.8 °C)-98.6 °F (37 °C)] 98.2 °F (36.8 °C)  Heart Rate:  [77-94] 77  Resp:  [16-18] 16  BP: (131-146)/(61-81) 132/78    Intake and output:    No intake/output data recorded.  I/O this shift:  In: 240 [P.O.:240]  Out: -     Physical Examination:    General Appearance:  Alert and cooperative.    Head:  Atraumatic and normocephalic.   Eyes: Conjunctivae and sclerae normal, no icterus. No pallor.   Throat: No oral lesions, no thrush, oral mucosa moist.   Neck: Supple, trachea midline, no thyromegaly.   Lungs:   Breath sounds heard bilaterally equally.  No crackles or wheezing.    Heart:  Normal S1 and S2, no murmur, No JVD.   Abdomen:   Normal bowel sounds, nontender, nondistended, no rebound tenderness.   Extremities: Supple, no edema, no cyanosis, no clubbing.   Skin: No bleeding or rash.   Neurologic: Alert and oriented x 3.  Generally weak all over     Laboratory results:    Results from last 7 days   Lab Units 21  0618 21  1110   SODIUM mmol/L 130* 132*   POTASSIUM mmol/L 5.5* 5.0   CHLORIDE mmol/L 105 104   CO2 mmol/L 14.6* 18.3*   BUN mg/dL 33* 20   CREATININE mg/dL 1.04 1.22   CALCIUM mg/dL 8.1* 8.4*   BILIRUBIN mg/dL 1.4* 1.6*   ALK  PHOS U/L 69 85   ALT (SGPT) U/L 26 32   AST (SGOT) U/L 42* 43*   GLUCOSE mg/dL 219* 203*     Results from last 7 days   Lab Units 08/18/21  0702 08/17/21  1110   WBC 10*3/mm3 4.12 5.01   HEMOGLOBIN g/dL 9.5* 10.7*   HEMATOCRIT % 28.0* 31.8*   PLATELETS 10*3/mm3 52* 64*     Results from last 7 days   Lab Units 08/17/21  1110   INR  1.27*     Results from last 7 days   Lab Units 08/17/21  1110   TROPONIN T ng/mL 0.021     Results from last 7 days   Lab Units 08/17/21  1117 08/17/21  1110   BLOODCX  No growth at 24 hours No growth at 24 hours         I have reviewed the patient's laboratory results.    Radiology results:    CT Head Without Contrast    Result Date: 8/17/2021  PROCEDURE: CT HEAD WO CONTRAST-  INDICATION: ams  TECHNIQUE: Multiple axial CT images were performed from the foramen magnum to the vertex without contrast.   Coronal reconstruction images were obtained from the axial data.  COMPARISON: 8/8/2021.  FINDINGS: There is no mass effect or midline shift.  The ventricles are symmetric in size and configuration.  There is no hydrocephalus.  There are no extraaxial fluid collections.  There is no intraventricular or intraparenchymal hemorrhage. There is an old lacunar infarct in the right abdiel. The posterior fossa is without acute abnormality. The basilar cisterns are preserved.  The soft tissues are within normal limits. No acute osseous abnormalities are present.      Impression: No mass effect, midline shift, or intracranial hemorrhage. No acute abnormality.      This study was performed with techniques to keep radiation doses as low as reasonably achievable (ALARA). Individualized dose reduction techniques using automated exposure control or adjustment of mA and/or kV according to the patient size were employed.  This report was finalized on 8/17/2021 11:58 AM by Nellie Soto MD.    CT Chest With Contrast Diagnostic    Result Date: 8/17/2021  CT OF THE CHEST, ABDOMEN AND PELVIS WITH CONTRAST   INDICATION: ams, diarrhea, abd tenderness  TECHNIQUE:  Thin section axial images were obtained from the lung apices to the pubic symphysis following IV and oral contrast administration. Coronal reconstruction images were obtained from the axial data.  COMPARISON: 5/9/2021.  FINDINGS:  CHEST:  There is no mediastinal, hilar, or axillary lymphadenopathy. There is no pleural or pericardial effusion.  There is stable right lower lobe atelectasis. There is a new round area of groundglass opacity in the left lower lobe on image 46 which is favored to be infectious or inflammatory.  ABDOMEN: The liver is small and nodular in contour consistent with cirrhosis. No focal liver lesion is visualized.  The gallbladder is present.  The spleen is mildly enlarged at 14 cm. The adrenal glands and pancreas are without acute abnormality.  There is no hydronephrosis. Hypodensity in the right mid kidney is unchanged. Gastric wall thickening is nonspecific in the setting of ascites. There is no evidence of small bowel obstruction. There is long segment colonic wall thickening. This is nonspecific in the setting of ascites. Colitis is not excluded. There is no abdominal ascites. Recanalized paraumbilical veins are noted and unchanged from prior. There is perihepatic and perisplenic ascites which is slightly improved compared to prior.  PELVIS: The prostate and urinary bladder are unremarkable. Long segment colonic wall thickening is nonspecific in the setting of ascites. Colitis not excluded. Pelvic ascites has decreased. There is no pelvic lymphadenopathy.   BONES: There is a new mild compression deformity of T12. A fracture of the right L1 transverse process is also new from prior exam. Subtle lucent lesions in the right iliac crest are unchanged.      Impression: 1. New rounded groundglass opacity in the left lower lobe which is favored to be infectious or inflammatory. Early viral pneumonia is not entirely excluded. 2. Cirrhosis with  findings of portal hypertension including splenomegaly, venous collaterals, and ascites. 3. Long segment colonic wall thickening, nonspecific in the setting of ascites. Colitis not excluded. 4. New mild compression deformity of T12 and right L1 transverse process fracture. A history of trauma was not provided. Consider MRI if indicated.       This study was performed with techniques to keep radiation doses as low as reasonably achievable (ALARA). Individualized dose reduction techniques using automated exposure control or adjustment of mA and/or kV according to the patient size were employed.  This report was finalized on 8/17/2021 12:09 PM by Nellie Soto MD.    CT Abdomen Pelvis With Contrast    Result Date: 8/17/2021  CT OF THE CHEST, ABDOMEN AND PELVIS WITH CONTRAST  INDICATION: ams, diarrhea, abd tenderness  TECHNIQUE:  Thin section axial images were obtained from the lung apices to the pubic symphysis following IV and oral contrast administration. Coronal reconstruction images were obtained from the axial data.  COMPARISON: 5/9/2021.  FINDINGS:  CHEST:  There is no mediastinal, hilar, or axillary lymphadenopathy. There is no pleural or pericardial effusion.  There is stable right lower lobe atelectasis. There is a new round area of groundglass opacity in the left lower lobe on image 46 which is favored to be infectious or inflammatory.  ABDOMEN: The liver is small and nodular in contour consistent with cirrhosis. No focal liver lesion is visualized.  The gallbladder is present.  The spleen is mildly enlarged at 14 cm. The adrenal glands and pancreas are without acute abnormality.  There is no hydronephrosis. Hypodensity in the right mid kidney is unchanged. Gastric wall thickening is nonspecific in the setting of ascites. There is no evidence of small bowel obstruction. There is long segment colonic wall thickening. This is nonspecific in the setting of ascites. Colitis is not excluded. There is no abdominal  ascites. Recanalized paraumbilical veins are noted and unchanged from prior. There is perihepatic and perisplenic ascites which is slightly improved compared to prior.  PELVIS: The prostate and urinary bladder are unremarkable. Long segment colonic wall thickening is nonspecific in the setting of ascites. Colitis not excluded. Pelvic ascites has decreased. There is no pelvic lymphadenopathy.   BONES: There is a new mild compression deformity of T12. A fracture of the right L1 transverse process is also new from prior exam. Subtle lucent lesions in the right iliac crest are unchanged.      Impression: 1. New rounded groundglass opacity in the left lower lobe which is favored to be infectious or inflammatory. Early viral pneumonia is not entirely excluded. 2. Cirrhosis with findings of portal hypertension including splenomegaly, venous collaterals, and ascites. 3. Long segment colonic wall thickening, nonspecific in the setting of ascites. Colitis not excluded. 4. New mild compression deformity of T12 and right L1 transverse process fracture. A history of trauma was not provided. Consider MRI if indicated.       This study was performed with techniques to keep radiation doses as low as reasonably achievable (ALARA). Individualized dose reduction techniques using automated exposure control or adjustment of mA and/or kV according to the patient size were employed.  This report was finalized on 8/17/2021 12:09 PM by Nellie Soto MD.    I have reviewed the patient's radiology reports.    Medication Review:     I have reviewed the patient's active and prn medications.     Problem List:      COVID-19      Assessment:    COVID-19 pneumonia, POA  Generalized weakness with nausea/vomiting secondary to above, POA  Closed fracture transverse process of T12 and L1 vertebra  Stage liver disease secondary to hepatocellular carcinoma  History of hepatic encephalopathy  Impaired functional mobility balance gait and endurance  Type  2 diabetes, insulin-dependent       Plan:    Continue monitor patient in the hospital with Covid isolation precautions.  We will continue with azithromycin and Decadron.  Fortunately, patient continues to remain very stable and require minimal amount of supplemental oxygen.  Continue with albuterol MDI and incentive spirometer.  Is able to maintain saturation on room air today.  Continue supportive care, antiemetics as needed.  Ammonia level appropriate for patient.  We will continue with his current home dose of lactulose to titrate to 3-4 bowel movements per day.  We will also continue rifaximin.  Continue Lasix and spironolactone.  Have restarted patient's basal insulin, will continue with low-dose sliding scale correction.  Patient would likely benefit from palliative care services following him post discharge.  Consult placed.  Further orders as clinical course dictates.    DVT Prophylaxis: SCDs  Code Status: DNR/DNI  Diet: Diabetic  Discharge Plan: Inpatient rehab    Hernando Cabello DO  08/18/21  14:32 EDT    Dictated utilizing Dragon dictation.

## 2021-08-18 NOTE — THERAPY EVALUATION
Patient Name: Garrett Richard  : 1950    MRN: [id number]                              Today's Date: 2021       Admit Date: 2021    Visit Dx:     ICD-10-CM ICD-9-CM   1. COVID-19  U07.1 079.89   2. Acute respiratory failure with hypoxia (CMS/HCC)  J96.01 518.81   3. Lactic acidosis  E87.2 276.2   4. Closed fracture of thoracic vertebra, unspecified fracture morphology, unspecified thoracic vertebral level, initial encounter (CMS/HCC)  S22.009A 805.2   5. Closed fracture of transverse process of lumbar vertebra, initial encounter (CMS/HCC)  S32.009A 805.4   6. 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)   • Encephalopathy, hepatic (CMS/HCC)   • AL (acute kidney injury) (CMS/HCC)   • Increased ammonia level   • Hepatic encephalopathy (CMS/HCC)   • Chronic kidney disease, stage III (moderate) (CMS/HCC)   • Severe malnutrition (CMS/HCC)   • COVID-19     Past Medical History:   Diagnosis Date   • AL (acute kidney injury) (CMS/HCC) 2021   • Diabetes mellitus (CMS/HCC)     type II per patient   • Hepatocellular carcinoma (CMS/HCC) 10/25/2018    s/p TACE procedure with no mass noted on fu imaging   • History of transfusion    • Hypertension    • Impaired functional mobility, balance, gait, and endurance    • Liver disease    • Umbilical hernia    • Upper respiratory infection    • Urinary symptom or sign    • Urinary tract infection      Past Surgical History:   Procedure Laterality Date   • ENDOSCOPY N/A 2021    Procedure: ESOPHAGOGASTRODUODENOSCOPY WITH ARGON THERMAL ABLATION;  Surgeon: Samy Swanson MD;  Location: [facility] ENDOSCOPY;  Service:  Gastroenterology;  Laterality: N/A;   • LIVER SURGERY     • TONSILLECTOMY       General Information     Row Name 08/18/21 1505          Physical Therapy Time and Intention    Document Type  evaluation  (Pended)   -CW     Mode of Treatment  physical therapy  (Pended)   -     Row Name 08/18/21 1505          General Information    Patient Profile Reviewed  yes  (Pended)   -CW     Prior Level of Function  independent:;all household mobility;community mobility  (Pended)   -CW     Existing Precautions/Restrictions  fall  (Pended)   -CW     Barriers to Rehab  medically complex;previous functional deficit;cognitive status  (Pended)   -CW     Row Name 08/18/21 1505          Living Environment    Lives With  facility resident  (Pended)   -     Row Name 08/18/21 1526          Cognition    Orientation Status (Cognition)  unable/difficult to assess  (Pended)   -     Row Name 08/18/21 1505          Safety Issues, Functional Mobility    Safety Issues Affecting Function (Mobility)  ability to follow commands;friction/shear risk;insight into deficits/self-awareness;positioning of assistive device;safety precautions follow-through/compliance;awareness of need for assistance  (Pended)   -CW     Impairments Affecting Function (Mobility)  balance;cognition;endurance/activity tolerance;strength;pain  (Pended)   -CW     Cognitive Impairments, Mobility Safety/Performance  awareness, need for assistance;safety precaution awareness  (Pended)   -CW       User Key  (r) = Recorded By, (t) = Taken By, (c) = Cosigned By    Initials Name Provider Type    CW Abigail Rushing, PT Student PT Student        Mobility     Row Name 08/18/21 1415          Bed Mobility    Bed Mobility  bed mobility (all) activities  (Pended)   -     All Activities, [place] (Bed Mobility)  maximum assist (25% patient effort);2 person assist  (Pended)   -CW     Assistive Device (Bed Mobility)  draw sheet;head of bed elevated  (Pended)   -CW     Row Name 08/18/21  1415          Transfers    Comment (Transfers)  Patient had a significant posterior lean doesnt reach full upright posture with transfers and gait.  (Pended)   -     Row Name 08/18/21 1415          Bed-Chair Transfer    Bed-Chair [place] (Transfers)  moderate assist (50% patient effort);2 person assist  (Pended)   -CW     Assistive Device (Bed-Chair Transfers)  walker, front-wheeled  (Pended)   -     Row Name 08/18/21 1415          Sit-Stand Transfer    Sit-Stand [place] (Transfers)  moderate assist (50% patient effort)  (Pended)   -CW     Assistive Device (Sit-Stand Transfers)  walker, front-wheeled  (Pended)   -     Row Name 08/18/21 1415          Gait/Stairs (Locomotion)    [place] Level (Gait)  --  (Pended)   -CW     Assistive Device (Gait)  --  (Pended)   -CW     Distance in Feet (Gait)  --  (Pended)   -CW     Deviations/Abnormal Patterns (Gait)  --  (Pended)   -CW     Comment (Gait/Stairs)  Patient struggled to  feet and maange RW, had to roll chair to meet pt tashiaue he couldnt back up.  (Pended)   -CW       User Key  (r) = Recorded By, (t) = Taken By, (c) = Cosigned By    Initials Name Provider Type    CW Abigail Ruhsing, PT Student PT Student        Obj/Interventions     Row Name 08/18/21 1415          Strength Comprehensive (MMT)    Comment, General Manual Muscle Testing (MMT) Assessment  LE MMT grossly 4/5  (Pended)   -     Row Name 08/18/21 1415          Balance    Balance Assessment  sitting static balance;sitting dynamic balance;standing static balance;standing dynamic balance  (Pended)   -     Static Sitting Balance  moderate impairment;sitting, edge of bed  (Pended)   -CW     Dynamic Sitting Balance  moderate impairment;sitting, edge of bed  (Pended)   -     Static Standing Balance  severe impairment  (Pended)   -     Dynamic Standing Balance  severe impairment  (Pended)   -     Comment, Balance  While sitting in bed patient slowly startef to lean back becasue he  could not support self on the EOB, during standing the patient demonstrated a sever posterior lean and could not correct to upright posture with verbal or physical cueing from the PT to shift weight forward or put toes on the ground  (Pended)   -CW       User Key  (r) = Recorded By, (t) = Taken By, (c) = Cosigned By    Initials Name Provider Type    CW Abigail Rushing, PT Student PT Student        Goals/Plan     Row Name 08/18/21 1538          Bed Mobility Goal 1 (PT)    Activity/Assistive Device (Bed Mobility Goal 1, PT)  bed mobility activities, all  (Pended)   -CW     [place] Level/Cues Needed (Bed Mobility Goal 1, PT)  minimum assist (75% or more patient effort)  (Pended)   -CW     Time Frame (Bed Mobility Goal 1, PT)  long term goal (LTG);10 days  (Pended)   -CW     Progress/Outcomes (Bed Mobility Goal 1, PT)  goal ongoing  (Pended)   -CW     Row Name 08/18/21 1538          Transfer Goal 1 (PT)    Activity/Assistive Device (Transfer Goal 1, PT)  sit-to-stand/stand-to-sit;bed-to-chair/chair-to-bed  (Pended)   -CW     [place] Level/Cues Needed (Transfer Goal 1, PT)  moderate assist (50-74% patient effort)  (Pended)   -CW     Time Frame (Transfer Goal 1, PT)  long term goal (LTG);10 days  (Pended)   -CW     Progress/Outcome (Transfer Goal 1, PT)  goal ongoing  (Pended)   -CW     Row Name 08/18/21 1538          Gait Training Goal 1 (PT)    Activity/Assistive Device (Gait Training Goal 1, PT)  gait (walking locomotion)  (Pended)   -CW     [place] Level (Gait Training Goal 1, PT)  minimum assist (75% or more patient effort)  (Pended)   -CW     Distance (Gait Training Goal 1, PT)  50ft  (Pended)   -CW     Time Frame (Gait Training Goal 1, PT)  long term goal (LTG);10 days  (Pended)   -CW     Progress/Outcome (Gait Training Goal 1, PT)  goal ongoing  (Pended)   -CW       User Key  (r) = Recorded By, (t) = Taken By, (c) = Cosigned By    Initials Name Provider Type    CW Abigail Rushing, PT Student PT  Student        Clinical Impression     Row Name 08/18/21 1415          Pain    Additional Documentation  Pain Scale: FACES Pre/Post-Treatment (Group)  (Pended)   -     Row Name 08/18/21 1415          Pain Scale: FACES Pre/Post-Treatment    Pain: FACES Scale, Pretreatment  0-->no hurt  (Pended)   -CW     Posttreatment Pain Rating  0-->no hurt  (Pended)   -CW     Row Name 08/18/21 1411          Plan of Care Review    Plan of Care Reviewed With  patient  (Pended)   -CW     Progress  no change  (Pended)   -CW     Outcome Summary  PT evaluation completed this date. Patient was difficult to rouse upon entry to the room, and would only answer yes no to questions. Patient demonstrates deficits in strength, transfers, gait, balance, and mobility. Patient was max x2 for bed mobility, mod assist for sit> stand, and mod x2 for bed> chair. Patient required consistent verbal cues to keep eyes open and to shift weight forward while in standing. Patient would benefit from skilled PT interventions during inpatient stay to adress deficits to improve mobility.  (Pended)   -     Row Name 08/18/21 1416          Therapy Assessment/Plan (PT)    Patient/Family Therapy Goals Statement (PT)  to go home  (Pended)   -CW     Rehab Potential (PT)  good, to achieve stated therapy goals  (Pended)   -CW     Criteria for Skilled Interventions Met (PT)  yes;meets criteria;skilled treatment is necessary  (Pended)   -CW     Predicted Duration of Therapy Intervention (PT)  unitl discharge  (Pended)   -     Row Name 08/18/21 1415          Vital Signs    O2 Delivery Pre Treatment  room air  (Pended)   -CW     O2 Delivery Intra Treatment  room air  (Pended)   -CW     O2 Delivery Post Treatment  room air  (Pended)   -CW     Pre Patient Position  Supine  (Pended)   -CW     Intra Patient Position  Standing  (Pended)   -CW     Post Patient Position  Sitting  (Pended)   -     Row Name 08/18/21 1416          Positioning and Restraints    Pre-Treatment  Position  in bed  (Pended)   -CW     Post Treatment Position  chair  (Pended)   -CW     In Chair  call light within reach;sitting;encouraged to call for assist;exit alarm on;with nsg  (Pended)   -CW       User Key  (r) = Recorded By, (t) = Taken By, (c) = Cosigned By    Initials Name Provider Type    Abigail Peraza, PT Student PT Student        Outcome Measures     Row Name 08/18/21 1415          How much help from another person do you currently need...    Turning from your back to your side while in flat bed without using bedrails?  2  (Pended)   -CW     Moving from lying on back to sitting on the side of a flat bed without bedrails?  2  (Pended)   -CW     Moving to and from a bed to a chair (including a wheelchair)?  2  (Pended)   -CW     Standing up from a chair using your arms (e.g., wheelchair, bedside chair)?  2  (Pended)   -CW     Climbing 3-5 steps with a railing?  1  (Pended)   -CW     To walk in hospital room?  2  (Pended)   -CW     AM-PAC 6 Clicks Score (PT)  11  (Pended)   -CW     Row Name 08/18/21 1546 08/18/21 1415       Functional Assessment    Outcome Measure Options  AM-PAC 6 Clicks Daily Activity (OT)  -SD  AM-PAC 6 Clicks Basic Mobility (PT)  (Pended)   -CW      User Key  (r) = Recorded By, (t) = Taken By, (c) = Cosigned By    Initials Name Provider Type    Lachelle Arenas OT Occupational Therapist    Abigail Peraza, PT Student PT Student                       Physical Therapy Education                 Title: PT OT SLP Therapies (In Progress)     Topic: Physical Therapy (In Progress)     Point: Mobility training (In Progress)     Learning Progress Summary           Patient Acceptance, E, NR by CW at 8/18/2021 1548    Comment: Educated the pateint on the benefit of PT POC, and functional upright posture.                   Point: Body mechanics (In Progress)     Learning Progress Summary           Patient Acceptance, E, NR by CW at 8/18/2021 1548    Comment: Educated the pateint on the  benefit of PT POC, and functional upright posture.                               User Key     Initials Effective Dates Name Provider Type Discipline    CW 07/26/21 -  Abigail Rushing, PT Student PT Student PT              PT Recommendation and Plan  Planned Therapy Interventions (PT): (P) balance training, bed mobility training, gait training, home exercise program, strengthening, transfer training  Plan of Care Reviewed With: (P) patient  Progress: (P) no change  Outcome Summary: (P) PT evaluation completed this date. Patient was difficult to rouse upon entry to the room, and would only answer yes no to questions. Patient demonstrates deficits in strength, transfers, gait, balance, and mobility. Patient was max x2 for bed mobility, mod assist for sit> stand, and mod x2 for bed> chair. Patient required consistent verbal cues to keep eyes open and to shift weight forward while in standing. Patient would benefit from skilled PT interventions during inpatient stay to adress deficits to improve mobility.     Time Calculation:   PT Charges     Row Name 08/18/21 1550             Time Calculation    Start Time  1415  (Pended)   -CW      PT Received On  08/18/21  (Pended)   -CW      PT Goal Re-Cert Due Date  08/28/21  (Pended)   -CW         Untimed Charges    PT Eval/Re-eval Minutes  53  (Pended)   -CW         Total Minutes    Untimed Charges Total Minutes  53  (Pended)   -CW       Total Minutes  53  (Pended)   -CW        User Key  (r) = Recorded By, (t) = Taken By, (c) = Cosigned By    Initials Name Provider Type    CW Abigail Rushing, PT Student PT Student        Therapy Charges for Today     Code Description Service Date Service Provider Modifiers Qty    [id number] HC PT EVAL MOD COMPLEXITY 4 8/18/2021 Abigail Rushing, PT Student GP 1          PT G-Codes  Outcome Measure Options: AM-PAC 6 Clicks Daily Activity (OT)  AM-PAC 6 Clicks Score (PT): (P) 11  AM-PAC 6 Clicks Score (OT): 9    ABIGAIL RUSHING PT  Student  8/18/2021

## 2021-08-18 NOTE — PLAN OF CARE
Goal Outcome Evaluation:  Plan of Care Reviewed With: (P) patient        Progress: (P) no change  Outcome Summary: (P) PT evaluation completed this date. Patient was difficult to rouse upon entry to the room, and would only answer yes no to questions. Patient demonstrates deficits in strength, transfers, gait, balance, and mobility. Patient was max x2 for bed mobility, mod assist for sit> stand, and mod x2 for bed> chair. Patient required consistent verbal cues to keep eyes open and to shift weight forward while in standing. Patient would benefit from skilled PT interventions during inpatient stay to adress deficits to improve mobility.

## 2021-08-18 NOTE — THERAPY EVALUATION
Patient Name: Garrett Richard  : 1950    MRN: [id number]                              Today's Date: 2021       Admit Date: 2021    Visit Dx:     ICD-10-CM ICD-9-CM   1. COVID-19  U07.1 079.89   2. Acute respiratory failure with hypoxia (CMS/HCC)  J96.01 518.81   3. Lactic acidosis  E87.2 276.2   4. Closed fracture of thoracic vertebra, unspecified fracture morphology, unspecified thoracic vertebral level, initial encounter (CMS/HCC)  S22.009A 805.2   5. Closed fracture of transverse process of lumbar vertebra, initial encounter (CMS/HCC)  S32.009A 805.4   6. 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)   • Encephalopathy, hepatic (CMS/HCC)   • AL (acute kidney injury) (CMS/HCC)   • Increased ammonia level   • Hepatic encephalopathy (CMS/HCC)   • Chronic kidney disease, stage III (moderate) (CMS/HCC)   • Severe malnutrition (CMS/HCC)   • COVID-19     Past Medical History:   Diagnosis Date   • AL (acute kidney injury) (CMS/HCC) 2021   • Diabetes mellitus (CMS/HCC)     type II per patient   • Hepatocellular carcinoma (CMS/HCC) 10/25/2018    s/p TACE procedure with no mass noted on fu imaging   • History of transfusion    • Hypertension    • Impaired functional mobility, balance, gait, and endurance    • Liver disease    • Umbilical hernia    • Upper respiratory infection    • Urinary symptom or sign    • Urinary tract infection      Past Surgical History:   Procedure Laterality Date   • ENDOSCOPY N/A 2021    Procedure: ESOPHAGOGASTRODUODENOSCOPY WITH ARGON THERMAL ABLATION;  Surgeon: Samy Swanson MD;  Location: [facility] ENDOSCOPY;  Service:  Gastroenterology;  Laterality: N/A;   • LIVER SURGERY     • TONSILLECTOMY       General Information     Row Name 08/18/21 1539          OT Time and Intention    Document Type  evaluation  -SD     Mode of Treatment  occupational therapy  -SD     Row Name 08/18/21 1539          General Information    Patient Profile Reviewed  yes  -SD     Prior Level of Function  min assist:;all household mobility;transfer has been at [facility]  -SD     Existing Precautions/Restrictions  fall  -SD     Barriers to Rehab  medically complex;previous functional deficit;cognitive status  -SD     Row Name 08/18/21 1539          Living Environment    Lives With  facility resident  -SD     Row Name 08/18/21 1539          Cognition    Orientation Status (Cognition)  oriented to;person;place patient lethargic, answered to name but never stated name or birthday. Knew he was at the hospital  -SD     Row Name 08/18/21 1539          Safety Issues, Functional Mobility    Safety Issues Affecting Function (Mobility)  ability to follow commands;awareness of need for assistance;friction/shear risk;judgment;safety precaution awareness;safety precautions follow-through/compliance;sequencing abilities  -SD     Impairments Affecting Function (Mobility)  balance;coordination;cognition;endurance/activity tolerance;motor planning;postural/trunk control;strength  -SD       User Key  (r) = Recorded By, (t) = Taken By, (c) = Cosigned By    Initials Name Provider Type    SD Lachelle Wolf OT Occupational Therapist          Mobility/ADL's     Row Name 08/18/21 1549          Bed Mobility    Bed Mobility  supine-sit  -SD     Supine-Sit [place] (Bed Mobility)  maximum assist (25% patient effort);2 person assist  -SD     Bed Mobility, Safety Issues  decreased use of arms for pushing/pulling;decreased use of legs for bridging/pushing;impaired trunk control for bed mobility  -SD     Assistive Device (Bed Mobility)  draw sheet;head of bed elevated;bed rails  -SD     Row  Name 08/18/21 1541          Transfers    Transfers  sit-stand transfer;bed-chair transfer  -SD     Bed-Chair [place] (Transfers)  moderate assist (50% patient effort);2 person assist  -SD     Assistive Device (Bed-Chair Transfers)  walker, front-wheeled  -SD     Sit-Stand [place] (Transfers)  minimum assist (75% patient effort);2 person assist  -SD     Row Name 08/18/21 1541          Sit-Stand Transfer    Assistive Device (Sit-Stand Transfers)  walker, front-wheeled  -SD     Row Name 08/18/21 1541          Functional Mobility    Functional Mobility- Comment  NT  -[facility] Name 08/18/21 1541          Activities of Daily Living    BADL Assessment/Intervention  bathing;upper body dressing;lower body dressing;grooming;feeding;toileting  -SD     Row Name 08/18/21 1541          Bathing Assessment/Intervention    [place] Level (Bathing)  dependent (less than 25% patient effort)  -SD     Row Name 08/18/21 1541          Upper Body Dressing Assessment/Training    [place] Level (Upper Body Dressing)  maximum assist (25% patient effort)  -SD     Row Name 08/18/21 1541          Lower Body Dressing Assessment/Training    [place] Level (Lower Body Dressing)  dependent (less than 25% patient effort)  -SD     Row Name 08/18/21 1541          Grooming Assessment/Training    [place] Level (Grooming)  maximum assist (25% patient effort)  -SD     Row Name 08/18/21 1541          Self-Feeding Assessment/Training    [place] Level (Feeding)  maximum assist (25% patient effort)  -SD     Row Name 08/18/21 1541          Toileting Assessment/Training    [place] Level (Toileting)  dependent (less than 25% patient effort)  -SD     Comment (Toileting)  incontinence  -SD       User Key  (r) = Recorded By, (t) = Taken By, (c) = Cosigned By    Initials Name Provider Type    SD Lachelle Wolf OT Occupational Therapist        Obj/Interventions     Row Name 08/18/21 1542          Range of Motion Comprehensive     General Range of Motion  bilateral upper extremity ROM WFL  -SD     Row Name 08/18/21 1542          Strength Comprehensive (MMT)    General Manual Muscle Testing (MMT) Assessment  upper extremity strength deficits identified  -SD     Comment, General Manual Muscle Testing (MMT) Assessment  3/5 - 3+/5  -SD       User Key  (r) = Recorded By, (t) = Taken By, (c) = Cosigned By    Initials Name Provider Type    SD Lachelle Wolf, OT Occupational Therapist        Goals/Plan     Row Name 08/18/21 1548          Transfer Goal 1 (OT)    Activity/Assistive Device (Transfer Goal 1, OT)  bed-to-chair/chair-to-bed;walker, rolling  -SD     [place] Level/Cues Needed (Transfer Goal 1, OT)  minimum assist (75% or more patient effort)  -SD     Time Frame (Transfer Goal 1, OT)  long term goal (LTG)  -SD     Progress/Outcome (Transfer Goal 1, OT)  goal ongoing  -SD     Row Name 08/18/21 1546          Dressing Goal 1 (OT)    Activity/Device (Dressing Goal 1, OT)  upper body dressing  -SD     [place]/Cues Needed (Dressing Goal 1, OT)  moderate assist (50-74% patient effort)  -SD     Time Frame (Dressing Goal 1, OT)  2 weeks  -SD     Progress/Outcome (Dressing Goal 1, OT)  goal ongoing  -SD     Row Name 08/18/21 1541          Grooming Goal 1 (OT)    Activity/Device (Grooming Goal 1, OT)  grooming skills, all  -SD     [place] (Grooming Goal 1, OT)  minimum assist (75% or more patient effort)  -SD     Time Frame (Grooming Goal 1, OT)  2 weeks  -SD     Progress/Outcome (Grooming Goal 1, OT)  goal ongoing  -SD     Row Name 08/18/21 1542          Strength Goal 1 (OT)    Strength Goal 1 (OT)  Patient to perform UB ther ex as tolerated  -SD     Time Frame (Strength Goal 1, OT)  long term goal (LTG)  -SD     Progress/Outcome (Strength Goal 1, OT)  goal ongoing  -SD     Row Name 08/18/21 1543          Therapy Assessment/Plan (OT)    Planned Therapy Interventions (OT)  activity tolerance training;adaptive equipment training;BADL  retraining;passive ROM/stretching;patient/caregiver education/training;ROM/therapeutic exercise;strengthening exercise;transfer/mobility retraining  -SD       User Key  (r) = Recorded By, (t) = Taken By, (c) = Cosigned By    Initials Name Provider Type    Lachelle Arenas OT Occupational Therapist        Clinical Impression     Row Name 08/18/21 1546          Pain Scale: FACES Pre/Post-Treatment    Pain: FACES Scale, Pretreatment  0-->no hurt  -SD     Posttreatment Pain Rating  0-->no hurt  -SD     Row Name 08/18/21 1540          Plan of Care Review    Plan of Care Reviewed With  patient  -SD     Progress  no change  -SD     Outcome Summary  OT eval completed. Patient presents deficits in strength, endurance, balance, mobility and ADL performance. Patient completed bed mobility with max A x 2 , sit to stand with min A x 2, requiring several sit to stands for perineal hygiene d/t incontinence. Patient required mod A x 2 for transfer to chair. Patient requires max A for all ADLs. Patient is expected to benefit from continued OT services prior to DC.  -SD     Row Name 08/18/21 1541          Therapy Assessment/Plan (OT)    Patient/Family Therapy Goal Statement (OT)  Increase strength  -SD     Rehab Potential (OT)  fair, will monitor progress closely  -SD     Criteria for Skilled Therapeutic Interventions Met (OT)  skilled treatment is necessary  -SD     Therapy Frequency (OT)  3 times/wk 5 times if indicated  -SD     Row Name 08/18/21 1542          Therapy Plan Review/Discharge Plan (OT)    Anticipated Discharge Disposition (OT)  inpatient rehabilitation facility  -SD     Row Name 08/18/21 154          Positioning and Restraints    Pre-Treatment Position  in bed  -SD     Post Treatment Position  chair  -SD     In Chair  reclined;call light within reach;encouraged to call for assist;exit alarm on;with nsg  -SD       User Key  (r) = Recorded By, (t) = Taken By, (c) = Cosigned By    Initials Name Provider Type    SD  Lachelle Wolf OT Occupational Therapist        Outcome Measures     Row Name 08/18/21 1546          How much help from another is currently needed...    Putting on and taking off regular lower body clothing?  1  -SD     Bathing (including washing, rinsing, and drying)  1  -SD     Toileting (which includes using toilet bed pan or urinal)  1  -SD     Putting on and taking off regular upper body clothing  2  -SD     Taking care of personal grooming (such as brushing teeth)  2  -SD     Eating meals  2  -SD     AM-PAC 6 Clicks Score (OT)  9  -SD     Row Name 08/18/21 1415          How much help from another person do you currently need...    Turning from your back to your side while in flat bed without using bedrails?  2  (Pended)   -CW     Moving from lying on back to sitting on the side of a flat bed without bedrails?  2  (Pended)   -CW     Moving to and from a bed to a chair (including a wheelchair)?  2  (Pended)   -CW     Standing up from a chair using your arms (e.g., wheelchair, bedside chair)?  2  (Pended)   -CW     Climbing 3-5 steps with a railing?  1  (Pended)   -CW     To walk in hospital room?  2  (Pended)   -CW     AM-PAC 6 Clicks Score (PT)  11  (Pended)   -CW     Row Name 08/18/21 1546 08/18/21 1415       Functional Assessment    Outcome Measure Options  AM-PAC 6 Clicks Daily Activity (OT)  -SD  AM-PAC 6 Clicks Basic Mobility (PT)  (Pended)   -CW      User Key  (r) = Recorded By, (t) = Taken By, (c) = Cosigned By    Initials Name Provider Type    Lachelle Arenas OT Occupational Therapist    CW Abigail Rushing, PT Student PT Student            OT Recommendation and Plan  Planned Therapy Interventions (OT): activity tolerance training, adaptive equipment training, BADL retraining, passive ROM/stretching, patient/caregiver education/training, ROM/therapeutic exercise, strengthening exercise, transfer/mobility retraining  Therapy Frequency (OT): 3 times/wk (5 times if indicated)  Plan of Care  Review  Plan of Care Reviewed With: patient  Progress: no change  Outcome Summary: OT eval completed. Patient presents deficits in strength, endurance, balance, mobility and ADL performance. Patient completed bed mobility with max A x 2 , sit to stand with min A x 2, requiring several sit to stands for perineal hygiene d/t incontinence. Patient required mod A x 2 for transfer to chair. Patient requires max A for all ADLs. Patient is expected to benefit from continued OT services prior to DC.     Time Calculation:   Time Calculation- OT     Row Name 08/18/21 1549             Time Calculation- OT    OT Start Time  1413  -SD      OT Received On  08/18/21  -SD      OT Goal Re-Cert Due Date  08/30/21  -SD         Untimed Charges    OT Eval/Re-eval Minutes  60  -SD         Total Minutes    Untimed Charges Total Minutes  60  -SD       Total Minutes  60  -SD        User Key  (r) = Recorded By, (t) = Taken By, (c) = Cosigned By    Initials Name Provider Type    Lachelle Arenas OT Occupational Therapist        Therapy Charges for Today     Code Description Service Date Service Provider Modifiers Qty    [id number]  OT EVAL MOD COMPLEXITY 4 8/18/2021 Lachelle Wolf OT GO 1               Lachelle Wolf OT  8/18/2021

## 2021-08-18 NOTE — PLAN OF CARE
Goal Outcome Evaluation:   PC/RN consult received for Goals of Care Discussion/ACP.  Pt is currently Code Status of No CPR with Limited Interventions--No Intubation, No Cardioversion.  Pt has a Living Will in the EMR with HC surrogate designations.  Pt's spouse, Bonny Richard, is the first HC Surrogate.     Pt admitted from Nursing Facility with AMS-less responsive.  Admission Assessment:  COVID 19, Acute Respiratory Failure with Hypoxia, lactic Acidosis, Closed Fx thoracic vertabra and transverse process of lumbar vertabra.  Pt was fully vaccinated in March 2021 with Pfizer vaccine    HX:  Hepatocellular Carcinoma (being evaluated for transplant list at [facility]), End stage Liver disease, DM-2, HTN, Impaired mobility, balance, gait and endurance    Unable to physically visit with pt at this time due to COVID 19 isolation.  Spoke with pt's primary nurse, Reece CHA, who reported pt was doing better however he is moving very slowly.       Later spoke with Dr aCbello re: consult for Goals of Care. She is recommending pt had Palliative Care referral at Discharge.  Informed her I would speak with pt's spouse later today or tomorrow since I cannot go into the COVID isolation room.  Dr Cabello reported pt now on RA and improving.

## 2021-08-18 NOTE — CASE MANAGEMENT/SOCIAL WORK
Discharge Planning Assessment  [facility]     Patient Name: Garrett Richard  MRN: [id number]  Today's Date: 8/18/2021    Admit Date: 8/17/2021    Discharge Needs Assessment     Row Name 08/18/21 1642       Living Environment    Lives With  spouse    Name(s) of Who Lives With Patient  Bonny    Current Living Arrangements  home/apartment/condo    Potentially Unsafe Housing Conditions  unable to assess    Primary Care Provided by  self    Provides Primary Care For  no one, unable/limited ability to care for self    Caregiving Concerns  Pt is weak and wife wants to Continue rehab, choosing [facility].    Family Caregiver if Needed  spouse    Quality of Family Relationships  supportive    Able to Return to Prior Arrangements  no    Living Arrangement Comments  Lives with wife but was in Skilled Rehab at [facility]       Resource/Environmental Concerns    Resource/Environmental Concerns  none       Discharge Needs Assessment    Readmission Within the Last 30 Days  previous discharge plan unsuccessful    Current Outpatient/Agency/Support Group  long-term acute care facility [facility]    Concerns to be Addressed  no discharge needs identified    Anticipated Changes Related to Illness  inability to care for self    Equipment Needed After Discharge  none    Outpatient/Agency/Support Group Needs  skilled nursing facility    Discharge Facility/Level of Care Needs  nursing facility, skilled    Provided Post Acute Provider List?  N/A    N/A Provider List Comment  Spoke to wife over the phone    Provided Post Acute Provider Quality & Resource List?  N/A    N/A Quality & Resource List Comment  Spoke to wife over the phone, chose [facility]    Current Discharge Risk  chronically ill;dependent with mobility/activities of daily living        Discharge Plan     Row Name 08/18/21 1700       Plan    Plan Pt is Covid Pos, unable to visit room  for DCP .  Spoke to Reece Flores feels pt would be unable to discuss DCP over the phone.  Called  to pt's wife Bonny.  Does have a Living Will and it is on file. No POA.  Lives in a one story house with his wife but has been at [facility] for Skilled Rehab since 8/11/21.  Has had frequent admits here for last few months due to his liver failure.  Lives with his wife in a one story house with a basement. Address and phone no is correct. Has a WC, walker and shower chair.  No home o2.  Before previous admission was a current pt of [facility] for PT only.  Has been discharged from other  services.  W[facility] pt [facility]  Explained Acute Rehab VS Skilled Rehab, wife is aware pt is covid pos. Some facilites are not taking Covid pos pts for rehab.  Called to Rosita at [facility] and they do have a Covid Unit but only for Acute Rehab.  Unsure pt will qualify for Acute Rehab.  Referral sent to Rosita and will continue to follow.   17:12 EDT  Received call back from [facility] and cannot accept pt back due to being Covid pos.  Dr Cabello updated.          Continued Care and Services - Admitted Since 8/17/2021     Destination     Service Provider Request Status Selected Services Address Phone Fax Patient Preferred    [facility]  Pending - No Request Sent N/A [address] [place] [facility] [address] [phone] --            Selected Continued Care - Episodes Includes selections from active Coordinated Care Management episodes    High Risk Care Management Episode start date: 8/13/2021   There are no active outsourced providers for this episode.             Selected Continued Care - Prior Encounters Includes selections from prior encounters from 5/19/2021 to 8/18/2021    Discharged on 8/11/2021 Admission date: 8/8/2021 - Discharge disposition: Skilled Nursing Facility (DC - External)    Destination     Service Provider Selected Services Address Phone Fax Patient Preferred    [facility]  Skilled  Nursing [address] [place] [address] [phone] --       Internal Comment last updated by Jody Carreon MSW 8/11/2021 0933    Precert started 8/10/21                     Home Medical Care     Service Provider Selected Services Address Phone Fax Patient Preferred    [facility] [address] [place] [place] [address] [phone] --       Internal Comment last updated by Audrey Funez, RN 8/9/2021 1833    Current provider for PT.  If patient goes home,  The wife is requesting Nursing and OT be added as well.- Audrey Funez 1830 8/9/21                           Discharged on 6/16/2021 Admission date: 6/12/2021 - Discharge disposition: Home-Health Care [facility]     Service Provider Selected Services Address Phone Fax Patient Preferred    [facility] [address] [place] [place] [address] [phone] --                Discharged on 5/29/2021 Admission date: 5/28/2021 - Discharge disposition: Home-Health Care [facility]     Service Provider Selected Services Address Phone Fax Patient [facility] [address] [place] [address] [phone] --       Internal Comment last updated by Malia Oseguera RN 6/1/2021 1316    6/1 called and confirmed accepted patient                                 Demographic Summary     Row Name 08/18/21 1641       General Information    Admission Type  inpatient    Arrived From  emergency department    Required Notices Provided  Important Message from Medicare    Expected Length of Stay (LOS)  5 days    Referral Source  admission list    Reason for Consult  discharge planning    Preferred Language  English        Functional Status     Row Name 08/18/21 1642       Functional Status    Usual Activity Tolerance  poor    Current Activity  Tolerance  poor    Functional Status Comments  asistance of pt's wife, has [facility] for PT and was a current pt before going to [place]       Functional Status, IADL    Medications  completely dependent    Meal Preparation  completely dependent    Housekeeping  completely dependent    Laundry  completely dependent    Shopping  completely dependent    IADL Comments  Assistance of one       Mental Status    General Appearance WDL  -- CM call wife is in Covid Isolation       Mental Status Summary    Recent Changes in Mental Status/Cognitive Functioning  no changes    Mental Status Comments  Lethargic  per PALOMA Lockett, RN

## 2021-08-19 NOTE — CASE MANAGEMENT/SOCIAL WORK
Continued Stay Note   [facility]     Patient Name: Garrett Richard  MRN: [id number]  Today's Date: 8/19/2021    Admit Date: 8/17/2021    Discharge Plan      Message sent to Rosita Shelby with [facility] asking for an update.      UPDATE: Rosita is looking at referral.  I also faxed the referral to [facility].  As the patient may need in one of two facilities; [facility], or [facility]    Expected Discharge Date and Time     Expected Discharge Date Expected Discharge Time    Aug 21, 2021             Audrey Funez RN

## 2021-08-19 NOTE — OUTREACH NOTE
Ambulatory Case Management Note    SNF Follow-up    Questions/Answers      Responses   Acute Facility Discharged From  [facility]   Acute Discharge Date  08/11/21   Name of the Skilled Nursing Facility?  [facility] [phone]   Purpose of SNF Admission  PT, OT, SN   Estimated length of stay for the patient?  Readmitted to [facility]   Skilled Nursing Discharge Date?  08/17/21   Where was the patient discharged to?  -- [Readmitted - [facility] - Covid19 pos]              Elena Iyer RN  Ambulatory Case Management    8/19/2021, 12:13 EDT

## 2021-08-19 NOTE — PLAN OF CARE
Goal Outcome Evaluation:  Plan of Care Reviewed With: patient        Progress: improving  Outcome Summary: Pt tolerated increased activity this treatment.  Pt was able to advance gait distance to 26' cga/min a with rw.  Pt also required decreased assistance with mobility in gerneral.  See flowsheet for details

## 2021-08-19 NOTE — DISCHARGE PLACEMENT REQUEST
"Please evaluate for rehab.  Audrey Funez [phone] fax [phone]    Garrett Richard (71 y.o. Male)     Date of Birth Social Security Number Address Home Phone MRN    1950  [address]   [place] [address] [phone] [id number]    Mosque Marital Status          Roman Catholic        Admission Date Admission Type Admitting Provider Attending Provider Department, Room/Bed    21 Emergency Hernando Cabello DO [place], [place], DO [facility] MED SURG  3, 325/1    Discharge Date Discharge Disposition Discharge Destination                       Attending Provider: Hernando Cabello DO    Allergies: No Known Allergies    Isolation: Contact Air, Enh Drop/Con   Infection: COVID (confirmed) (21)   Code Status: No CPR    Ht: 182.9 cm (72\")   Wt: 72.2 kg (159 lb 2.8 oz)    Admission Cmt: None   Principal Problem: None                Active Insurance as of 2021     Primary Coverage     Payor Plan Insurance Group Employer/Plan Group    HUMANA MEDICARE REPLACEMENT HUMANA MEDICARE REPLACEMENT [id number]     Payor Plan Address Payor Plan Phone Number Payor Plan Fax Number Effective Dates    [address] [address] [phone]  2020 - None Entered    [facility] [id number]       Subscriber Name Subscriber Birth Date Member ID       GARRETT RICHARD 1950 [id number]                 Emergency Contacts      (Rel.) Home Phone Work Phone Mobile Phone    Bonny Richard (Spouse) [phone] -- [phone]    OZZY DODD (Daughter) [phone] -- --               History & Physical      Hernando Cabello DO at 21 1754              [facility] HOSPITALIST   HISTORY AND PHYSICAL      Name:  Garrett Richard   Age:  71 y.o.  Sex:  male  :  1950  MRN:  [id number]   Visit Number:  [id number]  Admission Date:  2021  Date Of Service:  21  Primary Care Physician:  Pierce Lubin MD    Chief Complaint:     Generalized " "weakness, vomiting    History Of Presenting Illness:      Patient is a 71-year-old male with history significant for end-stage liver disease, hepatocellular carcinoma with multiple hospitalizations for hepatic encephalopathy who is followed at the [facility] awaiting liver transplant.  Patient was recently seen in our facility for hepatic encephalopathy and was discharged 8/11 to [facility] for inpatient rehabilitation.  History largely obtained from his wife who is present at bedside.  She states that he initially was doing okay and had been exercising on the bike when he became progressively more weak.  He was not eating and behaving normally.  Initially, thought that maybe his ammonia was elevated.  It was checked by nursing home staff and was found to be elevated at greater than 100 for which she was treated with lactulose and did have some mild improvement in cognition and energy.  However, patient and became weak again and would not cooperate with therapy.  This morning, he was eating breakfast when he had \"projectile vomiting\" and was sent to the emergency room for further evaluation.  In the ER, patient was found to be hemodynamically stable.  Initially he was 96% on room air.  Labs were largely unremarkable, ammonia level was 62, lactic acid mildly elevated at 2.9.  Procalcitonin within normal limits.  No leukocytosis, renal function normal.  CT head, abdomen pelvis and chest revealed groundglass opacity in the left lower lobe favored to be a early viral pneumonia.  Other findings include mild compression deformity of T12 and right L1 transverse process likely from a recent fall.  Other findings are chronic.  Respiratory PCR returned positive for COVID-19 infection.  Patient has been fully vaccinated since March 2021 and received Pfizer vaccine.    Review Of Systems:    All systems were reviewed and negative except as mentioned in history of presenting illness, assessment and " plan.    Past Medical History: Patient  has a past medical history of AL (acute kidney injury) (CMS/HCC) (5/28/2021), Diabetes mellitus (CMS/HCC), Hepatocellular carcinoma (CMS/HCC) (10/25/2018), History of transfusion, Hypertension, Impaired functional mobility, balance, gait, and endurance, Liver disease, Umbilical hernia, Upper respiratory infection, Urinary symptom or sign, and Urinary tract infection.    Past Surgical History: Patient  has a past surgical history that includes Tonsillectomy; Liver surgery; and Esophagogastroduodenoscopy (N/A, 5/11/2021).    Social History: Patient  reports that he has never smoked. He quit smokeless tobacco use about 2 months ago.  His smokeless tobacco use included snuff. He reports that he does not drink alcohol and does not use drugs.    Family History: Patient's family history includes COPD in his father; Colon cancer in his mother; Diabetes in his maternal grandmother; No Known Problems in his maternal grandfather, paternal grandfather, and paternal grandmother.    Allergies:      Patient has no known allergies.    Home Medications:    Prior to Admission Medications     Prescriptions Last Dose Informant Patient Reported? Taking?    ascorbic acid (VITAMIN C) 500 MG tablet   Yes No    Take 500 mg by mouth Daily.    Continuous Blood Gluc  (FreeStyle Sohail 14 Day [place]) device   No No    1 each Every 14 (Fourteen) Days.    ferrous sulfate 325 (65 FE) MG tablet   Yes No    Take 325 mg by mouth Daily With Breakfast. 2 tablets once a day    furosemide (Lasix) 40 MG tablet   No No    Take 0.5 tablets by mouth Daily for 90 days.    insulin aspart (NovoLOG FlexPen) 100 UNIT/ML solution pen-injector sc pen   No No    Inject 7 Units under the skin into the appropriate area as directed 3 (Three) Times a Day With Meals. Sliding scale - depending on glucose readings    Insulin Pen Needle (B-D ULTRAFINE III SHORT PEN) 31G X 8 MM misc   Yes No    lactulose (CHRONULAC) 10 GM/15ML  solution   No No    Take 30 mL by mouth 4 (Four) Times a Day.    Lantus SoloStar 100 UNIT/ML injection pen   Yes No    Inject 10 Units as directed Every Night. 10 units at bedtime if the first morning reading is over 184    riFAXIMin (XIFAXAN) 550 MG tablet  Spouse/Significant Other Yes No    Take 550 mg by mouth Every 12 (Twelve) Hours.    sennosides-docusate (PERICOLACE) 8.6-50 MG per tablet   Yes No    Take 1 tablet by mouth. otc    spironolactone (Aldactone) 50 MG tablet   No No    Take 1 tablet by mouth 2 (Two) Times a Day for 90 days.    sucralfate (Carafate) 1 g tablet   No No    Take 1 tablet by mouth 4 (Four) Times a Day.        ED Medications:    Medications   AZITHROMYCIN 500 MG/250 ML 0.9% NS IVPB (vial-mate) (has no administration in time range)   iopamidol (ISOVUE-300) 61 % injection 100 mL (100 mL Intravenous Given 8/17/21 1149)   AZITHROMYCIN 500 MG/250 ML 0.9% NS IVPB (vial-mate) (0 mg Intravenous Stopped 8/17/21 1545)   dexamethasone sodium phosphate injection 6 mg (6 mg Intravenous Given 8/17/21 1722)     Vital Signs:  Temp:  [97.1 °F (36.2 °C)] 97.1 °F (36.2 °C)  Heart Rate:  [92] 92  Resp:  [16] 16  BP: (141)/(79) 141/79        08/17/21  1031   Weight: 68 kg (150 lb)     Body mass index is 20.34 kg/m².    Physical Exam:     General Appearance:   Chronically ill-appearing  male, no acute distress   Head:  Atraumatic and normocephalic.   Eyes: Conjunctivae and sclerae normal, No pallor.   Ears:  Ears with no abnormalities noted.   Throat: No oral lesions, no thrush, oral mucosa dry   Neck: Supple, trachea midline,    Back:   No kyphoscoliosis present. No tenderness to palpation.   Lungs:   Breath sounds heard bilaterally equally.  No crackles or wheezing.    Heart:  Normal S1 and S2, no murmur, No JVD.   Abdomen:   Normal bowel Soft, nontender, nondistended, no rebound tenderness.   Extremities: Supple, no edema, no cyanosis, no clubbing.   Pulses: Pulses palpable bilaterally.   Skin: No  bleeding or rash.   Neurologic: Alert, will answer yes/no questions by shaking his head.  Agrees that he is tired.  Responds appropriately.  Does not follow further commands.     Laboratory data:    I have reviewed the labs done in the emergency room.    Results from last 7 days   Lab Units 08/17/21  1110 08/11/21  0550   SODIUM mmol/L 132* 134*   POTASSIUM mmol/L 5.0 4.4   CHLORIDE mmol/L 104 106   CO2 mmol/L 18.3* 20.3*   BUN mg/dL 20 19   CREATININE mg/dL 1.22 1.24   CALCIUM mg/dL 8.4* 8.5*   BILIRUBIN mg/dL 1.6*  --    ALK PHOS U/L 85  --    ALT (SGPT) U/L 32  --    AST (SGOT) U/L 43*  --    GLUCOSE mg/dL 203* 221*     Results from last 7 days   Lab Units 08/17/21  1110 08/11/21  0550   WBC 10*3/mm3 5.01 4.89   HEMOGLOBIN g/dL 10.7* 10.0*   HEMATOCRIT % 31.8* 29.3*   PLATELETS 10*3/mm3 64* 61*     Results from last 7 days   Lab Units 08/17/21  1110   INR  1.27*     Results from last 7 days   Lab Units 08/17/21  1110   TROPONIN T ng/mL 0.021     Results from last 7 days   Lab Units 08/17/21  1110   PROBNP pg/mL 115.2                       Invalid input(s): USDES,  BLOODU, NITRITITE, BACT, EP    Pain Management Panel     Pain Management Panel Latest Ref Rng & Units 9/29/2020 3/21/2018    CREATININE UR Not Estab. mg/dL 125.5 100          EKG:      EKG personally reviewed, sinus rhythm no signs of acute infarct or ischemia.    Radiology:    CT Head Without Contrast    Result Date: 8/17/2021  PROCEDURE: CT HEAD WO CONTRAST-  INDICATION: ams  TECHNIQUE: Multiple axial CT images were performed from the foramen magnum to the vertex without contrast.   Coronal reconstruction images were obtained from the axial data.  COMPARISON: 8/8/2021.  FINDINGS: There is no mass effect or midline shift.  The ventricles are symmetric in size and configuration.  There is no hydrocephalus.  There are no extraaxial fluid collections.  There is no intraventricular or intraparenchymal hemorrhage. There is an old lacunar infarct in the  right abdiel. The posterior fossa is without acute abnormality. The basilar cisterns are preserved.  The soft tissues are within normal limits. No acute osseous abnormalities are present.      No mass effect, midline shift, or intracranial hemorrhage. No acute abnormality.      This study was performed with techniques to keep radiation doses as low as reasonably achievable (ALARA). Individualized dose reduction techniques using automated exposure control or adjustment of mA and/or kV according to the patient size were employed.  This report was finalized on 8/17/2021 11:58 AM by Nellie Soto MD.    CT Chest With Contrast Diagnostic    Result Date: 8/17/2021  CT OF THE CHEST, ABDOMEN AND PELVIS WITH CONTRAST  INDICATION: ams, diarrhea, abd tenderness  TECHNIQUE:  Thin section axial images were obtained from the lung apices to the pubic symphysis following IV and oral contrast administration. Coronal reconstruction images were obtained from the axial data.  COMPARISON: 5/9/2021.  FINDINGS:  CHEST:  There is no mediastinal, hilar, or axillary lymphadenopathy. There is no pleural or pericardial effusion.  There is stable right lower lobe atelectasis. There is a new round area of groundglass opacity in the left lower lobe on image 46 which is favored to be infectious or inflammatory.  ABDOMEN: The liver is small and nodular in contour consistent with cirrhosis. No focal liver lesion is visualized.  The gallbladder is present.  The spleen is mildly enlarged at 14 cm. The adrenal glands and pancreas are without acute abnormality.  There is no hydronephrosis. Hypodensity in the right mid kidney is unchanged. Gastric wall thickening is nonspecific in the setting of ascites. There is no evidence of small bowel obstruction. There is long segment colonic wall thickening. This is nonspecific in the setting of ascites. Colitis is not excluded. There is no abdominal ascites. Recanalized paraumbilical veins are noted and unchanged  from prior. There is perihepatic and perisplenic ascites which is slightly improved compared to prior.  PELVIS: The prostate and urinary bladder are unremarkable. Long segment colonic wall thickening is nonspecific in the setting of ascites. Colitis not excluded. Pelvic ascites has decreased. There is no pelvic lymphadenopathy.   BONES: There is a new mild compression deformity of T12. A fracture of the right L1 transverse process is also new from prior exam. Subtle lucent lesions in the right iliac crest are unchanged.      1. New rounded groundglass opacity in the left lower lobe which is favored to be infectious or inflammatory. Early viral pneumonia is not entirely excluded. 2. Cirrhosis with findings of portal hypertension including splenomegaly, venous collaterals, and ascites. 3. Long segment colonic wall thickening, nonspecific in the setting of ascites. Colitis not excluded. 4. New mild compression deformity of T12 and right L1 transverse process fracture. A history of trauma was not provided. Consider MRI if indicated.       This study was performed with techniques to keep radiation doses as low as reasonably achievable (ALARA). Individualized dose reduction techniques using automated exposure control or adjustment of mA and/or kV according to the patient size were employed.  This report was finalized on 8/17/2021 12:09 PM by Nellie Soto MD.    CT Abdomen Pelvis With Contrast    Result Date: 8/17/2021  CT OF THE CHEST, ABDOMEN AND PELVIS WITH CONTRAST  INDICATION: ams, diarrhea, abd tenderness  TECHNIQUE:  Thin section axial images were obtained from the lung apices to the pubic symphysis following IV and oral contrast administration. Coronal reconstruction images were obtained from the axial data.  COMPARISON: 5/9/2021.  FINDINGS:  CHEST:  There is no mediastinal, hilar, or axillary lymphadenopathy. There is no pleural or pericardial effusion.  There is stable right lower lobe atelectasis. There is a  new round area of groundglass opacity in the left lower lobe on image 46 which is favored to be infectious or inflammatory.  ABDOMEN: The liver is small and nodular in contour consistent with cirrhosis. No focal liver lesion is visualized.  The gallbladder is present.  The spleen is mildly enlarged at 14 cm. The adrenal glands and pancreas are without acute abnormality.  There is no hydronephrosis. Hypodensity in the right mid kidney is unchanged. Gastric wall thickening is nonspecific in the setting of ascites. There is no evidence of small bowel obstruction. There is long segment colonic wall thickening. This is nonspecific in the setting of ascites. Colitis is not excluded. There is no abdominal ascites. Recanalized paraumbilical veins are noted and unchanged from prior. There is perihepatic and perisplenic ascites which is slightly improved compared to prior.  PELVIS: The prostate and urinary bladder are unremarkable. Long segment colonic wall thickening is nonspecific in the setting of ascites. Colitis not excluded. Pelvic ascites has decreased. There is no pelvic lymphadenopathy.   BONES: There is a new mild compression deformity of T12. A fracture of the right L1 transverse process is also new from prior exam. Subtle lucent lesions in the right iliac crest are unchanged.      1. New rounded groundglass opacity in the left lower lobe which is favored to be infectious or inflammatory. Early viral pneumonia is not entirely excluded. 2. Cirrhosis with findings of portal hypertension including splenomegaly, venous collaterals, and ascites. 3. Long segment colonic wall thickening, nonspecific in the setting of ascites. Colitis not excluded. 4. New mild compression deformity of T12 and right L1 transverse process fracture. A history of trauma was not provided. Consider MRI if indicated.       This study was performed with techniques to keep radiation doses as low as reasonably achievable (ALARA). Individualized dose  reduction techniques using automated exposure control or adjustment of mA and/or kV according to the patient size were employed.  This report was finalized on 8/17/2021 12:09 PM by Nellie Soto MD.      Assessment:    COVID-19 pneumonia, POA  Generalized weakness with nausea/vomiting secondary to above, POA  Closed fracture transverse process of T12 and L1 vertebra  Stage liver disease secondary to hepatocellular carcinoma  History of hepatic encephalopathy  Impaired functional mobility balance gait and endurance  Type 2 diabetes, insulin-dependent    Plan:    We will met patient to the hospital Covid wing, isolation precautions.  We will treat patient with azithromycin and Decadron.  Fortunately, patient only requiring a minimal amount of supplemental oxygen.  May titrate down to see if he can tolerate room air.  Albuterol MDI, incentive spirometer.  PT/OT.  Ammonia level appropriate for patient.  We will continue with his current home dose of lactulose to titrate to 3-4 bowel movements per day.  We will also continue rifaximin.  Continue Lasix and spironolactone.  Patient not eating, will only order low-dose sliding scale correction for glucose control.  Patient may benefit from palliative care services, consult has been placed.  Further orders clinical course dictates.  Patient is high risk.    Risk Assessment: High  DVT Prophylaxis: SCDs, avoid chemical prophylaxis due to thrombocytopenia  Code Status: DNR/DNI  Diet:\Diabetic    Advance Care Planning   ACP discussion was declined by the patient. Patient does not have an advance directive, declines further assistance.      Hernando Cabello DO  08/17/21  17:54 EDT    Dictated utilizing Dragon dictation.      Electronically signed by Hernando Cabello DO at 08/17/21 1802       Oxygen Therapy (last day)     Date/Time   SpO2   Device (Oxygen Therapy)   Flow (L/min)   Oxygen Concentration (%)   ETCO2 (mmHg)    08/19/21 0858   96   --   --   --   --    08/19/21 0300    95   room air   --   --   --    08/18/21 2300   96   room air   --   --   --    08/18/21 2000   --   room air   --   --   --    08/18/21 1900   98   room air   --   --   --    08/18/21 1851   95   room air   --   --   --    08/18/21 1700   97   --   --   --   --    08/18/21 1613   97   room air   --   --   --    08/18/21 1552   95   room air   --   --   --    08/18/21 1500   98   room air   --   --   --    08/18/21 1359   97   room air   --   --   --    08/18/21 1300   98   room air   --   --   --    08/18/21 1257   98   room air   --   --   --    08/18/21 1120   97   room air   --   --   --    08/18/21 1047   95   room air   --   --   --    08/18/21 1001   (!) 88   room air   --   --   --    08/18/21 0925   --   room air   --   --   --    08/18/21 0900   96   room air   --   --   --    08/18/21 0830   --   --   2   --   --    08/18/21 0300   100   nasal cannula   2   --   --    08/18/21 0040   97   nasal cannula   2   --   --                Current Facility-Administered Medications   Medication Dose Route Frequency Provider Last Rate Last Admin   • acetaminophen (TYLENOL) tablet 650 mg  650 mg Oral Q4H PRN Hernando Cabello DO        Or   • acetaminophen (TYLENOL) 160 MG/5ML solution 650 mg  650 mg Oral Q4H PRN Hernando Cabello DO        Or   • acetaminophen (TYLENOL) suppository 650 mg  650 mg Rectal Q4H PRN Hernando Cabello DO       • ascorbic acid (VITAMIN C) tablet 500 mg  500 mg Oral Daily Hernando Cabello DO   500 mg at 08/19/21 0913   • benzonatate (TESSALON) capsule 100 mg  100 mg Oral TID PRN Hernando Cabello DO       • dexamethasone (DECADRON) tablet 6 mg  6 mg Oral Daily Hernando Cabello DO   6 mg at 08/19/21 0913    Or   • dexamethasone (DECADRON) injection 6 mg  6 mg Intravenous Daily Hernando Cabello, DO       • dextrose (D50W) 25 g/ 50mL Intravenous Solution 25 g  25 g Intravenous Q15 Min PRN Hernando Cabello, DO       • dextrose (GLUTOSE) oral gel 1 tube  1 tube Oral Q15 Min PRN Hernando Cabello, DO        • furosemide (LASIX) tablet 20 mg  20 mg Oral Daily Hernando Cabello DO   20 mg at 21 09   • glucagon (human recombinant) (GLUCAGEN DIAGNOSTIC) injection 1 mg  1 mg Subcutaneous PRN Hernando Cabello DO       • insulin detemir (LEVEMIR) injection 10 Units  10 Units Subcutaneous Daily Hernando Cabello DO   10 Units at 21 0933   • insulin regular (humuLIN R,novoLIN R) injection 0-7 Units  0-7 Units Subcutaneous Q6H Hernando Cabello DO   3 Units at 21 0631   • lactulose (CHRONULAC) 10 GM/15ML solution 20 g  20 g Oral 4x Daily Hernando Cabello DO   20 g at 21   • melatonin tablet 5 mg  5 mg Oral Nightly PRN Hernando Cabello DO       • ondansetron (ZOFRAN) injection 4 mg  4 mg Intravenous Q6H PRN Hernando Cabello DO       • pantoprazole (PROTONIX) EC tablet 40 mg  40 mg Oral Daily Hernando Cabello DO   40 mg at 21 0631   • riFAXIMin (XIFAXAN) tablet 550 mg  550 mg Oral Q12H Hernando Cabello DO   550 mg at 21   • sennosides-docusate (PERICOLACE) 8.6-50 MG per tablet 1 tablet  1 tablet Oral Nightly Hernando Cabello DO   1 tablet at 210   • sodium chloride 0.9 % flush 10 mL  10 mL Intravenous Q12H Hernando Cabello DO   10 mL at 21 0914   • sodium chloride 0.9 % flush 10 mL  10 mL Intravenous PRN Hernando Cabello DO       • spironolactone (ALDACTONE) tablet 50 mg  50 mg Oral BID Hernando Cabello DO   50 mg at 21   • sucralfate (CARAFATE) tablet 1 g  1 g Oral 4x Daily Hernando Cabello DO   1 g at 21 09     Blood Administration Record (From admission, onward)    None           Physician Progress Notes (last 24 hours) (Notes from 21 1100 through 21 1100)      Hernando Cabello DO at 21 1430              [facility]    [facility]    Name:  Garrett Richard   Age:  71 y.o.  Sex:  male  :  1950  MRN:  [id number]   Visit Number:  [id number]  Admission Date:  2021  Date Of Service:   08/18/21  Primary Care Physician:  Pierce Lubin MD     LOS: 1 day :    Chief Complaint:      Generalized weakness, vomiting    Subjective:    Patient seen and evaluated today.  Resting comfortably in bed.  Tells me that he is tired but is much more alert and interactive today than yesterday.  Is able to answer all questions appropriately, alert and oriented x3.  No acute events overnight.    Review of Systems:     All systems were reviewed and negative except as mentioned in subjective, assessment and plan.    Vital Signs:    Temp:  [98.2 °F (36.8 °C)-98.6 °F (37 °C)] 98.2 °F (36.8 °C)  Heart Rate:  [77-94] 77  Resp:  [16-18] 16  BP: (131-146)/(61-81) 132/78    Intake and output:    No intake/output data recorded.  I/O this shift:  In: 240 [P.O.:240]  Out: -     Physical Examination:    General Appearance:  Alert and cooperative.    Head:  Atraumatic and normocephalic.   Eyes: Conjunctivae and sclerae normal, no icterus. No pallor.   Throat: No oral lesions, no thrush, oral mucosa moist.   Neck: Supple, trachea midline, no thyromegaly.   Lungs:   Breath sounds heard bilaterally equally.  No crackles or wheezing.    Heart:  Normal S1 and S2, no murmur, No JVD.   Abdomen:   Normal bowel sounds, nontender, nondistended, no rebound tenderness.   Extremities: Supple, no edema, no cyanosis, no clubbing.   Skin: No bleeding or rash.   Neurologic: Alert and oriented x 3.  Generally weak all over     Laboratory results:    Results from last 7 days   Lab Units 08/18/21  0618 08/17/21  1110   SODIUM mmol/L 130* 132*   POTASSIUM mmol/L 5.5* 5.0   CHLORIDE mmol/L 105 104   CO2 mmol/L 14.6* 18.3*   BUN mg/dL 33* 20   CREATININE mg/dL 1.04 1.22   CALCIUM mg/dL 8.1* 8.4*   BILIRUBIN mg/dL 1.4* 1.6*   ALK PHOS U/L 69 85   ALT (SGPT) U/L 26 32   AST (SGOT) U/L 42* 43*   GLUCOSE mg/dL 219* 203*     Results from last 7 days   Lab Units 08/18/21  0702 08/17/21  1110   WBC 10*3/mm3 4.12 5.01   HEMOGLOBIN g/dL 9.5* 10.7*   HEMATOCRIT  % 28.0* 31.8*   PLATELETS 10*3/mm3 52* 64*     Results from last 7 days   Lab Units 08/17/21  1110   INR  1.27*     Results from last 7 days   Lab Units 08/17/21  1110   TROPONIN T ng/mL 0.021     Results from last 7 days   Lab Units 08/17/21  1117 08/17/21  1110   BLOODCX  No growth at 24 hours No growth at 24 hours         I have reviewed the patient's laboratory results.    Radiology results:    CT Head Without Contrast    Result Date: 8/17/2021  PROCEDURE: CT HEAD WO CONTRAST-  INDICATION: ams  TECHNIQUE: Multiple axial CT images were performed from the foramen magnum to the vertex without contrast.   Coronal reconstruction images were obtained from the axial data.  COMPARISON: 8/8/2021.  FINDINGS: There is no mass effect or midline shift.  The ventricles are symmetric in size and configuration.  There is no hydrocephalus.  There are no extraaxial fluid collections.  There is no intraventricular or intraparenchymal hemorrhage. There is an old lacunar infarct in the right abdiel. The posterior fossa is without acute abnormality. The basilar cisterns are preserved.  The soft tissues are within normal limits. No acute osseous abnormalities are present.      Impression: No mass effect, midline shift, or intracranial hemorrhage. No acute abnormality.      This study was performed with techniques to keep radiation doses as low as reasonably achievable (ALARA). Individualized dose reduction techniques using automated exposure control or adjustment of mA and/or kV according to the patient size were employed.  This report was finalized on 8/17/2021 11:58 AM by Nellie Soto MD.    CT Chest With Contrast Diagnostic    Result Date: 8/17/2021  CT OF THE CHEST, ABDOMEN AND PELVIS WITH CONTRAST  INDICATION: ams, diarrhea, abd tenderness  TECHNIQUE:  Thin section axial images were obtained from the lung apices to the pubic symphysis following IV and oral contrast administration. Coronal reconstruction images were obtained from  the axial data.  COMPARISON: 5/9/2021.  FINDINGS:  CHEST:  There is no mediastinal, hilar, or axillary lymphadenopathy. There is no pleural or pericardial effusion.  There is stable right lower lobe atelectasis. There is a new round area of groundglass opacity in the left lower lobe on image 46 which is favored to be infectious or inflammatory.  ABDOMEN: The liver is small and nodular in contour consistent with cirrhosis. No focal liver lesion is visualized.  The gallbladder is present.  The spleen is mildly enlarged at 14 cm. The adrenal glands and pancreas are without acute abnormality.  There is no hydronephrosis. Hypodensity in the right mid kidney is unchanged. Gastric wall thickening is nonspecific in the setting of ascites. There is no evidence of small bowel obstruction. There is long segment colonic wall thickening. This is nonspecific in the setting of ascites. Colitis is not excluded. There is no abdominal ascites. Recanalized paraumbilical veins are noted and unchanged from prior. There is perihepatic and perisplenic ascites which is slightly improved compared to prior.  PELVIS: The prostate and urinary bladder are unremarkable. Long segment colonic wall thickening is nonspecific in the setting of ascites. Colitis not excluded. Pelvic ascites has decreased. There is no pelvic lymphadenopathy.   BONES: There is a new mild compression deformity of T12. A fracture of the right L1 transverse process is also new from prior exam. Subtle lucent lesions in the right iliac crest are unchanged.      Impression: 1. New rounded groundglass opacity in the left lower lobe which is favored to be infectious or inflammatory. Early viral pneumonia is not entirely excluded. 2. Cirrhosis with findings of portal hypertension including splenomegaly, venous collaterals, and ascites. 3. Long segment colonic wall thickening, nonspecific in the setting of ascites. Colitis not excluded. 4. New mild compression deformity of T12 and  right L1 transverse process fracture. A history of trauma was not provided. Consider MRI if indicated.       This study was performed with techniques to keep radiation doses as low as reasonably achievable (ALARA). Individualized dose reduction techniques using automated exposure control or adjustment of mA and/or kV according to the patient size were employed.  This report was finalized on 8/17/2021 12:09 PM by Nellie Soto MD.    CT Abdomen Pelvis With Contrast    Result Date: 8/17/2021  CT OF THE CHEST, ABDOMEN AND PELVIS WITH CONTRAST  INDICATION: ams, diarrhea, abd tenderness  TECHNIQUE:  Thin section axial images were obtained from the lung apices to the pubic symphysis following IV and oral contrast administration. Coronal reconstruction images were obtained from the axial data.  COMPARISON: 5/9/2021.  FINDINGS:  CHEST:  There is no mediastinal, hilar, or axillary lymphadenopathy. There is no pleural or pericardial effusion.  There is stable right lower lobe atelectasis. There is a new round area of groundglass opacity in the left lower lobe on image 46 which is favored to be infectious or inflammatory.  ABDOMEN: The liver is small and nodular in contour consistent with cirrhosis. No focal liver lesion is visualized.  The gallbladder is present.  The spleen is mildly enlarged at 14 cm. The adrenal glands and pancreas are without acute abnormality.  There is no hydronephrosis. Hypodensity in the right mid kidney is unchanged. Gastric wall thickening is nonspecific in the setting of ascites. There is no evidence of small bowel obstruction. There is long segment colonic wall thickening. This is nonspecific in the setting of ascites. Colitis is not excluded. There is no abdominal ascites. Recanalized paraumbilical veins are noted and unchanged from prior. There is perihepatic and perisplenic ascites which is slightly improved compared to prior.  PELVIS: The prostate and urinary bladder are unremarkable. Long  segment colonic wall thickening is nonspecific in the setting of ascites. Colitis not excluded. Pelvic ascites has decreased. There is no pelvic lymphadenopathy.   BONES: There is a new mild compression deformity of T12. A fracture of the right L1 transverse process is also new from prior exam. Subtle lucent lesions in the right iliac crest are unchanged.      Impression: 1. New rounded groundglass opacity in the left lower lobe which is favored to be infectious or inflammatory. Early viral pneumonia is not entirely excluded. 2. Cirrhosis with findings of portal hypertension including splenomegaly, venous collaterals, and ascites. 3. Long segment colonic wall thickening, nonspecific in the setting of ascites. Colitis not excluded. 4. New mild compression deformity of T12 and right L1 transverse process fracture. A history of trauma was not provided. Consider MRI if indicated.       This study was performed with techniques to keep radiation doses as low as reasonably achievable (ALARA). Individualized dose reduction techniques using automated exposure control or adjustment of mA and/or kV according to the patient size were employed.  This report was finalized on 8/17/2021 12:09 PM by Nellie Soto MD.    I have reviewed the patient's radiology reports.    Medication Review:     I have reviewed the patient's active and prn medications.     Problem List:      COVID-19      Assessment:    COVID-19 pneumonia, POA  Generalized weakness with nausea/vomiting secondary to above, POA  Closed fracture transverse process of T12 and L1 vertebra  Stage liver disease secondary to hepatocellular carcinoma  History of hepatic encephalopathy  Impaired functional mobility balance gait and endurance  Type 2 diabetes, insulin-dependent       Plan:    Continue monitor patient in the hospital with Covid isolation precautions.  We will continue with azithromycin and Decadron.  Fortunately, patient continues to remain very stable and  require minimal amount of supplemental oxygen.  Continue with albuterol MDI and incentive spirometer.  Is able to maintain saturation on room air today.  Continue supportive care, antiemetics as needed.  Ammonia level appropriate for patient.  We will continue with his current home dose of lactulose to titrate to 3-4 bowel movements per day.  We will also continue rifaximin.  Continue Lasix and spironolactone.  Have restarted patient's basal insulin, will continue with low-dose sliding scale correction.  Patient would likely benefit from palliative care services following him post discharge.  Consult placed.  Further orders as clinical course dictates.    DVT Prophylaxis: SCDs  Code Status: DNR/DNI  Diet: Diabetic  Discharge Plan: Inpatient rehab    Hernando Cabello DO  08/18/21  14:32 EDT    Dictated utilizing Dragon dictation.        Electronically signed by Hernando Cabello DO at 08/18/21 1439       Consult Notes (last 24 hours) (Notes from 08/18/21 1100 through 08/19/21 1100)    No notes of this type exist for this encounter.            Physical Therapy Notes (last 24 hours) (Notes from 08/18/21 1100 through 08/19/21 1100)      Abigail Rushing, PT Student at 08/18/21 4946  Version 1 of 1    Attestation signed by Yesika Guaman PT at 08/18/21 4572    I attest to the accuracy and completeness of this note.                 Goal Outcome Evaluation:  Plan of Care Reviewed With: (P) patient        Progress: (P) no change  Outcome Summary: (P) PT evaluation completed this date. Patient was difficult to rouse upon entry to the room, and would only answer yes no to questions. Patient demonstrates deficits in strength, transfers, gait, balance, and mobility. Patient was max x2 for bed mobility, mod assist for sit> stand, and mod x2 for bed> chair. Patient required consistent verbal cues to keep eyes open and to shift weight forward while in standing. Patient would benefit from skilled PT interventions during inpatient stay  to adress deficits to improve mobility.    Electronically signed by Yesika Guaman, PT at 21 4092     Abigail Rushing, PT Student at 21 0607  Version 1 of 1    Attestation signed by Yesika Guaman, PT at 21 6655    I attest to the accuracy and completeness of this note.                   Patient Name: Garrett Richard  : 1950    MRN: [id number]                              Today's Date: 2021       Admit Date: 2021    Visit Dx:     ICD-10-CM ICD-9-CM   1. COVID-19  U07.1 079.89   2. Acute respiratory failure with hypoxia (CMS/HCC)  J96.01 518.81   3. Lactic acidosis  E87.2 276.2   4. Closed fracture of thoracic vertebra, unspecified fracture morphology, unspecified thoracic vertebral level, initial encounter (CMS/HCC)  S22.009A 805.2   5. Closed fracture of transverse process of lumbar vertebra, initial encounter (CMS/HCC)  S32.009A 805.4   6. 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)   • Encephalopathy, hepatic (CMS/HCC)   • AL (acute kidney injury) (CMS/HCC)   • Increased ammonia level   • Hepatic encephalopathy (CMS/HCC)   • Chronic kidney disease, stage III (moderate) (CMS/HCC)   • Severe malnutrition (CMS/HCC)   • COVID-19     Past Medical History:   Diagnosis Date   • AL (acute kidney injury) (CMS/HCC) 2021   • Diabetes mellitus (CMS/HCC)     type II per patient   • Hepatocellular carcinoma (CMS/HCC) 10/25/2018    s/p TACE procedure with no mass noted on fu imaging   • History of transfusion    • Hypertension    • Impaired functional mobility, balance, gait, and endurance    • Liver disease    • Umbilical hernia    • Upper respiratory infection     • Urinary symptom or sign    • Urinary tract infection      Past Surgical History:   Procedure Laterality Date   • ENDOSCOPY N/A 5/11/2021    Procedure: ESOPHAGOGASTRODUODENOSCOPY WITH ARGON THERMAL ABLATION;  Surgeon: Samy Swanson MD;  Location: [facility] ENDOSCOPY;  Service: Gastroenterology;  Laterality: N/A;   • LIVER SURGERY     • TONSILLECTOMY       General Information     Row Name 08/18/21 1505          Physical Therapy Time and Intention    Document Type  evaluation  (Pended)   -CW     Mode of Treatment  physical therapy  (Pended)   -CW     Row Name 08/18/21 1505          General Information    Patient Profile Reviewed  yes  (Pended)   -CW     Prior Level of Function  independent:;all household mobility;community mobility  (Pended)   -CW     Existing Precautions/Restrictions  fall  (Pended)   -CW     Barriers to Rehab  medically complex;previous functional deficit;cognitive status  (Pended)   -CW     Row Name 08/18/21 1505          Living Environment    Lives With  facility resident  (Pended)   -CW     Row Name 08/18/21 1526          Cognition    Orientation Status (Cognition)  unable/difficult to assess  (Pended)   -CW     Row Name 08/18/21 1505          Safety Issues, Functional Mobility    Safety Issues Affecting Function (Mobility)  ability to follow commands;friction/shear risk;insight into deficits/self-awareness;positioning of assistive device;safety precautions follow-through/compliance;awareness of need for assistance  (Pended)   -CW     Impairments Affecting Function (Mobility)  balance;cognition;endurance/activity tolerance;strength;pain  (Pended)   -CW     Cognitive Impairments, Mobility Safety/Performance  awareness, need for assistance;safety precaution awareness  (Pended)   -CW       User Key  (r) = Recorded By, (t) = Taken By, (c) = Cosigned By    Initials Name Provider Type    CW Abigail Rushing, PT Student PT Student        Mobility     Row Name 08/18/21 1417          Bed Mobility     Bed Mobility  bed mobility (all) activities  (Pended)   -     All Activities, Wetzel (Bed Mobility)  maximum assist (25% patient effort);2 person assist  (Pended)   -CW     Assistive Device (Bed Mobility)  draw sheet;head of bed elevated  (Pended)   -     Row Name 08/18/21 1415          Transfers    Comment (Transfers)  Patient had a significant posterior lean doesnt reach full upright posture with transfers and gait.  (Pended)   -     Row Name 08/18/21 1415          Bed-Chair Transfer    Bed-Chair Wetzel (Transfers)  moderate assist (50% patient effort);2 person assist  (Pended)   -CW     Assistive Device (Bed-Chair Transfers)  walker, front-wheeled  (Pended)   -     Row Name 08/18/21 1415          Sit-Stand Transfer    Sit-Stand Wetzel (Transfers)  moderate assist (50% patient effort)  (Pended)   -CW     Assistive Device (Sit-Stand Transfers)  walker, front-wheeled  (Pended)   -     Row Name 08/18/21 1415          Gait/Stairs (Locomotion)    Wetzel Level (Gait)  --  (Pended)   -CW     Assistive Device (Gait)  --  (Pended)   -CW     Distance in Feet (Gait)  --  (Pended)   -CW     Deviations/Abnormal Patterns (Gait)  --  (Pended)   -CW     Comment (Gait/Stairs)  Patient struggled to  feet and maange RW, had to roll chair to meet pt becasue he couldnt back up.  (Pended)   -CW       User Key  (r) = Recorded By, (t) = Taken By, (c) = Cosigned By    Initials Name Provider Type    CW Abigail Rushing, PT Student PT Student        Obj/Interventions     Row Name 08/18/21 1415          Strength Comprehensive (MMT)    Comment, General Manual Muscle Testing (MMT) Assessment  LE MMT grossly 4/5  (Pended)   -     Row Name 08/18/21 1415          Balance    Balance Assessment  sitting static balance;sitting dynamic balance;standing static balance;standing dynamic balance  (Pended)   -     Static Sitting Balance  moderate impairment;sitting, edge of bed  (Pended)   -CW     Dynamic  Sitting Balance  moderate impairment;sitting, edge of bed  (Pended)   -CW     Static Standing Balance  severe impairment  (Pended)   -CW     Dynamic Standing Balance  severe impairment  (Pended)   -CW     Comment, Balance  While sitting in bed patient slowly startef to lean back becasue he could not support self on the EOB, during standing the patient demonstrated a sever posterior lean and could not correct to upright posture with verbal or physical cueing from the PT to shift weight forward or put toes on the ground  (Pended)   -CW       User Key  (r) = Recorded By, (t) = Taken By, (c) = Cosigned By    Initials Name Provider Type    CW Abigail Rushing, PT Student PT Student        Goals/Plan     Row Name 08/18/21 1538          Bed Mobility Goal 1 (PT)    Activity/Assistive Device (Bed Mobility Goal 1, PT)  bed mobility activities, all  (Pended)   -CW     [place]varado Level/Cues Needed (Bed Mobility Goal 1, PT)  minimum assist (75% or more patient effort)  (Pended)   -CW     Time Frame (Bed Mobility Goal 1, PT)  long term goal (LTG);10 days  (Pended)   -CW     Progress/Outcomes (Bed Mobility Goal 1, PT)  goal ongoing  (Pended)   -CW     Row Name 08/18/21 1538          Transfer Goal 1 (PT)    Activity/Assistive Device (Transfer Goal 1, PT)  sit-to-stand/stand-to-sit;bed-to-chair/chair-to-bed  (Pended)   -CW     [place] Level/Cues Needed (Transfer Goal 1, PT)  moderate assist (50-74% patient effort)  (Pended)   -CW     Time Frame (Transfer Goal 1, PT)  long term goal (LTG);10 days  (Pended)   -CW     Progress/Outcome (Transfer Goal 1, PT)  goal ongoing  (Pended)   -CW     Row Name 08/18/21 1538          Gait Training Goal 1 (PT)    Activity/Assistive Device (Gait Training Goal 1, PT)  gait (walking locomotion)  (Pended)   -CW     [place] Level (Gait Training Goal 1, PT)  minimum assist (75% or more patient effort)  (Pended)   -CW     Distance (Gait Training Goal 1, PT)  50ft  (Pended)   -CW     Time Frame  (Gait Training Goal 1, PT)  long term goal (LTG);10 days  (Pended)   -CW     Progress/Outcome (Gait Training Goal 1, PT)  goal ongoing  (Pended)   -CW       User Key  (r) = Recorded By, (t) = Taken By, (c) = Cosigned By    Initials Name Provider Type    CW Abigail Rushing, PT Student PT Student        Clinical Impression     Row Name 08/18/21 1419          Pain    Additional Documentation  Pain Scale: FACES Pre/Post-Treatment (Group)  (Pended)   -CW     Row Name 08/18/21 1411          Pain Scale: FACES Pre/Post-Treatment    Pain: FACES Scale, Pretreatment  0-->no hurt  (Pended)   -CW     Posttreatment Pain Rating  0-->no hurt  (Pended)   -     Row Name 08/18/21 1414          Plan of Care Review    Plan of Care Reviewed With  patient  (Pended)   -CW     Progress  no change  (Pended)   -CW     Outcome Summary  PT evaluation completed this date. Patient was difficult to rouse upon entry to the room, and would only answer yes no to questions. Patient demonstrates deficits in strength, transfers, gait, balance, and mobility. Patient was max x2 for bed mobility, mod assist for sit> stand, and mod x2 for bed> chair. Patient required consistent verbal cues to keep eyes open and to shift weight forward while in standing. Patient would benefit from skilled PT interventions during inpatient stay to adress deficits to improve mobility.  (Pended)   -     Row Name 08/18/21 1418          Therapy Assessment/Plan (PT)    Patient/Family Therapy Goals Statement (PT)  to go home  (Pended)   -CW     Rehab Potential (PT)  good, to achieve stated therapy goals  (Pended)   -CW     Criteria for Skilled Interventions Met (PT)  yes;meets criteria;skilled treatment is necessary  (Pended)   -CW     Predicted Duration of Therapy Intervention (PT)  unitl discharge  (Pended)   -     Row Name 08/18/21 1419          Vital Signs    O2 Delivery Pre Treatment  room air  (Pended)   -CW     O2 Delivery Intra Treatment  room air  (Pended)   -CW      O2 Delivery Post Treatment  room air  (Pended)   -CW     Pre Patient Position  Supine  (Pended)   -CW     Intra Patient Position  Standing  (Pended)   -CW     Post Patient Position  Sitting  (Pended)   -CW     Row Name 08/18/21 1415          Positioning and Restraints    Pre-Treatment Position  in bed  (Pended)   -CW     Post Treatment Position  chair  (Pended)   -CW     In Chair  call light within reach;sitting;encouraged to call for assist;exit alarm on;with nsg  (Pended)   -CW       User Key  (r) = Recorded By, (t) = Taken By, (c) = Cosigned By    Initials Name Provider Type    CW Abigail Rushing, PT Student PT Student        Outcome Measures     Row Name 08/18/21 1415          How much help from another person do you currently need...    Turning from your back to your side while in flat bed without using bedrails?  2  (Pended)   -CW     Moving from lying on back to sitting on the side of a flat bed without bedrails?  2  (Pended)   -CW     Moving to and from a bed to a chair (including a wheelchair)?  2  (Pended)   -CW     Standing up from a chair using your arms (e.g., wheelchair, bedside chair)?  2  (Pended)   -CW     Climbing 3-5 steps with a railing?  1  (Pended)   -CW     To walk in hospital room?  2  (Pended)   -CW     AM-PAC 6 Clicks Score (PT)  11  (Pended)   -CW     Row Name 08/18/21 1546 08/18/21 1415       Functional Assessment    Outcome Measure Options  AM-PAC 6 Clicks Daily Activity (OT)  -SD  AM-PAC 6 Clicks Basic Mobility (PT)  (Pended)   -CW      User Key  (r) = Recorded By, (t) = Taken By, (c) = Cosigned By    Initials Name Provider Type    Lachelle Arenas OT Occupational Therapist    Abigail Peraza, PT Student PT Student                       Physical Therapy Education                 Title: PT OT SLP Therapies (In Progress)     Topic: Physical Therapy (In Progress)     Point: Mobility training (In Progress)     Learning Progress Summary           Patient Acceptance, E, NR by CW at  8/18/2021 2418    Comment: Educated the pateint on the benefit of PT POC, and functional upright posture.                   Point: Body mechanics (In Progress)     Learning Progress Summary           Patient Acceptance, E, NR by CW at 8/18/2021 2829    Comment: Educated the pateint on the benefit of PT POC, and functional upright posture.                               User Key     Initials Effective Dates Name Provider Type Discipline     07/26/21 -  Abigail Rushing, PT Student PT Student PT              PT Recommendation and Plan  Planned Therapy Interventions (PT): (P) balance training, bed mobility training, gait training, home exercise program, strengthening, transfer training  Plan of Care Reviewed With: (P) patient  Progress: (P) no change  Outcome Summary: (P) PT evaluation completed this date. Patient was difficult to rouse upon entry to the room, and would only answer yes no to questions. Patient demonstrates deficits in strength, transfers, gait, balance, and mobility. Patient was max x2 for bed mobility, mod assist for sit> stand, and mod x2 for bed> chair. Patient required consistent verbal cues to keep eyes open and to shift weight forward while in standing. Patient would benefit from skilled PT interventions during inpatient stay to adress deficits to improve mobility.     Time Calculation:   PT Charges     Row Name 08/18/21 1550             Time Calculation    Start Time  1415  (Pended)   -CW      PT Received On  08/18/21  (Pended)   -      PT Goal Re-Cert Due Date  08/28/21  (Pended)   -CW         Untimed Charges    PT Eval/Re-eval Minutes  53  (Pended)   -CW         Total Minutes    Untimed Charges Total Minutes  53  (Pended)   -CW       Total Minutes  53  (Pended)   -CW        User Key  (r) = Recorded By, (t) = Taken By, (c) = Cosigned By    Initials Name Provider Type    CW Abigail Rushing, PT Student PT Student        Therapy Charges for Today     Code Description Service Date Service Provider  Modifiers Qty    15609999572 HC PT EVAL MOD COMPLEXITY 4 2021 Abigail Rushing, PT Student GP 1          PT G-Codes  Outcome Measure Options: AM-PAC 6 Clicks Daily Activity (OT)  AM-PAC 6 Clicks Score (PT): (P) 11  AM-PAC 6 Clicks Score (OT): 9    ABIGAIL RUSHING, PT Student  2021      Electronically signed by Yesika Guaman, PT at 21 1554          Occupational Therapy Notes (last 24 hours) (Notes from 21 1100 through 21 1100)      Lachelle Wolf, OT at 21 1549          Patient Name: Garrett Richard  : 1950    MRN: [id number]                              Today's Date: 2021       Admit Date: 2021    Visit Dx:     ICD-10-CM ICD-9-CM   1. COVID-19  U07.1 079.89   2. Acute respiratory failure with hypoxia (CMS/HCC)  J96.01 518.81   3. Lactic acidosis  E87.2 276.2   4. Closed fracture of thoracic vertebra, unspecified fracture morphology, unspecified thoracic vertebral level, initial encounter (CMS/HCC)  S22.009A 805.2   5. Closed fracture of transverse process of lumbar vertebra, initial encounter (CMS/HCC)  S32.009A 805.4   6. 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)   • Encephalopathy, hepatic (CMS/HCC)   • AL (acute kidney injury) (CMS/HCC)   • Increased ammonia level   • Hepatic encephalopathy (CMS/HCC)   • Chronic kidney disease, stage III (moderate) (CMS/HCC)   • Severe malnutrition (CMS/HCC)   • COVID-19     Past Medical History:   Diagnosis Date   • AL (acute kidney injury) (CMS/HCC) 2021   • Diabetes mellitus (CMS/HCC)     type II per patient   • Hepatocellular carcinoma (CMS/HCC) 10/25/2018    s/p TACE procedure with no mass  noted on fu imaging   • History of transfusion    • Hypertension    • Impaired functional mobility, balance, gait, and endurance    • Liver disease    • Umbilical hernia    • Upper respiratory infection    • Urinary symptom or sign    • Urinary tract infection      Past Surgical History:   Procedure Laterality Date   • ENDOSCOPY N/A 5/11/2021    Procedure: ESOPHAGOGASTRODUODENOSCOPY WITH ARGON THERMAL ABLATION;  Surgeon: Samy Swanson MD;  Location: [facility] ENDOSCOPY;  Service: Gastroenterology;  Laterality: N/A;   • LIVER SURGERY     • TONSILLECTOMY       General Information     Row Name 08/18/21 1539          OT Time and Intention    Document Type  evaluation  -SD     Mode of Treatment  occupational therapy  -SD     Row Name 08/18/21 1539          General Information    Patient Profile Reviewed  yes  -SD     Prior Level of Function  min assist:;all household mobility;transfer has been at [facility]  -SD     Existing Precautions/Restrictions  fall  -SD     Barriers to Rehab  medically complex;previous functional deficit;cognitive status  -SD     Row Name 08/18/21 1539          Living Environment    Lives With  facility resident  -SD     Row Name 08/18/21 1539          Cognition    Orientation Status (Cognition)  oriented to;person;place patient lethargic, answered to name but never stated name or birthday. Knew he was at the hospital  -SD     Row Name 08/18/21 1539          Safety Issues, Functional Mobility    Safety Issues Affecting Function (Mobility)  ability to follow commands;awareness of need for assistance;friction/shear risk;judgment;safety precaution awareness;safety precautions follow-through/compliance;sequencing abilities  -SD     Impairments Affecting Function (Mobility)  balance;coordination;cognition;endurance/activity tolerance;motor planning;postural/trunk control;strength  -SD       User Key  (r) = Recorded By, (t) = Taken By, (c) = Cosigned By    Initials Name Provider Type    SD [place],  RG Larose Occupational Therapist          Mobility/ADL's     Row Name 08/18/21 1541          Bed Mobility    Bed Mobility  supine-sit  -SD     Supine-Sit [place] (Bed Mobility)  maximum assist (25% patient effort);2 person assist  -SD     Bed Mobility, Safety Issues  decreased use of arms for pushing/pulling;decreased use of legs for bridging/pushing;impaired trunk control for bed mobility  -SD     Assistive Device (Bed Mobility)  draw sheet;head of bed elevated;bed rails  -SD     Row Name 08/18/21 1541          Transfers    Transfers  sit-stand transfer;bed-chair transfer  -SD     Bed-Chair [place] (Transfers)  moderate assist (50% patient effort);2 person assist  -SD     Assistive Device (Bed-Chair Transfers)  walker, front-wheeled  -SD     Sit-Stand [place] (Transfers)  minimum assist (75% patient effort);2 person assist  -SD     Row Name 08/18/21 1541          Sit-Stand Transfer    Assistive Device (Sit-Stand Transfers)  walker, front-wheeled  -SD     Row Name 08/18/21 1541          Functional Mobility    Functional Mobility- Comment  NT  -SD     Row Name 08/18/21 1541          Activities of Daily Living    BADL Assessment/Intervention  bathing;upper body dressing;lower body dressing;grooming;feeding;toileting  -SD     Row Name 08/18/21 1541          Bathing Assessment/Intervention    [place] Level (Bathing)  dependent (less than 25% patient effort)  -SD     Row Name 08/18/21 1541          Upper Body Dressing Assessment/Training    [place] Level (Upper Body Dressing)  maximum assist (25% patient effort)  -SD     Row Name 08/18/21 1541          Lower Body Dressing Assessment/Training    [place] Level (Lower Body Dressing)  dependent (less than 25% patient effort)  -SD     Row Name 08/18/21 1541          Grooming Assessment/Training    [place] Level (Grooming)  maximum assist (25% patient effort)  -SD     Row Name 08/18/21 1541          Self-Feeding Assessment/Training     [place] Level (Feeding)  maximum assist (25% patient effort)  -SD     Row Name 08/18/21 1541          Toileting Assessment/Training    [place] Level (Toileting)  dependent (less than 25% patient effort)  -SD     Comment (Toileting)  incontinence  -SD       User Key  (r) = Recorded By, (t) = Taken By, (c) = Cosigned By    Initials Name Provider Type    SD Lachelle Wolf OT Occupational Therapist        Obj/Interventions     Row Name 08/18/21 1542          Range of Motion Comprehensive    General Range of Motion  bilateral upper extremity ROM WFL  -SD     Row Name 08/18/21 1542          Strength Comprehensive (MMT)    General Manual Muscle Testing (MMT) Assessment  upper extremity strength deficits identified  -SD     Comment, General Manual Muscle Testing (MMT) Assessment  3/5 - 3+/5  -SD       User Key  (r) = Recorded By, (t) = Taken By, (c) = Cosigned By    Initials Name Provider Type    SD Lachelle Wolf OT Occupational Therapist        Goals/Plan     Row Name 08/18/21 1542          Transfer Goal 1 (OT)    Activity/Assistive Device (Transfer Goal 1, OT)  bed-to-chair/chair-to-bed;walker, rolling  -SD     [place] Level/Cues Needed (Transfer Goal 1, OT)  minimum assist (75% or more patient effort)  -SD     Time Frame (Transfer Goal 1, OT)  long term goal (LTG)  -SD     Progress/Outcome (Transfer Goal 1, OT)  goal ongoing  -SD     Row Name 08/18/21 1544          Dressing Goal 1 (OT)    Activity/Device (Dressing Goal 1, OT)  upper body dressing  -SD     [place]/Cues Needed (Dressing Goal 1, OT)  moderate assist (50-74% patient effort)  -SD     Time Frame (Dressing Goal 1, OT)  2 weeks  -SD     Progress/Outcome (Dressing Goal 1, OT)  goal ongoing  -SD     [facility] Name 08/18/21 1547          Grooming Goal 1 (OT)    Activity/Device (Grooming Goal 1, OT)  grooming skills, all  -SD     [place] (Grooming Goal 1, OT)  minimum assist (75% or more patient effort)  -SD     Time Frame (Grooming Goal 1,  OT)  2 weeks  -SD     Progress/Outcome (Grooming Goal 1, OT)  goal ongoing  -SD     Row Name 08/18/21 1541          Strength Goal 1 (OT)    Strength Goal 1 (OT)  Patient to perform UB ther ex as tolerated  -SD     Time Frame (Strength Goal 1, OT)  long term goal (LTG)  -SD     Progress/Outcome (Strength Goal 1, OT)  goal ongoing  -SD     Row Name 08/18/21 154          Therapy Assessment/Plan (OT)    Planned Therapy Interventions (OT)  activity tolerance training;adaptive equipment training;BADL retraining;passive ROM/stretching;patient/caregiver education/training;ROM/therapeutic exercise;strengthening exercise;transfer/mobility retraining  -SD       User Key  (r) = Recorded By, (t) = Taken By, (c) = Cosigned By    Initials Name Provider Type    Lachelle Arenas OT Occupational Therapist        Clinical Impression     Row Name 08/18/21 1541          Pain Scale: FACES Pre/Post-Treatment    Pain: FACES Scale, Pretreatment  0-->no hurt  -SD     Posttreatment Pain Rating  0-->no hurt  -SD     Row Name 08/18/21 1549          Plan of Care Review    Plan of Care Reviewed With  patient  -SD     Progress  no change  -SD     Outcome Summary  OT eval completed. Patient presents deficits in strength, endurance, balance, mobility and ADL performance. Patient completed bed mobility with max A x 2 , sit to stand with min A x 2, requiring several sit to stands for perineal hygiene d/t incontinence. Patient required mod A x 2 for transfer to chair. Patient requires max A for all ADLs. Patient is expected to benefit from continued OT services prior to DC.  -SD     Row Name 08/18/21 1541          Therapy Assessment/Plan (OT)    Patient/Family Therapy Goal Statement (OT)  Increase strength  -SD     Rehab Potential (OT)  fair, will monitor progress closely  -SD     Criteria for Skilled Therapeutic Interventions Met (OT)  skilled treatment is necessary  -SD     Therapy Frequency (OT)  3 times/wk 5 times if indicated  -SD     Row  Name 08/18/21 1543          Therapy Plan Review/Discharge Plan (OT)    Anticipated Discharge Disposition (OT)  inpatient rehabilitation facility  -SD     Row Name 08/18/21 1543          Positioning and Restraints    Pre-Treatment Position  in bed  -SD     Post Treatment Position  chair  -SD     In Chair  reclined;call light within reach;encouraged to call for assist;exit alarm on;with nsg  -SD       User Key  (r) = Recorded By, (t) = Taken By, (c) = Cosigned By    Initials Name Provider Type    Lachelle Arenas OT Occupational Therapist        Outcome Measures     Row Name 08/18/21 1546          How much help from another is currently needed...    Putting on and taking off regular lower body clothing?  1  -SD     Bathing (including washing, rinsing, and drying)  1  -SD     Toileting (which includes using toilet bed pan or urinal)  1  -SD     Putting on and taking off regular upper body clothing  2  -SD     Taking care of personal grooming (such as brushing teeth)  2  -SD     Eating meals  2  -SD     AM-PAC 6 Clicks Score (OT)  9  -SD     Row Name 08/18/21 1415          How much help from another person do you currently need...    Turning from your back to your side while in flat bed without using bedrails?  2  (Pended)   -CW     Moving from lying on back to sitting on the side of a flat bed without bedrails?  2  (Pended)   -CW     Moving to and from a bed to a chair (including a wheelchair)?  2  (Pended)   -CW     Standing up from a chair using your arms (e.g., wheelchair, bedside chair)?  2  (Pended)   -CW     Climbing 3-5 steps with a railing?  1  (Pended)   -CW     To walk in hospital room?  2  (Pended)   -CW     AM-PAC 6 Clicks Score (PT)  11  (Pended)   -CW     Row Name 08/18/21 1546 08/18/21 1415       Functional Assessment    Outcome Measure Options  AM-PAC 6 Clicks Daily Activity (OT)  -SD  AM-PAC 6 Clicks Basic Mobility (PT)  (Pended)   -CW      User Key  (r) = Recorded By, (t) = Taken By, (c) =  Cosigned By    Initials Name Provider Type    Lachelle Arenas OT Occupational Therapist    CW Abigail Rushing, PT Student PT Student            OT Recommendation and Plan  Planned Therapy Interventions (OT): activity tolerance training, adaptive equipment training, BADL retraining, passive ROM/stretching, patient/caregiver education/training, ROM/therapeutic exercise, strengthening exercise, transfer/mobility retraining  Therapy Frequency (OT): 3 times/wk (5 times if indicated)  Plan of Care Review  Plan of Care Reviewed With: patient  Progress: no change  Outcome Summary: OT eval completed. Patient presents deficits in strength, endurance, balance, mobility and ADL performance. Patient completed bed mobility with max A x 2 , sit to stand with min A x 2, requiring several sit to stands for perineal hygiene d/t incontinence. Patient required mod A x 2 for transfer to chair. Patient requires max A for all ADLs. Patient is expected to benefit from continued OT services prior to DC.     Time Calculation:   Time Calculation- OT     Row Name 08/18/21 1549             Time Calculation- OT    OT Start Time  1413  -SD      OT Received On  08/18/21  -SD      OT Goal Re-Cert Due Date  08/30/21  -SD         Untimed Charges    OT Eval/Re-eval Minutes  60  -SD         Total Minutes    Untimed Charges Total Minutes  60  -SD       Total Minutes  60  -SD        User Key  (r) = Recorded By, (t) = Taken By, (c) = Cosigned By    Initials Name Provider Type    Lachelle Arenas OT Occupational Therapist        Therapy Charges for Today     Code Description Service Date Service Provider Modifiers Qty    88089007484 HC OT EVAL MOD COMPLEXITY 4 8/18/2021 Lachelle Wolf OT GO 1               Lachelle Wolf OT  8/18/2021    Electronically signed by Lachelle Wolf OT at 08/18/21 1549     Lachelle Wolf OT at 08/18/21 1548          Problem: Adult Inpatient Plan of Care  Goal: Plan of Care Review  Recent Flowsheet  Documentation  Taken 8/18/2021 2600 by Lachelle Wolf OT  Progress: no change  Plan of Care Reviewed With: patient  Outcome Summary: OT eval completed. Patient presents deficits in strength, endurance, balance, mobility and ADL performance. Patient completed bed mobility with max A x 2 , sit to stand with min A x 2, requiring several sit to stands for perineal hygiene d/t incontinence. Patient required mod A x 2 for transfer to chair. Patient requires max A for all ADLs. Patient is expected to benefit from continued OT services prior to DC.   Goal Outcome Evaluation:  Plan of Care Reviewed With: patient        Progress: no change  Outcome Summary: OT eval completed. Patient presents deficits in strength, endurance, balance, mobility and ADL performance. Patient completed bed mobility with max A x 2 , sit to stand with min A x 2, requiring several sit to stands for perineal hygiene d/t incontinence. Patient required mod A x 2 for transfer to chair. Patient requires max A for all ADLs. Patient is expected to benefit from continued OT services prior to DC.    Electronically signed by Lachelle Wolf OT at 08/18/21 8554

## 2021-08-19 NOTE — THERAPY TREATMENT NOTE
Patient Name: Garrett Richard  : 1950    MRN: [id number]                              Today's Date: 2021       Admit Date: 2021    Visit Dx:     ICD-10-CM ICD-9-CM   1. COVID-19  U07.1 079.89   2. Acute respiratory failure with hypoxia (CMS/HCC)  J96.01 518.81   3. Lactic acidosis  E87.2 276.2   4. Closed fracture of thoracic vertebra, unspecified fracture morphology, unspecified thoracic vertebral level, initial encounter (CMS/HCC)  S22.009A 805.2   5. Closed fracture of transverse process of lumbar vertebra, initial encounter (CMS/HCC)  S32.009A 805.4   6. 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)   • Encephalopathy, hepatic (CMS/HCC)   • AL (acute kidney injury) (CMS/HCC)   • Increased ammonia level   • Hepatic encephalopathy (CMS/HCC)   • Chronic kidney disease, stage III (moderate) (CMS/HCC)   • Severe malnutrition (CMS/HCC)   • COVID-19     Past Medical History:   Diagnosis Date   • AL (acute kidney injury) (CMS/HCC) 2021   • Diabetes mellitus (CMS/HCC)     type II per patient   • Hepatocellular carcinoma (CMS/HCC) 10/25/2018    s/p TACE procedure with no mass noted on fu imaging   • History of transfusion    • Hypertension    • Impaired functional mobility, balance, gait, and endurance    • Liver disease    • Umbilical hernia    • Upper respiratory infection    • Urinary symptom or sign    • Urinary tract infection      Past Surgical History:   Procedure Laterality Date   • ENDOSCOPY N/A 2021    Procedure: ESOPHAGOGASTRODUODENOSCOPY WITH ARGON THERMAL ABLATION;  Surgeon: Samy Swanson MD;  Location: [facility] ENDOSCOPY;  Service:  Gastroenterology;  Laterality: N/A;   • LIVER SURGERY     • TONSILLECTOMY       General Information     Row Name 08/19/21 1313          OT Time and Intention    Document Type  therapy note (daily note)  -SD     Mode of Treatment  occupational therapy  -SD       User Key  (r) = Recorded By, (t) = Taken By, (c) = Cosigned By    Initials Name Provider Type    Lachelle Arenas OT Occupational Therapist          Mobility/ADL's     Row Name 08/19/21 1314          Bed Mobility    Bed Mobility  supine-sit  -SD     Supine-Sit [place] (Bed Mobility)  moderate assist (50% patient effort)  -SD     Assistive Device (Bed Mobility)  bed rails;head of bed elevated  -SD     Row Name 08/19/21 1314          Transfers    Transfers  sit-stand transfer;bed-chair transfer  -SD     Bed-Chair [place] (Transfers)  moderate assist (50% patient effort)  -SD     Assistive Device (Bed-Chair Transfers)  walker, front-wheeled  -SD     Sit-Stand [place] (Transfers)  moderate assist (50% patient effort)  -SD     Row Name 08/19/21 1314          Sit-Stand Transfer    Assistive Device (Sit-Stand Transfers)  walker, front-wheeled  -SD     Row Name 08/19/21 1314          Grooming Assessment/Training    [place] Level (Grooming)  wash face, hands;standby assist  -SD     Row Name 08/19/21 1314          Self-Feeding Assessment/Training    [place] Level (Feeding)  liquids to mouth;scoop food and bring to mouth;standby assist  -SD     Comment (Feeding)  elbow propped on pillow for support  -SD       User Key  (r) = Recorded By, (t) = Taken By, (c) = Cosigned By    Initials Name Provider Type    Lachelle Arenas OT Occupational Therapist        Obj/Interventions     Row Name 08/19/21 1315          Shoulder (Therapeutic Exercise)    Shoulder (Therapeutic Exercise)  AROM (active range of motion)  -SD     Shoulder AROM (Therapeutic Exercise)  bilateral;flexion;extension;aBduction;aDduction;10 repetitions  -SD     Row Name 08/19/21  1315          Elbow/Forearm (Therapeutic Exercise)    Elbow/Forearm (Therapeutic Exercise)  AROM (active range of motion)  -SD     Elbow/Forearm AROM (Therapeutic Exercise)  bilateral;flexion;extension;supination;pronation;10 repetitions  -SD     Row Name 08/19/21 1315          Hand (Therapeutic Exercise)    Hand (Therapeutic Exercise)  AROM (active range of motion)  -SD     Hand AROM/AAROM (Therapeutic Exercise)  bilateral;other (see comments) hand  x 20  -SD     Row Name 08/19/21 1315          Therapeutic Exercise    Therapeutic Exercise  shoulder;elbow/forearm;hand  -SD       User Key  (r) = Recorded By, (t) = Taken By, (c) = Cosigned By    Initials Name Provider Type    SD Lachelle Wolf OT Occupational Therapist        Goals/Plan    No documentation.       Clinical Impression     Row Name 08/19/21 1316          Pain Scale: FACES Pre/Post-Treatment    Pain: FACES Scale, Pretreatment  0-->no hurt  -SD     Posttreatment Pain Rating  0-->no hurt  -SD     Row Name 08/19/21 1316          Plan of Care Review    Plan of Care Reviewed With  patient  -SD     Progress  improving  -SD     Outcome Summary  OT tx completed. Patient much more alert today. Completed bed mobility and transfer to chair with mod A; pt completed grooming with SBA. Patient setup for self feeding with R elbow supported and patient was able to feed self with no difficulty. Patient completed BUE AROM x 10. Continue OT POC  -SD     Row Name 08/19/21 1316          Vital Signs    O2 Delivery Pre Treatment  room air  -SD     Row Name 08/19/21 1316          Positioning and Restraints    Pre-Treatment Position  in bed  -SD     Post Treatment Position  chair  -SD     In Chair  sitting;call light within reach;encouraged to call for assist;exit alarm on;with nsg  -SD       User Key  (r) = Recorded By, (t) = Taken By, (c) = Cosigned By    Initials Name Provider Type    Lachelle Arenas OT Occupational Therapist        Outcome Measures     Row Name  08/19/21 1319          How much help from another is currently needed...    Putting on and taking off regular lower body clothing?  1  -SD     Bathing (including washing, rinsing, and drying)  1  -SD     Toileting (which includes using toilet bed pan or urinal)  1  -SD     Putting on and taking off regular upper body clothing  2  -SD     Taking care of personal grooming (such as brushing teeth)  3  -SD     Eating meals  3  -SD     AM-PAC 6 Clicks Score (OT)  11  -SD     Row Name 08/19/21 1319          Functional Assessment    Outcome Measure Options  AM-PAC 6 Clicks Daily Activity (OT)  -SD       User Key  (r) = Recorded By, (t) = Taken By, (c) = Cosigned By    Initials Name Provider Type    Lachelle Arenas OT Occupational Therapist          Occupational Therapy Education                 Title: PT OT SLP Therapies (In Progress)     Topic: Occupational Therapy (In Progress)     Point: ADL training (Done)     Description:   Instruct learner(s) on proper safety adaptation and remediation techniques during self care or transfers.   Instruct in proper use of assistive devices.              Learning Progress Summary           Patient Acceptance, E,TB, VU by SD at 8/19/2021 1321    Comment: Safety and sequencing during funtional transfer.                   Point: Home exercise program (Not Started)     Description:   Instruct learner(s) on appropriate technique for monitoring, assisting and/or progressing therapeutic exercises/activities.              Learner Progress:  Not documented in this visit.          Point: Precautions (Not Started)     Description:   Instruct learner(s) on prescribed precautions during self-care and functional transfers.              Learner Progress:  Not documented in this visit.          Point: Body mechanics (Not Started)     Description:   Instruct learner(s) on proper positioning and spine alignment during self-care, functional mobility activities and/or exercises.              Learner  Progress:  Not documented in this visit.                      User Key     Initials Effective Dates Name Provider Type Discipline    SD 06/16/21 -  Lachelle Wolf OT Occupational Therapist OT              OT Recommendation and Plan  Planned Therapy Interventions (OT): activity tolerance training, adaptive equipment training, BADL retraining, passive ROM/stretching, patient/caregiver education/training, ROM/therapeutic exercise, strengthening exercise, transfer/mobility retraining  Therapy Frequency (OT): 3 times/wk (5 times if indicated)  Plan of Care Review  Plan of Care Reviewed With: patient  Progress: improving  Outcome Summary: OT tx completed. Patient much more alert today. Completed bed mobility and transfer to chair with mod A; pt completed grooming with SBA. Patient setup for self feeding with R elbow supported and patient was able to feed self with no difficulty. Patient completed BUE AROM x 10. Continue OT POC     Time Calculation:   Time Calculation- OT     Row Name 08/19/21 1322             Time Calculation- OT    OT Start Time  1211  -SD      OT Stop Time  1240  -SD      OT Time Calculation (min)  29 min  -SD      OT Received On  08/19/21  -SD      OT Goal Re-Cert Due Date  08/30/21  -SD         Timed Charges    85945 - OT Therapeutic Exercise Minutes  10  -SD      39197 - OT Therapeutic Activity Minutes  10  -SD      34010 - OT Self Care/Mgmt Minutes  9  -SD         Total Minutes    Timed Charges Total Minutes  29  -SD       Total Minutes  29  -SD        User Key  (r) = Recorded By, (t) = Taken By, (c) = Cosigned By    Initials Name Provider Type    SD Lachelle Wolf OT Occupational Therapist        Therapy Charges for Today     Code Description Service Date Service Provider Modifiers Qty    68843640989 HC OT EVAL MOD COMPLEXITY 4 8/18/2021 Lachelle Wolf OT GO 1    17891625139 HC OT THER PROC EA 15 MIN 8/19/2021 Lachelle Wolf OT GO 1    45154345699 HC OT THERAPEUTIC ACT EA 15 MIN  8/19/2021 Lachelle Wolf OT GO 1    [id number]  OT THER PROC EA 15 MIN 8/19/2021 Lachelle Wolf OT GO 1    [id number]  OT THERAPEUTIC ACT EA 15 MIN 8/19/2021 Lachelle Wolf OT GO 1               Lachelle Wolf OT  8/19/2021

## 2021-08-19 NOTE — PROGRESS NOTES
[facility]    Name:  Garrett Richard   Age:  71 y.o.  Sex:  male  :  1950  MRN:  [id number]   Visit Number:  [id number]  Admission Date:  2021  Date Of Service:  21  Primary Care Physician:  Pierce Lubin MD     LOS: 2 days :    Chief Complaint:      Generalized weakness, vomiting    Subjective:    Patient seen and evaluated today.  Patient sitting in bedside chair, finished working with OT.  He is very appropriate and interactive today.  Mentation is at his baseline.  Patient able to answer all questions appropriately.  Diet is improving.  No acute events overnight per nursing staff.  Still on room air saturation in the mid 90s.    Review of Systems:     All systems were reviewed and negative except as mentioned in subjective, assessment and plan.    Vital Signs:    Temp:  [97.6 °F (36.4 °C)-98.6 °F (37 °C)] 98.2 °F (36.8 °C)  Heart Rate:  [69-89] 69  Resp:  [16-18] 16  BP: (129-140)/(68-71) 132/68    Intake and output:    I/O last 3 completed shifts:  In: 360 [P.O.:360]  Out: -   I/O this shift:  In: 720 [P.O.:720]  Out: -     Physical Examination: Examined again     General Appearance:  Alert and cooperative.    Head:  Atraumatic and normocephalic.   Eyes: Conjunctivae and sclerae normal, no icterus. No pallor.   Throat: No oral lesions, no thrush, oral mucosa moist.   Neck: Supple, trachea midline, no thyromegaly.   Lungs:   Breath sounds heard bilaterally equally.  No crackles or wheezing.    Heart:  Normal S1 and S2, no murmur, No JVD.   Abdomen:   Normal bowel sounds, nontender, nondistended, no rebound tenderness.   Extremities: Supple, no edema, no cyanosis, no clubbing.   Skin: No bleeding or rash.   Neurologic: Alert and oriented x 3.  Generally weak all over     Laboratory results:    Results from last 7 days   Lab Units 21  0613 21  0618 21  1110   SODIUM mmol/L 129* 130* 132*   POTASSIUM mmol/L 4.7 5.5* 5.0    CHLORIDE mmol/L 106 105 104   CO2 mmol/L 18.0* 14.6* 18.3*   BUN mg/dL 45* 33* 20   CREATININE mg/dL 1.37* 1.04 1.22   CALCIUM mg/dL 8.3* 8.1* 8.4*   BILIRUBIN mg/dL 1.2 1.4* 1.6*   ALK PHOS U/L 68 69 85   ALT (SGPT) U/L 28 26 32   AST (SGOT) U/L 35 42* 43*   GLUCOSE mg/dL 219* 219* 203*     Results from last 7 days   Lab Units 08/19/21  0613 08/18/21  0702 08/17/21  1110   WBC 10*3/mm3 5.88 4.12 5.01   HEMOGLOBIN g/dL 9.0* 9.5* 10.7*   HEMATOCRIT % 26.4* 28.0* 31.8*   PLATELETS 10*3/mm3 48* 52* 64*     Results from last 7 days   Lab Units 08/17/21  1110   INR  1.27*     Results from last 7 days   Lab Units 08/17/21  1110   TROPONIN T ng/mL 0.021     Results from last 7 days   Lab Units 08/17/21  1117 08/17/21  1110   BLOODCX  No growth at 2 days No growth at 2 days         I have reviewed the patient's laboratory results.    Radiology results:    No radiology results from the last 24 hrs  I have reviewed the patient's radiology reports.    Medication Review:     I have reviewed the patient's active and prn medications.     Problem List:      COVID-19      Assessment:    COVID-19 pneumonia, POA  Generalized weakness with nausea/vomiting secondary to above, POA  Closed fracture transverse process of T12 and L1 vertebra  Stage liver disease secondary to hepatocellular carcinoma  History of hepatic encephalopathy  Impaired functional mobility balance gait and endurance  Type 2 diabetes, insulin-dependent       Plan:    Continue monitor patient in the hospital with Covid isolation precautions.  We will continue with Decadron to complete 5 days.  Fortunately, patient continues to remain very stable and require minimal amount of supplemental oxygen.  Continue with albuterol MDI and incentive spirometer.    We will continue with his current home dose of lactulose to titrate to 3-4 bowel movements per day.  We will also continue rifaximin. Continue Lasix and spironolactone.  Have restarted patient's basal insulin, will  continue with low-dose sliding scale correction.  Patient would likely benefit from palliative care services following him post discharge.  Consult placed.  Patient is pending transfer to short-term inpatient rehab, obviously has Covid diagnosis complicates this and reduce his number of rehab facilities available.  Fortunately, patient is essentially asymptomatic from a Covid standpoint and is able to perform PT/OT on room air with saturation in the mid 90s.  Further orders as clinical course dictates.    DVT Prophylaxis: SCDs  Code Status: DNR/DNI  Diet: Diabetic  Discharge Plan: Inpatient rehab    Hernando Cabello DO  08/19/21  14:08 EDT    Dictated utilizing Dragon dictation.

## 2021-08-19 NOTE — PLAN OF CARE
Goal Outcome Evaluation:  Plan of Care Reviewed With: patient        Progress: improving  Outcome Summary: OT tx completed. Patient much more alert today. Completed bed mobility and transfer to chair with mod A; pt completed grooming with SBA. Patient setup for self feeding with R elbow supported and patient was able to feed self with no difficulty. Patient completed BUE AROM x 10. Continue OT POC

## 2021-08-19 NOTE — CASE MANAGEMENT/SOCIAL WORK
Continued Stay Note   [facility]     Patient Name: Garrett Richard  MRN: [id number]  Today's Date: 8/19/2021    Admit Date: 8/17/2021    Discharge Plan     Row Name 08/19/21 1511       Plan    Plan Referrals sent to [facility] for possible opening of COVID unit. [facility] states that the facility will have a response in the morning after the doctor reviews the referral. CM will continue to follow and assist as needed.       RYLEY Salcido

## 2021-08-19 NOTE — PLAN OF CARE
Goal Outcome Evaluation:  Plan of Care Reviewed With: patient        Progress: improving  Outcome Summary: VSS.  Pt sit up in chair for a couple hours today.  No change in pt condition to report during shift.  Will continue to monitor.

## 2021-08-19 NOTE — THERAPY TREATMENT NOTE
Patient Name: Garrett Richard  : 1950    MRN: [id number]                              Today's Date: 2021       Admit Date: 2021    Visit Dx:     ICD-10-CM ICD-9-CM   1. COVID-19  U07.1 079.89   2. Acute respiratory failure with hypoxia (CMS/HCC)  J96.01 518.81   3. Lactic acidosis  E87.2 276.2   4. Closed fracture of thoracic vertebra, unspecified fracture morphology, unspecified thoracic vertebral level, initial encounter (CMS/HCC)  S22.009A 805.2   5. Closed fracture of transverse process of lumbar vertebra, initial encounter (CMS/HCC)  S32.009A 805.4   6. 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)   • Encephalopathy, hepatic (CMS/HCC)   • AL (acute kidney injury) (CMS/HCC)   • Increased ammonia level   • Hepatic encephalopathy (CMS/HCC)   • Chronic kidney disease, stage III (moderate) (CMS/HCC)   • Severe malnutrition (CMS/HCC)   • COVID-19     Past Medical History:   Diagnosis Date   • AL (acute kidney injury) (CMS/HCC) 2021   • Diabetes mellitus (CMS/HCC)     type II per patient   • Hepatocellular carcinoma (CMS/HCC) 10/25/2018    s/p TACE procedure with no mass noted on fu imaging   • History of transfusion    • Hypertension    • Impaired functional mobility, balance, gait, and endurance    • Liver disease    • Umbilical hernia    • Upper respiratory infection    • Urinary symptom or sign    • Urinary tract infection      Past Surgical History:   Procedure Laterality Date   • ENDOSCOPY N/A 2021    Procedure: ESOPHAGOGASTRODUODENOSCOPY WITH ARGON THERMAL ABLATION;  Surgeon: Samy Swanson MD;  Location: [facility] ENDOSCOPY;  Service:  Gastroenterology;  Laterality: N/A;   • LIVER SURGERY     • TONSILLECTOMY       General Information     Row Name 08/19/21 1500          Physical Therapy Time and Intention    Document Type  therapy note (daily note)  -RM     Mode of Treatment  physical therapy  -RM     Row Name 08/19/21 1500          General Information    Patient Profile Reviewed  yes  -RM     Existing Precautions/Restrictions  fall  -RM     Row Name 08/19/21 1500          Cognition    Orientation Status (Cognition)  oriented to;person;place  -RM     Row Name 08/19/21 1500          Safety Issues, Functional Mobility    Safety Issues Affecting Function (Mobility)  safety precautions follow-through/compliance;positioning of assistive device  -RM     Impairments Affecting Function (Mobility)  balance;coordination;cognition;endurance/activity tolerance;motor planning;postural/trunk control;strength  -RM       User Key  (r) = Recorded By, (t) = Taken By, (c) = Cosigned By    Initials Name Provider Type    Luisito Dockery PTA Physical Therapy Assistant        Mobility     Row Name 08/19/21 1503          Sit-Stand Transfer    Sit-Stand [place] (Transfers)  minimum assist (75% patient effort);verbal cues  -RM     Assistive Device (Sit-Stand Transfers)  walker, front-wheeled  -RM     Row Name 08/19/21 1503          Gait/Stairs (Locomotion)    [place] Level (Gait)  minimum assist (75% patient effort);contact guard;verbal cues  -RM     Assistive Device (Gait)  walker, front-wheeled  -RM     Distance in Feet (Gait)  26'  -RM     Deviations/Abnormal Patterns (Gait)  base of support, narrow;angel decreased;weight shifting decreased  -RM     Bilateral Gait Deviations  heel strike decreased;forward flexed posture  -RM       User Key  (r) = Recorded By, (t) = Taken By, (c) = Cosigned By    Initials Name Provider Type    Luisito Dockery PTA Physical Therapy Assistant        Obj/Interventions    No documentation.       Goals/Plan    No  documentation.       Clinical Impression     Row Name 08/19/21 1504          Pain Scale: FACES Pre/Post-Treatment    Pain: FACES Scale, Pretreatment  0-->no hurt  -RM     Posttreatment Pain Rating  0-->no hurt  -RM     Row Name 08/19/21 1504          Plan of Care Review    Plan of Care Reviewed With  patient  -RM     Progress  improving  -     Outcome Summary  Pt tolerated increased activity this treatment.  Pt was able to advance gait distance to 26' cga/min a with rw.  [facility]  See flowsheet for details  -     Row Name 08/19/21 1504          Vital Signs    Pre SpO2 (%)  98  -RM     O2 Delivery Pre Treatment  room air  -RM     Intra SpO2 (%)  94  -RM     O2 Delivery Intra Treatment  room air  -RM     Post SpO2 (%)  98  -RM     O2 Delivery Post Treatment  room air  -RM     Pre Patient Position  Sitting  -RM     Intra Patient Position  Standing  -RM     Post Patient Position  Sitting  -RM     Row Name 08/19/21 1504          Positioning and Restraints    Pre-Treatment Position  sitting in chair/recliner  -RM     Post Treatment Position  chair  -RM     In Chair  reclined;call light within reach;encouraged to call for assist;exit alarm on;notified ns  -       User Key  (r) = Recorded By, (t) = Taken By, (c) = Cosigned By    Initials Name Provider Type    RM Luisito Garcia, PTA Physical Therapy Assistant        Outcome Measures     Row Name 08/19/21 1506          How much help from another person do you currently need...    Turning from your back to your side while in flat bed without using bedrails?  3  -RM     Moving from lying on back to sitting on the side of a flat bed without bedrails?  3  -RM     Moving to and from a bed to a chair (including a wheelchair)?  3  -RM     Standing up from a chair using your arms (e.g., wheelchair, bedside chair)?  3  -RM     Climbing 3-5 steps with a railing?  1  -RM     To walk in hospital room?  3  -RM     AM-PAC 6  Clicks Score (PT)  16  -RM     Row Name 08/19/21 1506 08/19/21 1319       Functional Assessment    Outcome Measure Options  AM-PAC 6 Clicks Basic Mobility (PT)  -RM  AM-PAC 6 Clicks Daily Activity (OT)  -SD      User Key  (r) = Recorded By, (t) = Taken By, (c) = Cosigned By    Initials Name Provider Type    RM Luisito Garcia, PTA Physical Therapy Assistant    Lachelle Arenas OT Occupational Therapist                       Physical Therapy Education                 Title: PT OT SLP Therapies (In Progress)     Topic: Physical Therapy (In Progress)     Point: Mobility training (Done)     Learning Progress Summary           Patient Acceptance, E,TB,D, VU,NR by  at 8/19/2021 1506    Comment: importance of increased activity    Acceptance, E, NR by CW at 8/18/2021 1548    Comment: Educated the pateint on the benefit of PT POC, and functional upright posture.                   Point: Body mechanics (In Progress)     Learning Progress Summary           Patient Acceptance, E, NR by CW at 8/18/2021 1548    Comment: Educated the pateint on the benefit of PT POC, and functional upright posture.                               User Key     Initials Effective Dates Name Provider Type Discipline    RM 06/16/21 -  Luisito Garcia, ANAI Physical Therapy Assistant PT    CW 07/26/21 -  Abigail Rushing, DIGNA Student PT Student PT              PT Recommendation and Plan     Plan of Care Reviewed With: patient  Progress: improving  Outcome Summary: Pt tolerated increased activity this treatment.  Pt was able to advance gait distance to 26' cga/min a with rw.  Pt also required decreased assistance with mobility in gerneral.  See flowsheet for details     Time Calculation:   PT Charges     Row Name 08/19/21 1507             Time Calculation    Start Time  1325  -RM      Stop Time  1349  -RM      Time Calculation (min)  24 min  -RM      PT Received On  08/19/21  -RM      PT Goal Re-Cert Due Date  08/28/21  -RM         Time  Calculation- PT    Total Timed Code Minutes- PT  24 minute(s)  -RM         Timed Charges    39992 - Gait Training Minutes   24  -RM         Total Minutes    Timed Charges Total Minutes  24  -RM       Total Minutes  24  -RM        User Key  (r) = Recorded By, (t) = Taken By, (c) = Cosigned By    Initials Name Provider Type    Luisito Dockery, ANAI Physical Therapy Assistant        Therapy Charges for Today     Code Description Service Date Service Provider Modifiers Qty    [id number] HC GAIT TRAINING EA 15 MIN 8/19/2021 Luisito Garcia, ANAI GP 2          PT G-Codes  Outcome Measure Options: AM-PAC 6 Clicks Basic Mobility (PT)  AM-PAC 6 Clicks Score (PT): 16  AM-PAC 6 Clicks Score (OT): 11    Luisito Garcia PTA  8/19/2021

## 2021-08-19 NOTE — PLAN OF CARE
Goal Outcome Evaluation:      No acute events noted this shift.  Maintained o2 sats above 90% on room air.  Blood glucose monitored with ss coverage for elevated glucose.  No c/o pain or discomfort noted.  Safety precautions ongoing.  Will cont. To monitor.

## 2021-08-20 NOTE — PLAN OF CARE
Goal Outcome Evaluation:  Plan of Care Reviewed With: patient        Progress: improving  Outcome Summary: VSS.  No acute events noted this shift.  incont. care provided prn.  safety precautions ongoing.  will cont. to monitor

## 2021-08-20 NOTE — PROGRESS NOTES
Adult Nutrition  Assessment/PES    Patient Name:  Garrett Richard  YOB: 1950  MRN: [id number]  Admit Date:  8/17/2021    Assessment Date:  8/20/2021    Comments:    Recommend:  1. Consider adding renal modifications to current diet order as medically appropriate and tolerated.  2. Encourage PO intake. Current intake ~54% x 7 meals.   3. Continue Arginaid BID and Novasource Renal BID.  4. Consider a renal multivitamin with minerals daily.  5. Continue to monitor and replace electrolytes PRN.      RD to follow pt and available PRN.       Reason for Assessment     Row Name 08/20/21 1341          Reason for Assessment    Reason For Assessment  follow-up protocol     Diagnosis  cardiac disease;diabetes diagnosis/complications;cancer diagnosis/related complications;pulmonary disease;renal disease;liver disease COVID, cirrhosis, DM 2, HLD, HTN, CKD 3, hepatocellular carcinoma     Identified At Risk by Screening Criteria  large or nonhealing wound, burn or pressure injury;BMI             Labs/Tests/Procedures/Meds     Row Name 08/20/21 1343          Labs/Procedures/Meds    Lab Results Reviewed  reviewed, pertinent     Lab Results Comments  Low: Na, Alb, Platelets; High: BUN, Gluc, Ammonia        Medications    Pertinent Medications Reviewed  reviewed, pertinent     Pertinent Medications Comments  Vitamin C, Decadron, Levemir, Novolog, Protonix, Lasix, Pericolace, Lactulose,         Physical Findings     Row Name 08/20/21 1340          Physical Findings    Skin  pressure injury;other (see comments) coccyx PI, bilateral MASD, left and right arm skin tears           Nutrition Prescription Ordered     Row Name 08/20/21 1343          Nutrition Prescription PO    Current PO Diet  Regular     Supplement  Arginaid;Novasource Renal (Nepro)     Supplement Frequency  2 times a day     Common Modifiers  Cardiac;Consistent Carbohydrate         Evaluation of Received Nutrient/Fluid Intake     Row Name 08/20/21 6867           PO Evaluation    Number of Days PO Intake Evaluated  3 days     Number of Meals  7     % PO Intake  54               Problem/Interventions:  Problem 1     Row Name 08/20/21 1349          Nutrition Diagnoses Problem 1    Problem 1  Increased Nutrient Needs     Macronutrient  Protein     Micronutrient  Vitamin;Mineral     Etiology (related to)  Medical Diagnosis     Pulmonary/Critical Care  Other (comment) COVID     Skin  Skin breakdown;Pressure injury     Signs/Symptoms (evidenced by)  Report/Observation     Reported/Observed By  MD;RN         Problem 2     Row Name 08/20/21 1349          Nutrition Diagnoses Problem 2    Problem 2  Altered Nutrition Related to Labs     Etiology (related to)  Medical Diagnosis     Endocrine  DM Type 2     Renal  CKD     Signs/Symptoms (evidenced by)  Biochemical     Specific Labs Noted  BUN;Glucose             Intervention Goal     Row Name 08/20/21 1349          Intervention Goal    General  Meet nutritional needs for age/condition;Improved nutrition related lab(s)     PO  Meet estimated needs;Increase intake;PO intake (%)     PO Intake %  75 %     Weight  Maintain weight         Nutrition Intervention     Row Name 08/20/21 1345          Nutrition Intervention    RD/Tech Action  Follow Tx progress;Encourage intake;Recommend/ordered     Recommended/Ordered  Diet         Nutrition Prescription     Row Name 08/20/21 1347          Nutrition Prescription PO    PO Prescription  Begin/change diet     Begin/Change Diet to  Regular     Common Modifiers  Cardiac;Consistent Carbohydrate;Renal     New PO Prescription Ordered?  No, recommended        Other Orders    Obtain Weight  Daily     Obtain Weight Ordered?  No, recommended     Supplement  Vitamin mineral supplement Renal     Supplement Ordered?  No, recommended     Other  Continue to monitor and replace electrolytes PRN         Education/Evaluation     Row Name 08/20/21 1350          Education    Education  Education not appropriate  at this time     Please explain  Other (comment) isolation status        Monitor/Evaluation    Monitor  Per protocol;I&O;Supplement intake;PO intake;Pertinent labs;Skin status;Weight           Electronically signed by:  Sirisha Costa RD  08/20/21 13:50 EDT

## 2021-08-20 NOTE — CASE MANAGEMENT/SOCIAL WORK
Continued Stay Note  [facility]     Patient Name: Garrett Richard  MRN: [id number]  Today's Date: 8/20/2021    Admit Date: 8/17/2021    Discharge Plan    [facility] declined patient.  Still working on placement.   Expected Discharge Date and Time     Expected Discharge Date Expected Discharge Time    Aug 21, 2021             Audrey Funez RN

## 2021-08-20 NOTE — CASE MANAGEMENT/SOCIAL WORK
Continued Stay Note   [facility]     Patient Name: Garrett Richard  MRN: [id number]  Today's Date: 8/20/2021    Admit Date: 8/17/2021    Discharge Plan    Patient will be discharged to [facility] tomorrow, 8/21/21.  Will need EMS transport. He is a [place] resident. Plan discussed with wife.  IMM completed per wife.    Expected Discharge Date and Time     Expected Discharge Date Expected Discharge Time    Aug 21, 2021             Audrey Funez RN

## 2021-08-20 NOTE — PLAN OF CARE
Goal Outcome Evaluation:         Patient resting, not feeling well today o2 saturations >90% on room air. Will monitor patient.

## 2021-08-20 NOTE — THERAPY TREATMENT NOTE
Patient Name: Garrett Richard  : 1950    MRN: [id number]                              Today's Date: 2021       Admit Date: 2021    Visit Dx:     ICD-10-CM ICD-9-CM   1. COVID-19  U07.1 079.89   2. Acute respiratory failure with hypoxia (CMS/HCC)  J96.01 518.81   3. Lactic acidosis  E87.2 276.2   4. Closed fracture of thoracic vertebra, unspecified fracture morphology, unspecified thoracic vertebral level, initial encounter (CMS/HCC)  S22.009A 805.2   5. Closed fracture of transverse process of lumbar vertebra, initial encounter (CMS/HCC)  S32.009A 805.4   6. 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)   • Encephalopathy, hepatic (CMS/HCC)   • AL (acute kidney injury) (CMS/HCC)   • Increased ammonia level   • Hepatic encephalopathy (CMS/HCC)   • Chronic kidney disease, stage III (moderate) (CMS/HCC)   • Severe malnutrition (CMS/HCC)   • COVID-19     Past Medical History:   Diagnosis Date   • AL (acute kidney injury) (CMS/HCC) 2021   • Diabetes mellitus (CMS/HCC)     type II per patient   • Hepatocellular carcinoma (CMS/HCC) 10/25/2018    s/p TACE procedure with no mass noted on fu imaging   • History of transfusion    • Hypertension    • Impaired functional mobility, balance, gait, and endurance    • Liver disease    • Umbilical hernia    • Upper respiratory infection    • Urinary symptom or sign    • Urinary tract infection      Past Surgical History:   Procedure Laterality Date   • ENDOSCOPY N/A 2021    Procedure: ESOPHAGOGASTRODUODENOSCOPY WITH ARGON THERMAL ABLATION;  Surgeon: Samy Swanson MD;  Location: [facility] ENDOSCOPY;  Service:  Gastroenterology;  Laterality: N/A;   • LIVER SURGERY     • TONSILLECTOMY       General Information     Row Name 08/20/21 1547          OT Time and Intention    Document Type  therapy note (daily note)  -SD     Mode of Treatment  occupational therapy  -SD       User Key  (r) = Recorded By, (t) = Taken By, (c) = Cosigned By    Initials Name Provider Type    Lachelle Arenas OT Occupational Therapist          Mobility/ADL's     Row Name 08/20/21 1547          Bed Mobility    Bed Mobility  scooting/bridging  -SD     Scooting/Bridging [place] (Bed Mobility)  standby assist;verbal cues;other (see comments) bed in trendelenburg, headboard to pull  -SD     Comment (Bed Mobility)  pt c/o pain all over; repositioned patient in bed with bed in trendeleburg using headboard to pull then placed in chair position  -SD     Row Name 08/20/21 1547          Grooming Assessment/Training    [place] Level (Grooming)  oral care regimen;wash face, hands;set up  -SD     Position (Grooming)  sitting up in bed  -SD       User Key  (r) = Recorded By, (t) = Taken By, (c) = Cosigned By    Initials Name Provider Type    SD Lachelle Wolf OT Occupational Therapist        Obj/Interventions     Row Name 08/20/21 1549          Shoulder (Therapeutic Exercise)    Shoulder AROM (Therapeutic Exercise)  bilateral;flexion;extension;aBduction;aDduction;10 repetitions  -SD     Row Name 08/20/21 1549          Elbow/Forearm (Therapeutic Exercise)    Elbow/Forearm AROM (Therapeutic Exercise)  bilateral;flexion;extension;supination;pronation;10 repetitions  -SD     Row Name 08/20/21 1549          Wrist (Therapeutic Exercise)    Wrist (Therapeutic Exercise)  AROM (active range of motion)  -SD     Wrist AROM (Therapeutic Exercise)  bilateral;flexion;extension;10 repetitions  -SD     Row Name 08/20/21 1549          Hand (Therapeutic Exercise)    Hand AROM/AAROM (Therapeutic Exercise)  bilateral;other (see comments) [place] hand  x 15  -SD     Row  Name 08/20/21 1549          Therapeutic Exercise    Therapeutic Exercise  shoulder;elbow/forearm;wrist;hand  -SD       User Key  (r) = Recorded By, (t) = Taken By, (c) = Cosigned By    Initials Name Provider Type    Lachelle Arenas OT Occupational Therapist        Goals/Plan    No documentation.       Clinical Impression     Row Name 08/20/21 1550          Pain Scale: FACES Pre/Post-Treatment    Pain: FACES Scale, Pretreatment  6-->hurts even more  -SD     Posttreatment Pain Rating  4-->hurts little more  -SD     Row Name 08/20/21 1550          Plan of Care Review    Plan of Care Reviewed With  patient  -SD     Progress  no change  -SD     Outcome Summary  OT tx completed. Patient repositioned in bed with bed in trendelenburg and using headboard to pull. Sitting up in bed patient completed grooming tasks with SBA and UB AROM. Patient c/o less pain once repositioned in bed. Continue OT POC  -SD     Row Name 08/20/21 1550          Vital Signs    O2 Delivery Pre Treatment  room air  -SD     O2 Delivery Intra Treatment  room air  -SD     O2 Delivery Post Treatment  room air  -SD     Row Name 08/20/21 1550          Positioning and Restraints    Pre-Treatment Position  in bed  -SD     Post Treatment Position  bed  -SD     In Bed  fowlers;call light within reach;encouraged to call for assist;exit alarm on;notified nsg  -SD       User Key  (r) = Recorded By, (t) = Taken By, (c) = Cosigned By    Initials Name Provider Type    Lachelle Arenas OT Occupational Therapist        Outcome Measures     Row Name 08/20/21 1551          How much help from another is currently needed...    Putting on and taking off regular lower body clothing?  1  -SD     Bathing (including washing, rinsing, and drying)  1  -SD     Toileting (which includes using toilet bed pan or urinal)  1  -SD     Putting on and taking off regular upper body clothing  2  -SD     Taking care of personal grooming (such as brushing teeth)  3  -SD     Eating  meals  3  -SD     AM-PAC 6 Clicks Score (OT)  11  -SD     Row Name 08/20/21 1551          Functional Assessment    Outcome Measure Options  AM-PAC 6 Clicks Daily Activity (OT)  -SD       User Key  (r) = Recorded By, (t) = Taken By, (c) = Cosigned By    Initials Name Provider Type    Lachelle Arenas OT Occupational Therapist          Occupational Therapy Education                 Title: PT OT SLP Therapies (Done)     Topic: Occupational Therapy (Done)     Point: ADL training (Done)     Description:   Instruct learner(s) on proper safety adaptation and remediation techniques during self care or transfers.   Instruct in proper use of assistive devices.              Learning Progress Summary           Patient Acceptance, E,TB, VU by  at 8/20/2021 1415    Acceptance, E,TB, VU by SD at 8/19/2021 1321    Comment: Safety and sequencing during funtional transfer.                   Point: Home exercise program (Done)     Description:   Instruct learner(s) on appropriate technique for monitoring, assisting and/or progressing therapeutic exercises/activities.              Learning Progress Summary           Patient Acceptance, E,TB, VU by SD at 8/20/2021 1551    Comment: Benefit of ther ex to decrease pain    Acceptance, E,TB, VU by  at 8/20/2021 1415                   Point: Precautions (Done)     Description:   Instruct learner(s) on prescribed precautions during self-care and functional transfers.              Learning Progress Summary           Patient Acceptance, E,TB, VU by  at 8/20/2021 1415                   Point: Body mechanics (Done)     Description:   Instruct learner(s) on proper positioning and spine alignment during self-care, functional mobility activities and/or exercises.              Learning Progress Summary           Patient Acceptance, E,TB, VU by  at 8/20/2021 1415                               User Key     Initials Effective Dates Name Provider Type Discipline     06/16/21 -  Wilian  Maria Del Carmen MERRITT, RN Registered Nurse Nurse    SD 06/16/21 -  Lachelle Wolf OT Occupational Therapist OT              OT Recommendation and Plan  Planned Therapy Interventions (OT): activity tolerance training, adaptive equipment training, BADL retraining, passive ROM/stretching, patient/caregiver education/training, ROM/therapeutic exercise, strengthening exercise, transfer/mobility retraining  Therapy Frequency (OT): 3 times/wk (5 times if indicated)  Plan of Care Review  Plan of Care Reviewed With: patient  Progress: no change  Outcome Summary: OT tx completed. Patient repositioned in bed with bed in trendelenburg and using headboard to pull. Sitting up in bed patient completed grooming tasks with SBA and UB AROM. Patient c/o less pain once repositioned in bed. Continue OT POC     Time Calculation:   Time Calculation- OT     Row Name 08/20/21 1552             Time Calculation- OT    OT Start Time  1454  -SD      OT Stop Time  1520  -SD      OT Time Calculation (min)  26 min  -SD      OT Received On  08/20/21  -SD      OT Goal Re-Cert Due Date  08/30/21  -SD         Timed Charges    19589 - OT Therapeutic Exercise Minutes  11  -SD      96526 - OT Self Care/Mgmt Minutes  15  -SD         Total Minutes    Timed Charges Total Minutes  26  -SD       Total Minutes  26  -SD        User Key  (r) = Recorded By, (t) = Taken By, (c) = Cosigned By    Initials Name Provider Type    SD Lachelle Wolf OT Occupational Therapist        Therapy Charges for Today     Code Description Service Date Service Provider Modifiers Qty    19951662105 HC OT THER PROC EA 15 MIN 8/19/2021 Lachelle Wolf OT GO 1    14528511839 HC OT THERAPEUTIC ACT EA 15 MIN 8/19/2021 Lachelle Wolf, OT GO 1    99503505207 HC OT THER PROC EA 15 MIN 8/19/2021 Lachelle Wolf, OT GO 1    27030707910 HC OT THERAPEUTIC ACT EA 15 MIN 8/19/2021 Lachelle Wolf OT GO 1    87652959551 HC OT THER PROC EA 15 MIN 8/20/2021 Lachelle Wolf, OT GO 1     23563669632 HC OT SELF CARE/MGMT/TRAIN EA 15 MIN 8/20/2021 Lachelle Wolf OT GO 1               Lachelle Wolf OT  8/20/2021

## 2021-08-20 NOTE — PLAN OF CARE
Goal Outcome Evaluation:  Plan of Care Reviewed With: patient        Progress: no change  Outcome Summary: OT tx completed. Patient repositioned in bed with bed in trendelenburg and using headboard to pull. Sitting up in bed patient completed grooming tasks with SBA and UB AROM. Patient c/o less pain once repositioned in bed. Continue OT POC

## 2021-08-20 NOTE — PROGRESS NOTES
[facility]    Name:  Garrett Richard   Age:  71 y.o.  Sex:  male  :  1950  MRN:  [id number]   Visit Number:  [id number]  Admission Date:  2021  Date Of Service:  21  Primary Care Physician:  Pierce Lubin MD     LOS: 3 days :    Chief Complaint:      Generalized weakness, vomiting    Subjective:    Patient seen and evaluated today.  Patient resting comfortably in bed.  Watching TV.  Alert and oriented x3.  No acute events overnight.    Review of Systems:     All systems were reviewed and negative except as mentioned in subjective, assessment and plan.    Vital Signs:    Temp:  [98 °F (36.7 °C)-98.5 °F (36.9 °C)] 98.5 °F (36.9 °C)  Heart Rate:  [67-86] 69  Resp:  [16-18] 18  BP: (128-151)/(67-85) 130/85    Intake and output:    I/O last 3 completed shifts:  In: 1080 [P.O.:1080]  Out: 300 [Urine:300]  I/O this shift:  In: 360 [P.O.:360]  Out: -     Physical Examination: Examined again     General Appearance:  Alert and cooperative.    Head:  Atraumatic and normocephalic.   Eyes: Conjunctivae and sclerae normal, no icterus. No pallor.   Throat: No oral lesions, no thrush, oral mucosa moist.   Neck: Supple, trachea midline, no thyromegaly.   Lungs:   Breath sounds heard bilaterally equally.  No crackles or wheezing.    Heart:  Normal S1 and S2, no murmur, No JVD.   Abdomen:   Normal bowel sounds, nontender, nondistended, no rebound tenderness.   Extremities: Supple, no edema, no cyanosis, no clubbing.   Skin: No bleeding or rash.   Neurologic: Alert and oriented x 3.  Generally weak all over     Laboratory results:    Results from last 7 days   Lab Units 21  0543 21  0613 21  0618   SODIUM mmol/L 129* 129* 130*   POTASSIUM mmol/L 4.6 4.7 5.5*   CHLORIDE mmol/L 105 106 105   CO2 mmol/L 12.8* 18.0* 14.6*   BUN mg/dL 41* 45* 33*   CREATININE mg/dL 1.19 1.37* 1.04   CALCIUM mg/dL 8.9 8.3* 8.1*   BILIRUBIN mg/dL 1.2 1.2 1.4*   ALK  PHOS U/L 72 68 69   ALT (SGPT) U/L 23 28 26   AST (SGOT) U/L 40 35 42*   GLUCOSE mg/dL 240* 219* 219*     Results from last 7 days   Lab Units 08/20/21  0543 08/19/21  0613 08/18/21  0702   WBC 10*3/mm3 5.00 5.88 4.12   HEMOGLOBIN g/dL 9.2* 9.0* 9.5*   HEMATOCRIT % 27.2* 26.4* 28.0*   PLATELETS 10*3/mm3 37* 48* 52*     Results from last 7 days   Lab Units 08/17/21  1110   INR  1.27*     Results from last 7 days   Lab Units 08/17/21  1110   TROPONIN T ng/mL 0.021     Results from last 7 days   Lab Units 08/17/21  1117 08/17/21  1110   BLOODCX  No growth at 3 days No growth at 3 days         I have reviewed the patient's laboratory results.    Radiology results:    No radiology results from the last 24 hrs  I have reviewed the patient's radiology reports.    Medication Review:     I have reviewed the patient's active and prn medications.     Problem List:      COVID-19      Assessment:    COVID-19 pneumonia, POA  Generalized weakness with nausea/vomiting secondary to above, POA  Closed fracture transverse process of T12 and L1 vertebra  Stage liver disease secondary to hepatocellular carcinoma  History of hepatic encephalopathy  Impaired functional mobility balance gait and endurance  Type 2 diabetes, insulin-dependent       Plan:    Continue monitor patient in the hospital with Covid isolation precautions.  We will continue with Decadron to complete 5 days.  Fortunately, patient continues to remain very stable and require minimal amount of supplemental oxygen.  Continue with albuterol MDI and incentive spirometer.    We will continue with his current home dose of lactulose to titrate to 3-4 bowel movements per day.  We will also continue rifaximin. Continue Lasix and spironolactone.  Have restarted patient's basal insulin, will continue with low-dose sliding scale correction.  Patient would likely benefit from palliative care services following him post discharge.  Consult placed.  Patient is pending transfer to  short-term inpatient rehab. Fortunately, patient is essentially asymptomatic from a Covid standpoint and is able to perform PT/OT on room air with saturation in the mid 90s.  Further orders as clinical course dictates.    DVT Prophylaxis: SCDs  Code Status: DNR/DNI  Diet: Diabetic  Discharge Plan: Transfer to [facility] and rehab tomorrow    Hernando Cabello DO  08/20/21  15:27 EDT    Dictated utilizing Dragon dictation.

## 2021-08-20 NOTE — PLAN OF CARE
Goal Outcome Evaluation:      Pt remains in COVID isolation.  's documentation revealed plan for pt to DC to [facility] tomorrow. MD documentation revealed recommendation for PC referral.    Called pt's spouse, Bonny Richard, to explain Palliative Care and the service it provides .   I explained it was not Hospice Care.  She was agreeable to a PC referral and requested they call her.    I mentioned seeing that pt will be going to [facility] tomorrow.  She responded that she was not aware that he would be going there tomorrow.   I informed her I would attempt to notify  to confirm.   were gone for the day.  Left message with Rachele CHA, who was scheduled to work tomorrow to please follow up with Discharge Planner tomorrow.

## 2021-08-21 NOTE — THERAPY TREATMENT NOTE
Patient Name: Garrett Richard  : 1950    MRN: [id number]                              Today's Date: 2021       Admit Date: 2021    Visit Dx:     ICD-10-CM ICD-9-CM   1. COVID-19  U07.1 079.89   2. Acute respiratory failure with hypoxia (CMS/HCC)  J96.01 518.81   3. Lactic acidosis  E87.2 276.2   4. Closed fracture of thoracic vertebra, unspecified fracture morphology, unspecified thoracic vertebral level, initial encounter (CMS/HCC)  S22.009A 805.2   5. Closed fracture of transverse process of lumbar vertebra, initial encounter (CMS/HCC)  S32.009A 805.4   6. 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)   • Encephalopathy, hepatic (CMS/HCC)   • AL (acute kidney injury) (CMS/HCC)   • Increased ammonia level   • Hepatic encephalopathy (CMS/HCC)   • Chronic kidney disease, stage III (moderate) (CMS/HCC)   • Severe malnutrition (CMS/HCC)   • COVID-19     Past Medical History:   Diagnosis Date   • AL (acute kidney injury) (CMS/HCC) 2021   • Diabetes mellitus (CMS/HCC)     type II per patient   • Hepatocellular carcinoma (CMS/HCC) 10/25/2018    s/p TACE procedure with no mass noted on fu imaging   • History of transfusion    • Hypertension    • Impaired functional mobility, balance, gait, and endurance    • Liver disease    • Umbilical hernia    • Upper respiratory infection    • Urinary symptom or sign    • Urinary tract infection      Past Surgical History:   Procedure Laterality Date   • ENDOSCOPY N/A 2021    Procedure: ESOPHAGOGASTRODUODENOSCOPY WITH ARGON THERMAL ABLATION;  Surgeon: Samy Swanson MD;  Location: [facility] ENDOSCOPY;  Service:  Gastroenterology;  Laterality: N/A;   • LIVER SURGERY     • TONSILLECTOMY       General Information     [facility] Name 08/21/21 1141          Physical Therapy Time and Intention    Document Type  therapy note (daily note)  -TW     Mode of Treatment  physical therapy  -     Row Name 08/21/21 1141          General Information    Patient Profile Reviewed  yes  -TW     Existing Precautions/Restrictions  fall  -     Row Name 08/21/21 1141          Cognition    Orientation Status (Cognition)  oriented to;person;situation;place  -TW       User Key  (r) = Recorded By, (t) = Taken By, (c) = Cosigned By    Initials Name Provider Type    TW Katie Willett, PT Physical Therapist        Mobility     Row Name 08/21/21 1141          Bed Mobility    Bed Mobility  scooting/bridging  -TW     Scooting/Bridging [place] (Bed Mobility)  minimum assist (75% patient effort);verbal cues  -TW     Comment (Bed Mobility)  Pt stated he was willing to participate in PT but post ther ex declined to sit on EOB to increase his mobility stating he just wasn't up to it today.  -[facility] Name 08/21/21 1141          Bed-Chair Transfer    Bed-Chair [place] (Transfers)  not tested  -[facility] Name 08/21/21 1141          Gait/Stairs (Locomotion)    [place] Level (Gait)  not tested  -       User Key  (r) = Recorded By, (t) = Taken By, (c) = Cosigned By    Initials Name Provider Type    Katie Velásquez, [employer] Physical Therapist        Obj/Interventions     Row Name 08/21/21 1141          Motor Skills    Therapeutic Exercise  hip;ankle BLE ther ex 1 x 10 including: SAQ, hip add sets, glut sets, quad sets and assisted heelslides. Pain with R hip movement.  -TW       User Key  (r) = Recorded By, (t) = Taken By, (c) = Cosigned By    Initials Name Provider Type    Katie Velásquez, PT Physical Therapist        Goals/Plan    No documentation.       Clinical Impression     Row Name 08/21/21 1211          Pain    Additional Documentation  Pain Scale:  Numbers Pre/Post-Treatment (Group)  -TW     Row Name 08/21/21 1211          Pain Scale: Numbers Pre/Post-Treatment    Pretreatment Pain Rating  5/10  -TW     Posttreatment Pain Rating  5/10  -TW     Pain Location - Side  Right  -TW     Pain Location - Orientation  lower  -TW     Pain Location  extremity  -TW     Pain Intervention(s)  Repositioned  -TW     Row Name 08/21/21 1211          Plan of Care Review    Progress  no change  -TW     Outcome Summary  PT treatment completed bedside with pt verbalizing he was willing to work with PT and performed LE ther ex per flowsheet. When asked which side of the bed he'd like to get up on, pt declined stating that he didn't feel like getting OOB. He states he has had frequent diarrhea which has made him weak. Nursing informed. Will continue PT POC if pt does not go to rehab.  -TW     Row Name 08/21/21 1211 08/21/21 1141       Vital Signs    Pre SpO2 (%)  97  -TW  --    O2 Delivery Pre Treatment  room air  -TW  --    Pre Patient Position  --  Supine  -TW    Intra Patient Position  --  Supine  -TW    Post Patient Position  --  Supine  -TW    Row Name 08/21/21 1141          Positioning and Restraints    Pre-Treatment Position  in bed  -TW     Post Treatment Position  bed  -TW     In Bed  supine;call light within reach;encouraged to call for assist;exit alarm on;side rails up x3  -TW       User Key  (r) = Recorded By, (t) = Taken By, (c) = Cosigned By    Initials Name Provider Type    TW Katie Willett, PT Physical Therapist        Outcome Measures     Row Name 08/21/21 1141          How much help from another person do you currently need...    Turning from your back to your side while in flat bed without using bedrails?  3  -TW     Moving from lying on back to sitting on the side of a flat bed without bedrails?  3  -TW     Moving to and from a bed to a chair (including a wheelchair)?  3  -TW     Standing up from a chair using your arms (e.g., wheelchair, bedside chair)?  3  -TW      Climbing 3-5 steps with a railing?  1  -TW     To walk in hospital room?  3  -TW     AM-PAC 6 Clicks Score (PT)  16  -TW     Row Name 08/21/21 1141          Functional Assessment    Outcome Measure Options  AM-PAC 6 Clicks Basic Mobility (PT)  -TW       User Key  (r) = Recorded By, (t) = Taken By, (c) = Cosigned By    Initials Name Provider Type    TW Katie Willett, PT Physical Therapist                       Physical Therapy Education                 Title: PT OT SLP Therapies (Resolved)     Topic: Physical Therapy (Resolved)     Point: Mobility training (Resolved)     Learning Progress Summary           Patient Acceptance, E,TB, VU by  at 8/20/2021 1415    Acceptance, E,TB,D, VU,NR by  at 8/19/2021 1506    Comment: importance of increased activity    Acceptance, E, NR by  at 8/18/2021 1548    Comment: Educated the pateint on the benefit of PT POC, and functional upright posture.                   Point: Body mechanics (Resolved)     Learning Progress Summary           Patient Acceptance, E,TB, VU by  at 8/20/2021 1415    Acceptance, E, NR by  at 8/18/2021 1548    Comment: Educated the pateint on the benefit of PT POC, and functional upright posture.                               User Key     Initials Effective Dates Name Provider Type Discipline     06/16/21 -  Maria Del Carmen Cedeno, RN Registered Nurse Nurse     06/16/21 -  Luisito Garcia, PTA Physical Therapy Assistant PT     07/26/21 -  Abigail Rushing, [employer] Student PT Student PT              PT Recommendation and Plan     Progress: no change  Outcome Summary: PT treatment completed bedside with pt verbalizing he was willing to work with PT and performed LE ther ex per flowsheet. When asked which side of the bed he'd like to get up on, pt declined stating that he didn't feel like getting OOB. He states he has had frequent diarrhea which has made him weak. Nursing informed. Will continue PT POC if pt does not go to rehab.     Time  Calculation:   PT Charges     Row Name 08/21/21 1141             Time Calculation    Start Time  1130  -TW      Stop Time  1141  -TW      Time Calculation (min)  11 min  -TW      PT Received On  08/21/21  -TW         Timed Charges    87516 - PT Therapeutic Exercise Minutes  11  -TW         Total Minutes    Timed Charges Total Minutes  11  -TW       Total Minutes  11  -TW        User Key  (r) = Recorded By, (t) = Taken By, (c) = Cosigned By    Initials Name Provider Type    TW Katie Willett PT Physical Therapist        Therapy Charges for Today     Code Description Service Date Service Provider Modifiers Qty    87901501363 HC PT THER PROC EA 15 MIN 8/21/2021 Katie Willett PT GP 1          PT G-Codes  Outcome Measure Options: AM-PAC 6 Clicks Basic Mobility (PT)  AM-PAC 6 Clicks Score (PT): 16  AM-PAC 6 Clicks Score (OT): 11    Katie Willett PT  8/21/2021

## 2021-08-21 NOTE — NURSING NOTE
"Wife Bonny notified of patient discharge to [facility] today 8/21/21 . Bonny became agitated, stated \"No one communicated this to me! I had no idea he was going to [place].!\" Documentation noted in chart from case management on 8/20/21 regarding  patient discharge on this date 8/21/21.  "

## 2021-08-21 NOTE — DISCHARGE SUMMARY
[facility]   DISCHARGE SUMMARY      Name:  Garrett Richard   Age:  71 y.o.  Sex:  male  :  1950  MRN:  [id number]   Visit Number:  [id number]    Admission Date:  2021  Date of Discharge:  2021  Primary Care Physician:  Pierce Lubin MD    Discharge Diagnoses:     COVID-19 pneumonia, POA  Generalized weakness with nausea/vomiting secondary to above, POA  Closed fracture transverse process of T12 and L1 vertebra  Stage liver disease secondary to hepatocellular carcinoma  History of hepatic encephalopathy  Impaired functional mobility balance gait and endurance  Type 2 diabetes, insulin-dependent         COVID-19      Presenting Problem:    COVID-19 [U07.1]     Consults:     Consults     No orders found from 2021 to 2021.        Consulting Physician(s)             None            Procedures Performed:           Hospital Course:    Patient is a 71-year-old male with history significant for end-stage liver disease, hepatocellular carcinoma with multiple hospitalizations for hepatic encephalopathy who is followed at the [facility] awaiting liver transplant.  Patient was recently seen in our facility for hepatic encephalopathy and was discharged  to [facility] for inpatient rehabilitation.  Patient presented to the hospital from [facility] rehab facility due to progressive weakness and a one episode of vomiting.  In the ER, patient was found to be hemodynamically stable.  Initially he was 96% on room air.  Labs were largely unremarkable, ammonia level was 62, lactic acid mildly elevated at 2.9.  Procalcitonin within normal limits.  No leukocytosis, renal function normal.  CT head, abdomen pelvis and chest revealed groundglass opacity in the left lower lobe favored to be a early viral pneumonia.  Other findings include mild compression deformity of T12 and right L1 transverse process likely from a recent fall.  Other findings are  chronic.  Respiratory PCR returned positive for COVID-19 infection.  Patient has been fully vaccinated since March 2021 and received Pfizer vaccine.  Patient was admitted to the hospital and started on Decadron for treatment of Covid.  Completed course while in the hospital.  No indication for antibiotics procalcitonin was negative.  Ammonia level was appropriate.  No concern for hepatic encephalopathy.  Patient was admitted to the hospital and continued on his home medication regimen including diuretics, lactulose and rifaximin.  Initially, he did require 2 L to maintain appropriate saturation however this was titrated down the following morning after admission and patient maintained on room air even with activity.  PT OT consulted.  Patient returned to his baseline level of functioning and cooperated and worked well with physical therapy.  Patient discharged to continue physical therapy and inpatient rehab.    Vital Signs:    Temp:  [96.9 °F (36.1 °C)-97.7 °F (36.5 °C)] 97.3 °F (36.3 °C)  Heart Rate:  [69-84] 80  Resp:  [16-18] 18  BP: (114-131)/(66-81) 114/73    Physical Exam:    General Appearance:  Alert and cooperative, not in any acute distress.   Head:  Atraumatic and normocephalic, without obvious abnormality.   Eyes:          PERRLA, conjunctivae and sclerae normal, no icterus. No pallor. Extraocular movements are within normal limits.   Ears:  Ears appear intact with no abnormalities noted.   Throat: No oral lesions, no thrush, oral mucosa moist.   Neck: Supple, trachea midline, no thyromegaly, no carotid bruit.       Lungs:   Chest shape is normal. Breath sounds heard bilaterally equally.  No crackles or wheezing.    Heart:  Normal S1 and S2, no murmur, No JVD.   Abdomen:   Normal bowel sounds,  Soft, nontender, distended, no guarding, no rebound tenderness.   Extremities: Moves all extremities well, no edema, no cyanosis, no clubbing.   Pulses: Pulses palpable and equal bilaterally.   Skin: No bleeding,  bruising or rash.       Neurologic: Alert and oriented x 3. Moves all four limbs equally. No tremors. No facial asymmetry.     Pertinent Lab Results:     Results from last 7 days   Lab Units 08/20/21  0543 08/19/21  0613 08/18/21  0618   SODIUM mmol/L 129* 129* 130*   POTASSIUM mmol/L 4.6 4.7 5.5*   CHLORIDE mmol/L 105 106 105   CO2 mmol/L 12.8* 18.0* 14.6*   BUN mg/dL 41* 45* 33*   CREATININE mg/dL 1.19 1.37* 1.04   CALCIUM mg/dL 8.9 8.3* 8.1*   BILIRUBIN mg/dL 1.2 1.2 1.4*   ALK PHOS U/L 72 68 69   ALT (SGPT) U/L 23 28 26   AST (SGOT) U/L 40 35 42*   GLUCOSE mg/dL 240* 219* 219*     Results from last 7 days   Lab Units 08/21/21  0559 08/20/21  0543 08/19/21  0613   WBC 10*3/mm3 5.01 5.00 5.88   HEMOGLOBIN g/dL 9.2* 9.2* 9.0*   HEMATOCRIT % 26.2* 27.2* 26.4*   PLATELETS 10*3/mm3 32* 37* 48*     Results from last 7 days   Lab Units 08/17/21  1110   INR  1.27*     Results from last 7 days   Lab Units 08/17/21  1110   TROPONIN T ng/mL 0.021     Results from last 7 days   Lab Units 08/17/21  1110   PROBNP pg/mL 115.2                       Invalid input(s): USDES,  BLOODU, NITRITITE, BACT, EP  Pain Management Panel     Pain Management Panel Latest Ref Rng & Units 9/29/2020 3/21/2018    CREATININE UR Not Estab. mg/dL 125.5 100        Results from last 7 days   Lab Units 08/17/21  1117 08/17/21  1110   BLOODCX  No growth at 4 days No growth at 4 days       Pertinent Radiology Results:    Imaging Results (All)     Procedure Component Value Units Date/Time    CT Chest With Contrast Diagnostic [[id number]] Collected: 08/17/21 1200     Updated: 08/17/21 1212    Narrative:      CT OF THE CHEST, ABDOMEN AND PELVIS WITH CONTRAST     INDICATION: ams, diarrhea, abd tenderness     TECHNIQUE:  Thin section axial images were obtained from the lung apices  to the pubic symphysis following IV and oral contrast administration.  Coronal reconstruction images were obtained from the axial data.     COMPARISON: 5/9/2021.     FINDINGS:       CHEST:  There is no mediastinal, hilar, or axillary lymphadenopathy.   There is no pleural or pericardial effusion.  There is stable right  lower lobe atelectasis. There is a new round area of groundglass opacity  in the left lower lobe on image 46 which is favored to be infectious or  inflammatory.     ABDOMEN: The liver is small and nodular in contour consistent with  cirrhosis. No focal liver lesion is visualized.  The gallbladder is  present.  The spleen is mildly enlarged at 14 cm. The adrenal glands and  pancreas are without acute abnormality.  There is no hydronephrosis.  Hypodensity in the right mid kidney is unchanged. Gastric wall  thickening is nonspecific in the setting of ascites. There is no  evidence of small bowel obstruction. There is long segment colonic wall  thickening. This is nonspecific in the setting of ascites. Colitis is  not excluded. There is no abdominal ascites. Recanalized paraumbilical  veins are noted and unchanged from prior. There is perihepatic and  perisplenic ascites which is slightly improved compared to prior.     PELVIS: The prostate and urinary bladder are unremarkable. Long segment  colonic wall thickening is nonspecific in the setting of ascites.  Colitis not excluded. Pelvic ascites has decreased. There is no pelvic  lymphadenopathy.       BONES: There is a new mild compression deformity of T12. A fracture of  the right L1 transverse process is also new from prior exam. Subtle  lucent lesions in the right iliac crest are unchanged.       Impression:      1. New rounded groundglass opacity in the left lower lobe which is  favored to be infectious or inflammatory. Early viral pneumonia is not  entirely excluded.  2. Cirrhosis with findings of portal hypertension including  splenomegaly, venous collaterals, and ascites.  3. Long segment colonic wall thickening, nonspecific in the setting of  ascites. Colitis not excluded.  4. New mild compression deformity of T12 and right  L1 transverse process  fracture. A history of trauma was not provided. Consider MRI if  indicated.                    This study was performed with techniques to keep radiation doses as low  as reasonably achievable (ALARA). Individualized dose reduction  techniques using automated exposure control or adjustment of mA and/or  kV according to the patient size were employed.      This report was finalized on 8/17/2021 12:09 PM by Nellie Soto MD.    CT Abdomen Pelvis With Contrast [[id number]] Collected: 08/17/21 1200     Updated: 08/17/21 1212    Narrative:      CT OF THE CHEST, ABDOMEN AND PELVIS WITH CONTRAST     INDICATION: ams, diarrhea, abd tenderness     TECHNIQUE:  Thin section axial images were obtained from the lung apices  to the pubic symphysis following IV and oral contrast administration.  Coronal reconstruction images were obtained from the axial data.     COMPARISON: 5/9/2021.     FINDINGS:      CHEST:  There is no mediastinal, hilar, or axillary lymphadenopathy.   There is no pleural or pericardial effusion.  There is stable right  lower lobe atelectasis. There is a new round area of groundglass opacity  in the left lower lobe on image 46 which is favored to be infectious or  inflammatory.     ABDOMEN: The liver is small and nodular in contour consistent with  cirrhosis. No focal liver lesion is visualized.  The gallbladder is  present.  The spleen is mildly enlarged at 14 cm. The adrenal glands and  pancreas are without acute abnormality.  There is no hydronephrosis.  Hypodensity in the right mid kidney is unchanged. Gastric wall  thickening is nonspecific in the setting of ascites. There is no  evidence of small bowel obstruction. There is long segment colonic wall  thickening. This is nonspecific in the setting of ascites. Colitis is  not excluded. There is no abdominal ascites. Recanalized paraumbilical  veins are noted and unchanged from prior. There is perihepatic and  perisplenic ascites which  is slightly improved compared to prior.     PELVIS: The prostate and urinary bladder are unremarkable. Long segment  colonic wall thickening is nonspecific in the setting of ascites.  Colitis not excluded. Pelvic ascites has decreased. There is no pelvic  lymphadenopathy.       BONES: There is a new mild compression deformity of T12. A fracture of  the right L1 transverse process is also new from prior exam. Subtle  lucent lesions in the right iliac crest are unchanged.       Impression:      1. New rounded groundglass opacity in the left lower lobe which is  favored to be infectious or inflammatory. Early viral pneumonia is not  entirely excluded.  2. Cirrhosis with findings of portal hypertension including  splenomegaly, venous collaterals, and ascites.  3. Long segment colonic wall thickening, nonspecific in the setting of  ascites. Colitis not excluded.  4. New mild compression deformity of T12 and right L1 transverse process  fracture. A history of trauma was not provided. Consider MRI if  indicated.                    This study was performed with techniques to keep radiation doses as low  as reasonably achievable (ALARA). Individualized dose reduction  techniques using automated exposure control or adjustment of mA and/or  kV according to the patient size were employed.      This report was finalized on 8/17/2021 12:09 PM by Nellie Soto MD.    CT Head Without Contrast [[id number]] Collected: 08/17/21 1157     Updated: 08/17/21 1201    Narrative:      PROCEDURE: CT HEAD WO CONTRAST-     INDICATION: ams     TECHNIQUE: Multiple axial CT images were performed from the foramen  magnum to the vertex without contrast.   Coronal reconstruction images  were obtained from the axial data.     COMPARISON: 8/8/2021.     FINDINGS: There is no mass effect or midline shift.  The ventricles are  symmetric in size and configuration.  There is no hydrocephalus.  There  are no extraaxial fluid collections.  There is no  intraventricular or  intraparenchymal hemorrhage. There is an old lacunar infarct in the  right abdiel. The posterior fossa is without acute abnormality. The  basilar cisterns are preserved.  The soft tissues are within normal  limits. No acute osseous abnormalities are present.       Impression:      No mass effect, midline shift, or intracranial hemorrhage.  No acute abnormality.                 This study was performed with techniques to keep radiation doses as low  as reasonably achievable (ALARA). Individualized dose reduction  techniques using automated exposure control or adjustment of mA and/or  kV according to the patient size were employed.      This report was finalized on 8/17/2021 11:58 AM by Nellie Soto MD.          Echo:    Results for orders placed in visit on 08/13/19    SCANNED - ECHOCARDIOGRAM      Condition on Discharge:      Stable.    Code status during the hospital stay:    Code Status and Medical Interventions:   Ordered at: 08/17/21 1738     Limited Support to NOT Include:    Cardioversion/Defibrillation    Intubation     Code Status:    No CPR     Medical Interventions (Level of Support Prior to Arrest):    Limited       Discharge Disposition:    Rehab Facility or Unit (DC - External)    Discharge Medications:       Discharge Medications      New Medications      Instructions Start Date   benzonatate 100 MG capsule  Commonly known as: TESSALON   100 mg, Oral, 3 Times Daily PRN         Changes to Medications      Instructions Start Date   lactulose 10 GM/15ML solution  Commonly known as: CHRONULAC  What changed: how much to take   20 g, Oral, 4 Times Daily      NovoLOG FlexPen 100 UNIT/ML solution pen-injector sc pen  Generic drug: insulin aspart  What changed: Another medication with the same name was removed. Continue taking this medication, and follow the directions you see here.   7 Units, Subcutaneous, 3 Times Daily With Meals, Sliding scale - depending on glucose readings       spironolactone 50 MG tablet  Commonly known as: Aldactone  What changed:   · how much to take  · when to take this   50 mg, Oral, 2 Times Daily         Continue These Medications      Instructions Start Date   ascorbic acid 500 MG tablet  Commonly known as: VITAMIN C   500 mg, Oral, Daily      B-D ULTRAFINE III SHORT PEN 31G X 8 MM misc  Generic drug: Insulin Pen Needle   No dose, route, or frequency recorded.      ferrous sulfate 325 (65 FE) MG tablet   325 mg, Oral, Daily With Breakfast, 2 tablets once a day       FreeStyle Sohail 14 Day Louin device   1 each, Does not apply, Every 14 Days      furosemide 40 MG tablet  Commonly known as: Lasix   20 mg, Oral, Daily      Lantus SoloStar 100 UNIT/ML injection pen  Generic drug: Insulin Glargine   10 Units, Injection, Nightly, 10 units at bedtime if the first morning reading is over 184      pantoprazole 40 MG EC tablet  Commonly known as: PROTONIX   40 mg, Oral, Daily      riFAXIMin 550 MG tablet  Commonly known as: XIFAXAN   550 mg, Oral, Every 12 Hours Scheduled      sennosides-docusate 8.6-50 MG per tablet  Commonly known as: PERICOLACE   1 tablet, Oral, otc       sucralfate 1 g tablet  Commonly known as: Carafate   1 g, Oral, 4 Times Daily         Stop These Medications    mupirocin 2 % ointment  Commonly known as: BACTROBAN            Discharge Diet:     Diet Instructions     Diet: Cardiac, Consistent Carbohydrate      Discharge Diet:  Cardiac  Consistent Carbohydrate       Fluid Restriction per day: 1500 mL Fluid          Activity at Discharge:     Activity Instructions     Activity as Tolerated            Follow-up Appointments:    Additional Instructions for the Follow-ups that You Need to Schedule     Discharge Follow-up with PCP   As directed       Currently Documented PCP:    Pierce Lubin MD    PCP Phone Number:    [phone]     Follow Up Details: 1-2 weeks            Contact information for follow-up providers     Pierce Lubin MD .     Specialty: Internal Medicine  Why: 1-2 weeks  Contact information:  [address]  [facility] [address]  [phone]                   Contact information for after-discharge care     Destination     [facility] .    Service: Skilled Nursing  Contact information:  [address] [address] [phone]                             Future Appointments   Date Time Provider Department Center   8/26/2021  5:00 AM Nirali Gonzalez PA-C [facility]   9/2/2021  5:00 AM Nirali Gonzalez PA-[facility]   9/9/2021  5:00 AM Nirali Gonzalez PA-C [facility]   10/1/2021  3:15 PM Pierce Lubin MD [facility]   10/7/2021  5:00 AM Krzysztof Arias DO [facility]   12/9/2021  5:00 AM Krzysztof Arias DO [facility]       Additional Instructions for the Follow-ups that You Need to Schedule     Discharge Follow-up with PCP   As directed       Currently Documented PCP:    Pierce Lubin MD    PCP Phone Number:    [phone]     Follow Up Details: 1-2 weeks               Test Results Pending at Discharge:    Pending Labs     Order Current Status    Blood Culture With NAY - Blood, Arm, Right Preliminary result    Blood Culture With NAY - Blood, Hand, Right Preliminary result             Hernando Cabello DO  08/21/21  12:45 EDT    Time spent: Time: I spent  >30  minutes on this discharge activity which included: face-to-face encounter with the patient, reviewing the data in the system, coordination of the care with the nursing staff as well as consultants, documentation, and entering orders.        Dictated utilizing Dragon dictation.

## 2021-08-21 NOTE — CASE MANAGEMENT/SOCIAL WORK
Case Management Discharge Note      Final Note: [facility] rehab    Provided Post Acute Provider List?: N/A  N/A Provider List Comment: Spoke to wife over the phone  Provided Post Acute Provider Quality & Resource List?: N/A  N/A Quality & Resource List Comment: Spoke to wife over the phone, chose [facility]    Selected Continued Care - Discharged on 8/21/2021 Admission date: 8/17/2021 - Discharge disposition: Rehab Facility or Unit (DC - External)    Destination Coordination complete.    Service Provider Selected Services Address Phone Fax Patient Preferred    [facility]  Skilled Nursing [address], [facility] [address] [phone] --          Durable Medical Equipment    No services have been selected for the patient.              Dialysis/Infusion    No services have been selected for the patient.              Home Medical Care    No services have been selected for the patient.              Therapy    No services have been selected for the patient.              Community Resources    No services have been selected for the patient.              Community & DME    No services have been selected for the patient.                Selected Continued Care - Episodes Includes selections from active Coordinated Care Management episodes    High Risk Care Management Episode start date: 8/13/2021 (Paused)   There are no active outsourced providers for this episode.             Selected Continued Care - Prior Encounters Includes selections from prior encounters from 5/19/2021 to 8/21/2021    Discharged on 8/11/2021 Admission date: 8/8/2021 - Discharge disposition: Skilled Nursing Facility (DC - External)    Destination     Service Provider Selected Services Address Phone Fax Patient Preferred    [facility]  [facility] [address] [address] [phone] --       Internal Comment last updated by Jody Carreon MSW 8/11/2021 7169     Precert started 8/10/21                     Home Medical Care     Service Provider Selected Services Address Phone Fax Patient Preferred    [facility] [address] [address] [phone] --       Internal Comment last updated by uAdrey Funez, RN 8/9/2021 1833    Current provider for PT.  If patient goes home,  The wife is requesting Nursing and OT be added as well.- Audrey Funez 1830 8/9/21                           Discharged on 6/16/2021 Admission date: 6/12/2021 - Discharge disposition: Home-Health Care [facility]     Service Provider Selected Services Address Phone Fax Patient Preferred    [facility] [address] [address] [phone] --                Discharged on 5/29/2021 Admission date: 5/28/2021 - Discharge disposition: Home-Health Care [facility]     Service Provider Selected Services Address Phone Fax Patient Preferred    [facility] [address] [address] [phone] --       Internal Comment last updated by Malia Oseguera RN 6/1/2021 1316    6/1 called and confirmed accepted patient                               Transportation Services  Ambulance: [facility]    Final Discharge Disposition Code: 03 - skilled nursing facility (SNF)

## 2021-08-21 NOTE — PLAN OF CARE
Goal Outcome Evaluation:           Progress: no change  Outcome Summary: PT treatment completed bedside with pt verbalizing he was willing to work with PT and performed LE ther ex per flowsheet. When asked which side of the bed he'd like to get up on, pt declined stating that he didn't feel like getting OOB. He states he has had frequent diarrhea which has made him weak. Nursing informed. Will continue PT POC if pt does not go to rehab.

## 2021-08-23 ENCOUNTER — HOSPITAL ENCOUNTER (OUTPATIENT)
Facility: HOSPITAL | Age: 71
Discharge: HOME | End: 2021-08-23
Payer: MEDICARE

## 2021-08-23 LAB
A/G RATIO: 0.6 (ref 0.8–2)
ALBUMIN SERPL-MCNC: 2.2 G/DL (ref 3.4–4.8)
ALP BLD-CCNC: 88 U/L (ref 25–100)
ALT SERPL-CCNC: 50 U/L (ref 4–36)
ANION GAP SERPL CALCULATED.3IONS-SCNC: 6 MMOL/L (ref 3–16)
AST SERPL-CCNC: 38 U/L (ref 8–33)
BASOPHILS ABSOLUTE: 0 K/UL (ref 0–0.1)
BASOPHILS RELATIVE PERCENT: 0.1 %
BILIRUB SERPL-MCNC: 1.2 MG/DL (ref 0.3–1.2)
BUN BLDV-MCNC: 36 MG/DL (ref 6–20)
CALCIUM SERPL-MCNC: 8.8 MG/DL (ref 8.5–10.5)
CHLORIDE BLD-SCNC: 102 MMOL/L (ref 98–107)
CO2: 20 MMOL/L (ref 20–30)
CREAT SERPL-MCNC: 1.2 MG/DL (ref 0.4–1.2)
EOSINOPHILS ABSOLUTE: 0.2 K/UL (ref 0–0.4)
EOSINOPHILS RELATIVE PERCENT: 1.6 %
GFR AFRICAN AMERICAN: >59
GFR NON-AFRICAN AMERICAN: >59
GLOBULIN: 3.4 G/DL
GLUCOSE BLD-MCNC: 326 MG/DL (ref 74–106)
HCT VFR BLD CALC: 31 % (ref 40–54)
HEMOGLOBIN: 10 G/DL (ref 13–18)
IMMATURE GRANULOCYTES #: 0 K/UL
IMMATURE GRANULOCYTES %: 0.3 % (ref 0–5)
LYMPHOCYTES ABSOLUTE: 0.5 K/UL (ref 1.5–4)
LYMPHOCYTES RELATIVE PERCENT: 4.4 %
MCH RBC QN AUTO: 35.3 PG (ref 27–32)
MCHC RBC AUTO-ENTMCNC: 32.3 G/DL (ref 31–35)
MCV RBC AUTO: 109.5 FL (ref 80–100)
MONOCYTES ABSOLUTE: 0.6 K/UL (ref 0.2–0.8)
MONOCYTES RELATIVE PERCENT: 5.9 %
NEUTROPHILS ABSOLUTE: 9 K/UL (ref 2–7.5)
NEUTROPHILS RELATIVE PERCENT: 87.7 %
PDW BLD-RTO: 14.8 % (ref 11–16)
PLATELET # BLD: 37 K/UL (ref 150–400)
PMV BLD AUTO: 10 FL (ref 6–10)
POTASSIUM SERPL-SCNC: 4.9 MMOL/L (ref 3.4–5.1)
RBC # BLD: 2.83 M/UL (ref 4.5–6)
SODIUM BLD-SCNC: 128 MMOL/L (ref 136–145)
TOTAL PROTEIN: 5.6 G/DL (ref 6.4–8.3)
WBC # BLD: 10.2 K/UL (ref 4–11)

## 2021-08-23 PROCEDURE — 80053 COMPREHEN METABOLIC PANEL: CPT

## 2021-08-23 PROCEDURE — 85025 COMPLETE CBC W/AUTO DIFF WBC: CPT

## 2021-08-24 NOTE — PLAN OF CARE
Spoke to Tootie with [facility].  Palliative referral given.  Update given that pt was discharged to rehab on 8/21/21.  They will note pt's acct to f/u after rehab.

## 2021-08-24 NOTE — OUTREACH NOTE
Ambulatory Case Management Note    SNF Follow-up    Questions/Answers      Responses   Acute Facility Discharged From  [facility]   Acute Discharge Date  08/21/21   Name [facility] the Skilled Nursing Facility[facility] [phone]   Purpose of SNF Admission  PT, OT, SN   Estimated length of stay for the patient?  Undetermined   Who is the insurance provider or payor of patient stay?  Medicare   Progression of Patient?  SNF admission following 4 day acute event at [facility]              Elena Iyer RN  Ambulatory Case Management    8/24/2021, 09:57 EDT

## 2021-08-30 NOTE — TELEPHONE ENCOUNTER
Received call from Terrie with home palliative services.  Wife has declined services stating that she doesn't feel he needs it.  Pt remains at [facility] doing rehab.  However, wife reports that he has not been able to work well with therapy.

## 2021-08-31 ENCOUNTER — HOSPITAL ENCOUNTER (OUTPATIENT)
Facility: HOSPITAL | Age: 71
Discharge: HOME | End: 2021-08-31
Payer: MEDICARE

## 2021-08-31 LAB
BASOPHILS ABSOLUTE: 0 K/UL (ref 0–0.1)
BASOPHILS RELATIVE PERCENT: 0.9 %
EOSINOPHILS ABSOLUTE: 0.3 K/UL (ref 0–0.4)
EOSINOPHILS RELATIVE PERCENT: 6 %
HCT VFR BLD CALC: 28.5 % (ref 40–54)
HEMOGLOBIN: 9.5 G/DL (ref 13–18)
IMMATURE GRANULOCYTES #: 0 K/UL
IMMATURE GRANULOCYTES %: 0.4 % (ref 0–5)
LYMPHOCYTES ABSOLUTE: 1.7 K/UL (ref 1.5–4)
LYMPHOCYTES RELATIVE PERCENT: 37.7 %
MCH RBC QN AUTO: 33.3 PG (ref 27–32)
MCHC RBC AUTO-ENTMCNC: 33.3 G/DL (ref 31–35)
MCV RBC AUTO: 100 FL (ref 80–100)
MONOCYTES ABSOLUTE: 0.6 K/UL (ref 0.2–0.8)
MONOCYTES RELATIVE PERCENT: 13.2 %
NEUTROPHILS ABSOLUTE: 1.9 K/UL (ref 2–7.5)
NEUTROPHILS RELATIVE PERCENT: 41.8 %
PDW BLD-RTO: 23.8 % (ref 11–16)
PLATELET # BLD: 265 K/UL (ref 150–400)
PMV BLD AUTO: 10.6 FL (ref 6–10)
RBC # BLD: 2.85 M/UL (ref 4.5–6)
WBC # BLD: 4.5 K/UL (ref 4–11)

## 2021-08-31 PROCEDURE — 85025 COMPLETE CBC W/AUTO DIFF WBC: CPT

## 2021-08-31 NOTE — PROGRESS NOTES
Nursing Home Progress Note        Christopher Patel DO []  SAURABH Rosales []  [address], [place] [address]  Phone: [phone]  Fax: [phone] Beka Mays MD []  Krzysztof Arias DO []  Nirali Gonzalez PA-C [x]   [address] [place] [address]  Phone: [phone]  Fax: [phone]     PATIENT NAME: Garrett Richard                                                                          YOB: 1950           DATE OF SERVICE: 8/16/2021  FACILITY: [facility]    CHIEF COMPLAINT:  Increased confusion, emesis.       HISTORY OF PRESENT ILLNESS:   Mr. Richard is a 71-year of age male with history of end-stage liver disease, hepatocellular carcinoma who is followed at [facility] awaiting liver transplant.  Patient has had increased confusion, noted to be somnolent.  He also had an episode of emesis earlier this morning.  Wife is at bedside concerned with lactulose dosing.  He was previously taking 30 g 4 times daily.  Per nursing he has had bowel movement x2 today.  No fevers.  Mildly hypotensive.    PAST MEDICAL & SURGICAL HISTORY:   Past Medical History:   Diagnosis Date   • AL (acute kidney injury) (CMS/HCC) 5/28/2021   • Diabetes mellitus (CMS/HCC)     type II per patient   • Hepatocellular carcinoma (CMS/HCC) 10/25/2018    s/p TACE procedure with no mass noted on fu imaging   • History of transfusion    • Hypertension    • Impaired functional mobility, balance, gait, and endurance    • Liver disease    • Umbilical hernia    • Upper respiratory infection    • Urinary symptom or sign    • Urinary tract infection       Past Surgical History:   Procedure Laterality Date   • ENDOSCOPY N/A 5/11/2021    Procedure: ESOPHAGOGASTRODUODENOSCOPY WITH ARGON THERMAL ABLATION;  Surgeon: Samy Swanson MD;  Location: [facility] ENDOSCOPY;  Service: Gastroenterology;  Laterality: N/A;   • LIVER SURGERY     • TONSILLECTOMY           MEDICATIONS:  I have reviewed and reconciled  the patients medication list in the patients chart at the skilled nursing facility today.      ALLERGIES:  No Known Allergies      SOCIAL HISTORY:  Social History     Socioeconomic History   • Marital status:      Spouse name: Not on file   • Number of children: Not on file   • Years of education: Not on file   • Highest education level: Not on file   Tobacco Use   • Smoking status: Never Smoker   • Smokeless tobacco: Former User     Types: Snuff   Vaping Use   • Vaping Use: Never used   Substance and Sexual Activity   • Alcohol use: No   • Drug use: No   • Sexual activity: Defer       FAMILY HISTORY:  Family History   Problem Relation Age of Onset   • Colon cancer Mother    • Diabetes Maternal Grandmother    • COPD Father    • No Known Problems Maternal Grandfather    • No Known Problems Paternal Grandmother    • No Known Problems Paternal Grandfather        REVIEW OF SYSTEMS:    Unable to perform ROS due to confusion.    PHYSICAL EXAMINATION:     VITAL SIGNS:  /60   Pulse 94   Temp 97.5 °F (36.4 °C)   Resp 18   SpO2 96%     Nursing notes and vital signs reviewed.   General Appearance:  Chronically ill appearing male lying in bed, somnolent.   Head: Normocephalic and atraumatic, without obvious abnormality     Eyes: PERRLA.  Conjunctivae and sclerae normal.  EOM are within normal limits.    Neck: Normal range of motion. Neck supple. No JVD present.   Cardiovascular: Normal rate, regular rhythm.  Pulses palpable equal bilaterally.    Pulmonary/Chest: Effort normal and breath sounds normal. No respiratory distress.   Abdominal: Soft. Bowel sounds are normal. Mild distention, no apparent tenderness.       Musculoskeletal: Normal range of motion. No edema.   Neurological: Difficult to arouse, oriented to self only.    Skin: Skin is warm and dry.   Psychiatric:  Normal mood and affect. Normal behavior       RECORDS REVIEW:   8/13: Sodium 132, potassium 4.2, BUN 19, creatinine 1.1, FVC is 3.8, hemoglobin  9.5, hematocrit 27.7, platelets 49, A1c 5.8    ASSESSMENT     Diagnoses and all orders for this visit:    1. Encephalopathy, hepatic (CMS/HCC) (Primary)    2. Hepatocellular carcinoma (CMS/HCC)    3. Thrombocytopenia (CMS/HCC)    4. Anemia of chronic disease    5. Essential hypertension        PLAN  Wife at bedside upon assessment.  Will increase his lactulose to previous dosing of 30 g 4 times a day.  Blood pressure repeated which had showed some improvement 140/80.  Will hold his spironolactone tonight.  Thrombocytopenia is chronic although his platelets are lower at 49, previously low in the 60's.  Repeat CBC/CMP and will add ammonia level.  Zofran for nausea.  Discussed plan with wife at bedside who is agreeable.  She would like to defer transfer to hospital at this time, if his condition acutely changes in agreeable to urgent transfer.  Discussed with nursing to closely monitor patient, contact myself or MD with any acute changes.        [x]  Discussed Patient in detail with nursing/staff, addressed all needs today.     [x]  Plan of Care Reviewed   []  PT/OT Reviewed   []  Order Changes  []  Discharge Plans Reviewed  [x]  Advance Directive on file with Nursing Home.   [x]  POA on file with Nursing Home.    [x]  Code Status: DNR         Nirali Gonzalez PA-C.  8/31/2021

## 2021-09-01 NOTE — PROGRESS NOTES
Nursing Home History and Physical Note      Christopher Patel DO  []  SAURABH Rosales  []  [address] [place] [address]  Phone: [phone]  Fax: [phone] Beka Mays MD  []  Krzysztof Arias DO  []  Nirali Gonzalez PA-C  [x]  [address] [place] [address]  Phone: [phone]  Fax: [phone]     PATIENT NAME: Garrett Richard                                                                          YOB: 1950            DATE OF SERVICE: 08/25/2021  FACILITY: [facility]    CHIEF COMPLAINT:   New admit; COVID-19 pneumonia       HISTORY OF PRESENT ILLNESS:   Mr. Richard is a 71-year of age male with history of end-stage liver disease, hepatocellular carcinoma with multiple hospitalizations for hepatic encephalopathy (followed by  currently awaiting liver transplant).  He had recent admission to due to progressive weakness for hepatic encephalopathy and discharged on 8/11 to SNF for rehabilitation.  He presented back to [facility] due to progressive weakness, altered mental status, and vomiting.  Initially he was 96% on room air, hemodynamically stable.  Ammonia level 62, pro-Corey normal, lactic acid only mildly elevated at 2.9.  CT of the chest notable for groundglass opacity in the left lower lobe favored to be early viral pneumonia.  Respiratory PCR returned positive for COVID-19.  Of note he was fully vaccinated on March 2020 with Pfizer.  He was admitted admitted to the hospitalist service and treated with Decadron, not a candidate for remdesivir due to liver failure.  He did require supplemental oxygen but was able to titrated down.  Procalcitonin negative, no indication for antibiotics.  He did however continue to have fatigue and weakness, was recommended to continue irehabilitation services.  Due to his current Covid 19 infection he was transferred to [facility] and rehabilitation for continued care.  Per nursing, patient has remained in stable condition  since admission.  He continues under droplet precautions.  He is not requiring any supplemental O2.  Upon assessment he denies any acute complaints, continues on lactulose with 2-3 bowel movement reported per day.    PAST MEDICAL & SURGICAL HISTORY:   Past Medical History:   Diagnosis Date   • AL (acute kidney injury) (CMS/HCC) 5/28/2021   • Diabetes mellitus (CMS/HCC)     type II per patient   • Hepatocellular carcinoma (CMS/HCC) 10/25/2018    s/p TACE procedure with no mass noted on fu imaging   • History of transfusion    • Hypertension    • Impaired functional mobility, balance, gait, and endurance    • Liver disease    • Umbilical hernia    • Upper respiratory infection    • Urinary symptom or sign    • Urinary tract infection       Past Surgical History:   Procedure Laterality Date   • ENDOSCOPY N/A 5/11/2021    Procedure: ESOPHAGOGASTRODUODENOSCOPY WITH ARGON THERMAL ABLATION;  Surgeon: Samy Swanson MD;  Location: [facility] ENDOSCOPY;  Service: Gastroenterology;  Laterality: N/A;   • LIVER SURGERY     • TONSILLECTOMY            MEDICATIONS:  I have reviewed and reconciled the patients medication list in the patients chart at the skilled nursing facility today.      ALLERGIES:  No Known Allergies      SOCIAL HISTORY:  Social History     Socioeconomic History   • Marital status:      Spouse name: Not on file   • Number of children: Not on file   • Years of education: Not on file   • Highest education level: Not on file   Tobacco Use   • Smoking status: Never Smoker   • Smokeless tobacco: Former User     Types: Snuff   Vaping Use   • Vaping Use: Never used   Substance and Sexual Activity   • Alcohol use: No   • Drug use: No   • Sexual activity: Defer       FAMILY HISTORY:  Family History   Problem Relation Age of Onset   • Colon cancer Mother    • Diabetes Maternal Grandmother    • COPD Father    • No Known Problems Maternal Grandfather    • No Known Problems Paternal Grandmother    • No Known  Problems Paternal Grandfather          REVIEW OF SYSTEMS:    Generalized weakness, fatigue, poor appetite.   All other systems were reviewed and are negative.     PHYSICAL EXAMINATION:     VITAL SIGNS:  /65   Pulse 84   Temp 97.6 °F (36.4 °C)   Resp 18   Wt 70.4 kg (155 lb 5 oz)   SpO2 94%   BMI 21.06 kg/m²     Nursing notes and vital signs reviewed.   General Appearance:  Chronically ill appearing male, NAD lying in bed.    Head: Normocephalic and atraumatic, without obvious abnormality     Eyes: PERRLA.  Conjunctivae and sclerae normal.  EOM are within normal limits.    Neck: Normal range of motion. Neck supple. No JVD present.   Cardiovascular: Normal rate, regular rhythm.  Pulses palpable equal bilaterally.    Pulmonary/Chest: Effort normal and breath sounds normal. No respiratory distress.   Abdominal: Soft. Bowel sounds are normal.  Mild distention, no tenderness.     Musculoskeletal: Normal range of motion. No edema.   Neurological: Alert, appears fatigue. Oriented at baseline.    Skin: Skin is warm and dry.   Psychiatric:  Normal mood and affect. Normal behavior         RECORDS REVIEW:   8/23: WBCs 10.2, hemoglobin 10, hematocrit 31.0, platelets 37, sodium 128, BUN 36, creatinine 1.2, AST 38, ALT 50    ASSESSMENT   Diagnoses and all orders for this visit:    1. Pneumonia due to COVID-19 virus (Primary)    2. Hepatocellular carcinoma (CMS/HCC)    3. Encephalopathy, hepatic (CMS/HCC)    4. Cirrhosis of liver with ascites, unspecified hepatic cirrhosis type (CMS/HCC)    5. Anemia of chronic disease    6. Thrombocytopenia (CMS/HCC)    7. Stage 3 chronic kidney disease, unspecified whether stage 3a or 3b CKD (CMS/HCC)    8. Essential hypertension    9. Type 1 diabetes mellitus with diabetic polyneuropathy (CMS/HCC)    10. Severe malnutrition (CMS/HCC)        PLAN  COVID-19: Continue under droplet precautions as per facility protocol.  No continued need for supplemental O2, has maintained saturations  greater than 90 on room air.  He denies any continued cough or shortness of breath.  No fevers or leukocytosis.  Continue to monitor.    Hepatocellular carcinoma/encephalopathy/liver cirrhosis: Followed by [facility], currently awaiting liver transplant.  He does have history of encephalopathy, ammonia usually high during these times.  Mentation appears to be at his baseline.  Continue with lactulose dosing, goal of 2-3 bowel movements per day.  Will need follow-up with [facility] as outpatient.    Thrombocytopenia: Present upon discharge.  No rash or other systemic symptoms noted.  Per family he has history of thrombocytopenia, most likely worsened with recent COVID-19 infection.  We will repeat labs x1 week.    CKD: Chronic, stable.  Continue to monitor.    Diabetes:  Continue on current regimen.  Will need A1c, most recent per chart review on 5/9.      Malnutrition: Appetite continues to be poor, although he is tolerating p.o. intake without any vomiting.  Continue to encourage diet as tolerated and monitor weights closely.    []  Discussed Patient in detail with nursing/staff, addressed all needs today.     []  Plan of Care Reviewed   []  PT/OT Reviewed   []  Order Changes  []  Discharge Plans Reviewed  [x]  Advance Directive on file with Nursing Home.   [x]  POA on file with Nursing Home.   [x]  Code Status: DNR      Nirali Gonzalez PA-C

## 2021-09-03 NOTE — TELEPHONE ENCOUNTER
Caller: Bonny Richard    Relationship: Emergency Contact    Best call back number: [phone]      What specialty or service is being requested: FOR [facility]             Any additional details:  THE PATIENTS WIFE WOULD LIKE TO KNOW IF THE DOCTOR WILL REFER THE PATIENT TO [facility] SHE STATES THAT THE PATIENT IS CURRENTLY IN [facility] BUT THE INSURANCE WILL NOT COVER IT AS 09/06/2021 THE CALLER WOULD LIKE TO HAVE HIM IN [facility] PLEASE CALL THE CALLER TO DISCUSS

## 2021-09-07 NOTE — TELEPHONE ENCOUNTER
Rx Refill Note  Requested Prescriptions     Pending Prescriptions Disp Refills   • pantoprazole (PROTONIX) 40 MG EC tablet 90 tablet 3     Sig: Take 1 tablet by mouth Daily.      Last office visit with prescribing clinician: 7/1/2021      Next office visit with prescribing clinician: 10/1/2021            Lizet Casas LPN  09/07/21, 16:33 EDT

## 2021-09-07 NOTE — TELEPHONE ENCOUNTER
Caller: Jose ManuelBonny    Relationship: Emergency Contact    Best call back number: [phone]    Medication needed:   Requested Prescriptions     Pending Prescriptions Disp Refills   • pantoprazole (PROTONIX) 40 MG EC tablet       Sig: Take 1 tablet by mouth Daily.       When do you need the refill by: 09/07/2021    What additional details did the patient provide when requesting the medication:   PATIENT WOULD LIKE A 90 DAY SUPPLY OF MEDICATION CALLED INTO THE PHARMACY    Does the patient have less than a 3 day supply:  [] Yes  [x] No    What is the patient's preferred pharmacy: [facility] #[id number] - [place], [place] - [address] - [phone] [facility] [phone] FX

## 2021-09-08 NOTE — TELEPHONE ENCOUNTER
Caller: Bonny Richard    Relationship: Emergency Contact    Best call back number:     What medication are you requesting: NA    What are your current symptoms: PRESSURE SORE ON TAIL BONE    How long have you been experiencing symptoms: NA    Have you had these symptoms before:    [] Yes  [x] No    Have you been treated for these symptoms before:   [] Yes  [x] No    If a prescription is needed, what is your preferred pharmacy and phone number: [facility]Y #[id number] - [place], [place] - [address] [phone] [facility] [phone] FX     Additional notes: PATIENT RETURNED HOME 09/07 IN HOME PT EVALUATED 09/08 AND WILL BE SENDING NOTES FOR A REFERRAL TO DR MURRELL. PATIENTS WIFE STATES PT IS GOING TO RECOMMEND HE GO TO [facility].

## 2021-09-09 NOTE — TELEPHONE ENCOUNTER
HECTOR FROM [facility] SAW JESSICA TODAY AND HIS BP /85, PULSE RATE 91 AND WAS 90% ON ROOM AIR, RESP RATE WAS 18 AND TEMP WAS 97.9. HECTOR LOOKED AT HIS BOTTOM BECAUSE HE IS NOW BED BOUND AND HE HAS A STAGE 2 PRESSURE ULCER ON HIS COCCYX. PATIENT'S WIFE HADN'T NOTICED IT BUT SHE BELIEVES IF WAS THERE WHEN HE WAS IN NURSING HOME, HE HAS A LOT OF BRUISING ON RIGHT ABDOMEN AND RIGHT FLANK. HECTOR REPORTS SHE IS REALLY WORRIED ABOUT THIS PATIENT. PATIENT IS POSY COVID BUT HIS LINGS WERE CLEAR. PATIENT ISN'T EATING/DRINKING WELL MAYBE 16OZS AND A GRILL CHEESE SAMDWICH A DAY. PT AND OT SAW HIM AND ARE GOING TO TRY TO GET HIM IN [facility]. HOME HEALTH IS SCHEDULED FOR Monday. PLEASE RETURN CALL WITH QUESTIONS [phone]

## 2021-09-13 NOTE — TELEPHONE ENCOUNTER
Caller: JULITA    Relationship: Other PHYSICAL THERAPIST WITH [facility]    Best call back number:     What is the best time to reach you: ANYTIME    Who are you requesting to speak with (clinical staff, provider,  specific staff member): CLINICAL STAFF    Do you know the name of the person who called: JULITA    What was the call regarding: TO CONTINUE PHYS THERAPY 1 X 6 WEEK    Do you require a callback: YES

## 2021-09-20 NOTE — OUTREACH NOTE
Ambulatory Case Management Note    Care Coordination    RN-ACM care coordination with [facility].  Patient is active with their service and currently receiving SN, PT, and OT.   HRCM temporarily paused.     Elena Iyer RN  Ambulatory Case Management    9/20/2021, 11:27 EDT

## 2021-09-20 NOTE — TELEPHONE ENCOUNTER
I spoke with Bonny - she says [facility] is offering the supplies for free. They are going to use it as a back up for the [employer] Sohail.    Sending order over as a fax after Dr. Lubin signs

## 2021-09-20 NOTE — TELEPHONE ENCOUNTER
Telephone encounter to be sent to the clinical pool.    Pharmacy Name: [facility] - [facility] [address] - [phone]  - [phone]      Pharmacy representative name: [facility]    Pharmacy representative phone number:     What medication are you calling in regards to:     ROSARIO METRIX GLUCOSE METER  ROSARIO METRIX TEST STRIPS  ROSARIO PLUS 30 GAUGE LANCETS  ALCOHOL SWABS  ROSARIO METRIX LEVEL ONE CONTROL SOLUTION    What question does the pharmacy have: PHARMACIST REQUESTING PRESCRIPTION ORDERS FOR     ROSARIO METRIX GLUCOSE METER  ROSARIO METRIX TEST STRIPS  ROSARIO PLUS 30 GAUGE LANCETS  ALCOHOL SWABS  ROSARIO METRIX LEVEL ONE CONTROL SOLUTION    Who is the provider that prescribed the medication: NA    Additional notes:

## 2021-10-01 NOTE — THERAPY EVALUATION
Patient Name: Garrett Richard  : 1950    MRN: [id number]                              Today's Date: 10/1/2021       Admit Date: 2021    Visit Dx:     ICD-10-CM ICD-9-CM   1. Hepatic encephalopathy (HCC)  K72.90 572.2     Patient Active Problem List   Diagnosis   • Hepatic cirrhosis (HCC)   • Type 1 diabetes mellitus with diabetic polyneuropathy (HCC)   • Hyperlipidemia   • Essential hypertension   • Hypogonadism in male   • Right renal mass   • Hepatocellular carcinoma (HCC)   • Fever   • Melena   • Type 2 diabetes mellitus with nephropathy (HCC)   • Encephalopathy, hepatic (HCC)   • AL (acute kidney injury) (HCC)   • Increased ammonia level   • Hepatic encephalopathy (HCC)   • Chronic kidney disease, stage III (moderate) (HCC)   • Severe malnutrition (HCC)   • COVID-19     Past Medical History:   Diagnosis Date   • AL (acute kidney injury) (CMS/HCC) 2021   • Diabetes mellitus (CMS/HCC)     type II per patient   • Hepatocellular carcinoma (CMS/HCC) 10/25/2018    s/p TACE procedure with no mass noted on fu imaging   • History of transfusion    • Hypertension    • Impaired functional mobility, balance, gait, and endurance    • Liver disease    • Umbilical hernia    • Upper respiratory infection    • Urinary symptom or sign    • Urinary tract infection      Past Surgical History:   Procedure Laterality Date   • ENDOSCOPY N/A 2021    Procedure: ESOPHAGOGASTRODUODENOSCOPY WITH ARGON THERMAL ABLATION;  Surgeon: Samy Swanson MD;  Location: [facility] ENDOSCOPY;  Service: Gastroenterology;  Laterality: N/A;   • LIVER SURGERY     • TONSILLECTOMY       General Information     Row Name 10/01/21 1100          Physical Therapy Time and Intention    Document Type  evaluation  -JR     Mode of Treatment  physical therapy  -JR     Row Name 10/01/21 1100          General Information    Patient Profile Reviewed  yes  -JR     Prior Level of Function  mod assist:;transfer with therapy at home.  Wife  uses a juan [employer] lift  -     Existing Precautions/Restrictions  fall  -     Barriers to Rehab  medically complex;previous functional deficit;cognitive status  -     Row Name 10/01/21 1100          Living Environment    Lives With  spouse  -     Row Name 10/01/21 1100          Home Main Entrance    Number of Stairs, Main Entrance  none  -     Row Name 10/01/21 1100          Stairs Within Home, Primary    Number of Stairs, Within Home, Primary  none  -     Row Name 10/01/21 1100          Cognition    Orientation Status (Cognition)  oriented to;person;place  -     Row Name 10/01/21 1100          Safety Issues, Functional Mobility    Safety Issues Affecting Function (Mobility)  safety precautions follow-through/compliance;awareness of need for assistance;safety precaution awareness;insight into deficits/self-awareness  -     Impairments Affecting Function (Mobility)  strength;balance;endurance/activity tolerance;pain;motor control;range of motion (ROM);postural/trunk control  -     Cognitive Impairments, Mobility Safety/Performance  awareness, need for assistance;sequencing abilities;safety precaution follow-through  -       User Key  (r) = Recorded By, (t) = Taken By, (c) = Cosigned By    Initials Name Provider Type     Yesika Guaman, PT Physical Therapist        Mobility     Row Name 10/01/21 1100          Bed Mobility    Bed Mobility  supine-sit  -     Supine-Sit [place] (Bed Mobility)  moderate assist (50% patient effort);2 person assist  -     Assistive Device (Bed Mobility)  bed rails;draw sheet;head of bed elevated  -     Row Name 10/01/21 1100          Transfers    Comment (Transfers)  feet close together with posterior lean, improved after anterior translation activities are performed in sitting.  -     Row Name 10/01/21 1100          Bed-Chair Transfer    Bed-Chair [place] (Transfers)  maximum assist (25% patient effort);2 person assist  -     Assistive Device (Bed-Chair  Transfers)  -- gait belt  -JR     Row Name 10/01/21 1100          Sit-Stand Transfer    Sit-Stand [place] (Transfers)  moderate assist (50% patient effort);2 person assist  -JR     Assistive Device (Sit-Stand Transfers)  -- gait belt  -JR     Row Name 10/01/21 1100          Gait/Stairs (Locomotion)    [place] Level (Gait)  maximum assist (25% patient effort);2 person assist  -JR     Assistive Device (Gait)  -- gait belt  -JR     Distance in Feet (Gait)  1 step to the right sidestepping  -JR     Deviations/Abnormal Patterns (Gait)  base of support, narrow;festinating/shuffling;gait speed decreased;stride length decreased;other (see comments) posterior lean  -JR       User Key  (r) = Recorded By, (t) = Taken By, (c) = Cosigned By    Initials Name Provider Type    Yesika Diaz PT Physical Therapist        Obj/Interventions     Row Name 10/01/21 1100          Range of Motion Comprehensive    General Range of Motion  bilateral lower extremity ROM WNL  -JR     Comment, General Range of Motion  Stiff movements throughout  -JR     Row Name 10/01/21 1100          Strength Comprehensive (MMT)    Comment, General Manual Muscle Testing (MMT) Assessment  BLE grossly 3/5  -JR     Row Name 10/01/21 1100          Balance    Balance Assessment  sitting static balance;sitting dynamic balance;standing static balance;standing dynamic balance  -JR     Static Sitting Balance  WFL  -JR     Dynamic Sitting Balance  mild impairment  -JR     Static Standing Balance  moderate impairment  -JR     Dynamic Standing Balance  moderate impairment  -JR     Comment, Balance  He has posterior list in sitting and standing.  He leans to the right in sitting and can correct with verbal cues 50% of the time  -JR       User Key  (r) = Recorded By, (t) = Taken By, (c) = Cosigned By    Initials Name Provider Type    Yesika Diaz PT Physical Therapist        Goals/Plan     Row Name 10/01/21 1100          Bed Mobility Goal 1 (PT)     Activity/Assistive Device (Bed Mobility Goal 1, PT)  sit to supine;supine to sit  -JR     [place] Level/Cues Needed (Bed Mobility Goal 1, PT)  minimum assist (75% or more patient effort)  -JR     Time Frame (Bed Mobility Goal 1, PT)  short term goal (STG)  -JR     Progress/Outcomes (Bed Mobility Goal 1, PT)  goal ongoing  -     Row Name 10/01/21 1100          Transfer Goal 1 (PT)    Activity/Assistive Device (Transfer Goal 1, PT)  sit-to-stand/stand-to-sit;bed-to-chair/chair-to-bed  -JR     [place] Level/Cues Needed (Transfer Goal 1, PT)  minimum assist (75% or more patient effort)  -JR     Time Frame (Transfer Goal 1, PT)  10 days  -JR     Progress/Outcome (Transfer Goal 1, PT)  goal ongoing  -     Row Name 10/01/21 1100          Gait Training Goal 1 (PT)    Activity/Assistive Device (Gait Training Goal 1, PT)  gait (walking locomotion);assistive device use;walker, rolling  -JR     [place] Level (Gait Training Goal 1, PT)  minimum assist (75% or more patient effort)  -JR     Distance (Gait Training Goal 1, PT)  12  -JR     Time Frame (Gait Training Goal 1, PT)  10 days  -JR     Progress/Outcome (Gait Training Goal 1, PT)  goal ongoing  -       User Key  (r) = Recorded By, (t) = Taken By, (c) = Cosigned By    Initials Name Provider Type    Yesika Diaz, PT Physical Therapist        Clinical Impression     Row Name 10/01/21 1100          Pain    Additional Documentation  Pain Scale: Numbers Pre/Post-Treatment (Group)  -     Row Name 10/01/21 1100          Pain Scale: Numbers Pre/Post-Treatment    Pretreatment Pain Rating  0/10 - no pain  -JR     Posttreatment Pain Rating  0/10 - no pain  -     Row Name 10/01/21 1100          Plan of Care Review    Plan of Care Reviewed With  patient;spouse  -JR     Progress  no change  -JR     Outcome Summary  Patient participates well in skilled PT evaluation and demonstrates deficits in strength, balance, transfers, coordination and gait.  He had  posterior lean in sitting and standing that is improving after performing anterior translation activities.  He is able to perform a step to the right with max assit of 2.  He has difficulty coordination movements but is eager to walk.  He is expected to benefit from additional PT services while hospitalized and upon discharge to home with home health care.  -     Row Name 10/01/21 1100          Therapy Assessment/Plan (PT)    Patient/Family Therapy Goals Statement (PT)  To go home  -JR     Rehab Potential (PT)  good, to achieve stated therapy goals  -     Criteria for Skilled Interventions Met (PT)  yes;meets criteria;skilled treatment is necessary  -     Row Name 10/01/21 1100          Positioning and Restraints    Pre-Treatment Position  in bed  -JR     Post Treatment Position  chair  -JR     In Chair  sitting;call light within reach;encouraged to call for assist;with family/caregiver  -       User Key  (r) = Recorded By, (t) = Taken By, (c) = Cosigned By    Initials Name Provider Type    Yesika Diaz, PT Physical Therapist        Outcome Measures     Row Name 10/01/21 1100          How much help from another person do you currently need...    Turning from your back to your side while in flat bed without using bedrails?  2  -JR     Moving from lying on back to sitting on the side of a flat bed without bedrails?  2  -JR     Moving to and from a bed to a chair (including a wheelchair)?  2  -JR     Standing up from a chair using your arms (e.g., wheelchair, bedside chair)?  2  -JR     Climbing 3-5 steps with a railing?  1  -JR     To walk in hospital room?  1  -JR     AM-PAC 6 Clicks Score (PT)  10  -     Row Name 10/01/21 1149 10/01/21 1100       Functional Assessment    Outcome Measure Options  AM-PAC 6 Clicks Daily Activity (OT)  -SD  AM-PAC 6 Clicks Basic Mobility (PT)  -      User Key  (r) = Recorded By, (t) = Taken By, (c) = Cosigned By    Initials Name Provider Type    Yesika Diaz, PT  Physical Therapist    Lachelle Arenas, OT Occupational Therapist                       Physical Therapy Education                 Title: PT OT SLP Therapies (In Progress)     Topic: Physical Therapy (In Progress)     Point: Mobility training (Done)     Learning Progress Summary           Patient Acceptance, E,TB, VU by  at 10/1/2021 1218    Comment: Role of PT and POC                   Point: Home exercise program (Not Started)     Learner Progress:  Not documented in this visit.          Point: Body mechanics (Not Started)     Learner Progress:  Not documented in this visit.          Point: Precautions (Not Started)     Learner Progress:  Not documented in this visit.                      User Key     Initials Effective Dates Name Provider Type Discipline     06/16/21 -  Yesika Guaman, PT Physical Therapist PT              PT Recommendation and Plan  Planned Therapy Interventions (PT): stretching, balance training, bed mobility training, transfer training, gait training, home exercise program, patient/family education  Plan of Care Reviewed With: patient, spouse  Progress: no change  Outcome Summary: Patient participates well in skilled PT evaluation and demonstrates deficits in strength, balance, transfers, coordination and gait.  He had posterior lean in sitting and standing that is improving after performing anterior translation activities.  He is able to perform a step to the right with max assit of 2.  He has difficulty coordination movements but is eager to walk.  He is expected to benefit from additional PT services while hospitalized and upon discharge to home with home health care.     Time Calculation:   PT Charges     Row Name 10/01/21 1219             Time Calculation    Start Time  1100  -JR      PT Received On  10/01/21  -      PT Goal Re-Cert Due Date  10/11/21  -         Untimed Charges    PT Eval/Re-eval Minutes  54  -JR         Total Minutes    Untimed Charges Total Minutes  54  -JR        Total Minutes  54  -JR        User Key  (r) = Recorded By, (t) = Taken By, (c) = Cosigned By    Initials Name Provider Type    Yesika Diaz, PT Physical Therapist        Therapy Charges for Today     Code Description Service Date Service Provider Modifiers Qty    [id number] HC PT EVAL MOD COMPLEXITY 4 10/1/2021 Yesika Guaman, PT GP 1          PT G-Codes  Outcome Measure Options: AM-PAC 6 Clicks Daily Activity (OT)  AM-PAC 6 Clicks Score (PT): 10  AM-PAC 6 Clicks Score (OT): 12    Yesika Guaman, DIGNA  10/1/2021

## 2021-10-01 NOTE — PLAN OF CARE
Dr. Mays and I attempted to visit patient.  He is asleep in bed and appears to be comfortable.  Dr. Mays attempted to speak to the patient.  He opened his eyes, but was unable to respond.    Dr. Mays spoke to pt's wife re goals of care.  Long discussion had re pt's current health state.  Per conversation with wife yesterday, she stated that she would not put pt back in a facility.  After discussing goals for patient, wife states that she wants to take pt home and would like hospice services to follow. She reports a hospital bed and lift at home but would like to speak with hospice about what DME they could supply.      Called [facility]; spoke to Lilibeth.  Referral information given.  She will plan to contact wife to discuss services and DME supplied.  Palliative services will continue to follow.  If needed, hospice request comfortable meds be filled and sent home with pt at discharge.

## 2021-10-01 NOTE — PLAN OF CARE
Goal Outcome Evaluation:  Plan of Care Reviewed With: patient, spouse        Progress: no change  Outcome Summary: OT eval completed. Patient presents deficits in strength, endurance, balance, coordination and ADL performance. Patient completed bed mobility and sit to stand with mod A x 2, required max A x 2 to chair. Patient with posterior lean and difficulty moving feet. Patient requires max A for all ADLs. Patient is expected to benefit from continued OT services prior to DC.

## 2021-10-01 NOTE — PLAN OF CARE
Goal Outcome Evaluation:  Plan of Care Reviewed With: patient        Progress: improving  Outcome Summary: VSS; pt alert during shift; remains confused; continue wound care

## 2021-10-01 NOTE — PLAN OF CARE
Goal Outcome Evaluation:  Plan of Care Reviewed With: patient, spouse        Progress: no change  Outcome Summary: Patient participates well in skilled PT evaluation and demonstrates deficits in strength, balance, transfers, coordination and gait.  He had posterior lean in sitting and standing that is improving after performing anterior translation activities.  He is able to perform a step to the right with max assit of 2.  He has difficulty coordination movements but is eager to walk.  He is expected to benefit from additional PT services while hospitalized and upon discharge to home with home health care.

## 2021-10-01 NOTE — THERAPY EVALUATION
Patient Name: Garrett Richard  : 1950    MRN: [id number]                              Today's Date: 10/1/2021       Admit Date: 2021    Visit Dx:     ICD-10-CM ICD-9-CM   1. Hepatic encephalopathy (HCC)  K72.90 572.2     Patient Active Problem List   Diagnosis   • Hepatic cirrhosis (HCC)   • Type 1 diabetes mellitus with diabetic polyneuropathy (HCC)   • Hyperlipidemia   • Essential hypertension   • Hypogonadism in male   • Right renal mass   • Hepatocellular carcinoma (HCC)   • Fever   • Melena   • Type 2 diabetes mellitus with nephropathy (HCC)   • Encephalopathy, hepatic (HCC)   • AL (acute kidney injury) (HCC)   • Increased ammonia level   • Hepatic encephalopathy (HCC)   • Chronic kidney disease, stage III (moderate) (HCC)   • Severe malnutrition (HCC)   • COVID-19     Past Medical History:   Diagnosis Date   • AL (acute kidney injury) (CMS/HCC) 2021   • Diabetes mellitus (CMS/HCC)     type II per patient   • Hepatocellular carcinoma (CMS/HCC) 10/25/2018    s/p TACE procedure with no mass noted on fu imaging   • History of transfusion    • Hypertension    • Impaired functional mobility, balance, gait, and endurance    • Liver disease    • Umbilical hernia    • Upper respiratory infection    • Urinary symptom or sign    • Urinary tract infection      Past Surgical History:   Procedure Laterality Date   • ENDOSCOPY N/A 2021    Procedure: ESOPHAGOGASTRODUODENOSCOPY WITH ARGON THERMAL ABLATION;  Surgeon: Samy Swanson MD;  Location: [facility] ENDOSCOPY;  Service: Gastroenterology;  Laterality: N/A;   • LIVER SURGERY     • TONSILLECTOMY       General Information     Row Name 10/01/21 1135          OT Time and Intention    Document Type  evaluation  -SD     Mode of Treatment  occupational therapy  -SD     Row Name 10/01/21 1135          General Information    Patient Profile Reviewed  yes  -SD     Prior Level of Function  mod assist:;transfer;ADL's patient lives with wife, has a  juan ramon lift, but HH therapist had been working on tf training with patient  -SD     Existing Precautions/Restrictions  fall  -SD     Barriers to Rehab  medically complex;previous functional deficit;cognitive status  -SD     Row Name 10/01/21 1135          Living Environment    Lives With  spouse  -SD     Row Name 10/01/21 1135          Home Main Entrance    Number of Stairs, Main Entrance  one  -SD     Row Name 10/01/21 1135          Stairs Within Home, Primary    Number of Stairs, Within Home, Primary  none  -SD     Row Name 10/01/21 1135          Cognition    Orientation Status (Cognition)  oriented to;person;place  -SD     Row Name 10/01/21 1135          Safety Issues, Functional Mobility    Safety Issues Affecting Function (Mobility)  safety precautions follow-through/compliance;sequencing abilities;safety precaution awareness;insight into deficits/self-awareness;friction/shear risk;awareness of need for assistance;ability to follow commands  -SD     Impairments Affecting Function (Mobility)  balance;cognition;coordination;endurance/activity tolerance;motor planning;pain;postural/trunk control;range of motion (ROM);strength  -SD       User Key  (r) = Recorded By, (t) = Taken By, (c) = Cosigned By    Initials Name Provider Type    SD Lachelle Wolf OT Occupational Therapist          Mobility/ADL's     Row Name 10/01/21 1137          Bed Mobility    Bed Mobility  supine-sit;sit-supine  -SD     Supine-Sit [place] (Bed Mobility)  moderate assist (50% patient effort);2 person assist  -SD     Bed Mobility, Safety Issues  decreased use of arms for pushing/pulling;decreased use of legs for bridging/pushing;impaired trunk control for bed mobility  -SD     Assistive Device (Bed Mobility)  bed rails;draw sheet;head of bed elevated  -SD     Row Name 10/01/21 1137          Transfers    Transfers  sit-stand transfer;bed-chair transfer  -SD     Comment (Transfers)  posterior lean; difficulty picking up and moving feet   -SD     Bed-Chair [place] (Transfers)  maximum assist (25% patient effort);2 person assist  -SD     Sit-Stand [place] (Transfers)  moderate assist (50% patient effort);2 person assist  -[facility] Name 10/01/21 1137          Functional Mobility    Functional Mobility- Comment  NT  -SD     Row Name 10/01/21 1137          Activities of Daily Living    BADL Assessment/Intervention  bathing;upper body dressing;lower body dressing;grooming;feeding;toileting  -SD     Row Name 10/01/21 1137          Bathing Assessment/Intervention    [place] Level (Bathing)  maximum assist (25% patient effort)  -SD     Row Name 10/01/21 1137          Upper Body Dressing Assessment/Training    [place] Level (Upper Body Dressing)  maximum assist (25% patient effort)  -SD     Row Name 10/01/21 1137          Lower Body Dressing Assessment/Training    [place] Level (Lower Body Dressing)  maximum assist (25% patient effort)  -SD     Row Name 10/01/21 1137          Grooming Assessment/Training    [place] Level (Grooming)  moderate assist (50% patient effort)  -SD     Row Name 10/01/21 1137          Self-Feeding Assessment/Training    [place] Level (Feeding)  moderate assist (50% patient effort)  -SD     Row Name 10/01/21 1137          Toileting Assessment/Training    [place] Level (Toileting)  maximum assist (25% patient effort)  -SD       User Key  (r) = Recorded By, (t) = Taken By, (c) = Cosigned By    Initials Name Provider Type    Lachelle Arenas OT Occupational Therapist        Obj/Interventions     [facility] Name 10/01/21 1139          Range of Motion Comprehensive    General Range of Motion  upper extremity range of motion deficits identified  -SD     Comment, General Range of Motion  blaine UE stiff; deificts in elbow extension  -[facility] Name 10/01/21 1139          Strength Comprehensive (MMT)    General Manual Muscle Testing (MMT) Assessment  upper extremity strength deficits identified  -SD      Comment, General Manual Muscle Testing (MMT) Assessment  UB 3-/5  -SD       User Key  (r) = Recorded By, (t) = Taken By, (c) = Cosigned By    Initials Name Provider Type    Lachelle Arenas OT Occupational Therapist        Goals/Plan     Row Name 10/01/21 1145          Transfer Goal 1 (OT)    Activity/Assistive Device (Transfer Goal 1, OT)  bed-to-chair/chair-to-bed  -SD     [place] Level/Cues Needed (Transfer Goal 1, OT)  moderate assist (50-74% patient effort)  -SD     Time Frame (Transfer Goal 1, OT)  long term goal (LTG)  -SD     Progress/Outcome (Transfer Goal 1, OT)  goal ongoing  -SD     Row Name 10/01/21 1143          Dressing Goal 1 (OT)    Activity/Device (Dressing Goal 1, OT)  upper body dressing  -SD     [place]/Cues Needed (Dressing Goal 1, OT)  moderate assist (50-74% patient effort)  -SD     Time Frame (Dressing Goal 1, OT)  2 weeks  -SD     Progress/Outcome (Dressing Goal 1, OT)  goal ongoing  -SD     Row Name 10/01/21 1149          Self-Feeding Goal 1 (OT)    Activity/Device (Self-Feeding Goal 1, OT)  self-feeding skills, all  -SD     [place] Level/Cues Needed (Self-Feeding Goal 1, OT)  minimum assist (75% or more patient effort)  -SD     Time Frame (Self-Feeding Goal 1, OT)  2 weeks  -SD     Progress/Outcomes (Self-Feeding Goal 1, OT)  goal ongoing  -SD     Row Name 10/01/21 1143          Strength Goal 1 (OT)    Strength Goal 1 (OT)  Patient to perform UB AROM/ther ex as tolerated  -SD     Time Frame (Strength Goal 1, OT)  long term goal (LTG)  -SD     Progress/Outcome (Strength Goal 1, OT)  goal ongoing  -SD     Row Name 10/01/21 1146          Therapy Assessment/Plan (OT)    Planned Therapy Interventions (OT)  activity tolerance training;adaptive equipment training;BADL retraining;passive ROM/stretching;patient/caregiver education/training;ROM/therapeutic exercise;strengthening exercise;transfer/mobility retraining  -SD       User Key  (r) = Recorded By, (t) = Taken By, (c) =  Cosigned By    Initials Name Provider Type    Lachelle Arenas OT Occupational Therapist        Clinical Impression     Row Name 10/01/21 1142          Pain Assessment    Additional Documentation  Pain Scale: Numbers Pre/Post-Treatment (Group)  -SD     Row Name 10/01/21 1142          Pain Scale: Numbers Pre/Post-Treatment    Pretreatment Pain Rating  0/10 - no pain  -SD     Posttreatment Pain Rating  0/10 - no pain  -SD     Row Name 10/01/21 1142          Plan of Care Review    Plan of Care Reviewed With  patient;spouse  -SD     Progress  no change  -SD     Outcome Summary  OT eval completed. Patient presents deficits in strength, endurance, balance, coordination and ADL performance. Patient completed bed mobility and sit to stand with mod A x 2, required max A x 2 to chair. Patient with posterior lean and difficulty moving feet. Patient requires max A for all ADLs. Patient is expected to benefit from continued OT services prior to DC.  -SD     Row Name 10/01/21 1142          Therapy Assessment/Plan (OT)    Patient/Family Therapy Goal Statement (OT)  Return home  -SD     Rehab Potential (OT)  fair, will monitor progress closely  -SD     Criteria for Skilled Therapeutic Interventions Met (OT)  skilled treatment is necessary  -SD     Therapy Frequency (OT)  3 times/wk 5 times if indicated  -SD     Row Name 10/01/21 1142          Therapy Plan Review/Discharge Plan (OT)    Anticipated Discharge Disposition (OT)  home with assist;home with home health  -SD     Row Name 10/01/21 1147          Positioning and Restraints    Pre-Treatment Position  in bed  -SD     Post Treatment Position  chair  -SD     In Chair  sitting;call light within reach;encouraged to call for assist;with family/caregiver;notified nsg  -SD       User Key  (r) = Recorded By, (t) = Taken By, (c) = Cosigned By    Initials Name Provider Type    Lachelle Arenas OT Occupational Therapist        Outcome Measures     Row Name 10/01/21 1141           How much help from another is currently needed...    Putting on and taking off regular lower body clothing?  2  -SD     Bathing (including washing, rinsing, and drying)  2  -SD     Toileting (which includes using toilet bed pan or urinal)  2  -SD     Putting on and taking off regular upper body clothing  2  -SD     Taking care of personal grooming (such as brushing teeth)  2  -SD     Eating meals  2  -SD     AM-PAC 6 Clicks Score (OT)  12  -SD     Row Name 10/01/21 1149          Functional Assessment    Outcome Measure Options  AM-PAC 6 Clicks Daily Activity (OT)  -SD       User Key  (r) = Recorded By, (t) = Taken By, (c) = Cosigned By    Initials Name Provider Type    SD Lachelle Wolf OT Occupational Therapist          Occupational Therapy Education                 Title: PT OT SLP Therapies (In Progress)     Topic: Occupational Therapy (In Progress)     Point: ADL training (Done)     Description:   Instruct learner(s) on proper safety adaptation and remediation techniques during self care or transfers.   Instruct in proper use of assistive devices.              Learning Progress Summary           Patient Acceptance, E,TB, VU by SD at 10/1/2021 1150    Comment: Benefit of OT; OT POC   Significant Other Acceptance, E,TB, VU by SD at 10/1/2021 1150    Comment: Benefit of OT; OT POC                   Point: Home exercise program (Not Started)     Description:   Instruct learner(s) on appropriate technique for monitoring, assisting and/or progressing therapeutic exercises/activities.              Learner Progress:  Not documented in this visit.          Point: Precautions (Not Started)     Description:   Instruct learner(s) on prescribed precautions during self-care and functional transfers.              Learner Progress:  Not documented in this visit.          Point: Body mechanics (Not Started)     Description:   Instruct learner(s) on proper positioning and spine alignment during self-care, functional mobility  activities and/or exercises.              Learner Progress:  Not documented in this visit.                      User Key     Initials Effective Dates Name Provider Type Discipline    SD 06/16/21 -  Lachelle Wolf OT Occupational Therapist OT              OT Recommendation and Plan  Planned Therapy Interventions (OT): activity tolerance training, adaptive equipment training, BADL retraining, passive ROM/stretching, patient/caregiver education/training, ROM/therapeutic exercise, strengthening exercise, transfer/mobility retraining  Therapy Frequency (OT): 3 times/wk (5 times if indicated)  Plan of Care Review  Plan of Care Reviewed With: patient, spouse  Progress: no change  Outcome Summary: OT eval completed. Patient presents deficits in strength, endurance, balance, coordination and ADL performance. Patient completed bed mobility and sit to stand with mod A x 2, required max A x 2 to chair. Patient with posterior lean and difficulty moving feet. Patient requires max A for all ADLs. Patient is expected to benefit from continued OT services prior to DC.     Time Calculation:   Time Calculation- OT     Row Name 10/01/21 1151             Time Calculation- OT    OT Start Time  1101  -SD      OT Received On  10/01/21  -SD      OT Goal Re-Cert Due Date  10/13/21  -SD         Untimed Charges    OT Eval/Re-eval Minutes  45  -SD         Total Minutes    Untimed Charges Total Minutes  45  -SD       Total Minutes  45  -SD        User Key  (r) = Recorded By, (t) = Taken By, (c) = Cosigned By    Initials Name Provider Type    SD Lachelle Wolf OT Occupational Therapist        Therapy Charges for Today     Code Description Service Date Service Provider Modifiers Qty    25061750612 HC OT EVAL MOD COMPLEXITY 3 10/1/2021 Lachelle Wolf OT GO 1               Lachelle Wolf OT  10/1/2021

## 2021-10-01 NOTE — CONSULTS
[facility]    INPATIENT ADVANCED CARE PLANNING CONSULT      Name:  Garrett Richard   Age:  71 y.o.  Sex:  male  :  1950  MRN:  [id number]   Visit Number:  [id number]  Date of Service:  10/1/2021  Date of Admission:  2021  Primary Care Physician:  Pierce Lubin MD      Consulting Physician:    Dr. Kristan Connolly    Reason For Consult:    Addressing goals of care in the setting of clinical functional decline and debility    Brief Summary:    I reviewed in detail patient's clinical course on admission and sequence of events since then as reflected by history and physical, progress notes, consults, staff input and work-up.  In summary, the Jose Manuel is a 71 years old gentleman with history of hepatocellular carcinoma end-stage liver disease, chronic kidney disease left 2 diabetes mellitus and hypertension who was hospitalized  after presenting with increasing weakness and question.  Patient has been followed at  closely for consideration of liver transplant.,  He had multiple admissions to skilled nursing facilities; unfortunately, he continued to decline despite PT and OT.  Palliative care consultation was requested to address goals of care given above presentation.  Advance Care Planning   1.  Determination of decisional capacity:    ·  Patient lacks decisional capacity due to confusion with inability to comprehend the content of our discussion clear to rational decisions    2.  Advanced Directives:    · Living will on record  · DO NOT RESUSCITATE, DO NOT INTUBATE    3.  Patient & Family Meeting    · Meeting attendees: I contacted spouse, Bonny Richard by phone    Summary of the discussion:    · After initial introductions and presenting the role of palliative/supportive care team, I encouraged them to describe patient's clinical course over the past few months.  She reported progressive functional decline despite a plan for rehab requiring discharge home.  · In response to my  question regarding patient's good health, she confirmed and requested nothing DO NOT RESUSCITATE, DO NOT INTUBATE status based on patient's previous process.  · Regarding future, of expressed her belief that patient would elect to defer further hospitalizations or ER visits in any life prolonging measures given her progressive functional decline and poor quality of life.  Therefore, she clearly requested proceeding comfort measures via home-based hospice services.  · I presented in our clinic Medicare hospice benefit, levels of care was provided.  I assured her that patient will be comfortable while team will provide interdisciplinary care.  I also explained the option of transfer to the [facility] in the event of double symptoms not amenable to home based comfort measures.  · Above was communicated to case management and prompting who concurred and will follow on further plans.    Conclusions:    · Code status: DO NOT RESUSCITATE, DO NOT INTUBATE  · Medical interventions: Comfort measures  · Advanced directives: Living will, spouse, Bonny is healthcare surrogate       I appreciate the opportunity to participate in Mr. Richard's care.  Please feel free to contact me for any upcoming questions or concerns as need arises.     Total time spent in counseling and advanced care planning discussion per above documentation 45 min.     Dragon system was utilized for this dictation; therefore unintended spelling or expressions may be noted in the body of this document.     Beka Mays MD  10/1/2021

## 2021-10-01 NOTE — PROGRESS NOTES
[facility] HOSPITALIST    PROGRESS NOTE    Name:  Garrett Richard   Age:  71 y.o.  Sex:  male  :  1950  MRN:  [id number]   Visit Number:  [id number]  Admission Date:  2021  Date Of Service:  10/01/21  Primary Care Physician:  Pierce Lubin MD     LOS: 2 days :    Chief Complaint:      Weakness/ Failure to Thrive/ Confusion    Subjective:    Patient seen and evaluated today again.  Patient's wife is at bedside today.  Patient will wake today to voice and is having appropriate conversation.  He is more alert.  He denies any pain or other problems on exam.  He is able to pull his body over so I can listen to breath sounds.  He had a bowel movement and was aware during my exam.  Looks clinically much improved today although still very sick.    Hospital Course:    Patient is a 71-year-old gentleman well-known to the hospitalist service with history of hepatocellular carcinoma with ESLD, chronic kidney disease stage III, type 2 diabetes mellitus, hypertension, impaired mobility, chronic anemia, who had presented from home due to apparent confusion, lethargy.  Seems patient had apparently been doing okay per wife, however noticed confusion and fatigue over the last couple of days.  He had recently been in multiple nursing home facilities for rehab, however has now been at home with home health services.  Has previously followed with [facility] hepatology for consideration of liver transplant.  Patient has no significant complaints at time of visit.  Does seem rather sleepy, but is able to respond to verbal stimuli.  Was able to swallow pills at time of my visit.  History is limited however.     Review of Systems:     All systems were reviewed and negative except as mentioned in subjective, assessment and plan.    Vital Signs:    Temp:  [97.4 °F (36.3 °C)-98 °F (36.7 °C)] 97.5 °F (36.4 °C)  Heart Rate:  [73-87] 81  Resp:  [14-20] 17  BP: (106-124)/(60-66) 117/65    Intake and output:    I/O last  3 completed shifts:  In: 1714 [I.V.:1714]  Out: -   I/O this shift:  In: 120 [P.O.:120]  Out: -     Physical Examination:    General Appearance:  Alert and cooperative, resting in bed on exam with wife at bedside. Chronically ill.   Head:  Atraumatic and normocephalic.   Eyes: Conjunctivae and sclerae normal, no icterus. No pallor.   Throat: No oral lesions, no thrush, oral mucosa moist.   Neck: Supple, trachea midline   Lungs:   Breath sounds heard bilaterally equally.  No crackles or wheezing.   Heart:  Normal S1 and S2, no murmur, no gallop, no rub. No JVD.   Abdomen:   Normal bowel sounds, no masses, no organomegaly. Soft, nontender, nondistended, no rebound tenderness.   Extremities: Supple, no edema, no cyanosis, no clubbing.   Skin: No bleeding or rash.   Neurologic: Alert and appropriate conversation and responses noted today. No facial asymmetry. Moves all four limbs. Very weak.     Laboratory results:    Results from last 7 days   Lab Units 10/01/21  0617 09/30/21  0705 09/29/21  1613   SODIUM mmol/L 140 139 133*   POTASSIUM mmol/L 3.2* 3.2* 2.9*   CHLORIDE mmol/L 111* 106 97*   CO2 mmol/L 19.7* 22.0 22.0   BUN mg/dL 24* 25* 24*   CREATININE mg/dL 1.55* 1.92* 1.90*   CALCIUM mg/dL 8.4* 8.5* 8.9   BILIRUBIN mg/dL  --   --  2.1*   ALK PHOS U/L  --   --  128*   ALT (SGPT) U/L  --   --  42*   AST (SGOT) U/L  --   --  46*   GLUCOSE mg/dL 95 85 267*     Results from last 7 days   Lab Units 10/01/21  0617 09/29/21  1613   WBC 10*3/mm3 4.96 6.83   HEMOGLOBIN g/dL 11.2* 12.5*   HEMATOCRIT % 34.4* 36.6*   PLATELETS 10*3/mm3 40* 61*         Results from last 7 days   Lab Units 09/29/21  1613   TROPONIN T ng/mL 0.042*             I have reviewed the patient's laboratory results.    Radiology results:    XR Chest 1 View    Result Date: 9/29/2021  PROCEDURE: XR CHEST 1 VW-  HISTORY: Weak/Dizzy/AMS triage protocol  COMPARISON:  6/12/2021  FINDINGS:  Portable view of the chest demonstrates the lungs to be grossly clear.  There is no evidence of effusion, pneumothorax or other significant pleural disease. The mediastinum is unremarkable.  The heart size is normal.      Impression: Unremarkable portable chest.  This report was finalized on 9/29/2021 4:36 PM by Agustin Guallpa MD.    I have reviewed the patient's radiology reports.    Medication Review:     I have reviewed the patient's active and prn medications.     Problem List:      Hepatic encephalopathy (HCC)    Hepatic cirrhosis (HCC)    Essential hypertension    Hepatocellular carcinoma (HCC)    Type 2 diabetes mellitus with nephropathy (HCC)    Chronic kidney disease, stage III (moderate) (HCC)    Severe malnutrition (HCC)      Assessment:    1.  Suspected hepatic encephalopathy, present on admission, acute  2.  Dehydration with hypokalemia  3.  Hepatocellular carcinoma status post TACE  4.  History of Davis cirrhosis  5.  Hypertension   6.  Type 2 diabetes  7.  History of renal cell carcinoma  8.  Impaired mobility and ADLs  9.  History of GI bleed    Plan:    Continue current plan of care.  Potassium replaced orally today.  Continue IV fluids.  Patient's wife will remain at bedside to speak with Dr. Mays today about options moving forward hospice vs CCC vs Palliative Care.  Wife is concerned about already having necessary equipment at their house and hospice bringing their own supplies since it was already paid by their insurance.  Patient's wife adamantly refuses any nursing home or rehab placement due to poor experience previously.  Continue to monitor closely.  Continue current medications.  Supportive care as ordered.      DVT Prophylaxis: SCDs  Code Status: DNR/DNI  Diet: As tolerated  Discharge Plan: Pending    Angeles Kiran, APRN  10/01/21  11:26 EDT    Dictated utilizing Dragon dictation.

## 2021-10-02 NOTE — DISCHARGE SUMMARY
[facility]   DISCHARGE SUMMARY      Name:  Garrett Richard   Age:  71 y.o.  Sex:  male  :  1950  MRN:  [id number]   Visit Number:  [id number]  Primary Care Physician:  Pierce Lubin MD  Date of Discharge:  10/2/2021  Admission Date:  2021      Discharge Diagnosis:           Hepatic encephalopathy (HCC)    Hepatic cirrhosis (HCC)    Essential hypertension    Hepatocellular carcinoma (HCC)    Type 2 diabetes mellitus with nephropathy (HCC)    Chronic kidney disease, stage III (moderate) (HCC)    Severe malnutrition (HCC)      Presenting Problem/History of Present Illness:    Hepatic encephalopathy (HCC) [K72.90]     Consults:     Consults     Date and Time Order Name Status Description    2021 12:29 PM Inpatient Palliative Care MD Consult            Procedures Performed:             History of presenting illness:  71-year-old male with history of liver cirrhosis with end-stage liver disease awaiting liver transplant for more than a year.  In the meantime had developed hepatocellular carcinoma treated with TACE procedure.  Also history of renal cell carcinoma status post resection.  Has had significant ascites, recurring spontaneous bacterial peritonitis and hepatic encephalopathy.  Has had more than 6 hospital admissions over the last 3-1/2 months for exacerbation of his symptoms.  He was brought into the emergency room with complaints of confusion and fatigue.  He had been at a rehab facility prior to being at home over the last few days    Hospital Course:  Patient appeared dehydrated on clinical exam and confirmed by lab work P where he was given gentle IV hydration diuretic therapy was placed on hold for a short time.  Patient is diabetic he was monitored with Accu-Cheks and a sliding scale.  Detailed discussion held with patient's wife and it was decided that patient would be a good candidate for hospice and palliative care.  Consult was obtained.  Awaiting  transfer to home under hospice care  He was continued on oral antibiotic therapy.  Nutrition consult has been obtained the recommendations are being followed  Review of Systems:  Review of Systems   Constitutional: Positive for fatigue and unexpected weight change. Negative for activity change, appetite change and fever.        Appears malnourished   HENT: Negative for congestion, ear discharge, ear pain and trouble swallowing.    Eyes: Negative for photophobia and visual disturbance.   Respiratory: Positive for shortness of breath. Negative for cough.    Cardiovascular: Negative for chest pain and palpitations.   Gastrointestinal: Positive for abdominal distention and nausea. Negative for abdominal pain, constipation, diarrhea and vomiting.   Endocrine: Negative.    Genitourinary: Negative for dysuria, hematuria and urgency.   Musculoskeletal: Negative for arthralgias, back pain, joint swelling and myalgias.   Skin: Negative for color change and rash.   Allergic/Immunologic: Negative.    Neurological: Negative for dizziness, weakness, light-headedness and headaches.   Hematological: Negative for adenopathy. Does not bruise/bleed easily.   Psychiatric/Behavioral: Positive for confusion, decreased concentration, dysphoric mood and sleep disturbance. Negative for agitation. The patient is not nervous/anxious.         Vital Signs:    Temp:  [97.1 °F (36.2 °C)-98.2 °F (36.8 °C)] 97.9 °F (36.6 °C)  Heart Rate:  [75-87] 75  Resp:  [16-20] 18  BP: (106-127)/(54-69) 117/60    Physical Exam:  Physical Exam  Constitutional:       General: He is not in acute distress.     Appearance: He is well-developed. He is ill-appearing.   HENT:      Nose: Nose normal.   Eyes:      General: No scleral icterus.     Conjunctiva/sclera: Conjunctivae normal.   Neck:      Thyroid: No thyromegaly.      Trachea: No tracheal deviation.   Cardiovascular:      Rate and Rhythm: Normal rate and regular rhythm.      Heart sounds: No murmur heard.   No  friction rub.   Pulmonary:      Effort: No respiratory distress.      Breath sounds: No wheezing or rales.   Abdominal:      General: There is no distension.      Palpations: Abdomen is soft. There is no mass.      Tenderness: There is no abdominal tenderness. There is no guarding.      Comments: Ascites   Musculoskeletal:         General: Deformity present. Normal range of motion.   Lymphadenopathy:      Cervical: No cervical adenopathy.   Skin:     General: Skin is warm and dry.      Findings: Bruising present. No erythema or rash.   Neurological:      Mental Status: He is alert. He is disoriented.      Cranial Nerves: No cranial nerve deficit.      Coordination: Coordination abnormal.      Deep Tendon Reflexes: Reflexes are normal and symmetric.         Pertinent Lab Results:     Lab Results (last 72 hours)     Procedure Component Value Units Date/Time    Basic Metabolic Panel [[id number]]  (Abnormal) Collected: 10/02/21 0721    Specimen: Blood Updated: 10/02/21 0808     Glucose 160 mg/dL      BUN 22 mg/dL      Creatinine 1.19 mg/dL      Sodium 135 mmol/L      Potassium 3.8 mmol/L      Chloride 110 mmol/L      CO2 17.0 mmol/L      Calcium 7.9 mg/dL      eGFR Non African Amer 61 mL/min/1.73      BUN/Creatinine Ratio 18.5     Anion Gap 8.0 mmol/L     Narrative:      GFR Normal >60  Chronic Kidney Disease <60  Kidney Failure <15      CBC (No Diff) [[id number]]  (Abnormal) Collected: 10/02/21 0721    Specimen: Blood Updated: 10/02/21 0759     WBC 4.34 10*3/mm3      RBC 2.92 10*6/mm3      Hemoglobin 10.0 g/dL      Hematocrit 29.7 %      .7 fL      MCH 34.2 pg      MCHC 33.7 g/dL      RDW 14.9 %      RDW-SD 55.1 fl      MPV 10.7 fL      Platelets 29 10*3/mm3     POC Glucose Once [[id number]]  (Abnormal) Collected: 10/02/21 0515    Specimen: Blood Updated: 10/02/21 0547     Glucose 161 mg/dL      Comment: Serial Number: [id number]:  [id number]       POC Glucose Once [[id number]]  (Abnormal) Collected: 10/01/21  2023    Specimen: Blood Updated: 10/01/21 2027     Glucose 175 mg/dL      Comment: Serial Number: [id number]:  [id number]       POC Glucose Once [[id number]]  (Abnormal) Collected: 10/01/21 1552    Specimen: Blood Updated: 10/01/21 1608     Glucose 204 mg/dL      Comment: Serial Number: [id number]:  [id number]       POC Glucose Once [[id number]]  (Normal) Collected: 10/01/21 1115    Specimen: Blood Updated: 10/01/21 1119     Glucose 103 mg/dL      Comment: Serial Number: [id number]:  [id number]       Basic Metabolic Panel [[id number]]  (Abnormal) Collected: 10/01/21 0617    Specimen: Blood Updated: 10/01/21 0725     Glucose 95 mg/dL      BUN 24 mg/dL      Creatinine 1.55 mg/dL      Sodium 140 mmol/L      Potassium 3.2 mmol/L      Chloride 111 mmol/L      CO2 19.7 mmol/L      Calcium 8.4 mg/dL      eGFR Non African Amer 45 mL/min/1.73      BUN/Creatinine Ratio 15.5     Anion Gap 9.3 mmol/L     Narrative:      GFR Normal >60  Chronic Kidney Disease <60  Kidney Failure <15      CBC (No Diff) [[id number]]  (Abnormal) Collected: 10/01/21 0617    Specimen: Blood Updated: 10/01/21 0707     WBC 4.96 10*3/mm3      RBC 3.32 10*6/mm3      Hemoglobin 11.2 g/dL      Hematocrit 34.4 %      .6 fL      MCH 33.7 pg      MCHC 32.6 g/dL      RDW 15.1 %      RDW-SD 56.8 fl      MPV 10.2 fL      Platelets 40 10*3/mm3     POC Glucose Once [[id number]]  (Normal) Collected: 10/01/21 0601    Specimen: Blood Updated: 10/01/21 0618     Glucose 102 mg/dL      Comment: Serial Number: [id number]:  [id number]       POC Glucose Once [[id number]]  (Abnormal) Collected: 09/30/21 2007    Specimen: Blood Updated: 09/30/21 2020     Glucose 204 mg/dL      Comment: Serial Number: [id number]:  [id number]       POC Glucose Once [[id number]]  (Normal) Collected: 09/30/21 1607    Specimen: Blood Updated: 09/30/21 1625     Glucose 106 mg/dL      Comment: Serial Number: [id number]:  [id number]       POC Glucose Once [[id number]]   (Normal) Collected: 09/30/21 1053    Specimen: Blood Updated: 09/30/21 1057     Glucose 93 mg/dL      Comment: Serial Number: [id number]:  [id number]       Basic Metabolic Panel [[id number]]  (Abnormal) Collected: 09/30/21 0705    Specimen: Blood Updated: 09/30/21 0735     Glucose 85 mg/dL      BUN 25 mg/dL      Creatinine 1.92 mg/dL      Sodium 139 mmol/L      Potassium 3.2 mmol/L      Chloride 106 mmol/L      CO2 22.0 mmol/L      Calcium 8.5 mg/dL      eGFR Non African Amer 35 mL/min/1.73      BUN/Creatinine Ratio 13.0     Anion Gap 11.0 mmol/L     Narrative:      GFR Normal >60  Chronic Kidney Disease <60  Kidney Failure <15      Magnesium [[id number]]  (Normal) Collected: 09/30/21 0705    Specimen: Blood Updated: 09/30/21 0735     Magnesium 1.8 mg/dL     POC Glucose Once [[id number]]  (Normal) Collected: 09/30/21 0617    Specimen: Blood Updated: 09/30/21 0621     Glucose 98 mg/dL      Comment: Serial Number: [id number]:  [id number]       POC Glucose Once [[id number]]  (Abnormal) Collected: 09/29/21 2118    Specimen: Blood Updated: 09/29/21 2120     Glucose 222 mg/dL      Comment: Serial Number: [id number]:  [id number]       [place] Draw [[id number]] Collected: 09/29/21 1613    Specimen: Blood Updated: 09/29/21 1715    Narrative:      The following orders were created for panel order [place] Draw.  Procedure                               Abnormality         Status                     ---------                               -----------         ------                     Green Top (Gel)[[id number]]                                  Final result               Lavender Top[[id number]]                                     Final result               Gold Top - SST[[id number]]                                   Final result               Light Blue Top[[id number]]                                   Final result                 Please view results for these tests on the individual orders.    Green Top (Gel) [[id number]]  Collected: 09/29/21 1613    Specimen: Blood Updated: 09/29/21 1715     Extra Tube Hold for add-ons.     Comment: Auto resulted.       Lavender Top [[id number]] Collected: 09/29/21 1613    Specimen: Blood Updated: 09/29/21 1715     Extra Tube hold for add-on     Comment: Auto resulted       Gold Top - SST [[id number]] Collected: 09/29/21 1613    Specimen: Blood Updated: 09/29/21 1715     Extra Tube Hold for add-ons.     Comment: Auto resulted.       Light Blue Top [[id number]] Collected: 09/29/21 1613    Specimen: Blood Updated: 09/29/21 1715     Extra Tube hold for add-on     Comment: Auto resulted       Troponin [[id number]]  (Abnormal) Collected: 09/29/21 1613    Specimen: Blood Updated: 09/29/21 1659     Troponin T 0.042 ng/mL     Narrative:      Troponin T Reference Range:  <= 0.03 ng/mL-   Negative for AMI  >0.03 ng/mL-     Abnormal for myocardial necrosis.  Clinicians would have to utilize clinical acumen, EKG, Troponin and serial changes to determine if it is an Acute Myocardial Infarction or myocardial injury due to an underlying chronic condition.       Results may be falsely decreased if patient taking Biotin.      CBC & Differential [[id number]]  (Abnormal) Collected: 09/29/21 1613    Specimen: Blood Updated: 09/29/21 1643    Narrative:      The following orders were created for panel order CBC & Differential.  Procedure                               Abnormality         Status                     ---------                               -----------         ------                     CBC Auto Differential[[id number]]        Abnormal            Final result               Scan Slide[[id number]]                                       Final result                 Please view results for these tests on the individual orders.    Scan Slide [[id number]] Collected: 09/29/21 1613    Specimen: Blood Updated: 09/29/21 1643     RBC Morphology Normal     WBC Morphology Normal     Platelet Estimate Decreased    CBC Auto  Differential [[id number]]  (Abnormal) Collected: 09/29/21 1613    Specimen: Blood Updated: 09/29/21 1643     WBC 6.83 10*3/mm3      RBC 3.75 10*6/mm3      Hemoglobin 12.5 g/dL      Hematocrit 36.6 %      MCV 97.6 fL      MCH 33.3 pg      MCHC 34.2 g/dL      RDW 14.5 %      RDW-SD 51.7 fl      MPV 10.4 fL      Platelets 61 10*3/mm3      Neutrophil % 79.8 %      Lymphocyte % 10.8 %      Monocyte % 7.2 %      Eosinophil % 1.3 %      Basophil % 0.6 %      Immature Grans % 0.3 %      Neutrophils, Absolute 5.45 10*3/mm3      Lymphocytes, Absolute 0.74 10*3/mm3      Monocytes, Absolute 0.49 10*3/mm3      Eosinophils, Absolute 0.09 10*3/mm3      Basophils, Absolute 0.04 10*3/mm3      Immature Grans, Absolute 0.02 10*3/mm3      nRBC 0.0 /100 WBC     Magnesium [[id number]]  (Normal) Collected: 09/29/21 1613    Specimen: Blood Updated: 09/29/21 1642     Magnesium 1.9 mg/dL     Comprehensive Metabolic Panel [[id number]]  (Abnormal) Collected: 09/29/21 1613    Specimen: Blood Updated: 09/29/21 1642     Glucose 267 mg/dL      Comment: Glucose >180, Hemoglobin A1C recommended.        BUN 24 mg/dL      Creatinine 1.90 mg/dL      Sodium 133 mmol/L      Potassium 2.9 mmol/L      Comment: Slight hemolysis detected by analyzer. Results may be affected.        Chloride 97 mmol/L      CO2 22.0 mmol/L      Calcium 8.9 mg/dL      Total Protein 6.4 g/dL      Albumin 2.50 g/dL      ALT (SGPT) 42 U/L      AST (SGOT) 46 U/L      Alkaline Phosphatase 128 U/L      Total Bilirubin 2.1 mg/dL      eGFR Non African Amer 35 mL/min/1.73      Globulin 3.9 gm/dL      A/G Ratio 0.6 g/dL      BUN/Creatinine Ratio 12.6     Anion Gap 14.0 mmol/L     Narrative:      GFR Normal >60  Chronic Kidney Disease <60  Kidney Failure <15      Ammonia [[id number]]  (Abnormal) Collected: 09/29/21 1613    Specimen: Blood Updated: 09/29/21 1640     Ammonia 130 umol/L           Pertinent Radiology Results:    Imaging Results (All)     Procedure Component Value Units Date/Time     XR Chest 1 View [[id number]] Collected: 09/29/21 1636     Updated: 09/29/21 1640    Narrative:      PROCEDURE: XR CHEST 1 VW-     HISTORY: Weak/Dizzy/AMS triage protocol     COMPARISON:  6/12/2021     FINDINGS:  Portable view of the chest demonstrates the lungs to be  grossly clear. There is no evidence of effusion, pneumothorax or other  significant pleural disease. The mediastinum is unremarkable.     The heart size is normal.       Impression:      Unremarkable portable chest.      This report was finalized on 9/29/2021 4:36 PM by Agustin Guallpa MD.          Condition on Discharge:    Afebrile hemodynamically stable.  Still disoriented and poor oral intake    Code status during the hospital stay:    Code Status and Medical Interventions:   Ordered at: 09/29/21 1914     Limited Support to NOT Include:    Intubation    Cardioversion/Defibrillation     Code Status:    No CPR     Medical Interventions (Level of Support Prior to Arrest):    Limited       Discharge Disposition:    Hospice/Home    Discharge Medication:       Discharge Medications      Changes to Medications      Instructions Start Date   furosemide 40 MG tablet  Commonly known as: Lasix  What changed: how much to take   20 mg, Oral, Daily      lactulose 10 GM/15ML solution  Commonly known as: CHRONULAC  What changed: how much to take   20 g, Oral, 4 Times Daily      spironolactone 50 MG tablet  Commonly known as: Aldactone  What changed:   · how much to take  · when to take this   50 mg, Oral, 2 Times Daily         Continue These Medications      Instructions Start Date   ascorbic acid 500 MG tablet  Commonly known as: VITAMIN C   500 mg, Oral, Daily      B-D ULTRAFINE III SHORT PEN 31G X 8 MM misc  Generic drug: Insulin Pen Needle   No dose, route, or frequency recorded.      ferrous sulfate 325 (65 FE) MG tablet   325 mg, Oral, Daily With Breakfast, 2 tablets once a day       FreeStyle Sohail 14 Day [place] device   1 each, Does not apply, Every 14  Days      NovoLOG FlexPen 100 UNIT/ML solution pen-injector sc pen  Generic drug: insulin aspart   7 Units, Subcutaneous, 3 Times Daily With Meals, Sliding scale - depending on glucose readings      pantoprazole 40 MG EC tablet  Commonly known as: PROTONIX   40 mg, Oral, Daily      riFAXIMin 550 MG tablet  Commonly known as: XIFAXAN   550 mg, Oral, Every 12 Hours Scheduled      sucralfate 1 g tablet  Commonly known as: Carafate   1 g, Oral, 4 Times Daily         Stop These Medications    Lantus SoloStar 100 UNIT/ML injection pen  Generic drug: Insulin Glargine            Discharge Diet: As tolerated        Activity at Discharge: As tolerated        Follow-up Appointments:     Contact information for follow-up providers     Pierce Lubin MD .    Specialty: Internal Medicine  Contact information:  [address] [place] [address] [phone]                   Contact information for after-discharge care     Home Medical Care     [facility] .    Service: Home Health Services  Contact information:  [address] [place] [address] [address] [place] [place] [address] [phone]                                 Test Results Pending at Discharge:    Discussed in detail with hospice consult Dr. Dereck Hanson.  Discussed with wife over the phone she is ready to accept him at home as long as the hospice equipment arrangements have all been made.  Already has a hospital bed at home.  Comfort measures DNR       Pierce Lubin MD  10/02/21  09:59 EDT    Time: Discharge 40 min

## 2021-10-02 NOTE — NURSING NOTE
Patient discharged at this time per MD order. Supplies delivered to patient's home per hospice. Patient discharging home with wife and hospice care. Patient and wife given discharge instructions/teaching and follow up appointments. Verbalized understanding. Patient left facility via stretcher with EMS with no acute distress noted.

## 2021-10-02 NOTE — CASE MANAGEMENT/SOCIAL WORK
Discharge Planning Assessment   Oskar     Patient Name: Garrett Richard  MRN: [id number]  Today's Date: 10/2/2021    Admit Date: 9/29/2021    Discharge Needs Assessment    No documentation.       Discharge Plan     Row Name 10/02/21 1856       Plan    Plan  Called [facility] on DME status   Laureen the on call spoke with pt's spouse all day and gave her updates   All electric bed delivered and set up and pt's spouse notified    Pt discharged home without any further incident        Continued Care and Services - Discharged on 10/2/2021 Admission date: 9/29/2021 - Discharge disposition: Hospice/Home    Home Medical Care     Service Provider Request Status Selected Services Address Phone Fax Patient Preferred    [facility]   Selected Home Health Services [address] [place] [address] [phone] --            Selected Continued Care - Episodes Includes selections from active Coordinated Care Management episodes    High Risk Care Management Episode start date: 8/13/2021 (Paused)   There are no active outsourced providers for this episode.             Selected Continued Care - Prior Encounters Includes selections from prior encounters from 7/1/2021 to 10/2/2021    Discharged on 8/21/2021 Admission date: 8/17/2021 - Discharge disposition: Skilled Nursing Facility (DC - External)    Destination     Service Provider Selected Services Address Phone Fax Patient Preferred    [facility]  Skilled Nursing [address] [facility] [address] [phone] --                Discharged on 8/11/2021 Admission date: 8/8/2021 - Discharge disposition: Skilled Nursing Facility (DC - External)    Destination     Service Provider Selected Services Address Phone Fax Patient Preferred    [facility]  Skilled Nursing [address] [place] [address] [phone] --       Internal Comment last updated by Velma  RYLEY Vera 8/11/2021 0933    Precert started 8/10/21                     Home Medical Care     Service Provider Selected Services Address Phone Fax Patient Preferred    [facility] [address] [facility] [place] [address] [phone] --       Internal Comment last updated by Audrey Funez RN 8/9/2021 1833    Current provider for PT.  If patient goes home,  The wife is requesting Nursing and OT be added as well.- Audrey Funez 1830 8/9/21                               Expected Discharge Date and Time     Expected Discharge Date Expected Discharge Time    Oct 2, 2021         Demographic Summary    No documentation.       Functional Status    No documentation.       Psychosocial    No documentation.       Abuse/Neglect    No documentation.       Legal    No documentation.       Substance Abuse    No documentation.       Patient Forms    No documentation.           Darlyn Pak, RN

## 2021-10-03 NOTE — CASE MANAGEMENT/SOCIAL WORK
Case Management Discharge Note           Provided Post Acute Provider List?: N/A  Provided Post Acute Provider Quality & Resource List?: N/A    Selected Continued Care - Discharged on 10/2/2021 Admission date: 9/29/2021 - Discharge disposition: Hospice/Home    Destination    No services have been selected for the patient.              Durable Medical Equipment    No services have been selected for the patient.              Dialysis/Infusion    No services have been selected for the patient.              Home Medical Care Coordination complete.    Service Provider Selected Services Address Phone Fax Patient Preferred    [facility] [address] [place] [address] [phone] --    [facility]  Home [facility] [address] [place] [address] [phone] --          Therapy    No services have been selected for the patient.              Community Resources    No services have been selected for the patient.              Community & DME    No services have been selected for the patient.                Selected Continued Care - Episodes Includes selections from active Coordinated Care Management episodes    High Risk Care Management Episode start date: 8/13/2021 (Paused)   There are no active outsourced providers for this episode.             Selected Continued Care - Prior Encounters Includes selections from prior encounters from 7/1/2021 to 10/2/2021    Discharged on 8/21/2021 Admission date: 8/17/2021 - Discharge disposition: Skilled Nursing Facility (DC - External)    Destination     Service Provider Selected Services Address Phone Fax Patient Preferred    [facility]  Skilled Nursing [address] [place] [id number] [phone] --                Discharged on 8/11/2021 Admission date: 8/8/2021 - Discharge disposition: Skilled Nursing Facility (DC - External)    Destination     Service Provider  Selected Services Address Phone Fax Patient Preferred    [facility]  Skilled Nursing [address] [place] [address] [phone] --       Internal Comment last updated by Jody Carreon MSW 8/11/2021 0908    Precert started 8/10/21                     Home Medical Care     Service Provider Selected Services Address Phone Fax Patient Preferred    [facility]  Home Health Services [address] [place] [address] [phone] --       Internal Comment last updated by Audrey Funez, RN 8/9/2021 1833    Current provider for PT.  If patient goes home,  The wife is requesting Nursing and OT be added as well.- Audrey Funez 1830 8/9/21                               Transportation Services  Ambulance: [facility]    Final Discharge Disposition Code: 50 - home with hospice

## 2021-10-07 NOTE — TELEPHONE ENCOUNTER
PHONE CALL FROM [facility].  THEY RECEIVED PRESCRIPTION FOR TRUE [employer]RI[employer] CONTROL SOLUTION AND BD SINGLE USE SWABS AND THE FAX WAS NOT SIGNED.  PLEASE HAVE THAT SIGNED AND REFAXED [phone].    CALL IF NEEDED NAIDA @ [phone]

## 2021-10-08 NOTE — TELEPHONE ENCOUNTER
Test strips are just a back up for Garrett - he has [employer]. The fax did not  Dr. Lubin's sig. I will send in Epic

## 2023-01-12 NOTE — TELEPHONE ENCOUNTER
Caller: DUARTE    Relationship to patient: REPRESENTATIVE FROM [facility]    Best call back number:    [phone]    New or established patient?  [] New  [x] Established    Date of discharge:     6/16/221    Facility discharged from:     [facility]    Diagnosis/Symptoms:    HEPATIC ENCEPHALOPATHY    Length of stay (If applicable):     N/A    Specialty Only: Did you see a [facility] provider?    [] Yes  [] No  If so, who?    TIERRA WAS UNABLE TO SCHEDULE HOSPITAL FOLLOW UP APPOINTMENT WITH DR NYASIA MAYORGA SCHEDULED HOSPITAL FOLLOW UP APPOINTMENT WITH SAURABH MANN   0

## 2024-03-26 NOTE — PLAN OF CARE
Goal Outcome Evaluation:  Plan of Care Reviewed With: patient        Progress: improving  Outcome Summary: no acute events noted this shift.  maintaining o2 sats above 90% on room air.  safety precautions ongoing.  will cont. to monitor   5

## (undated) DEVICE — HYBRID TUBING/CAP SET FOR OLYMPUS® SCOPES: Brand: ERBE

## (undated) DEVICE — ENDOSCOPY PORT CONNECTOR FOR OLYMPUS® SCOPES: Brand: ERBE

## (undated) DEVICE — FIAPC® PROBE W/ FILTER 2200 C OD 2.3MM/6.9FR; L 2.2M/7.2FT: Brand: ERBE

## (undated) DEVICE — SUCTION CANISTER, 1500CC, RIGID: Brand: DEROYAL

## (undated) DEVICE — VLV SXN AIR/H2O ORCAPOD3 1P/U STRL

## (undated) DEVICE — CONMED SCOPE SAVER BITE BLOCK, 20X27 MM: Brand: SCOPE SAVER

## (undated) DEVICE — Device